# Patient Record
Sex: FEMALE | Race: WHITE | NOT HISPANIC OR LATINO | Employment: UNEMPLOYED | URBAN - METROPOLITAN AREA
[De-identification: names, ages, dates, MRNs, and addresses within clinical notes are randomized per-mention and may not be internally consistent; named-entity substitution may affect disease eponyms.]

---

## 2017-08-10 ENCOUNTER — HISTORICAL (OUTPATIENT)
Dept: ADMINISTRATIVE | Facility: HOSPITAL | Age: 52
End: 2017-08-10

## 2017-12-22 ENCOUNTER — HISTORICAL (OUTPATIENT)
Dept: RADIOLOGY | Facility: HOSPITAL | Age: 52
End: 2017-12-22

## 2019-02-20 ENCOUNTER — HISTORICAL (OUTPATIENT)
Dept: RADIOLOGY | Facility: HOSPITAL | Age: 54
End: 2019-02-20

## 2019-03-20 ENCOUNTER — HISTORICAL (OUTPATIENT)
Dept: RADIOLOGY | Facility: HOSPITAL | Age: 54
End: 2019-03-20

## 2020-09-09 ENCOUNTER — HISTORICAL (OUTPATIENT)
Dept: RADIOLOGY | Facility: HOSPITAL | Age: 55
End: 2020-09-09

## 2020-09-09 LAB
ABS NEUT (OLG): 6.9 X10(3)/MCL (ref 2.1–9.2)
ALBUMIN SERPL-MCNC: 3.7 GM/DL (ref 3.4–5)
ALBUMIN/GLOB SERPL: 1 RATIO (ref 1.1–2)
ALP SERPL-CCNC: 75 UNIT/L (ref 45–117)
ALT SERPL-CCNC: 22 UNIT/L (ref 12–78)
AST SERPL-CCNC: 25 UNIT/L (ref 15–37)
B-HCG SERPL QL: NEGATIVE
BASOPHILS # BLD AUTO: 0.1 X10(3)/MCL (ref 0–0.2)
BASOPHILS NFR BLD AUTO: 1 %
BILIRUB SERPL-MCNC: 0.4 MG/DL (ref 0.2–1)
BILIRUBIN DIRECT+TOT PNL SERPL-MCNC: 0.1 MG/DL (ref 0–0.2)
BILIRUBIN DIRECT+TOT PNL SERPL-MCNC: 0.3 MG/DL
BUN SERPL-MCNC: 22 MG/DL (ref 7–18)
CALCIUM SERPL-MCNC: 9 MG/DL (ref 8.5–10.1)
CHLORIDE SERPL-SCNC: 109 MMOL/L (ref 98–107)
CO2 SERPL-SCNC: 26 MMOL/L (ref 21–32)
CREAT SERPL-MCNC: 0.8 MG/DL (ref 0.6–1.3)
EOSINOPHIL # BLD AUTO: 0.2 X10(3)/MCL (ref 0–0.9)
EOSINOPHIL NFR BLD AUTO: 3 %
ERYTHROCYTE [DISTWIDTH] IN BLOOD BY AUTOMATED COUNT: 14 % (ref 11.5–14.5)
FERRITIN SERPL-MCNC: 18.8 NG/ML (ref 8–388)
GLOBULIN SER-MCNC: 3.7 GM/ML (ref 2.3–3.5)
GLUCOSE SERPL-MCNC: 107 MG/DL (ref 74–106)
HAV AB SER QL IA: REACTIVE
HAV IGM SERPL QL IA: NONREACTIVE
HBV CORE AB SERPL QL IA: REACTIVE
HBV SURFACE AB SER-ACNC: 6.51 M[IU]/ML
HBV SURFACE AB SERPL IA-ACNC: NONREACTIVE M[IU]/ML
HBV SURFACE AG SERPL QL IA: NONREACTIVE
HCT VFR BLD AUTO: 31.4 % (ref 35–46)
HGB BLD-MCNC: 9.8 GM/DL (ref 12–16)
HIV 1+2 AB+HIV1 P24 AG SERPL QL IA: NONREACTIVE
IMM GRANULOCYTES # BLD AUTO: 0.06 10*3/UL
IMM GRANULOCYTES NFR BLD AUTO: 1 %
INR PPP: 0.96 (ref 0.9–1.2)
LYMPHOCYTES # BLD AUTO: 1.5 X10(3)/MCL (ref 0.6–4.6)
LYMPHOCYTES NFR BLD AUTO: 16 %
MCH RBC QN AUTO: 29.3 PG (ref 26–34)
MCHC RBC AUTO-ENTMCNC: 31.2 GM/DL (ref 31–37)
MCV RBC AUTO: 93.7 FL (ref 80–100)
MONOCYTES # BLD AUTO: 0.6 X10(3)/MCL (ref 0.1–1.3)
MONOCYTES NFR BLD AUTO: 6 %
NEUTROPHILS # BLD AUTO: 6.9 X10(3)/MCL (ref 2.1–9.2)
NEUTROPHILS NFR BLD AUTO: 74 %
PLATELET # BLD AUTO: 295 X10(3)/MCL (ref 130–400)
PMV BLD AUTO: 10.5 FL (ref 7.4–10.4)
POTASSIUM SERPL-SCNC: 4.4 MMOL/L (ref 3.5–5.1)
PROT SERPL-MCNC: 7.4 GM/DL (ref 6.4–8.2)
PROTHROMBIN TIME: 12.4 SECOND(S) (ref 11.9–14.4)
RBC # BLD AUTO: 3.35 X10(6)/MCL (ref 4–5.2)
SODIUM SERPL-SCNC: 141 MMOL/L (ref 136–145)
T PALLIDUM AB SER QL: NONREACTIVE
WBC # SPEC AUTO: 9.3 X10(3)/MCL (ref 4.5–11)

## 2020-11-16 ENCOUNTER — HISTORICAL (OUTPATIENT)
Dept: RADIOLOGY | Facility: HOSPITAL | Age: 55
End: 2020-11-16

## 2020-11-30 ENCOUNTER — HISTORICAL (OUTPATIENT)
Dept: LAB | Facility: HOSPITAL | Age: 55
End: 2020-11-30

## 2020-11-30 LAB
ABS NEUT (OLG): 3.72 X10(3)/MCL (ref 2.1–9.2)
ALBUMIN SERPL-MCNC: 3.6 GM/DL (ref 3.5–5)
ALBUMIN/GLOB SERPL: 0.8 RATIO (ref 1.1–2)
ALP SERPL-CCNC: 62 UNIT/L (ref 40–150)
ALT SERPL-CCNC: 8 UNIT/L (ref 0–55)
AST SERPL-CCNC: 15 UNIT/L (ref 5–34)
BASOPHILS # BLD AUTO: 0.1 X10(3)/MCL (ref 0–0.2)
BASOPHILS NFR BLD AUTO: 1 %
BILIRUB SERPL-MCNC: 0.3 MG/DL
BILIRUBIN DIRECT+TOT PNL SERPL-MCNC: 0.1 MG/DL (ref 0–0.8)
BILIRUBIN DIRECT+TOT PNL SERPL-MCNC: 0.2 MG/DL (ref 0–0.5)
BUN SERPL-MCNC: 11 MG/DL (ref 9.8–20.1)
CALCIUM SERPL-MCNC: 9.8 MG/DL (ref 8.4–10.2)
CHLORIDE SERPL-SCNC: 103 MMOL/L (ref 98–107)
CO2 SERPL-SCNC: 24 MMOL/L (ref 22–29)
CREAT SERPL-MCNC: 0.75 MG/DL (ref 0.55–1.02)
EOSINOPHIL # BLD AUTO: 0.3 X10(3)/MCL (ref 0–0.9)
EOSINOPHIL NFR BLD AUTO: 4 %
ERYTHROCYTE [DISTWIDTH] IN BLOOD BY AUTOMATED COUNT: 15.5 % (ref 11.5–17)
GLOBULIN SER-MCNC: 4.4 GM/DL (ref 2.4–3.5)
GLUCOSE SERPL-MCNC: 110 MG/DL (ref 74–100)
HCT VFR BLD AUTO: 38.3 % (ref 37–47)
HGB BLD-MCNC: 11.5 GM/DL (ref 12–16)
IMM GRANULOCYTES # BLD AUTO: 0.01 % (ref 0–0.02)
IMM GRANULOCYTES NFR BLD AUTO: 0.2 % (ref 0–0.43)
INR PPP: 0.9 (ref 0–1.3)
LYMPHOCYTES # BLD AUTO: 1.7 X10(3)/MCL (ref 0.6–4.6)
LYMPHOCYTES NFR BLD AUTO: 27 %
MCH RBC QN AUTO: 26.2 PG (ref 27–31)
MCHC RBC AUTO-ENTMCNC: 30 GM/DL (ref 33–36)
MCV RBC AUTO: 87.2 FL (ref 80–94)
MONOCYTES # BLD AUTO: 0.5 X10(3)/MCL (ref 0.1–1.3)
MONOCYTES NFR BLD AUTO: 8 %
NEUTROPHILS # BLD AUTO: 3.72 X10(3)/MCL (ref 1.4–7.9)
NEUTROPHILS NFR BLD AUTO: 59 %
PLATELET # BLD AUTO: 375 X10(3)/MCL (ref 130–400)
PMV BLD AUTO: 11.1 FL (ref 9.4–12.4)
POTASSIUM SERPL-SCNC: 4.1 MMOL/L (ref 3.5–5.1)
PROT SERPL-MCNC: 8 GM/DL (ref 6.4–8.3)
PROTHROMBIN TIME: 12.1 SECOND(S) (ref 11.1–13.7)
RBC # BLD AUTO: 4.39 X10(6)/MCL (ref 4.2–5.4)
SODIUM SERPL-SCNC: 138 MMOL/L (ref 136–145)
WBC # SPEC AUTO: 6.3 X10(3)/MCL (ref 4.5–11.5)

## 2020-12-11 ENCOUNTER — HISTORICAL (OUTPATIENT)
Dept: GASTROENTEROLOGY | Facility: CLINIC | Age: 55
End: 2020-12-11

## 2020-12-11 LAB — SARS-COV-2 RNA RESP QL NAA+PROBE: NOT DETECTED

## 2020-12-16 ENCOUNTER — HISTORICAL (OUTPATIENT)
Dept: ENDOSCOPY | Facility: HOSPITAL | Age: 55
End: 2020-12-16

## 2020-12-23 ENCOUNTER — HISTORICAL (OUTPATIENT)
Dept: LAB | Facility: HOSPITAL | Age: 55
End: 2020-12-23

## 2020-12-23 LAB
ABS NEUT (OLG): 3.24 X10(3)/MCL (ref 2.1–9.2)
ALBUMIN SERPL-MCNC: 3.6 GM/DL (ref 3.5–5)
ALBUMIN/GLOB SERPL: 1 RATIO (ref 1.1–2)
ALP SERPL-CCNC: 68 UNIT/L (ref 40–150)
ALT SERPL-CCNC: 5 UNIT/L (ref 0–55)
AST SERPL-CCNC: 15 UNIT/L (ref 5–34)
BASOPHILS # BLD AUTO: 0 X10(3)/MCL (ref 0–0.2)
BASOPHILS NFR BLD AUTO: 1 %
BILIRUB SERPL-MCNC: 0.2 MG/DL
BILIRUBIN DIRECT+TOT PNL SERPL-MCNC: <0.1 MG/DL (ref 0–0.5)
BILIRUBIN DIRECT+TOT PNL SERPL-MCNC: >0.1 MG/DL (ref 0–0.8)
BUN SERPL-MCNC: 8.8 MG/DL (ref 9.8–20.1)
CALCIUM SERPL-MCNC: 9.1 MG/DL (ref 8.4–10.2)
CHLORIDE SERPL-SCNC: 106 MMOL/L (ref 98–107)
CO2 SERPL-SCNC: 28 MMOL/L (ref 22–29)
CREAT SERPL-MCNC: 0.75 MG/DL (ref 0.55–1.02)
EOSINOPHIL # BLD AUTO: 0.2 X10(3)/MCL (ref 0–0.9)
EOSINOPHIL NFR BLD AUTO: 4 %
ERYTHROCYTE [DISTWIDTH] IN BLOOD BY AUTOMATED COUNT: 15.6 % (ref 11.5–17)
GLOBULIN SER-MCNC: 3.5 GM/DL (ref 2.4–3.5)
GLUCOSE SERPL-MCNC: 104 MG/DL (ref 74–100)
HCT VFR BLD AUTO: 36.2 % (ref 37–47)
HGB BLD-MCNC: 11 GM/DL (ref 12–16)
IMM GRANULOCYTES # BLD AUTO: 0.01 % (ref 0–0.02)
IMM GRANULOCYTES NFR BLD AUTO: 0.2 % (ref 0–0.43)
INR PPP: 1 (ref 0–1.3)
LYMPHOCYTES # BLD AUTO: 1.5 X10(3)/MCL (ref 0.6–4.6)
LYMPHOCYTES NFR BLD AUTO: 28 %
MCH RBC QN AUTO: 26.8 PG (ref 27–31)
MCHC RBC AUTO-ENTMCNC: 30.4 GM/DL (ref 33–36)
MCV RBC AUTO: 88.1 FL (ref 80–94)
MONOCYTES # BLD AUTO: 0.4 X10(3)/MCL (ref 0.1–1.3)
MONOCYTES NFR BLD AUTO: 7 %
NEUTROPHILS # BLD AUTO: 3.24 X10(3)/MCL (ref 1.4–7.9)
NEUTROPHILS NFR BLD AUTO: 60 %
PLATELET # BLD AUTO: 300 X10(3)/MCL (ref 130–400)
PMV BLD AUTO: 10.4 FL (ref 9.4–12.4)
POTASSIUM SERPL-SCNC: 4.4 MMOL/L (ref 3.5–5.1)
PROT SERPL-MCNC: 7.1 GM/DL (ref 6.4–8.3)
PROTHROMBIN TIME: 12.8 SECOND(S) (ref 11.1–13.7)
RBC # BLD AUTO: 4.11 X10(6)/MCL (ref 4.2–5.4)
SODIUM SERPL-SCNC: 142 MMOL/L (ref 136–145)
WBC # SPEC AUTO: 5.4 X10(3)/MCL (ref 4.5–11.5)

## 2021-01-26 ENCOUNTER — HISTORICAL (OUTPATIENT)
Dept: LAB | Facility: HOSPITAL | Age: 56
End: 2021-01-26

## 2021-01-26 LAB
ABS NEUT (OLG): 3.96 X10(3)/MCL (ref 2.1–9.2)
ALBUMIN SERPL-MCNC: 3.5 GM/DL (ref 3.5–5)
ALBUMIN/GLOB SERPL: 0.9 RATIO (ref 1.1–2)
ALP SERPL-CCNC: 51 UNIT/L (ref 40–150)
ALT SERPL-CCNC: 9 UNIT/L (ref 0–55)
AST SERPL-CCNC: 16 UNIT/L (ref 5–34)
BASOPHILS # BLD AUTO: 0 X10(3)/MCL (ref 0–0.2)
BASOPHILS NFR BLD AUTO: 1 %
BILIRUB SERPL-MCNC: 0.3 MG/DL
BILIRUBIN DIRECT+TOT PNL SERPL-MCNC: 0.1 MG/DL (ref 0–0.5)
BILIRUBIN DIRECT+TOT PNL SERPL-MCNC: 0.2 MG/DL (ref 0–0.8)
BUN SERPL-MCNC: 8 MG/DL (ref 9.8–20.1)
CALCIUM SERPL-MCNC: 9.3 MG/DL (ref 8.4–10.2)
CHLORIDE SERPL-SCNC: 104 MMOL/L (ref 98–107)
CO2 SERPL-SCNC: 26 MMOL/L (ref 22–29)
CREAT SERPL-MCNC: 0.77 MG/DL (ref 0.55–1.02)
EOSINOPHIL # BLD AUTO: 0.4 X10(3)/MCL (ref 0–0.9)
EOSINOPHIL NFR BLD AUTO: 6 %
ERYTHROCYTE [DISTWIDTH] IN BLOOD BY AUTOMATED COUNT: 17.3 % (ref 11.5–17)
GLOBULIN SER-MCNC: 3.9 GM/DL (ref 2.4–3.5)
GLUCOSE SERPL-MCNC: 113 MG/DL (ref 74–100)
HCT VFR BLD AUTO: 35.7 % (ref 37–47)
HGB BLD-MCNC: 10.9 GM/DL (ref 12–16)
IMM GRANULOCYTES # BLD AUTO: 0.01 % (ref 0–0.02)
IMM GRANULOCYTES NFR BLD AUTO: 0.2 % (ref 0–0.43)
INR PPP: 1 (ref 0–1.3)
LYMPHOCYTES # BLD AUTO: 1.7 X10(3)/MCL (ref 0.6–4.6)
LYMPHOCYTES NFR BLD AUTO: 26 %
MCH RBC QN AUTO: 27.5 PG (ref 27–31)
MCHC RBC AUTO-ENTMCNC: 30.5 GM/DL (ref 33–36)
MCV RBC AUTO: 89.9 FL (ref 80–94)
MONOCYTES # BLD AUTO: 0.4 X10(3)/MCL (ref 0.1–1.3)
MONOCYTES NFR BLD AUTO: 6 %
NEUTROPHILS # BLD AUTO: 3.96 X10(3)/MCL (ref 1.4–7.9)
NEUTROPHILS NFR BLD AUTO: 61 %
PLATELET # BLD AUTO: 342 X10(3)/MCL (ref 130–400)
PMV BLD AUTO: 10.3 FL (ref 9.4–12.4)
POTASSIUM SERPL-SCNC: 4 MMOL/L (ref 3.5–5.1)
PROT SERPL-MCNC: 7.4 GM/DL (ref 6.4–8.3)
PROTHROMBIN TIME: 12.5 SECOND(S) (ref 11.1–13.7)
RBC # BLD AUTO: 3.97 X10(6)/MCL (ref 4.2–5.4)
SODIUM SERPL-SCNC: 139 MMOL/L (ref 136–145)
WBC # SPEC AUTO: 6.5 X10(3)/MCL (ref 4.5–11.5)

## 2021-03-24 ENCOUNTER — HISTORICAL (OUTPATIENT)
Dept: LAB | Facility: HOSPITAL | Age: 56
End: 2021-03-24

## 2021-03-24 LAB — DEPRECATED CALCIDIOL+CALCIFEROL SERPL-MC: 24 NG/ML (ref 30–80)

## 2021-04-20 ENCOUNTER — HISTORICAL (OUTPATIENT)
Dept: LAB | Facility: HOSPITAL | Age: 56
End: 2021-04-20

## 2021-12-15 ENCOUNTER — HISTORICAL (OUTPATIENT)
Dept: LAB | Facility: HOSPITAL | Age: 56
End: 2021-12-15

## 2021-12-15 LAB — TSH SERPL-ACNC: 1.22 UIU/ML (ref 0.35–4.94)

## 2022-02-21 ENCOUNTER — HISTORICAL (OUTPATIENT)
Dept: ADMINISTRATIVE | Facility: HOSPITAL | Age: 57
End: 2022-02-21

## 2022-02-21 ENCOUNTER — HISTORICAL (OUTPATIENT)
Dept: LAB | Facility: HOSPITAL | Age: 57
End: 2022-02-21

## 2022-02-21 LAB
ALBUMIN SERPL-MCNC: 3.3 G/DL (ref 3.5–5)
ALBUMIN/GLOB SERPL: 0.9 {RATIO} (ref 1.1–2)
ALP SERPL-CCNC: 62 U/L (ref 40–150)
ALT SERPL-CCNC: 5 U/L (ref 0–55)
APPEARANCE, UA: CLEAR
AST SERPL-CCNC: 12 U/L (ref 5–34)
BACTERIA SPEC CULT: NORMAL
BILIRUB SERPL-MCNC: 0.2 MG/DL
BILIRUB UR QL STRIP: NORMAL
BILIRUBIN DIRECT+TOT PNL SERPL-MCNC: 0.1 (ref 0–0.5)
BILIRUBIN DIRECT+TOT PNL SERPL-MCNC: 0.1 (ref 0–0.8)
BUN SERPL-MCNC: 13.1 MG/DL (ref 9.8–20.1)
CALCIUM SERPL-MCNC: 9.3 MG/DL (ref 8.7–10.5)
CHLORIDE SERPL-SCNC: 105 MMOL/L (ref 98–107)
CHOLEST SERPL-MCNC: 154 MG/DL
CHOLEST/HDLC SERPL: 3 {RATIO} (ref 0–5)
CO2 SERPL-SCNC: 28 MMOL/L (ref 22–29)
COLOR UR: YELLOW
CREAT SERPL-MCNC: 0.73 MG/DL (ref 0.55–1.02)
ERYTHROCYTE [DISTWIDTH] IN BLOOD BY AUTOMATED COUNT: 15.8 % (ref 11.5–17)
GLOBULIN SER-MCNC: 3.6 G/DL (ref 2.4–3.5)
GLUCOSE (UA): NEGATIVE
GLUCOSE SERPL-MCNC: 108 MG/DL (ref 74–100)
HCT VFR BLD AUTO: 23.5 % (ref 37–47)
HDLC SERPL-MCNC: 47 MG/DL (ref 35–60)
HEMOLYSIS INTERF INDEX SERPL-ACNC: <0
HGB BLD-MCNC: 6.7 G/DL (ref 12–16)
HGB UR QL STRIP: NEGATIVE
ICTERIC INTERF INDEX SERPL-ACNC: 0
KETONES UR QL STRIP: 15
LDLC SERPL CALC-MCNC: 96 MG/DL (ref 50–140)
LEUKOCYTE ESTERASE UR QL STRIP: NEGATIVE
LIPEMIC INTERF INDEX SERPL-ACNC: <0
MCH RBC QN AUTO: 22 PG (ref 27–31)
MCHC RBC AUTO-ENTMCNC: 28.5 G/DL (ref 33–36)
MCV RBC AUTO: 77.3 FL (ref 80–94)
NITRITE UR QL STRIP: NEGATIVE
PH UR STRIP: 6.5 [PH] (ref 5–9)
PLATELET # BLD AUTO: 218 10*3/UL (ref 130–400)
PMV BLD AUTO: 9.7 FL (ref 9.4–12.4)
POTASSIUM SERPL-SCNC: 4.7 MMOL/L (ref 3.5–5.1)
PROT SERPL-MCNC: 6.9 G/DL (ref 6.4–8.3)
PROT UR QL STRIP: 30
RBC # BLD AUTO: 3.04 10*6/UL (ref 4.2–5.4)
RBC #/AREA URNS HPF: NORMAL /[HPF] (ref 0–2)
SODIUM SERPL-SCNC: 140 MMOL/L (ref 136–145)
SP GR UR STRIP: 1.02 (ref 1–1.03)
SQUAMOUS EPITHELIAL, UA: NORMAL
TRIGL SERPL-MCNC: 54 MG/DL (ref 37–140)
TSH SERPL-ACNC: 1.12 M[IU]/L (ref 0.35–4.94)
UROBILINOGEN UR STRIP-ACNC: 1
VLDLC SERPL CALC-MCNC: 11 MG/DL
WBC # SPEC AUTO: 5.1 10*3/UL (ref 4.5–11.5)
WBC #/AREA URNS HPF: NORMAL /[HPF] (ref 0–2)

## 2022-02-24 ENCOUNTER — HISTORICAL (OUTPATIENT)
Dept: LAB | Facility: HOSPITAL | Age: 57
End: 2022-02-24

## 2022-04-10 ENCOUNTER — HISTORICAL (OUTPATIENT)
Dept: ADMINISTRATIVE | Facility: HOSPITAL | Age: 57
End: 2022-04-10
Payer: MEDICAID

## 2022-04-27 VITALS
HEIGHT: 61 IN | DIASTOLIC BLOOD PRESSURE: 81 MMHG | SYSTOLIC BLOOD PRESSURE: 126 MMHG | BODY MASS INDEX: 27.8 KG/M2 | WEIGHT: 147.25 LBS

## 2022-05-04 NOTE — HISTORICAL [facility] C[facility]ER
This is a historical note converted from [employer]. Formatting and pictures may have been removed.  Please reference [employer] for original formatting and attached multimedia. History of Present Illness  55F with PMH of CAD s/p stenting on Plavix (last took last week), liver cirrhosis currently on treatment for chronic hepatitis C?and hepatitis B,?who presents for EGD today. She has a history of gastric ulcers treated 5 years ago in [place]. She reports good appetite, tolerating regular diet without any nausea or vomiting. No odynophagia or dysphagia. No abdominal pain or reflux symptoms. Regular BMs every 2 days, soft and brown. No melena or hematochezia. No pain with BMs.  ?  Last EGD: Apparently had EGD done 4-5 years ago in [place],?for gastric ulcers, reports that she took multiple medications for 6 weeks. Does not remember if she?was tested for H.?pylori.?Does not recall being told she had?varices present.  ?   Last colonoscopy was about 5 years ago, found some polyps but unsure of pathology, was told needed repeat scope in 5 years.  Review of Systems  See HPI, otherwise negative.  Physical Exam  Gen: NAD  CV: RRR  Resp: equal chest rise bilaterally  Abdomen: soft, NT, ND  Extremities: moving all 4, no edema  Assessment/Plan  55F with PMH of CAD s/p stenting on Plavix (last took last week), liver cirrhosis currently on treatment for chronic hepatitis C?and hepatitis B,?who presents for EGD today.  ?   -Risks and benefits of procedure discussed, consents signed.  -Proceed with endoscopic procedure with planned discharge to home afterwards.  ?   Adela Ambrose MD  [employer] General Surgery - PGY2   Problem List/Past Medical History  Ongoing  Bipolar  Chronic pain  Chronic pain  GERD - Gastro-esophageal reflux disease  Hepatitis C  HTN (hypertension)  HTN - Hypertension  Manic depression  Historical  Blockage of coronary artery of heart  Procedure/Surgical History  Angiogram (11.2020)  Biopsy, breast, with  placement of breast localization device(s) (eg, clip, metallic pellet), when performed, and imaging of the biopsy specimen, when performed, percutaneous; each additional lesion, including stereotactic guidance (List separately in kimberly (01/14/2016)  Biopsy, breast, with placement of breast localization device(s) (eg, clip, metallic pellet), when performed, and imaging of the biopsy specimen, when performed, percutaneous; first lesion, including stereotactic guidance (01/14/2016)  Excision of Left Breast, Percutaneous Approach, Diagnostic (01/14/2016)  Drainage of Right Middle Ear with Drainage Device, Open Approach (12/14/2015)  Replacement of Right Temporal Bone with Nonautologous Tissue Substitute, Open Approach (12/14/2015)  Tympanomastoidectomy (None) (12/14/2015)  Tympanoplasty with mastoidectomy (including canalplasty, middle ear surgery, tympanic membrane repair); with ossicular chain reconstruction (12/14/2015)  Tympanostomy (requiring insertion of ventilating tube), general anesthesia (12/14/2015)  angiogram (2015)  cardiac stent (2013)  right foot sx (2011)  hemorrhoidectomy (1998)  R hip fx (1974)  T&A, L ear sx, PET tubes (1974)  Colonoscopy   Medications  Inpatient  buffered lidocaine 2% - 0.5 ml syringe, 10 mg= 0.5 mL, Subcutaneous, As Directed  IVF Lactated Ringers LR Infusion 1,000 mL, 1000 mL, IV  Home  carvedilol 6.25 mg oral tablet, 6.25 mg= 1 tab(s), Oral, BID  clopidogrel 75 mg oral tablet, 75 mg= 1 tab(s), Oral, Daily  DULoxetine 60 mg oral delayed release capsule, 60 mg= 1 cap(s), Oral, Daily  lamiVUDine 100 mg oral tablet, 100 mg= 1 tab(s), Oral, Daily, 2 refills  lisinopril 20 mg oral tablet, 20 mg= 1 tab(s), Oral, Daily  PRAVASTATIN NA 40 MG TAB, 40 mg= 1 tab(s), Oral, Daily  sofosbuvir-velpatasvir 400 mg-100 mg oral tablet, 1 tab(s), Oral, Daily  traMADol 50 mg oral tablet, 50 mg= 1 tab(s), Oral, q12hr, PRN  Allergies  No Known Medication Allergies  Social History  Abuse/Neglect  No, No,  Yes, 12/15/2020  Alcohol - Low Risk, 09/07/2015  Past, Beer, 3-5 times per week, Started age 17 Years. Stopped age 34 Years. Previous treatment: None. Alcohol use interferes with work or home: No. Drinks more than intended: No. Others hurt by drinking: No., 08/18/2015  Employment/School  DISABLED, Highest education level: None., 08/18/2015  Home/Environment  Lives with Siblings. Living situation: Home/Independent. Alcohol abuse in household: No. Substance abuse in household: No. Smoker in household: Yes. Injuries/Abuse/Neglect in household: No. Feels unsafe at home: No. Safe place to go: Yes. Family/Friends available for support: Yes., 08/18/2015  Nutrition/Health  Regular, 08/18/2015  Sexual  Sexually active: No. Sexual orientation: Straight or heterosexual. History of sexual abuse: No. Gender Identity Identifies as female., 02/28/2019  Substance Use - High Risk, 12/10/2015  Past, Marijuana, 12/15/2020  Tobacco - High Risk, 09/07/2015  5-9 cigarettes (between 1/4 to 1/2 pack)/day in last 30 days, Cigars, No, 10 per day., 12/15/2020  Family History  Alcoholism.: Father.  Anxiety.: Sister.  Bipolar 1 disorder.: Sister.  Depression.: Sister.  Diabetes mellitus type 2: Mother.  Glaucoma.: Brother.  Heart disease.: Mother.  Hypertension.: Mother, Sister and Brother.  Immunizations  Vaccine Date Status   pneumococcal 13-valent conjugate vaccine 10/27/2020 Given   tetanus/diphtheria/pertussis, acel(Tdap) 09/23/2020 Given   influenza virus vaccine, inactivated 09/23/2020 Given   hepatitis A-hepatitis B vaccine 07/01/2014 Recorded   hepatitis A-hepatitis B vaccine 01/02/2014 Recorded   hepatitis A-hepatitis B vaccine 02/13/2012 Recorded

## 2022-06-21 ENCOUNTER — HOSPITAL ENCOUNTER (EMERGENCY)
Facility: HOSPITAL | Age: 57
Discharge: HOME | End: 2022-06-21
Attending: FAMILY MEDICINE
Payer: MEDICAID

## 2022-06-21 VITALS
BODY MASS INDEX: 25.52 KG/M2 | TEMPERATURE: 97 F | OXYGEN SATURATION: 98 % | WEIGHT: 130 LBS | SYSTOLIC BLOOD PRESSURE: 112 MMHG | RESPIRATION RATE: 20 BRPM | DIASTOLIC BLOOD PRESSURE: 69 MMHG | HEART RATE: 67 BPM | HEIGHT: 60 IN

## 2022-06-21 DIAGNOSIS — R52 PAIN: ICD-10-CM

## 2022-06-21 DIAGNOSIS — S63.502A SPRAIN OF LEFT WRIST, INITIAL ENCOUNTER: Primary | ICD-10-CM

## 2022-06-21 PROCEDURE — 96372 THER/PROPH/DIAG INJ SC/IM: CPT | Performed by: FAMILY MEDICINE

## 2022-06-21 PROCEDURE — 63600175 PHARM REV CODE 636 W HCPCS: Performed by: FAMILY MEDICINE

## 2022-06-21 PROCEDURE — 99284 EMERGENCY DEPT VISIT MOD MDM: CPT | Mod: 25

## 2022-06-21 PROCEDURE — 29126 APPL SHORT ARM SPLINT DYN: CPT | Mod: LT

## 2022-06-21 RX ORDER — KETOROLAC TROMETHAMINE 30 MG/ML
30 INJECTION, SOLUTION INTRAMUSCULAR; INTRAVENOUS
Status: COMPLETED | OUTPATIENT
Start: 2022-06-21 | End: 2022-06-21

## 2022-06-21 RX ORDER — DICLOFENAC SODIUM 50 MG/1
50 TABLET, DELAYED RELEASE ORAL 3 TIMES DAILY
Qty: 9 TABLET | Refills: 0 | Status: SHIPPED | OUTPATIENT
Start: 2022-06-21 | End: 2022-06-24

## 2022-06-21 RX ADMIN — KETOROLAC TROMETHAMINE 30 MG: 30 INJECTION, SOLUTION INTRAMUSCULAR at 11:06

## 2022-06-22 NOTE — ED PROVIDER NOTES
Encounter Date: 6/21/2022       History     Chief Complaint   Patient presents with    Wrist Pain     Fell 1 week ago and broke her fall with her left wrist, states it burns like arthritis.     Patient is a 56-year-old female patient comes in with a fall 1 week ago and is now having pain otherwise patient has no nausea no vomiting no diarrhea no fever chills or night sweats no head pain no loss        Review of patient's allergies indicates:  No Known Allergies  No past medical history on file.  No past surgical history on file.  No family history on file.     Review of Systems   Constitutional: Negative for fever.   HENT: Negative for sore throat.    Respiratory: Negative for shortness of breath.    Cardiovascular: Negative for chest pain.   Gastrointestinal: Negative for nausea.   Genitourinary: Negative for dysuria.   Musculoskeletal: Positive for myalgias. Negative for back pain.   Skin: Negative for rash.   Neurological: Negative for weakness.   Hematological: Does not bruise/bleed easily.       Physical Exam     Initial Vitals [06/21/22 2227]   BP Pulse Resp Temp SpO2   112/69 67 20 96.8 °F (36 °C) 98 %      MAP       --         Physical Exam    Nursing note and vitals reviewed.  Constitutional: She appears well-developed.   HENT:   Head: Normocephalic and atraumatic.   Right Ear: External ear normal.   Left Ear: External ear normal.   Nose: Nose normal.   Mouth/Throat: Oropharynx is clear and moist. No oropharyngeal exudate.   Eyes: Conjunctivae and EOM are normal. Pupils are equal, round, and reactive to light. Right eye exhibits no discharge. Left eye exhibits no discharge.   Neck: Neck supple. No tracheal deviation present. No JVD present.   Normal range of motion.  Cardiovascular: Normal rate, regular rhythm, normal heart sounds and intact distal pulses. Exam reveals no gallop and no friction rub.    No murmur heard.  Pulmonary/Chest: Breath sounds normal. No stridor. No respiratory distress. She has no  wheezes. She has no rhonchi. She has no rales.   Abdominal: Abdomen is soft. Bowel sounds are normal. She exhibits no distension and no mass. There is no abdominal tenderness. There is no rebound and no guarding.   Musculoskeletal:         General: Tenderness present. Normal range of motion.      Cervical back: Normal range of motion and neck supple.     Neurological: She is alert and oriented to person, place, and time. She has normal strength. No cranial nerve deficit.   Skin: Skin is warm and dry. No rash and no abscess noted. No erythema.   Psychiatric: She has a normal mood and affect. Her behavior is normal. Judgment and thought content normal.         ED Course   Procedures  Labs Reviewed - No data to display       Imaging Results          X-Ray Wrist Complete Left (Preliminary result)  Result time 06/21/22 22:50:52    ED Interpretation by Judah Canada MD (06/21/22 22:50:52, [facility] - Emergency Dept, Emergency Medicine)    Please see official radiology report otherwise ED interpretation is no acute process.                                 Medications - No data to display                       Clinical Impression:   Final diagnoses:  [R52] Pain  [S63.502A] Sprain of left wrist, initial encounter (Primary)          ED Disposition Condition    Discharge Stable        ED Prescriptions     Medication Sig Dispense Start Date End Date Auth. Provider    diclofenac (VOLTAREN) 50 MG EC tablet Take 1 tablet (50 mg total) by mouth 3 (three) times daily. for 3 days 9 tablet 6/21/2022 6/24/2022 Judah Canada MD        Follow-up Information    None          Judah Canada MD  06/21/22 1698

## 2022-09-19 DIAGNOSIS — Z12.31 OTHER SCREENING MAMMOGRAM: Primary | ICD-10-CM

## 2022-10-03 ENCOUNTER — HOSPITAL ENCOUNTER (EMERGENCY)
Facility: HOSPITAL | Age: 57
Discharge: HOME | End: 2022-10-03
Attending: FAMILY MEDICINE
Payer: MEDICAID

## 2022-10-03 VITALS
WEIGHT: 126.38 LBS | OXYGEN SATURATION: 98 % | BODY MASS INDEX: 24.81 KG/M2 | HEART RATE: 91 BPM | RESPIRATION RATE: 24 BRPM | TEMPERATURE: 98 F | SYSTOLIC BLOOD PRESSURE: 112 MMHG | DIASTOLIC BLOOD PRESSURE: 81 MMHG | HEIGHT: 60 IN

## 2022-10-03 DIAGNOSIS — J40 BRONCHITIS: ICD-10-CM

## 2022-10-03 DIAGNOSIS — K92.0 HEMATEMESIS, UNSPECIFIED WHETHER NAUSEA PRESENT: Primary | ICD-10-CM

## 2022-10-03 DIAGNOSIS — R06.02 SOB (SHORTNESS OF BREATH): ICD-10-CM

## 2022-10-03 LAB
ALBUMIN SERPL-MCNC: 4 GM/DL (ref 3.5–5)
ALBUMIN/GLOB SERPL: 1.1 RATIO (ref 1.1–2)
ALP SERPL-CCNC: 63 UNIT/L (ref 40–150)
ALT SERPL-CCNC: 6 UNIT/L (ref 0–55)
APPEARANCE UR: ABNORMAL
APTT PPP: 27.2 SECONDS (ref 23.2–33.7)
AST SERPL-CCNC: 20 UNIT/L (ref 5–34)
BACTERIA #/AREA URNS AUTO: ABNORMAL /HPF
BASOPHILS # BLD AUTO: 0.03 X10(3)/MCL (ref 0–0.2)
BASOPHILS NFR BLD AUTO: 0.4 %
BILIRUB UR QL STRIP.AUTO: ABNORMAL MG/DL
BILIRUBIN DIRECT+TOT PNL SERPL-MCNC: 0.3 MG/DL
BUN SERPL-MCNC: 10.6 MG/DL (ref 9.8–20.1)
CALCIUM SERPL-MCNC: 9.6 MG/DL (ref 8.4–10.2)
CHLORIDE SERPL-SCNC: 103 MMOL/L (ref 98–107)
CO2 SERPL-SCNC: 22 MMOL/L (ref 22–29)
COLOR UR AUTO: ABNORMAL
CREAT SERPL-MCNC: 0.79 MG/DL (ref 0.55–1.02)
D DIMER PPP IA.FEU-MCNC: 0.96 UG/ML FEU (ref 0–0.5)
EOSINOPHIL # BLD AUTO: 0.18 X10(3)/MCL (ref 0–0.9)
EOSINOPHIL NFR BLD AUTO: 2.2 %
ERYTHROCYTE [DISTWIDTH] IN BLOOD BY AUTOMATED COUNT: 13.4 % (ref 11.5–17)
FLUAV AG UPPER RESP QL IA.RAPID: NOT DETECTED
FLUBV AG UPPER RESP QL IA.RAPID: NOT DETECTED
GFR SERPLBLD CREATININE-BSD FMLA CKD-EPI: >60 MLS/MIN/1.73/M2
GLOBULIN SER-MCNC: 3.7 GM/DL (ref 2.4–3.5)
GLUCOSE SERPL-MCNC: 125 MG/DL (ref 74–100)
GLUCOSE UR QL STRIP.AUTO: NEGATIVE MG/DL
HCT VFR BLD AUTO: 42.6 % (ref 37–47)
HGB BLD-MCNC: 13.2 GM/DL (ref 12–16)
IMM GRANULOCYTES # BLD AUTO: 0.02 X10(3)/MCL (ref 0–0.04)
IMM GRANULOCYTES NFR BLD AUTO: 0.2 %
INR BLD: 1.07 (ref 0–1.3)
KETONES UR QL STRIP.AUTO: ABNORMAL MG/DL
LEUKOCYTE ESTERASE UR QL STRIP.AUTO: ABNORMAL UNIT/L
LYMPHOCYTES # BLD AUTO: 2 X10(3)/MCL (ref 0.6–4.6)
LYMPHOCYTES NFR BLD AUTO: 24.5 %
MCH RBC QN AUTO: 28.4 PG (ref 27–31)
MCHC RBC AUTO-ENTMCNC: 31 MG/DL (ref 33–36)
MCV RBC AUTO: 91.8 FL (ref 80–94)
MONOCYTES # BLD AUTO: 0.48 X10(3)/MCL (ref 0.1–1.3)
MONOCYTES NFR BLD AUTO: 5.9 %
MUCOUS THREADS URNS QL MICRO: ABNORMAL /LPF
NEUTROPHILS # BLD AUTO: 5.5 X10(3)/MCL (ref 2.1–9.2)
NEUTROPHILS NFR BLD AUTO: 66.8 %
NITRITE UR QL STRIP.AUTO: NEGATIVE
PH UR STRIP.AUTO: 5.5 [PH]
PLATELET # BLD AUTO: 352 X10(3)/MCL (ref 130–400)
PMV BLD AUTO: 10.4 FL (ref 7.4–10.4)
POC CARDIAC TROPONIN I: 0 NG/ML
POTASSIUM SERPL-SCNC: 4.4 MMOL/L (ref 3.5–5.1)
PROT SERPL-MCNC: 7.7 GM/DL (ref 6.4–8.3)
PROT UR QL STRIP.AUTO: NEGATIVE MG/DL
PROTHROMBIN TIME: 10.8 SECONDS (ref 12.5–14.5)
RBC # BLD AUTO: 4.64 X10(6)/MCL (ref 4.2–5.4)
RBC #/AREA URNS AUTO: ABNORMAL /HPF
RBC UR QL AUTO: NEGATIVE UNIT/L
SAMPLE: NORMAL
SARS-COV-2 RNA RESP QL NAA+PROBE: NOT DETECTED
SODIUM SERPL-SCNC: 139 MMOL/L (ref 136–145)
SP GR UR STRIP.AUTO: 1.02
SQUAMOUS #/AREA URNS AUTO: ABNORMAL /HPF
UROBILINOGEN UR STRIP-ACNC: 0.2 MG/DL
WBC # SPEC AUTO: 8.2 X10(3)/MCL (ref 4.5–11.5)
WBC #/AREA URNS AUTO: ABNORMAL /HPF

## 2022-10-03 PROCEDURE — 85025 COMPLETE CBC W/AUTO DIFF WBC: CPT | Performed by: FAMILY MEDICINE

## 2022-10-03 PROCEDURE — 36415 COLL VENOUS BLD VENIPUNCTURE: CPT | Performed by: FAMILY MEDICINE

## 2022-10-03 PROCEDURE — 93005 ELECTROCARDIOGRAM TRACING: CPT

## 2022-10-03 PROCEDURE — 99285 EMERGENCY DEPT VISIT HI MDM: CPT | Mod: 25

## 2022-10-03 PROCEDURE — 85610 PROTHROMBIN TIME: CPT | Performed by: FAMILY MEDICINE

## 2022-10-03 PROCEDURE — 0241U COVID/FLU A&B PCR: CPT | Performed by: FAMILY MEDICINE

## 2022-10-03 PROCEDURE — 80053 COMPREHEN METABOLIC PANEL: CPT | Performed by: FAMILY MEDICINE

## 2022-10-03 PROCEDURE — 85379 FIBRIN DEGRADATION QUANT: CPT | Performed by: FAMILY MEDICINE

## 2022-10-03 PROCEDURE — 81001 URINALYSIS AUTO W/SCOPE: CPT | Performed by: FAMILY MEDICINE

## 2022-10-03 PROCEDURE — 25500020 PHARM REV CODE 255: Performed by: FAMILY MEDICINE

## 2022-10-03 PROCEDURE — 93010 EKG 12-LEAD: ICD-10-PCS | Mod: ,,, | Performed by: INTERNAL MEDICINE

## 2022-10-03 PROCEDURE — 93010 ELECTROCARDIOGRAM REPORT: CPT | Mod: ,,, | Performed by: INTERNAL MEDICINE

## 2022-10-03 PROCEDURE — 85730 THROMBOPLASTIN TIME PARTIAL: CPT | Performed by: FAMILY MEDICINE

## 2022-10-03 PROCEDURE — 84484 ASSAY OF TROPONIN QUANT: CPT

## 2022-10-03 RX ADMIN — IOPAMIDOL 100 ML: 755 INJECTION, SOLUTION INTRAVENOUS at 03:10

## 2022-10-03 NOTE — DISCHARGE INSTRUCTIONS
Small lung mass documented on chest CT follow-up with primary care physician within next 2-3 days for observation via CT

## 2022-10-03 NOTE — ED PROVIDER NOTES
Encounter Date: 10/3/2022       History     Chief Complaint   Patient presents with    Hematemesis     Pt reports coughing this morning and coughing up mucous, then brown/red blood after coughing episode. Pt reports being on Plavix, has had past blood transfusions. Pt reports fatigue, abd discomfort when vomiting, denies pain now. Pt denies blood in urine or stool. Pt reports weight loss of 10 lbs in two weeks without trying, current smoker.      57-year-old comes in with complaint of coughing that has blood mixed in otherwise no blood in the patient's stool urine and is currently a smoker and has been throughout her past.      Review of patient's allergies indicates:  No Known Allergies  No past medical history on file.  No past surgical history on file.  No family history on file.     Review of Systems   Constitutional:  Negative for fever.   HENT:  Negative for sore throat.    Respiratory:  Positive for cough. Negative for shortness of breath.    Cardiovascular:  Negative for chest pain.   Gastrointestinal:  Negative for nausea.   Genitourinary:  Negative for dysuria.   Musculoskeletal:  Negative for back pain.   Skin:  Negative for rash.   Neurological:  Negative for weakness.   Hematological:  Does not bruise/bleed easily.   All other systems reviewed and are negative.    Physical Exam     Initial Vitals [10/03/22 1356]   BP Pulse Resp Temp SpO2   112/77 106 20 98.3 °F (36.8 °C) 97 %      MAP       --         Physical Exam    Nursing note and vitals reviewed.  Constitutional: She appears well-developed.   HENT:   Head: Normocephalic and atraumatic.   Right Ear: External ear normal.   Left Ear: External ear normal.   Nose: Nose normal.   Mouth/Throat: Oropharynx is clear and moist. No oropharyngeal exudate.   Eyes: Conjunctivae and EOM are normal. Pupils are equal, round, and reactive to light. Right eye exhibits no discharge. Left eye exhibits no discharge.   Neck: Neck supple. No tracheal deviation present. No  JVD present.   Normal range of motion.  Cardiovascular:  Normal rate, regular rhythm, normal heart sounds and intact distal pulses.     Exam reveals no gallop and no friction rub.       No murmur heard.  Pulmonary/Chest: Breath sounds normal. No stridor. No respiratory distress. She has no wheezes. She has no rhonchi. She has no rales.   Abdominal: Abdomen is soft. Bowel sounds are normal. She exhibits no distension and no mass. There is no abdominal tenderness. There is no rebound and no guarding.   Musculoskeletal:         General: Normal range of motion.      Cervical back: Normal range of motion and neck supple.     Neurological: She is alert and oriented to person, place, and time. She has normal strength. No cranial nerve deficit.   Skin: Skin is warm and dry. No rash and no abscess noted. No erythema.   Psychiatric: She has a normal mood and affect. Her behavior is normal. Judgment and thought content normal.       ED Course   Procedures  Labs Reviewed   COMPREHENSIVE METABOLIC PANEL - Abnormal; Notable for the following components:       Result Value    Glucose Level 125 (*)     Globulin 3.7 (*)     All other components within normal limits   URINALYSIS, REFLEX TO URINE CULTURE - Abnormal; Notable for the following components:    Color, UA Nichole (*)     Appearance, UA Slightly Cloudy (*)     Ketones, UA Trace (*)     Bilirubin, UA Small (*)     Leukocyte Esterase, UA Trace (*)     All other components within normal limits   PROTIME-INR - Abnormal; Notable for the following components:    PT 10.8 (*)     All other components within normal limits   D DIMER, QUANTITATIVE - Abnormal; Notable for the following components:    D-Dimer 0.96 (*)     All other components within normal limits   CBC WITH DIFFERENTIAL - Abnormal; Notable for the following components:    MCHC 31.0 (*)     All other components within normal limits   URINALYSIS, MICROSCOPIC - Abnormal; Notable for the following components:    Mucous, UA  Trace (*)     All other components within normal limits   APTT - Normal   COVID/FLU A&B PCR - Normal   CBC W/ AUTO DIFFERENTIAL    Narrative:     The following orders were created for panel order CBC auto differential.  Procedure                               Abnormality         Status                     ---------                               -----------         ------                     CBC with Differential[[id number]]        Abnormal            Final result                 Please view results for these tests on the individual orders.   TROPONIN ISTAT   POCT TROPONIN          Imaging Results              CTA Chest Non-Coronary (PE Studies) (Final result)  Result time 10/03/22 15:40:43      Final result by Ada Stoddard MD (10/03/22 15:40:43)                   Impression:      No pulmonary embolism seen    1 cm x 1 cm nodule seen in the right lower lobe.  Short-term follow-up with noncontrast CT is recommended.      Electronically signed by: Ada Stoddard  Date:    10/03/2022  Time:    15:40               Narrative:    EXAMINATION:  CTA CHEST NON CORONARY (PE STUDIES)    CLINICAL HISTORY:  Pulmonary embolism (PE) suspected, positive D-dimer;    TECHNIQUE:  Low dose axial images, sagittal and coronal reformations were obtained from the thoracic inlet to the lung bases following the IV administration of contrast..  Contrast timing was optimized to evaluate the pulmonary arteries.  MIP images were performed.  Automated exposure control was utilized    COMPARISON:  None    FINDINGS:  The lungs are clear.  No infiltrate is seen.  There is a 1 cm x 1 cm nodule seen in the right lower lobe..  No pleural effusion is seen.  No pleural thickening is seen.  No pneumothorax is seen.    No pulmonary embolism is seen.    The thoracic aorta appears normal.  No mediastinal lymphadenopathy is seen.  The heart appears normal.    Upper abdomen shows no acute abnormality.                                       X-Ray  Chest AP Portable (Final result)  Result time 10/03/22 14:18:07      Final result by Didier Dasilva MD (10/03/22 14:18:07)                   Impression:      No acute chest disease is identified.      Electronically signed by: Didier Dasilva  Date:    10/03/2022  Time:    14:18               Narrative:    EXAMINATION:  XR CHEST AP PORTABLE    CLINICAL HISTORY:  Chest Pain;, .    COMPARISON:  December 19, 2017    FINDINGS:  No alveolar consolidation, effusion, or pneumothorax is seen.   The thoracic aorta is normal  cardiac silhouette, central pulmonary vessels and mediastinum are normal in size and are grossly unremarkable.   visualized osseous structures are grossly unremarkable.                                       Medications   iopamidoL (ISOVUE-370) injection 100 mL (100 mLs Intravenous Given 10/3/22 1528)                              Clinical Impression:   Final diagnoses:  [R06.02] SOB (shortness of breath)  [K92.0] Hematemesis, unspecified whether nausea present (Primary)  [J40] Bronchitis        ED Disposition Condition    Discharge Stable          ED Prescriptions    None       Follow-up Information       Follow up With Specialties Details Why Contact Info      Schedule an appointment as soon as possible for a visit in 2 days  Follow-up with your primary care physician for documented lung mass on CT             Judah Canada MD  10/03/22 6980

## 2022-10-11 ENCOUNTER — OFFICE VISIT (OUTPATIENT)
Dept: INTERNAL MEDICINE | Facility: CLINIC | Age: 57
End: 2022-10-11
Payer: MEDICAID

## 2022-10-11 ENCOUNTER — LAB VISIT (OUTPATIENT)
Dept: LAB | Facility: HOSPITAL | Age: 57
End: 2022-10-11
Attending: STUDENT IN AN ORGANIZED HEALTH CARE EDUCATION/TRAINING PROGRAM
Payer: MEDICAID

## 2022-10-11 VITALS
HEART RATE: 84 BPM | BODY MASS INDEX: 24.39 KG/M2 | DIASTOLIC BLOOD PRESSURE: 57 MMHG | RESPIRATION RATE: 18 BRPM | TEMPERATURE: 98 F | WEIGHT: 129.19 LBS | SYSTOLIC BLOOD PRESSURE: 94 MMHG | HEIGHT: 61 IN | OXYGEN SATURATION: 96 %

## 2022-10-11 DIAGNOSIS — M85.80 OSTEOPENIA, UNSPECIFIED LOCATION: ICD-10-CM

## 2022-10-11 DIAGNOSIS — Z12.31 ENCOUNTER FOR SCREENING MAMMOGRAM FOR MALIGNANT NEOPLASM OF BREAST: ICD-10-CM

## 2022-10-11 DIAGNOSIS — D64.9 ANEMIA, UNSPECIFIED TYPE: ICD-10-CM

## 2022-10-11 DIAGNOSIS — B18.2 CHRONIC HEPATITIS C WITHOUT HEPATIC COMA: ICD-10-CM

## 2022-10-11 DIAGNOSIS — F31.9 BIPOLAR AFFECTIVE DISORDER, REMISSION STATUS UNSPECIFIED: Primary | ICD-10-CM

## 2022-10-11 DIAGNOSIS — I25.10 CORONARY ARTERY DISEASE, UNSPECIFIED VESSEL OR LESION TYPE, UNSPECIFIED WHETHER ANGINA PRESENT, UNSPECIFIED WHETHER NATIVE OR TRANSPLANTED HEART: ICD-10-CM

## 2022-10-11 DIAGNOSIS — K21.9 GASTROESOPHAGEAL REFLUX DISEASE, UNSPECIFIED WHETHER ESOPHAGITIS PRESENT: ICD-10-CM

## 2022-10-11 DIAGNOSIS — R91.1 LUNG NODULE: ICD-10-CM

## 2022-10-11 DIAGNOSIS — I10 HYPERTENSION, UNSPECIFIED TYPE: ICD-10-CM

## 2022-10-11 PROBLEM — B19.20 HEPATITIS C VIRUS INFECTION: Status: ACTIVE | Noted: 2022-10-11

## 2022-10-11 PROBLEM — Z12.39 SCREENING FOR BREAST CANCER: Status: ACTIVE | Noted: 2022-10-11

## 2022-10-11 LAB
BASOPHILS # BLD AUTO: 0.05 X10(3)/MCL (ref 0–0.2)
BASOPHILS NFR BLD AUTO: 0.7 %
CHOLEST SERPL-MCNC: 158 MG/DL
CHOLEST/HDLC SERPL: 4 {RATIO} (ref 0–5)
DEPRECATED CALCIDIOL+CALCIFEROL SERPL-MC: 34.5 NG/ML (ref 30–80)
EOSINOPHIL # BLD AUTO: 0.37 X10(3)/MCL (ref 0–0.9)
EOSINOPHIL NFR BLD AUTO: 5.2 %
ERYTHROCYTE [DISTWIDTH] IN BLOOD BY AUTOMATED COUNT: 13.6 % (ref 11.5–17)
EST. AVERAGE GLUCOSE BLD GHB EST-MCNC: 99.7 MG/DL
FERRITIN SERPL-MCNC: 27.13 NG/ML (ref 4.63–204)
HBA1C MFR BLD: 5.1 %
HCT VFR BLD AUTO: 39.2 % (ref 37–47)
HDLC SERPL-MCNC: 44 MG/DL (ref 35–60)
HGB BLD-MCNC: 12.2 GM/DL (ref 12–16)
IMM GRANULOCYTES # BLD AUTO: 0.04 X10(3)/MCL (ref 0–0.04)
IMM GRANULOCYTES NFR BLD AUTO: 0.6 %
IRON SATN MFR SERPL: 24 % (ref 20–50)
IRON SERPL-MCNC: 69 UG/DL (ref 50–170)
LDLC SERPL CALC-MCNC: 96 MG/DL (ref 50–140)
LYMPHOCYTES # BLD AUTO: 2.23 X10(3)/MCL (ref 0.6–4.6)
LYMPHOCYTES NFR BLD AUTO: 31.4 %
MCH RBC QN AUTO: 28.8 PG (ref 27–31)
MCHC RBC AUTO-ENTMCNC: 31.1 MG/DL (ref 33–36)
MCV RBC AUTO: 92.7 FL (ref 80–94)
MONOCYTES # BLD AUTO: 0.59 X10(3)/MCL (ref 0.1–1.3)
MONOCYTES NFR BLD AUTO: 8.3 %
NEUTROPHILS # BLD AUTO: 3.8 X10(3)/MCL (ref 2.1–9.2)
NEUTROPHILS NFR BLD AUTO: 53.8 %
NRBC BLD AUTO-RTO: 0 %
PLATELET # BLD AUTO: 235 X10(3)/MCL (ref 130–400)
PMV BLD AUTO: 10.4 FL (ref 7.4–10.4)
RBC # BLD AUTO: 4.23 X10(6)/MCL (ref 4.2–5.4)
TIBC SERPL-MCNC: 223 UG/DL (ref 70–310)
TIBC SERPL-MCNC: 292 UG/DL (ref 250–450)
TRANSFERRIN SERPL-MCNC: 263 MG/DL (ref 180–382)
TRIGL SERPL-MCNC: 88 MG/DL (ref 37–140)
VLDLC SERPL CALC-MCNC: 18 MG/DL
WBC # SPEC AUTO: 7.1 X10(3)/MCL (ref 4.5–11.5)

## 2022-10-11 PROCEDURE — 83036 HEMOGLOBIN GLYCOSYLATED A1C: CPT

## 2022-10-11 PROCEDURE — 36415 COLL VENOUS BLD VENIPUNCTURE: CPT

## 2022-10-11 PROCEDURE — 82728 ASSAY OF FERRITIN: CPT

## 2022-10-11 PROCEDURE — 90694 VACC AIIV4 NO PRSRV 0.5ML IM: CPT | Mod: PBBFAC

## 2022-10-11 PROCEDURE — 83540 ASSAY OF IRON: CPT

## 2022-10-11 PROCEDURE — 90471 IMMUNIZATION ADMIN: CPT | Mod: PBBFAC

## 2022-10-11 PROCEDURE — 80061 LIPID PANEL: CPT

## 2022-10-11 PROCEDURE — 85025 COMPLETE CBC W/AUTO DIFF WBC: CPT

## 2022-10-11 PROCEDURE — 99215 OFFICE O/P EST HI 40 MIN: CPT | Mod: PBBFAC

## 2022-10-11 PROCEDURE — 82306 VITAMIN D 25 HYDROXY: CPT

## 2022-10-11 RX ORDER — TIZANIDINE 4 MG/1
4 TABLET ORAL NIGHTLY PRN
COMMUNITY
Start: 2022-09-15 | End: 2023-02-01

## 2022-10-11 RX ORDER — AMLODIPINE BESYLATE 10 MG/1
10 TABLET ORAL DAILY
COMMUNITY
Start: 2022-10-04 | End: 2023-02-01 | Stop reason: SDUPTHER

## 2022-10-11 RX ORDER — CLONAZEPAM 0.5 MG/1
.25-.5 TABLET ORAL DAILY PRN
Status: ON HOLD | COMMUNITY
Start: 2022-09-07 | End: 2023-04-08 | Stop reason: HOSPADM

## 2022-10-11 RX ORDER — GABAPENTIN 300 MG/1
300 CAPSULE ORAL NIGHTLY
COMMUNITY
Start: 2022-10-06 | End: 2023-02-01

## 2022-10-11 RX ORDER — CARVEDILOL 12.5 MG/1
12.5 TABLET ORAL 2 TIMES DAILY
COMMUNITY
Start: 2022-10-04 | End: 2023-02-01 | Stop reason: SDUPTHER

## 2022-10-11 RX ORDER — DULOXETIN HYDROCHLORIDE 30 MG/1
30 CAPSULE, DELAYED RELEASE ORAL NIGHTLY
COMMUNITY
Start: 2022-10-04 | End: 2023-02-01 | Stop reason: SDUPTHER

## 2022-10-11 RX ORDER — CETIRIZINE HYDROCHLORIDE 10 MG/1
10 TABLET ORAL NIGHTLY
COMMUNITY
Start: 2022-10-04 | End: 2023-03-13

## 2022-10-11 RX ORDER — TRAZODONE HYDROCHLORIDE 150 MG/1
300 TABLET ORAL NIGHTLY
COMMUNITY
Start: 2022-09-07 | End: 2023-11-13

## 2022-10-11 RX ORDER — CLOPIDOGREL BISULFATE 75 MG/1
75 TABLET ORAL DAILY
COMMUNITY
Start: 2022-09-07 | End: 2024-01-03 | Stop reason: SDUPTHER

## 2022-10-11 RX ORDER — PANTOPRAZOLE SODIUM 40 MG/1
40 TABLET, DELAYED RELEASE ORAL DAILY
COMMUNITY
Start: 2022-08-10 | End: 2023-02-01 | Stop reason: SDUPTHER

## 2022-10-11 RX ORDER — FERROUS GLUCONATE 324(38)MG
1 TABLET ORAL 2 TIMES DAILY
COMMUNITY
Start: 2022-09-07 | End: 2022-10-20 | Stop reason: SDUPTHER

## 2022-10-11 RX ORDER — DULOXETIN HYDROCHLORIDE 60 MG/1
60 CAPSULE, DELAYED RELEASE ORAL DAILY
COMMUNITY
Start: 2022-10-04 | End: 2023-02-01 | Stop reason: SDUPTHER

## 2022-10-11 RX ORDER — LISINOPRIL 20 MG/1
20 TABLET ORAL DAILY
COMMUNITY
Start: 2022-10-05 | End: 2023-02-01 | Stop reason: SDUPTHER

## 2022-10-11 RX ADMIN — INFLUENZA A VIRUS A/VICTORIA/2570/2019 IVR-215 (H1N1) ANTIGEN (FORMALDEHYDE INACTIVATED), INFLUENZA A VIRUS A/DARWIN/6/2021 IVR-227 (H3N2) ANTIGEN (FORMALDEHYDE INACTIVATED), INFLUENZA B VIRUS B/AUSTRIA/1359417/2021 BVR-26 ANTIGEN (FORMALDEHYDE INACTIVATED), INFLUENZA B VIRUS B/PHUKET/3073/2013 BVR-1B ANTIGEN (FORMALDEHYDE INACTIVATED) 0.5 ML: 15; 15; 15; 15 INJECTION, SUSPENSION INTRAMUSCULAR at 03:10
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
## 2022-10-11 NOTE — PROGRESS NOTES
I have reviewed and concur with the resident's history, physical, assessment, and plan.  I have discussed with him all issues related to the diagnosis, workup and treatment plan.Care provided as reasonable and necessary.check CBC    Narinder Porter MD  [facility]

## 2022-10-20 ENCOUNTER — TELEPHONE (OUTPATIENT)
Dept: INTERNAL MEDICINE | Facility: CLINIC | Age: 57
End: 2022-10-20
Payer: MEDICAID

## 2022-10-20 RX ORDER — ATORVASTATIN CALCIUM 40 MG/1
40 TABLET, FILM COATED ORAL DAILY
Qty: 90 TABLET | Refills: 1 | Status: SHIPPED | OUTPATIENT
Start: 2022-10-20 | End: 2023-02-01 | Stop reason: SDUPTHER

## 2022-10-20 RX ORDER — FERROUS GLUCONATE 324(38)MG
1 TABLET ORAL 2 TIMES DAILY
Qty: 180 TABLET | Refills: 1 | Status: SHIPPED | OUTPATIENT
Start: 2022-10-20 | End: 2023-02-01 | Stop reason: SDUPTHER

## 2022-10-20 NOTE — PROGRESS NOTES
I spoke with Ms. Burns regarding her LDL. Goal would be < 70. She's taking pravastatin 40mg daily. I will switch it to atorvastatin 40mg daily.     She also mentions some lightheadedness on standing. Her BP was soft at the last visit. She will take her BP at home and keep a log which she'll bring to her nurse visit which will be scheduled.    Akin Gan PGY 3

## 2022-10-20 NOTE — TELEPHONE ENCOUNTER
Hi, can we please have Ms. Burns scheduled for a nurse visit for vitals check in the next 1-2 weeks. She will bring a BP log with her as well. Thanks.

## 2022-10-27 ENCOUNTER — TELEPHONE (OUTPATIENT)
Dept: INTERNAL MEDICINE | Facility: CLINIC | Age: 57
End: 2022-10-27
Payer: MEDICAID

## 2022-10-27 DIAGNOSIS — K92.0 HEMATEMESIS, UNSPECIFIED WHETHER NAUSEA PRESENT: Primary | ICD-10-CM

## 2022-10-27 NOTE — PROGRESS NOTES
Ms. Burns said she thew up some blood at 9AM this morning. It soaked a sheet of paper towel; there was some eggs in the throw up so she does believe it was from her stomach. She was coughing which led to her throwing up. Denies dizziness, lightheadedness. No repeat hematemesis. Does not know if she has melena b/c she takes iron so her stool is already black. Has EGD scheduled on 11/9. Will get CBC tomorrow. ED precautions given if she has repeat hematemesis or symptoms of feeling faint/weak. She lives with her brother    Akin Gan PGY 3

## 2022-10-27 NOTE — TELEPHONE ENCOUNTER
Pt called to inform Dr Gan she threw blood this morning and she is schedule for a scope on Wednesday Nov. 2nd. And her colonoscopy will be the following wk on Nov 9th.  Pt. Requesting a call back. Thanks.

## 2022-10-28 ENCOUNTER — LAB VISIT (OUTPATIENT)
Dept: LAB | Facility: HOSPITAL | Age: 57
End: 2022-10-28
Attending: STUDENT IN AN ORGANIZED HEALTH CARE EDUCATION/TRAINING PROGRAM
Payer: MEDICAID

## 2022-10-28 DIAGNOSIS — K92.0 HEMATEMESIS, UNSPECIFIED WHETHER NAUSEA PRESENT: ICD-10-CM

## 2022-10-28 LAB
BASOPHILS # BLD AUTO: 0.04 X10(3)/MCL (ref 0–0.2)
BASOPHILS NFR BLD AUTO: 0.5 %
EOSINOPHIL # BLD AUTO: 0.32 X10(3)/MCL (ref 0–0.9)
EOSINOPHIL NFR BLD AUTO: 3.6 %
ERYTHROCYTE [DISTWIDTH] IN BLOOD BY AUTOMATED COUNT: 13.8 % (ref 11.5–17)
HCT VFR BLD AUTO: 36.5 % (ref 37–47)
HGB BLD-MCNC: 11.6 GM/DL (ref 12–16)
IMM GRANULOCYTES # BLD AUTO: 0.03 X10(3)/MCL (ref 0–0.04)
IMM GRANULOCYTES NFR BLD AUTO: 0.3 %
LYMPHOCYTES # BLD AUTO: 2.15 X10(3)/MCL (ref 0.6–4.6)
LYMPHOCYTES NFR BLD AUTO: 24.2 %
MCH RBC QN AUTO: 28.4 PG (ref 27–31)
MCHC RBC AUTO-ENTMCNC: 31.8 MG/DL (ref 33–36)
MCV RBC AUTO: 89.2 FL (ref 80–94)
MONOCYTES # BLD AUTO: 0.63 X10(3)/MCL (ref 0.1–1.3)
MONOCYTES NFR BLD AUTO: 7.1 %
NEUTROPHILS # BLD AUTO: 5.7 X10(3)/MCL (ref 2.1–9.2)
NEUTROPHILS NFR BLD AUTO: 64.3 %
PLATELET # BLD AUTO: 292 X10(3)/MCL (ref 130–400)
PMV BLD AUTO: 10 FL (ref 7.4–10.4)
RBC # BLD AUTO: 4.09 X10(6)/MCL (ref 4.2–5.4)
WBC # SPEC AUTO: 8.9 X10(3)/MCL (ref 4.5–11.5)

## 2022-10-28 PROCEDURE — 85025 COMPLETE CBC W/AUTO DIFF WBC: CPT

## 2022-10-28 PROCEDURE — 36415 COLL VENOUS BLD VENIPUNCTURE: CPT

## 2022-11-04 ENCOUNTER — CLINICAL SUPPORT (OUTPATIENT)
Dept: INTERNAL MEDICINE | Facility: CLINIC | Age: 57
End: 2022-11-04
Payer: MEDICAID

## 2022-11-04 VITALS
HEART RATE: 70 BPM | DIASTOLIC BLOOD PRESSURE: 58 MMHG | HEIGHT: 61 IN | BODY MASS INDEX: 24.92 KG/M2 | TEMPERATURE: 98 F | WEIGHT: 132 LBS | SYSTOLIC BLOOD PRESSURE: 102 MMHG | RESPIRATION RATE: 18 BRPM

## 2022-11-04 DIAGNOSIS — R03.1 LOW BLOOD PRESSURE READING: Primary | ICD-10-CM

## 2022-11-04 PROCEDURE — 99214 OFFICE O/P EST MOD 30 MIN: CPT | Mod: PBBFAC

## 2022-11-04 RX ORDER — SODIUM, POTASSIUM,MAG SULFATES 17.5-3.13G
SOLUTION, RECONSTITUTED, ORAL ORAL
COMMUNITY
Start: 2022-09-13 | End: 2023-02-13 | Stop reason: ALTCHOICE

## 2022-11-04 RX ORDER — MIRTAZAPINE 15 MG/1
15 TABLET, FILM COATED ORAL NIGHTLY
COMMUNITY
Start: 2022-10-17 | End: 2023-03-13

## 2022-11-04 RX ORDER — PRAVASTATIN SODIUM 40 MG/1
40 TABLET ORAL DAILY
COMMUNITY
Start: 2022-07-29 | End: 2023-02-01

## 2022-11-04 RX ORDER — METHYLPREDNISOLONE 4 MG/1
TABLET ORAL
COMMUNITY
Start: 2022-08-16 | End: 2023-02-01

## 2022-11-04 NOTE — PROGRESS NOTES
Here for BP Check per DR KALIA Gan    BP Readin/58 manual resding    OR:70    Last dose of Medications: Amlodipine 10 mg , Carvedilol 12.5 mg and lisinopril 20 mg all taken 22 @   0830 am.    Complaints:No complaints of dizziness, weakness, tiredness, lightheadedness, chest pain or shortness of breath noted.    Provider sent  Blood Pressure reading.     BP log with home BP readings scanned into .          Dr KALIA Gan notified /58 OR 70 Patient has no hypotensive signs or symptoms. Patient told nurse she does not drink water. She drinks coke. Patient instructed she must drink 6 to 8 glasses of water daily to hydrate herself. Patient voiced understanding of this. Patient given 2 bottles of water and drank one in office, No new orders noted. Patient instructed provider will call her later this evening. After reviewing EMR.patient voiced understanding of this.                   Discharged home with instructions and information to continue with all prescribed medications,follow up appointments, drink plenty of water daily, maintain low sodium intake and to walk/ exercise daily.Patient voiced understanding of discharge instructions.

## 2022-12-12 ENCOUNTER — TELEPHONE (OUTPATIENT)
Dept: INTERNAL MEDICINE | Facility: CLINIC | Age: 57
End: 2022-12-12
Payer: MEDICAID

## 2022-12-12 DIAGNOSIS — Z00.00 HEALTH MAINTENANCE EXAMINATION: Primary | ICD-10-CM

## 2022-12-12 NOTE — TELEPHONE ENCOUNTER
----- Message from Bethany Ray sent at 12/12/2022  2:11 PM CST -----  Regarding: Dr. Gan-GYN Referral  Patient called in stating she hasn't had a GYN check-up in quite awhile and would like to be referred for an annual visit. Thanks

## 2023-02-01 ENCOUNTER — OFFICE VISIT (OUTPATIENT)
Dept: INTERNAL MEDICINE | Facility: CLINIC | Age: 58
End: 2023-02-01
Payer: MEDICAID

## 2023-02-01 VITALS
BODY MASS INDEX: 28.55 KG/M2 | RESPIRATION RATE: 18 BRPM | OXYGEN SATURATION: 97 % | TEMPERATURE: 98 F | DIASTOLIC BLOOD PRESSURE: 62 MMHG | SYSTOLIC BLOOD PRESSURE: 101 MMHG | WEIGHT: 151.19 LBS | HEART RATE: 94 BPM | HEIGHT: 61 IN

## 2023-02-01 DIAGNOSIS — R06.00 DYSPNEA, UNSPECIFIED TYPE: ICD-10-CM

## 2023-02-01 DIAGNOSIS — M85.80 OSTEOPENIA, UNSPECIFIED LOCATION: ICD-10-CM

## 2023-02-01 DIAGNOSIS — N83.209 CYST OF OVARY, UNSPECIFIED LATERALITY: ICD-10-CM

## 2023-02-01 DIAGNOSIS — I10 HYPERTENSION, UNSPECIFIED TYPE: Primary | ICD-10-CM

## 2023-02-01 DIAGNOSIS — Z12.39 ENCOUNTER FOR SCREENING FOR MALIGNANT NEOPLASM OF BREAST, UNSPECIFIED SCREENING MODALITY: ICD-10-CM

## 2023-02-01 DIAGNOSIS — D64.9 ANEMIA, UNSPECIFIED TYPE: ICD-10-CM

## 2023-02-01 DIAGNOSIS — M79.671 RIGHT FOOT PAIN: ICD-10-CM

## 2023-02-01 DIAGNOSIS — K21.9 GASTROESOPHAGEAL REFLUX DISEASE, UNSPECIFIED WHETHER ESOPHAGITIS PRESENT: ICD-10-CM

## 2023-02-01 DIAGNOSIS — M25.50 ARTHRALGIA, UNSPECIFIED JOINT: ICD-10-CM

## 2023-02-01 PROCEDURE — 90471 IMMUNIZATION ADMIN: CPT | Mod: PBBFAC

## 2023-02-01 PROCEDURE — 90677 PCV20 VACCINE IM: CPT | Mod: PBBFAC

## 2023-02-01 PROCEDURE — 99215 OFFICE O/P EST HI 40 MIN: CPT | Mod: PBBFAC

## 2023-02-01 RX ORDER — LISINOPRIL 20 MG/1
20 TABLET ORAL DAILY
Qty: 90 TABLET | Refills: 1 | Status: ON HOLD | OUTPATIENT
Start: 2023-02-01 | End: 2023-02-22 | Stop reason: HOSPADM

## 2023-02-01 RX ORDER — ACYCLOVIR 800 MG/1
TABLET ORAL
Qty: 30 TABLET | Refills: 0 | Status: SHIPPED | OUTPATIENT
Start: 2023-02-01 | End: 2023-02-20 | Stop reason: SDUPTHER

## 2023-02-01 RX ORDER — FERROUS GLUCONATE 324(38)MG
1 TABLET ORAL EVERY OTHER DAY
Qty: 60 TABLET | Refills: 1 | Status: SHIPPED | OUTPATIENT
Start: 2023-02-01 | End: 2023-02-19 | Stop reason: ALTCHOICE

## 2023-02-01 RX ORDER — CARVEDILOL 12.5 MG/1
12.5 TABLET ORAL 2 TIMES DAILY
Qty: 180 TABLET | Refills: 1 | Status: ON HOLD | OUTPATIENT
Start: 2023-02-01 | End: 2023-02-22 | Stop reason: HOSPADM

## 2023-02-01 RX ORDER — AMLODIPINE BESYLATE 10 MG/1
10 TABLET ORAL DAILY
Qty: 90 TABLET | Refills: 1 | Status: SHIPPED | OUTPATIENT
Start: 2023-02-01 | End: 2023-05-22 | Stop reason: SDUPTHER

## 2023-02-01 RX ORDER — DULOXETIN HYDROCHLORIDE 60 MG/1
60 CAPSULE, DELAYED RELEASE ORAL DAILY
Qty: 90 CAPSULE | Refills: 1 | Status: ON HOLD | OUTPATIENT
Start: 2023-02-01 | End: 2023-04-08 | Stop reason: HOSPADM

## 2023-02-01 RX ORDER — DULOXETIN HYDROCHLORIDE 30 MG/1
30 CAPSULE, DELAYED RELEASE ORAL NIGHTLY
Qty: 90 CAPSULE | Refills: 1 | Status: SHIPPED | OUTPATIENT
Start: 2023-02-01

## 2023-02-01 RX ORDER — GABAPENTIN 300 MG/1
300 CAPSULE ORAL 3 TIMES DAILY
Qty: 270 CAPSULE | Refills: 1 | Status: SHIPPED | OUTPATIENT
Start: 2023-02-01 | End: 2023-05-22

## 2023-02-01 RX ORDER — ATORVASTATIN CALCIUM 40 MG/1
40 TABLET, FILM COATED ORAL DAILY
Qty: 90 TABLET | Refills: 1 | Status: SHIPPED | OUTPATIENT
Start: 2023-02-01 | End: 2023-05-22 | Stop reason: SDUPTHER

## 2023-02-01 RX ORDER — CILOSTAZOL 50 MG/1
50 TABLET ORAL 2 TIMES DAILY
COMMUNITY
Start: 2023-01-23 | End: 2023-03-13

## 2023-02-01 RX ORDER — PANTOPRAZOLE SODIUM 40 MG/1
40 TABLET, DELAYED RELEASE ORAL DAILY
Qty: 90 TABLET | Refills: 1 | Status: SHIPPED | OUTPATIENT
Start: 2023-02-01 | End: 2023-05-22 | Stop reason: SDUPTHER

## 2023-02-01 RX ADMIN — PNEUMOCOCCAL 20-VALENT CONJUGATE VACCINE 0.5 ML
2.2; 2.2; 2.2; 2.2; 2.2; 2.2; 2.2; 2.2; 2.2; 2.2; 2.2; 2.2; 2.2; 2.2; 2.2; 2.2; 4.4; 2.2; 2.2; 2.2 INJECTION, SUSPENSION INTRAMUSCULAR at 03:02
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
## 2023-02-02 NOTE — PROGRESS NOTES
I have reviewed the notes, assessments, and management plan performed by resident, I discussed plan with resident and I concur with her/his documentation of Sandra Burns.

## 2023-02-13 ENCOUNTER — OFFICE VISIT (OUTPATIENT)
Dept: PULMONOLOGY | Facility: CLINIC | Age: 58
End: 2023-02-13
Payer: MEDICAID

## 2023-02-13 ENCOUNTER — HOSPITAL ENCOUNTER (OUTPATIENT)
Dept: RADIOLOGY | Facility: HOSPITAL | Age: 58
Discharge: HOME | End: 2023-02-13
Attending: STUDENT IN AN ORGANIZED HEALTH CARE EDUCATION/TRAINING PROGRAM
Payer: MEDICAID

## 2023-02-13 VITALS
SYSTOLIC BLOOD PRESSURE: 139 MMHG | OXYGEN SATURATION: 98 % | HEIGHT: 61 IN | WEIGHT: 154 LBS | BODY MASS INDEX: 29.07 KG/M2 | HEART RATE: 86 BPM | DIASTOLIC BLOOD PRESSURE: 89 MMHG | TEMPERATURE: 98 F | RESPIRATION RATE: 20 BRPM

## 2023-02-13 DIAGNOSIS — Z72.0 TOBACCO USE: Primary | ICD-10-CM

## 2023-02-13 DIAGNOSIS — R91.1 LUNG NODULE: ICD-10-CM

## 2023-02-13 DIAGNOSIS — J44.9 CHRONIC OBSTRUCTIVE PULMONARY DISEASE, UNSPECIFIED COPD TYPE: ICD-10-CM

## 2023-02-13 DIAGNOSIS — R91.1 SOLITARY PULMONARY NODULE: ICD-10-CM

## 2023-02-13 DIAGNOSIS — M25.50 ARTHRALGIA, UNSPECIFIED JOINT: ICD-10-CM

## 2023-02-13 DIAGNOSIS — R91.1 NODULE OF LOWER LOBE OF RIGHT LUNG: Primary | ICD-10-CM

## 2023-02-13 DIAGNOSIS — Z72.0 TOBACCO USE: ICD-10-CM

## 2023-02-13 PROCEDURE — 3079F PR MOST RECENT DIASTOLIC BLOOD PRESSURE 80-89 MM HG: ICD-10-PCS | Mod: CPTII,,, | Performed by: INTERNAL MEDICINE

## 2023-02-13 PROCEDURE — 99205 OFFICE O/P NEW HI 60 MIN: CPT | Mod: S$PBB,,, | Performed by: INTERNAL MEDICINE

## 2023-02-13 PROCEDURE — 3079F DIAST BP 80-89 MM HG: CPT | Mod: CPTII,,, | Performed by: INTERNAL MEDICINE

## 2023-02-13 PROCEDURE — 73130 X-RAY EXAM OF HAND: CPT | Mod: TC,RT

## 2023-02-13 PROCEDURE — 4010F PR ACE/ARB THEARPY RXD/TAKEN: ICD-10-PCS | Mod: CPTII,,, | Performed by: INTERNAL MEDICINE

## 2023-02-13 PROCEDURE — 1159F PR MEDICATION LIST DOCUMENTED IN MEDICAL RECORD: ICD-10-PCS | Mod: CPTII,,, | Performed by: INTERNAL MEDICINE

## 2023-02-13 PROCEDURE — 99205 PR OFFICE/OUTPT VISIT, NEW, LEVL V, 60-74 MIN: ICD-10-PCS | Mod: S$PBB,,, | Performed by: INTERNAL MEDICINE

## 2023-02-13 PROCEDURE — 3075F SYST BP GE 130 - 139MM HG: CPT | Mod: CPTII,,, | Performed by: INTERNAL MEDICINE

## 2023-02-13 PROCEDURE — 3008F PR BODY MASS INDEX (BMI) DOCUMENTED: ICD-10-PCS | Mod: CPTII,,, | Performed by: INTERNAL MEDICINE

## 2023-02-13 PROCEDURE — 3008F BODY MASS INDEX DOCD: CPT | Mod: CPTII,,, | Performed by: INTERNAL MEDICINE

## 2023-02-13 PROCEDURE — 4010F ACE/ARB THERAPY RXD/TAKEN: CPT | Mod: CPTII,,, | Performed by: INTERNAL MEDICINE

## 2023-02-13 PROCEDURE — 99215 OFFICE O/P EST HI 40 MIN: CPT | Mod: PBBFAC

## 2023-02-13 PROCEDURE — 1159F MED LIST DOCD IN RCRD: CPT | Mod: CPTII,,, | Performed by: INTERNAL MEDICINE

## 2023-02-13 PROCEDURE — 3075F PR MOST RECENT SYSTOLIC BLOOD PRESS GE 130-139MM HG: ICD-10-PCS | Mod: CPTII,,, | Performed by: INTERNAL MEDICINE

## 2023-02-13 RX ORDER — BUPROPION HYDROCHLORIDE 150 MG/1
150 TABLET, EXTENDED RELEASE ORAL EVERY MORNING
COMMUNITY
Start: 2022-10-17

## 2023-02-13 RX ORDER — KETOROLAC TROMETHAMINE 10 MG/1
10 TABLET, FILM COATED ORAL EVERY 4 HOURS PRN
COMMUNITY
Start: 2023-01-27 | End: 2023-03-13

## 2023-02-13 RX ORDER — NITROGLYCERIN 0.4 MG/1
1 TABLET SUBLINGUAL EVERY 5 MIN PRN
COMMUNITY
Start: 2023-01-23

## 2023-02-13 RX ORDER — FAMOTIDINE 40 MG/1
40 TABLET, FILM COATED ORAL DAILY
COMMUNITY
Start: 2022-09-14 | End: 2023-03-13

## 2023-02-13 RX ORDER — ROSUVASTATIN CALCIUM 40 MG/1
40 TABLET, COATED ORAL DAILY
COMMUNITY
Start: 2023-02-06 | End: 2023-05-22

## 2023-02-13 RX ORDER — AMPICILLIN TRIHYDRATE 250 MG
3 CAPSULE ORAL 2 TIMES DAILY
COMMUNITY
End: 2023-12-07

## 2023-02-13 RX ORDER — ALPRAZOLAM 0.5 MG/1
0.5 TABLET ORAL 2 TIMES DAILY PRN
COMMUNITY

## 2023-02-13 NOTE — PROGRESS NOTES
Subjective:       Patient ID: Sandra Burns is a 57 y.o. female.    Chief Complaint: No chief complaint on file.    Patient is a very pleasant 57-year-old white female referred to lung mass Clinic for an incidentally noted right lower lobe nodule.  Patient has a long smoking history 40+ pack years.  She has no personal history of cancer.  The nodularity in question is closely associated with vasculature and is in a peripheral location.  It appears somewhat sub solid.    Review of Systems   Constitutional: Negative.    HENT: Negative.     Eyes: Negative.    Respiratory:  Positive for cough.    Cardiovascular: Negative.    Gastrointestinal: Negative.    Genitourinary: Negative.    Musculoskeletal: Negative.    Skin: Negative.    Neurological: Negative.    Endo/Heme/Allergies: Negative.    Psychiatric/Behavioral: Negative.     All other systems reviewed and are negative.      Objective:   LMP  (LMP Unknown)     Physical Exam  Vitals reviewed.   Constitutional:       Appearance: Normal appearance.   HENT:      Head: Normocephalic.   Eyes:      Extraocular Movements: Extraocular movements intact.      Conjunctiva/sclera: Conjunctivae normal.      Pupils: Pupils are equal, round, and reactive to light.   Neck:      Comments: Postsurgical changes from partial thyroidectomy  Cardiovascular:      Rate and Rhythm: Normal rate and regular rhythm.      Pulses: Normal pulses.      Heart sounds: Normal heart sounds.   Pulmonary:      Effort: Pulmonary effort is normal.      Breath sounds: Rhonchi present.   Abdominal:      General: Bowel sounds are normal.      Palpations: Abdomen is soft.   Musculoskeletal:         General: No tenderness or deformity.   Neurological:      General: No focal deficit present.      Mental Status: She is alert and oriented to person, place, and time.         CT scan of the chest done in early October was personally reviewed.  There is a somewhat triangular shaped flat sub solid nodular opacity in  the periphery closely associated with some vasculature in the right lower lobe.  No other suspicious findings  Assessment:       Lung nodule  -     Ambulatory referral/consult to Pulmonology         Plan:     Based on the 1 cm size nodule smoking history, this certainly deserves continued follow-up.  This may represent intrapulmonary lymph node.  She is almost to the six-month jaguar from her initial scan.  We will arrange for her to have the follow-up scan at the 6 month jaguar and then be seen in clinic shortly afterwards    Gerard Pereira MD

## 2023-02-16 ENCOUNTER — TELEPHONE (OUTPATIENT)
Dept: FAMILY MEDICINE | Facility: CLINIC | Age: 58
End: 2023-02-16

## 2023-02-16 NOTE — TELEPHONE ENCOUNTER
Pt called stating she is in severe pain, pt states her whole body hurts, and she's suffering from Rheumatoid Arthritis and requesting pain medication, Please Advise.

## 2023-02-19 ENCOUNTER — HOSPITAL ENCOUNTER (EMERGENCY)
Facility: HOSPITAL | Age: 58
Discharge: HOME | End: 2023-02-20
Attending: FAMILY MEDICINE
Payer: MEDICAID

## 2023-02-19 DIAGNOSIS — B96.89 BACTERIAL VAGINOSIS: ICD-10-CM

## 2023-02-19 DIAGNOSIS — N76.0 BACTERIAL VAGINOSIS: ICD-10-CM

## 2023-02-19 DIAGNOSIS — A60.04 HERPES SIMPLEX VULVOVAGINITIS: Primary | ICD-10-CM

## 2023-02-19 PROCEDURE — 99284 EMERGENCY DEPT VISIT MOD MDM: CPT | Mod: 25

## 2023-02-20 VITALS
DIASTOLIC BLOOD PRESSURE: 89 MMHG | RESPIRATION RATE: 18 BRPM | SYSTOLIC BLOOD PRESSURE: 139 MMHG | OXYGEN SATURATION: 99 % | HEART RATE: 74 BPM

## 2023-02-20 LAB
APPEARANCE UR: CLEAR
BACTERIA #/AREA URNS AUTO: ABNORMAL /HPF
BILIRUB UR QL STRIP.AUTO: NEGATIVE MG/DL
C TRACH DNA SPEC QL NAA+PROBE: NOT DETECTED
CLUE CELLS VAG QL WET PREP: ABNORMAL
COLOR UR AUTO: ABNORMAL
GLUCOSE UR QL STRIP.AUTO: NORMAL MG/DL
HYALINE CASTS #/AREA URNS LPF: ABNORMAL /LPF
KETONES UR QL STRIP.AUTO: NEGATIVE MG/DL
LEUKOCYTE ESTERASE UR QL STRIP.AUTO: 75 UNIT/L
MUCOUS THREADS URNS QL MICRO: ABNORMAL /LPF
N GONORRHOEA DNA SPEC QL NAA+PROBE: NOT DETECTED
NITRITE UR QL STRIP.AUTO: NEGATIVE
PH UR STRIP.AUTO: 6 [PH]
PROT UR QL STRIP.AUTO: NEGATIVE MG/DL
RBC #/AREA URNS AUTO: ABNORMAL /HPF
RBC UR QL AUTO: NEGATIVE UNIT/L
SP GR UR STRIP.AUTO: 1.02
SQUAMOUS #/AREA URNS LPF: ABNORMAL /HPF
T VAGINALIS VAG QL WET PREP: ABNORMAL
UROBILINOGEN UR STRIP-ACNC: NORMAL MG/DL
WBC #/AREA URNS AUTO: ABNORMAL /HPF
WBC #/AREA VAG WET PREP: ABNORMAL
YEAST SPEC QL WET PREP: ABNORMAL

## 2023-02-20 PROCEDURE — 87591 N.GONORRHOEAE DNA AMP PROB: CPT | Performed by: PHYSICIAN ASSISTANT

## 2023-02-20 PROCEDURE — 81001 URINALYSIS AUTO W/SCOPE: CPT | Performed by: PHYSICIAN ASSISTANT

## 2023-02-20 PROCEDURE — 25000003 PHARM REV CODE 250: Performed by: PHYSICIAN ASSISTANT

## 2023-02-20 PROCEDURE — 96372 THER/PROPH/DIAG INJ SC/IM: CPT | Performed by: PHYSICIAN ASSISTANT

## 2023-02-20 PROCEDURE — 63600175 PHARM REV CODE 636 W HCPCS: Performed by: PHYSICIAN ASSISTANT

## 2023-02-20 PROCEDURE — 63700000 PHARM REV CODE 250 ALT 637 W/O HCPCS: Performed by: PHYSICIAN ASSISTANT

## 2023-02-20 PROCEDURE — 87210 SMEAR WET MOUNT SALINE/INK: CPT | Performed by: PHYSICIAN ASSISTANT

## 2023-02-20 RX ORDER — METHYLPREDNISOLONE SOD SUCC 125 MG
125 VIAL (EA) INJECTION
Status: COMPLETED | OUTPATIENT
Start: 2023-02-20 | End: 2023-02-20

## 2023-02-20 RX ORDER — HYDROCODONE BITARTRATE AND ACETAMINOPHEN 5; 325 MG/1; MG/1
1 TABLET ORAL
Status: COMPLETED | OUTPATIENT
Start: 2023-02-20 | End: 2023-02-20

## 2023-02-20 RX ORDER — METRONIDAZOLE 500 MG/1
500 TABLET ORAL EVERY 12 HOURS
Qty: 14 TABLET | Refills: 0 | Status: SHIPPED | OUTPATIENT
Start: 2023-02-20 | End: 2023-02-27

## 2023-02-20 RX ORDER — FLUCONAZOLE 100 MG/1
200 TABLET ORAL
Status: COMPLETED | OUTPATIENT
Start: 2023-02-20 | End: 2023-02-20

## 2023-02-20 RX ORDER — METRONIDAZOLE 500 MG/1
500 TABLET ORAL
Status: COMPLETED | OUTPATIENT
Start: 2023-02-20 | End: 2023-02-20

## 2023-02-20 RX ORDER — ACYCLOVIR 800 MG/1
TABLET ORAL
Qty: 30 TABLET | Refills: 1 | Status: SHIPPED | OUTPATIENT
Start: 2023-02-20 | End: 2023-03-13

## 2023-02-20 RX ADMIN — HYDROCODONE BITARTRATE AND ACETAMINOPHEN 1 TABLET: 5; 325 TABLET ORAL at 12:02

## 2023-02-20 RX ADMIN — METRONIDAZOLE 500 MG: 500 TABLET ORAL at 12:02

## 2023-02-20 RX ADMIN — FLUCONAZOLE 200 MG: 100 TABLET ORAL at 12:02

## 2023-02-20 RX ADMIN — METHYLPREDNISOLONE SODIUM SUCCINATE 125 MG: 125 INJECTION, POWDER, FOR SOLUTION INTRAMUSCULAR; INTRAVENOUS at 12:02

## 2023-02-20 NOTE — DISCHARGE INSTRUCTIONS
Continue taking medications as prescribed until complete.  Do not drink alcohol with Flagyl because this will cause vomiting.  Keep scheduled procedures as planned and follow up with your primary care provider within 2-3 days.
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
## 2023-02-22 ENCOUNTER — HOSPITAL ENCOUNTER (OUTPATIENT)
Facility: HOSPITAL | Age: 58
Discharge: HOME | End: 2023-02-22
Attending: STUDENT IN AN ORGANIZED HEALTH CARE EDUCATION/TRAINING PROGRAM | Admitting: STUDENT IN AN ORGANIZED HEALTH CARE EDUCATION/TRAINING PROGRAM
Payer: MEDICAID

## 2023-02-22 VITALS
SYSTOLIC BLOOD PRESSURE: 99 MMHG | DIASTOLIC BLOOD PRESSURE: 63 MMHG | HEIGHT: 60 IN | OXYGEN SATURATION: 96 % | WEIGHT: 155 LBS | BODY MASS INDEX: 30.43 KG/M2 | HEART RATE: 74 BPM | TEMPERATURE: 98 F

## 2023-02-22 DIAGNOSIS — I70.213 ATHEROSCLEROSIS OF NATIVE ARTERY OF BOTH LOWER EXTREMITIES WITH INTERMITTENT CLAUDICATION: ICD-10-CM

## 2023-02-22 PROCEDURE — 63600175 PHARM REV CODE 636 W HCPCS: Performed by: STUDENT IN AN ORGANIZED HEALTH CARE EDUCATION/TRAINING PROGRAM

## 2023-02-22 PROCEDURE — C1725 CATH, TRANSLUMIN NON-LASER: HCPCS | Performed by: STUDENT IN AN ORGANIZED HEALTH CARE EDUCATION/TRAINING PROGRAM

## 2023-02-22 PROCEDURE — C1894 INTRO/SHEATH, NON-LASER: HCPCS | Performed by: STUDENT IN AN ORGANIZED HEALTH CARE EDUCATION/TRAINING PROGRAM

## 2023-02-22 PROCEDURE — C1887 CATHETER, GUIDING: HCPCS | Performed by: STUDENT IN AN ORGANIZED HEALTH CARE EDUCATION/TRAINING PROGRAM

## 2023-02-22 PROCEDURE — C1874 STENT, COATED/COV W/DEL SYS: HCPCS | Performed by: STUDENT IN AN ORGANIZED HEALTH CARE EDUCATION/TRAINING PROGRAM

## 2023-02-22 PROCEDURE — 25000003 PHARM REV CODE 250: Performed by: STUDENT IN AN ORGANIZED HEALTH CARE EDUCATION/TRAINING PROGRAM

## 2023-02-22 PROCEDURE — 37253 INTRVASC US NONCORONARY ADDL: CPT | Performed by: STUDENT IN AN ORGANIZED HEALTH CARE EDUCATION/TRAINING PROGRAM

## 2023-02-22 PROCEDURE — 27201423 OPTIME MED/SURG SUP & DEVICES STERILE SUPPLY: Performed by: STUDENT IN AN ORGANIZED HEALTH CARE EDUCATION/TRAINING PROGRAM

## 2023-02-22 PROCEDURE — C9765 REVASC INTRA LITHOTRIP-STENT: HCPCS | Mod: 50 | Performed by: STUDENT IN AN ORGANIZED HEALTH CARE EDUCATION/TRAINING PROGRAM

## 2023-02-22 PROCEDURE — 99153 MOD SED SAME PHYS/QHP EA: CPT | Performed by: STUDENT IN AN ORGANIZED HEALTH CARE EDUCATION/TRAINING PROGRAM

## 2023-02-22 PROCEDURE — C1769 GUIDE WIRE: HCPCS | Performed by: STUDENT IN AN ORGANIZED HEALTH CARE EDUCATION/TRAINING PROGRAM

## 2023-02-22 PROCEDURE — 25500020 PHARM REV CODE 255: Performed by: STUDENT IN AN ORGANIZED HEALTH CARE EDUCATION/TRAINING PROGRAM

## 2023-02-22 PROCEDURE — 37252 INTRVASC US NONCORONARY 1ST: CPT | Performed by: STUDENT IN AN ORGANIZED HEALTH CARE EDUCATION/TRAINING PROGRAM

## 2023-02-22 PROCEDURE — C1760 CLOSURE DEV, VASC: HCPCS | Performed by: STUDENT IN AN ORGANIZED HEALTH CARE EDUCATION/TRAINING PROGRAM

## 2023-02-22 PROCEDURE — 99152 MOD SED SAME PHYS/QHP 5/>YRS: CPT | Performed by: STUDENT IN AN ORGANIZED HEALTH CARE EDUCATION/TRAINING PROGRAM

## 2023-02-22 DEVICE — ANGIO-SEAL VIP VASCULAR CLOSURE DEVICE
Type: IMPLANTABLE DEVICE | Site: GROIN | Status: FUNCTIONAL
Brand: ANGIO-SEAL

## 2023-02-22 DEVICE — ICAST COVERED STENT, 7MMX38MMX80CM
Type: IMPLANTABLE DEVICE | Site: ARTERIAL | Status: FUNCTIONAL
Brand: ICAST

## 2023-02-22 RX ORDER — LIDOCAINE HYDROCHLORIDE 10 MG/ML
INJECTION, SOLUTION EPIDURAL; INFILTRATION; INTRACAUDAL; PERINEURAL
Status: DISCONTINUED | OUTPATIENT
Start: 2023-02-22 | End: 2023-02-22 | Stop reason: HOSPADM

## 2023-02-22 RX ORDER — HYDROCODONE BITARTRATE AND ACETAMINOPHEN 5; 325 MG/1; MG/1
1 TABLET ORAL EVERY 4 HOURS PRN
Status: DISCONTINUED | OUTPATIENT
Start: 2023-02-22 | End: 2023-02-22 | Stop reason: HOSPADM

## 2023-02-22 RX ORDER — MIDAZOLAM HYDROCHLORIDE 1 MG/ML
INJECTION, SOLUTION INTRAMUSCULAR; INTRAVENOUS
Status: DISCONTINUED | OUTPATIENT
Start: 2023-02-22 | End: 2023-02-22 | Stop reason: HOSPADM

## 2023-02-22 RX ORDER — SODIUM CHLORIDE 9 MG/ML
INJECTION, SOLUTION INTRAVENOUS ONCE
Status: COMPLETED | OUTPATIENT
Start: 2023-02-22 | End: 2023-02-22

## 2023-02-22 RX ORDER — CARVEDILOL 6.25 MG/1
6.25 TABLET ORAL 2 TIMES DAILY WITH MEALS
Qty: 90 TABLET | Refills: 3
Start: 2023-02-22 | End: 2024-01-03 | Stop reason: SDUPTHER

## 2023-02-22 RX ORDER — ASPIRIN 81 MG/1
81 TABLET ORAL DAILY
Qty: 90 TABLET | Refills: 3
Start: 2023-02-22 | End: 2023-08-14 | Stop reason: ALTCHOICE

## 2023-02-22 RX ORDER — ASPIRIN 325 MG
TABLET ORAL
Status: DISCONTINUED | OUTPATIENT
Start: 2023-02-22 | End: 2023-02-22 | Stop reason: HOSPADM

## 2023-02-22 RX ORDER — MAG HYDROX/ALUMINUM HYD/SIMETH 200-200-20
SUSPENSION, ORAL (FINAL DOSE FORM) ORAL
Status: DISCONTINUED | OUTPATIENT
Start: 2023-02-22 | End: 2023-02-22 | Stop reason: HOSPADM

## 2023-02-22 RX ORDER — MORPHINE SULFATE 4 MG/ML
2 INJECTION, SOLUTION INTRAMUSCULAR; INTRAVENOUS EVERY 4 HOURS PRN
Status: DISCONTINUED | OUTPATIENT
Start: 2023-02-22 | End: 2023-02-22 | Stop reason: HOSPADM

## 2023-02-22 RX ORDER — ACETAMINOPHEN 325 MG/1
650 TABLET ORAL EVERY 4 HOURS PRN
Status: DISCONTINUED | OUTPATIENT
Start: 2023-02-22 | End: 2023-02-22 | Stop reason: HOSPADM

## 2023-02-22 RX ORDER — FENTANYL CITRATE 50 UG/ML
INJECTION, SOLUTION INTRAMUSCULAR; INTRAVENOUS
Status: DISCONTINUED | OUTPATIENT
Start: 2023-02-22 | End: 2023-02-22 | Stop reason: HOSPADM

## 2023-02-22 RX ORDER — LISINOPRIL 10 MG/1
10 TABLET ORAL DAILY
Qty: 90 TABLET | Refills: 3
Start: 2023-02-22 | End: 2023-05-22 | Stop reason: SDUPTHER

## 2023-02-22 RX ORDER — CLOPIDOGREL 300 MG/1
TABLET, FILM COATED ORAL
Status: DISCONTINUED | OUTPATIENT
Start: 2023-02-22 | End: 2023-02-22 | Stop reason: HOSPADM

## 2023-02-22 RX ORDER — ONDANSETRON 4 MG/1
8 TABLET, ORALLY DISINTEGRATING ORAL EVERY 8 HOURS PRN
Status: DISCONTINUED | OUTPATIENT
Start: 2023-02-22 | End: 2023-02-22 | Stop reason: HOSPADM

## 2023-02-22 RX ORDER — SODIUM CHLORIDE 9 MG/ML
100 INJECTION, SOLUTION INTRAVENOUS CONTINUOUS
Status: DISCONTINUED | OUTPATIENT
Start: 2023-02-22 | End: 2023-02-22 | Stop reason: HOSPADM

## 2023-02-22 RX ORDER — PHENYLEPHRINE HYDROCHLORIDE 10 MG/ML
INJECTION INTRAVENOUS
Status: DISCONTINUED | OUTPATIENT
Start: 2023-02-22 | End: 2023-02-22 | Stop reason: HOSPADM

## 2023-02-22 RX ORDER — HYDRALAZINE HYDROCHLORIDE 20 MG/ML
10 INJECTION INTRAMUSCULAR; INTRAVENOUS EVERY 4 HOURS PRN
Status: DISCONTINUED | OUTPATIENT
Start: 2023-02-22 | End: 2023-02-22 | Stop reason: HOSPADM

## 2023-02-22 RX ORDER — HEPARIN SODIUM 1000 [USP'U]/ML
INJECTION, SOLUTION INTRAVENOUS; SUBCUTANEOUS
Status: DISCONTINUED | OUTPATIENT
Start: 2023-02-22 | End: 2023-02-22 | Stop reason: HOSPADM

## 2023-02-22 RX ADMIN — SODIUM CHLORIDE: 9 INJECTION, SOLUTION INTRAVENOUS at 06:02

## 2023-02-22 NOTE — DISCHARGE INSTRUCTIONS
-Remove dressing in 24hrs  -No driving for two Days  -Do not lift anything heavier than a gallon of milk for 5 days  -No tub bathing for 5 days (if you have a groin site).   -No lotions, powders, creams around site for 5 days  - Return to the nearest emergency room if you start running a fever; have any kind of discharge coming from the site, the site looks red or swollen.  - If site starts to bleed, lay flat on the ground, apply pressure to the site and call 911.     prescriptions for Aspirin, Lisinopril, and Coreg at [facility] in [place]. Your dosages have changed for Lisinopril and Coreg.

## 2023-02-22 NOTE — OP NOTE
Date of the procedure: Feb 22, 2023    : Adriel Valero MD  Secondary : Carson Gibson MD     Indication   -R3 Lifestyle limiting claudication   - Active smoker  - Known PAD      Procedures  - b/l USG guided accesss  - Mod sedation   - b/l Iliact PTA and stent iCast 7x38 with shock wave IVL b/l 7.0x60   - Ivus b/l iliac   - Angioseal x 2    Access site  - b/l iliac 7 fr       Sedation  - Moderate sedation was administered by nursing staff for a duration of 60 minutes.  This physician supervised the qualified employee whose time was devoted to administration sedation and monitoring the patient.    Procedure in detail  Patient brought to Cath Lab after obtaining informed consent. Conscious sedation (Versed and Fentanyl) was administered prior to the procedure. B/l groin was prepped and draped using sterile manner. 2% lidocaine was used for local anesthesia. Two 5 [place] sheath was placed in the b/l CF artery using Seldinger technique.  Sheath was upsized to 7 fr. Lesion was crossed with 0.14 gladius wire. Left Iliac was ivus'd and reference and size was marked. B/l Shokwave was performed using 7.0 balloon. After which PTA angioplasty with 7.0x 60 [place] balloon and later two icast stent 7.0x38 were deployed in b/l iliac overhanging the aorta. Angiogram shows lesion improvement from 80-90% to 0%. Both sheath were removed and angioseal closure device was used to seal the groin.   At the end of the procedure all catheters and wires removed. Patient tolerated procedure very well. No complications noted. Estimated Blood loss <5 cc. No specimen removed.     Finding and impressions  - Severe b/l iliac disease s/p ivus guided lithotripsy w/ b/l covered stent 7.0 x 38    Plan  - Continue Plavix. Start aspirin   - Encouraged to stop smoking  - Follow-up with [facility]/cardiology upon discharge

## 2023-02-22 NOTE — Clinical Note
An angiography was performed of the distal infrarenal abdominal aorta post intervention  via power injection.  DSA

## 2023-02-27 ENCOUNTER — LAB VISIT (OUTPATIENT)
Dept: LAB | Facility: HOSPITAL | Age: 58
End: 2023-02-27
Attending: STUDENT IN AN ORGANIZED HEALTH CARE EDUCATION/TRAINING PROGRAM
Payer: MEDICAID

## 2023-02-27 ENCOUNTER — OFFICE VISIT (OUTPATIENT)
Dept: INTERNAL MEDICINE | Facility: CLINIC | Age: 58
End: 2023-02-27
Payer: MEDICAID

## 2023-02-27 VITALS
HEART RATE: 74 BPM | DIASTOLIC BLOOD PRESSURE: 71 MMHG | BODY MASS INDEX: 29.25 KG/M2 | TEMPERATURE: 98 F | OXYGEN SATURATION: 100 % | WEIGHT: 149 LBS | RESPIRATION RATE: 18 BRPM | SYSTOLIC BLOOD PRESSURE: 111 MMHG | HEIGHT: 60 IN

## 2023-02-27 DIAGNOSIS — M79.641 PAIN IN BOTH HANDS: ICD-10-CM

## 2023-02-27 DIAGNOSIS — D64.9 ANEMIA, UNSPECIFIED TYPE: ICD-10-CM

## 2023-02-27 DIAGNOSIS — I10 HYPERTENSION, UNSPECIFIED TYPE: Primary | ICD-10-CM

## 2023-02-27 DIAGNOSIS — L03.90 CELLULITIS, UNSPECIFIED CELLULITIS SITE: ICD-10-CM

## 2023-02-27 DIAGNOSIS — K21.9 GASTROESOPHAGEAL REFLUX DISEASE, UNSPECIFIED WHETHER ESOPHAGITIS PRESENT: ICD-10-CM

## 2023-02-27 DIAGNOSIS — M79.642 PAIN IN BOTH HANDS: ICD-10-CM

## 2023-02-27 PROBLEM — M79.643 HAND PAIN: Status: ACTIVE | Noted: 2023-02-27

## 2023-02-27 LAB
ALBUMIN SERPL-MCNC: 3.5 G/DL (ref 3.5–5)
ALBUMIN/GLOB SERPL: 1 RATIO (ref 1.1–2)
ALP SERPL-CCNC: 64 UNIT/L (ref 40–150)
ALT SERPL-CCNC: 10 UNIT/L (ref 0–55)
AST SERPL-CCNC: 14 UNIT/L (ref 5–34)
BILIRUBIN DIRECT+TOT PNL SERPL-MCNC: 0.3 MG/DL
BUN SERPL-MCNC: 8.4 MG/DL (ref 9.8–20.1)
CALCIUM SERPL-MCNC: 9.3 MG/DL (ref 8.4–10.2)
CHLORIDE SERPL-SCNC: 101 MMOL/L (ref 98–107)
CO2 SERPL-SCNC: 24 MMOL/L (ref 22–29)
CREAT SERPL-MCNC: 0.69 MG/DL (ref 0.55–1.02)
GFR SERPLBLD CREATININE-BSD FMLA CKD-EPI: >60 MLS/MIN/1.73/M2
GLOBULIN SER-MCNC: 3.4 GM/DL (ref 2.4–3.5)
GLUCOSE SERPL-MCNC: 116 MG/DL (ref 74–100)
HAPTOGLOB SERPL-MCNC: 295 MG/DL (ref 35–250)
LDH SERPL-CCNC: 194 U/L (ref 125–220)
POTASSIUM SERPL-SCNC: 4.5 MMOL/L (ref 3.5–5.1)
PROT SERPL-MCNC: 6.9 GM/DL (ref 6.4–8.3)
SODIUM SERPL-SCNC: 135 MMOL/L (ref 136–145)

## 2023-02-27 PROCEDURE — 83615 LACTATE (LD) (LDH) ENZYME: CPT

## 2023-02-27 PROCEDURE — 36415 COLL VENOUS BLD VENIPUNCTURE: CPT

## 2023-02-27 PROCEDURE — 86225 DNA ANTIBODY NATIVE: CPT

## 2023-02-27 PROCEDURE — 99215 OFFICE O/P EST HI 40 MIN: CPT | Mod: PBBFAC | Performed by: STUDENT IN AN ORGANIZED HEALTH CARE EDUCATION/TRAINING PROGRAM

## 2023-02-27 PROCEDURE — 80053 COMPREHEN METABOLIC PANEL: CPT

## 2023-02-27 PROCEDURE — 83010 ASSAY OF HAPTOGLOBIN QUANT: CPT

## 2023-02-27 RX ORDER — DOXYCYCLINE HYCLATE 100 MG
100 TABLET ORAL 2 TIMES DAILY
Qty: 14 TABLET | Refills: 0 | OUTPATIENT
Start: 2023-02-27 | End: 2023-03-13

## 2023-02-27 RX ORDER — PREDNISONE 20 MG/1
20 TABLET ORAL DAILY
Qty: 7 TABLET | Refills: 0 | OUTPATIENT
Start: 2023-02-27 | End: 2023-03-13

## 2023-02-27 RX ORDER — MUPIROCIN 20 MG/G
OINTMENT TOPICAL 3 TIMES DAILY
Qty: 22 G | Refills: 0 | Status: SHIPPED | OUTPATIENT
Start: 2023-02-27 | End: 2023-03-06

## 2023-02-27 NOTE — PROGRESS NOTES
"INTERNAL MEDICINE RESIDENT CLINIC  CLINIC NOTE    Patient Name: Sandra Burns  YOB: 1965    PRESENTING HISTORY       History of Present Illness:  Ms. Sandra Burns is a 57 y.o. female w/ an active medical problem list including Bipolar Disorder, Depression, Chronic pain, GERD, HCV, HBV, HTN. She is presnting today to [facility] IM Resident clinic for an acute care visit.    She has chronic, intermittent bilateral hand/finger pain which has been worsening recently. It has been interferring with her daily life and has been causing a lot of distress. She says her bilateral entire hands hurt. It "feels like bone pain" and sometimes it does have a shooting sensation. Denies any current Raynaud's but has had some in the past.     Also, on the week of 2/6, she had a peripheral angiogram done via right radial access. They were unable to successfully perform it so she had it repeated via groin access on 2/22 during which they placed a stent in each leg. She is now on DAPT and is compliant. Her BLE leg pain has resolved. The access site over her right radial artery had a small scab by the time of her second angiogram on the 22'nd but as since worsened to be a medium-sized scab ([id number]) with surrounding erythema and some pus drainage per the patient. It does hurt.    PSocH  -no alcohol use; smokes 1ppd for 41 years, no drug use    PFH  -ESRD and heart failure      ROS  Constitutional: no fever/chills  EENT: no sore throat, ear pain, sinus pain/congestion, nasal congestion/drainage  Respiratory: no cough, no wheezing, no shortness of breath  Cardiovascular: no chest pain, no palpitations, no edema  Gastrointestinal: no nausea, vomiting, or diarrhea. No abdominal pain  Genitourinary: no dysuria, no urinary frequency or urgency, no hematuria  Integumentary: no skin rash or abnormal lesion  Neurologic: no headache, no dizziness, no weakness or numbness  MSK: as above      MEDICATIONS & ALLERGIES:     Current " Outpatient Medications on File Prior to Visit   Medication Sig    acyclovir (ZOVIRAX) 800 MG Tab Take 1 tablet twice per day for 5 days at the onset of rash.    ALPRAZolam (XANAX) 0.5 MG tablet Take 0.5 mg by mouth 2 (two) times daily as needed for Anxiety (anxiety).    amLODIPine (NORVASC) 10 MG tablet Take 1 tablet (10 mg total) by mouth once daily.    aspirin (ECOTRIN) 81 MG EC tablet Take 1 tablet (81 mg total) by mouth once daily.    carvediloL (COREG) 6.25 MG tablet Take 1 tablet (6.25 mg total) by mouth 2 (two) times daily with meals.    cetirizine (ZYRTEC) 10 MG tablet Take 10 mg by mouth every evening.    clopidogreL (PLAVIX) 75 mg tablet Take 75 mg by mouth once daily.    DULoxetine (CYMBALTA) 30 MG capsule Take 1 capsule (30 mg total) by mouth every evening.    DULoxetine (CYMBALTA) 60 MG capsule Take 1 capsule (60 mg total) by mouth once daily.    gabapentin (NEURONTIN) 300 MG capsule Take 1 capsule (300 mg total) by mouth 3 (three) times daily.    garlic (GARLIQUE ORAL) Take 1 tablet by mouth Daily.    lisinopriL 10 MG tablet Take 1 tablet (10 mg total) by mouth once daily.    mirtazapine (REMERON) 15 MG tablet Take 15 mg by mouth every evening.    nitroGLYCERIN (NITROSTAT) 0.4 MG SL tablet Place 1 tablet under the tongue every 5 (five) minutes as needed.    pantoprazole (PROTONIX) 40 MG tablet Take 1 tablet (40 mg total) by mouth once daily.    red yeast rice 600 mg Cap Take 3 capsules by mouth 2 (two) times a day.    rosuvastatin (CRESTOR) 40 MG Tab Take 40 mg by mouth Daily.    traZODone (DESYREL) 150 MG tablet Take 300 mg by mouth every evening.    atorvastatin (LIPITOR) 40 MG tablet Take 1 tablet (40 mg total) by mouth once daily. (Patient not taking: Reported on 2/27/2023)    buPROPion (WELLBUTRIN SR) 150 MG TBSR 12 hr tablet Take 150 mg by mouth every morning.    cilostazoL (PLETAL) 50 MG Tab Take 50 mg by mouth 2 (two) times daily.    clonazePAM (KLONOPIN) 0.5 MG tablet Take 0.25-0.5 mg by mouth  daily as needed.    famotidine (PEPCID) 40 MG tablet Take 40 mg by mouth once daily.    ketorolac (TORADOL) 10 mg tablet Take 10 mg by mouth every 4 (four) hours as needed.    metroNIDAZOLE (FLAGYL) 500 MG tablet Take 1 tablet (500 mg total) by mouth every 12 (twelve) hours. for 7 days (Patient not taking: Reported on 2/27/2023)    omega-3/dha/epa/fish oil (OMEGA-3 FISH OIL ORAL) Take 2 capsules by mouth once daily. 2000 mg capsules     No current facility-administered medications on file prior to visit.       Review of patient's allergies indicates:  No Known Allergies    OBJECTIVE:   Vital Signs:  Vitals:    02/27/23 1241   BP: 111/71   Pulse: 74   Resp: 18   Temp: 98.2 °F (36.8 °C)   SpO2: 100%   Weight: 67.6 kg (149 lb)   Height: 5' (1.524 m)       No results found for this or any previous visit (from the past 24 hour(s)).      Physical Exam    General - Appears comfortable, appropriatley conversive   Mental Status - alert and oriented x 3, speaking in logical, relevant sentences   HEENT - no rhinorrhea   Cardiac - RRR, no murmurs, rubs, or gallops; no edema in LE   Respiratory - breathing comfortably; clear to ascultation bilaterally   Abdominal - nondistended, soft, nontender to palpation   Extremities - middle finger on left hand is swollen and does not have tenting; otherwise no edema seen in hands. Has mild tenderness over MCP of 2nd digit on right hand but otherwise no convincing tenderness/erythema/edema over other joints of hands    Tinel's sign positive on both wrists    Phalen's test negative although not performed for full 60 seconds    Skin - 2cm x2 cm scab proximal to right wrist overlying path of radial artery. Has surrounding erythema. Slightly tender to palpation. No drainage observed      ASSESSMENT & PLAN:     BLE Claudication  -Got bilateral lower extremity stents. Is compliant with ASA/Plavix. Denies blood in stools    Cellulitis overlying right radial access site with reported purulence per  patient  -prescribing doxycycline 100mg BID x 7 days PO; bactroban ointment topical  -she has appointment with cardiologist on Wednesday    Hx Anemia  -required blood transfusions in past  -EGD 11/2021 - hiatal hernia, GE ring, non-bleeding angioectasias  -C-Scope 9/2021 - normal  -had recent EGD and C-Scope in 2022 - has esophageal dilation, no signs of bleeding, colon polyp  -continue ferrous sulfate every-other-day  -Hg has dropped within last 4 months. She was told to contact her GI physician regarding this  -will see if I can add on a CMP, LDH, Haptoglobin to the Ds-DNA that was drawn today in lab.    Ongoing Hand Pain  -Differential includes: RA, SLE, Osteoarthritis, radiculopathy  -labwork is positive for RF and MALIKA. Will check Ds-DNA  -Despite positive labwork and her story, her physical exam does not look like acute RA. Will trial prednisone 20mg x7 days to see her response  -XR of right hand shows signs of osteoarthritis. Will order XR of left hand. She is trying voltaren gel without relief  -Recommending against NSAIDs due to DAPT therapy  -has history of vertebral/spine disease. She will bring in old scans from years ago to be loaded into system. Am ordering XR C-Spine. May order MRI after. Consider nerve conduction studies or referral to Sports Medicine if radiculopathy suspected    REMAINDER OF PROBLEMS AND MANAGEMENT CAN BE SEEN IN LAST PRIMARY NOTE. THIS IS ACUTE CARE VISIT    RTC in 1 month    Akin Gan MD  Internal Medicine PGY-3

## 2023-02-28 NOTE — PROGRESS NOTES
I have reviewed the notes, assessments, and discussed management plan with resident, I concur with his documentation of Sandra Burns.

## 2023-03-02 ENCOUNTER — HOSPITAL ENCOUNTER (OUTPATIENT)
Dept: RADIOLOGY | Facility: HOSPITAL | Age: 58
Discharge: HOME | End: 2023-03-02
Attending: INTERNAL MEDICINE
Payer: MEDICAID

## 2023-03-02 ENCOUNTER — PROCEDURE VISIT (OUTPATIENT)
Dept: RESPIRATORY THERAPY | Facility: HOSPITAL | Age: 58
End: 2023-03-02
Attending: INTERNAL MEDICINE
Payer: MEDICAID

## 2023-03-02 ENCOUNTER — HOSPITAL ENCOUNTER (OUTPATIENT)
Dept: RADIOLOGY | Facility: HOSPITAL | Age: 58
Discharge: HOME | End: 2023-03-02
Attending: STUDENT IN AN ORGANIZED HEALTH CARE EDUCATION/TRAINING PROGRAM
Payer: MEDICAID

## 2023-03-02 DIAGNOSIS — M79.641 PAIN IN BOTH HANDS: ICD-10-CM

## 2023-03-02 DIAGNOSIS — Z72.0 TOBACCO USE: ICD-10-CM

## 2023-03-02 DIAGNOSIS — J44.9 CHRONIC OBSTRUCTIVE PULMONARY DISEASE, UNSPECIFIED COPD TYPE: ICD-10-CM

## 2023-03-02 DIAGNOSIS — R91.1 NODULE OF LOWER LOBE OF RIGHT LUNG: ICD-10-CM

## 2023-03-02 DIAGNOSIS — M79.642 PAIN IN BOTH HANDS: ICD-10-CM

## 2023-03-02 LAB — DSDNA ANTIBODY (OHS): NEGATIVE

## 2023-03-02 PROCEDURE — 99900031 HC PATIENT EDUCATION (STAT)

## 2023-03-02 PROCEDURE — 94729 DIFFUSING CAPACITY: CPT

## 2023-03-02 PROCEDURE — 94727 GAS DIL/WSHOT DETER LNG VOL: CPT

## 2023-03-02 PROCEDURE — 71250 CT THORAX DX C-: CPT | Mod: TC

## 2023-03-02 PROCEDURE — 72040 X-RAY EXAM NECK SPINE 2-3 VW: CPT | Mod: TC

## 2023-03-13 ENCOUNTER — HOSPITAL ENCOUNTER (OUTPATIENT)
Dept: RADIOLOGY | Facility: HOSPITAL | Age: 58
Discharge: HOME | End: 2023-03-13
Attending: STUDENT IN AN ORGANIZED HEALTH CARE EDUCATION/TRAINING PROGRAM
Payer: MEDICAID

## 2023-03-13 ENCOUNTER — HOSPITAL ENCOUNTER (EMERGENCY)
Facility: HOSPITAL | Age: 58
Discharge: HOME | End: 2023-03-13
Attending: EMERGENCY MEDICINE
Payer: MEDICAID

## 2023-03-13 VITALS
WEIGHT: 145 LBS | BODY MASS INDEX: 28.47 KG/M2 | TEMPERATURE: 98 F | HEIGHT: 60 IN | OXYGEN SATURATION: 98 % | DIASTOLIC BLOOD PRESSURE: 53 MMHG | RESPIRATION RATE: 18 BRPM | HEART RATE: 67 BPM | SYSTOLIC BLOOD PRESSURE: 104 MMHG

## 2023-03-13 DIAGNOSIS — R55 SYNCOPE: ICD-10-CM

## 2023-03-13 DIAGNOSIS — N83.209 CYST OF OVARY, UNSPECIFIED LATERALITY: ICD-10-CM

## 2023-03-13 DIAGNOSIS — M85.80 OSTEOPENIA, UNSPECIFIED LOCATION: ICD-10-CM

## 2023-03-13 DIAGNOSIS — R55 SYNCOPE AND COLLAPSE: Primary | ICD-10-CM

## 2023-03-13 DIAGNOSIS — D64.9 ANEMIA, UNSPECIFIED TYPE: ICD-10-CM

## 2023-03-13 DIAGNOSIS — Z12.39 ENCOUNTER FOR SCREENING FOR MALIGNANT NEOPLASM OF BREAST, UNSPECIFIED SCREENING MODALITY: ICD-10-CM

## 2023-03-13 LAB
ANION GAP SERPL CALC-SCNC: 8 MEQ/L
BASOPHILS # BLD AUTO: 0.04 X10(3)/MCL (ref 0–0.2)
BASOPHILS NFR BLD AUTO: 0.6 %
BUN SERPL-MCNC: 16 MG/DL (ref 9.8–20.1)
CALCIUM SERPL-MCNC: 9.3 MG/DL (ref 8.4–10.2)
CHLORIDE SERPL-SCNC: 103 MMOL/L (ref 98–107)
CO2 SERPL-SCNC: 27 MMOL/L (ref 22–29)
CREAT SERPL-MCNC: 0.75 MG/DL (ref 0.55–1.02)
CREAT/UREA NIT SERPL: 21
EOSINOPHIL # BLD AUTO: 0.27 X10(3)/MCL (ref 0–0.9)
EOSINOPHIL NFR BLD AUTO: 3.9 %
ERYTHROCYTE [DISTWIDTH] IN BLOOD BY AUTOMATED COUNT: 14.2 % (ref 11.5–17)
GFR SERPLBLD CREATININE-BSD FMLA CKD-EPI: >60 MLS/MIN/1.73/M2
GLUCOSE SERPL-MCNC: 120 MG/DL (ref 74–100)
HCT VFR BLD AUTO: 28.7 % (ref 37–47)
HGB BLD-MCNC: 8 G/DL (ref 12–16)
IMM GRANULOCYTES # BLD AUTO: 0.03 X10(3)/MCL (ref 0–0.04)
IMM GRANULOCYTES NFR BLD AUTO: 0.4 %
LYMPHOCYTES # BLD AUTO: 1.54 X10(3)/MCL (ref 0.6–4.6)
LYMPHOCYTES NFR BLD AUTO: 22 %
MCH RBC QN AUTO: 25.8 PG
MCHC RBC AUTO-ENTMCNC: 27.9 G/DL (ref 33–36)
MCV RBC AUTO: 92.6 FL (ref 80–94)
MONOCYTES # BLD AUTO: 0.55 X10(3)/MCL (ref 0.1–1.3)
MONOCYTES NFR BLD AUTO: 7.9 %
NEUTROPHILS # BLD AUTO: 4.56 X10(3)/MCL (ref 2.1–9.2)
NEUTROPHILS NFR BLD AUTO: 65.2 %
PLATELET # BLD AUTO: 320 X10(3)/MCL (ref 130–400)
PMV BLD AUTO: 10.4 FL (ref 7.4–10.4)
POTASSIUM SERPL-SCNC: 4 MMOL/L (ref 3.5–5.1)
RBC # BLD AUTO: 3.1 X10(6)/MCL (ref 4.2–5.4)
SODIUM SERPL-SCNC: 138 MMOL/L (ref 136–145)
WBC # SPEC AUTO: 7 X10(3)/MCL (ref 4.5–11.5)

## 2023-03-13 PROCEDURE — 77067 MAMMO DIGITAL SCREENING BILAT WITH TOMO: ICD-10-PCS | Mod: 26,,, | Performed by: RADIOLOGY

## 2023-03-13 PROCEDURE — 93010 EKG 12-LEAD: ICD-10-PCS | Mod: ,,, | Performed by: STUDENT IN AN ORGANIZED HEALTH CARE EDUCATION/TRAINING PROGRAM

## 2023-03-13 PROCEDURE — 80048 BASIC METABOLIC PNL TOTAL CA: CPT | Performed by: EMERGENCY MEDICINE

## 2023-03-13 PROCEDURE — 85025 COMPLETE CBC W/AUTO DIFF WBC: CPT | Performed by: EMERGENCY MEDICINE

## 2023-03-13 PROCEDURE — 76856 US EXAM PELVIC COMPLETE: CPT | Mod: TC

## 2023-03-13 PROCEDURE — 77080 DXA BONE DENSITY AXIAL: CPT | Mod: TC

## 2023-03-13 PROCEDURE — 99284 EMERGENCY DEPT VISIT MOD MDM: CPT | Mod: 25

## 2023-03-13 PROCEDURE — 93005 ELECTROCARDIOGRAM TRACING: CPT

## 2023-03-13 PROCEDURE — 77067 SCR MAMMO BI INCL CAD: CPT | Mod: TC

## 2023-03-13 PROCEDURE — 93010 ELECTROCARDIOGRAM REPORT: CPT | Mod: ,,, | Performed by: STUDENT IN AN ORGANIZED HEALTH CARE EDUCATION/TRAINING PROGRAM

## 2023-03-13 PROCEDURE — 77063 MAMMO DIGITAL SCREENING BILAT WITH TOMO: ICD-10-PCS | Mod: 26,,, | Performed by: RADIOLOGY

## 2023-03-13 PROCEDURE — 77063 BREAST TOMOSYNTHESIS BI: CPT | Mod: 26,,, | Performed by: RADIOLOGY

## 2023-03-13 PROCEDURE — 77067 SCR MAMMO BI INCL CAD: CPT | Mod: 26,,, | Performed by: RADIOLOGY

## 2023-03-13 NOTE — ED PROVIDER NOTES
Encounter Date: 3/13/2023       History     Chief Complaint   Patient presents with    Loss of Consciousness     Reports she had a syncope episode on Saturday and fell. Complains of dizziness and lightheaded. Ecchymosis noted to right eye. Patient is on Plavix     This 57-year-old female presents to the emergency room with numerous complaints.  She has ecchymosis around her right eye.  She states she passed out on Saturday.  She was lying in bed when her brother came into the house and she got up to see who it was.  She states that she is been feeling weak and dizzy ever since having stents placed in both legs on the 22nd of last month.  Her cardiologist has lowered her blood pressure medications.     Review of patient's allergies indicates:  No Known Allergies  Past Medical History:   Diagnosis Date    Anemia, unspecified     Coronary artery disease     History of esophagogastroduodenoscopy (EGD) 11/01/2022    Hypertension     PAD (peripheral artery disease)     PVD (peripheral vascular disease) with claudication     Thyroid disease      Past Surgical History:   Procedure Laterality Date    EXTRACORPOREAL SHOCK WAVE LITHOTRIPSY  2/22/2023    Procedure: LITHOTRIPSY- Peripheral Shockwave;  Surgeon: Adriel Valero MD;  Location: [facility] CATH LAB;  Service: Cardiology;;    HIP SURGERY      INSERTION, STENT, ARTERY  2/22/2023    Procedure: INSERTION, STENT, ARTERY;  Surgeon: Adriel Valero MD;  Location: [facility] CATH LAB;  Service: Cardiology;;    INTRAVASCULAR ULTRASOUND, NON-CORONARY  2/22/2023    Procedure: Intravascular Ultrasound, Non-Coronary;  Surgeon: Adriel Valero MD;  Location: [facility] CATH LAB;  Service: Cardiology;;    LEFT HEART CATHETERIZATION      PERIPHERAL ANGIOGRAPHY N/A 2/22/2023    Procedure: Peripheral angiography;  Surgeon: Adriel Valero MD;  Location: [facility] CATH LAB;  Service: Cardiology;  Laterality: N/A;  BILAT ILIAC INTERVENTION     No family history on file.  Social History     Tobacco Use     Smoking status: Every Day     Packs/day: 1.00     Years: 40.00     Pack years: 40.00     Types: Cigarettes    Smokeless tobacco: Never   Substance Use Topics    Alcohol use: Not Currently    Drug use: Yes     Types: Marijuana     Review of Systems   Constitutional:  Negative for fever.   HENT:  Negative for sore throat.    Respiratory:  Negative for shortness of breath.    Cardiovascular:  Negative for chest pain.   Gastrointestinal:  Negative for nausea.   Genitourinary:  Negative for dysuria.   Musculoskeletal:  Negative for back pain.   Skin:  Negative for rash.   Neurological:  Positive for dizziness, syncope and light-headedness. Negative for weakness.   Hematological:  Does not bruise/bleed easily.     Physical Exam     Initial Vitals [03/13/23 1446]   BP Pulse Resp Temp SpO2   101/64 63 18 97.8 °F (36.6 °C) 98 %      MAP       --         Physical Exam    Nursing note and vitals reviewed.  Constitutional: She appears well-developed and well-nourished.   HENT:   Head: Normocephalic and atraumatic.   Eyes: Conjunctivae and EOM are normal. Pupils are equal, round, and reactive to light.   Neck: Neck supple.   Normal range of motion.  Cardiovascular:  Normal rate, regular rhythm, normal heart sounds and intact distal pulses.           Pulmonary/Chest: Breath sounds normal.   Abdominal: Abdomen is soft. Bowel sounds are normal.   Musculoskeletal:         General: Normal range of motion.      Cervical back: Normal range of motion and neck supple.     Neurological: She is alert and oriented to person, place, and time. She has normal strength.   Skin: Skin is warm and dry. Capillary refill takes less than 2 seconds.   Psychiatric: She has a normal mood and affect. Her behavior is normal. Judgment and thought content normal.       ED Course   Procedures  Labs Reviewed   BASIC METABOLIC PANEL - Abnormal; Notable for the following components:       Result Value    Glucose Level 120 (*)     All other components within  normal limits   CBC WITH DIFFERENTIAL - Abnormal; Notable for the following components:    RBC 3.10 (*)     Hgb 8.0 (*)     Hct 28.7 (*)     MCHC 27.9 (*)     All other components within normal limits   CBC W/ AUTO DIFFERENTIAL    Narrative:     The following orders were created for panel order CBC auto differential.  Procedure                               Abnormality         Status                     ---------                               -----------         ------                     CBC with Differential[[id number]]        Abnormal            Final result                 Please view results for these tests on the individual orders.     EKG Readings: (Independently Interpreted)   Rhythm: Normal Sinus Rhythm. Heart Rate: 63. Ectopy: No Ectopy. Conduction: Normal. ST Segments: Normal ST Segments. T Waves: Normal. Clinical Impression: Normal Sinus Rhythm   3/13/23 1448     Imaging Results    None          Medications - No data to display                           Clinical Impression:   Final diagnoses:  [R55] Syncope  [R55] Syncope and collapse (Primary)  [D64.9] Anemia, unspecified type        ED Disposition Condition    Discharge Stable          ED Prescriptions    None       Follow-up Information       Follow up With Specialties Details Why Contact Info    Akin Gan MD Internal Medicine Call in 3 days  [address] [facility] [address] [phone]               Jadiel Coffey MD  03/13/23 1348

## 2023-03-27 ENCOUNTER — OFFICE VISIT (OUTPATIENT)
Dept: INTERNAL MEDICINE | Facility: CLINIC | Age: 58
End: 2023-03-27
Payer: MEDICAID

## 2023-03-27 VITALS
BODY MASS INDEX: 29.61 KG/M2 | WEIGHT: 150.81 LBS | DIASTOLIC BLOOD PRESSURE: 70 MMHG | SYSTOLIC BLOOD PRESSURE: 109 MMHG | OXYGEN SATURATION: 98 % | RESPIRATION RATE: 18 BRPM | TEMPERATURE: 99 F | HEIGHT: 60 IN | HEART RATE: 72 BPM

## 2023-03-27 DIAGNOSIS — D64.9 ANEMIA, UNSPECIFIED TYPE: ICD-10-CM

## 2023-03-27 DIAGNOSIS — M85.80 OSTEOPENIA, UNSPECIFIED LOCATION: ICD-10-CM

## 2023-03-27 DIAGNOSIS — M79.641 PAIN IN BOTH HANDS: ICD-10-CM

## 2023-03-27 DIAGNOSIS — M79.642 PAIN IN BOTH HANDS: ICD-10-CM

## 2023-03-27 DIAGNOSIS — M06.9 RHEUMATOID ARTHRITIS, INVOLVING UNSPECIFIED SITE, UNSPECIFIED WHETHER RHEUMATOID FACTOR PRESENT: ICD-10-CM

## 2023-03-27 DIAGNOSIS — I10 HYPERTENSION, UNSPECIFIED TYPE: Primary | ICD-10-CM

## 2023-03-27 DIAGNOSIS — K21.9 GASTROESOPHAGEAL REFLUX DISEASE, UNSPECIFIED WHETHER ESOPHAGITIS PRESENT: ICD-10-CM

## 2023-03-27 PROCEDURE — 99215 OFFICE O/P EST HI 40 MIN: CPT | Mod: PBBFAC | Performed by: STUDENT IN AN ORGANIZED HEALTH CARE EDUCATION/TRAINING PROGRAM

## 2023-03-27 RX ORDER — IBUPROFEN 200 MG
1 TABLET ORAL DAILY
Qty: 60 PATCH | Refills: 0 | Status: SHIPPED | OUTPATIENT
Start: 2023-03-27 | End: 2024-01-03 | Stop reason: SDUPTHER

## 2023-03-27 NOTE — PROGRESS NOTES
"INTERNAL MEDICINE RESIDENT CLINIC  CLINIC NOTE    Patient Name: Sandra Burns  YOB: 1965    PRESENTING HISTORY       History of Present Illness:  Ms. Sandra Burns is a 57 y.o. female w/ an active medical problem list including Bipolar Disorder, Depression, Chronic pain, GERD, HCV, HBV, HTN. She is presnting today to [facility] IM Resident clinic for an acute care visit for syncope, anemia.     Acute Care note from last month:    She has chronic, intermittent bilateral hand/finger pain which has been worsening recently. It has been interferring with her daily life and has been causing a lot of distress. She says her bilateral entire hands hurt. It "feels like bone pain" and sometimes it does have a shooting sensation. Denies any current Raynaud's but has had some in the past.     Also, on the week of 2/6, she had a peripheral angiogram done via right radial access. They were unable to successfully perform it so she had it repeated via groin access on 2/22 during which they placed a stent in each leg. She is now on DAPT and is compliant. Her BLE leg pain has resolved. The access site over her right radial artery had a small scab by the time of her second angiogram on the 22'nd but as since worsened to be a medium-sized scab ([id number]) with surrounding erythema and some pus drainage per the patient. It does hurt.    Today:    -Earlier this month, Ms. Burns had a month where she was feeling dizzy and weak. She collapsed one time after getting out of bed and subsequently went to the ED. Found to be anemic with Hg of 8 (previous Hg 8.7). Was not having any active bleed and was discharged. Also seems her lisinopril was decreased from 20 to 10mg daily recently due to low BP's.    For the past couple weeks she has felt tired but has not been having any dizziness. She is taking the iron every-other-day, and on the days when she doesn't take the iron her stool is brown. No melena/hematochezia/hematemesis. She " did contact Dr. Hatch's office regarding her anemia who suggested she see a hematologist b/c her recent EGD and C-scope were normal. Her small wound on her right wrist is still present but improving and has been without any drainage. She continues to have bilateral hand pains which did improve with her trial of prednisone.      PSocH  -no alcohol use; smokes 1ppd for 41 years, no drug use    PFH  -ESRD and heart failure      ROS  Constitutional: no fever/chills  EENT: no sore throat, ear pain, sinus pain/congestion, nasal congestion/drainage  Respiratory: no cough, no wheezing, no shortness of breath  Cardiovascular: no chest pain, no palpitations, no edema  Gastrointestinal: as above  Genitourinary: no dysuria, no urinary frequency or urgency, no hematuria  Integumentary: no skin rash or abnormal lesion  Neurologic: no headache, no dizziness, no weakness or numbness        MEDICATIONS & ALLERGIES:     Current Outpatient Medications on File Prior to Visit   Medication Sig    ALPRAZolam (XANAX) 0.5 MG tablet Take 0.5 mg by mouth 2 (two) times daily as needed for Anxiety (anxiety).    amLODIPine (NORVASC) 10 MG tablet Take 1 tablet (10 mg total) by mouth once daily.    aspirin (ECOTRIN) 81 MG EC tablet Take 1 tablet (81 mg total) by mouth once daily.    atorvastatin (LIPITOR) 40 MG tablet Take 1 tablet (40 mg total) by mouth once daily.    carvediloL (COREG) 6.25 MG tablet Take 1 tablet (6.25 mg total) by mouth 2 (two) times daily with meals.    clopidogreL (PLAVIX) 75 mg tablet Take 75 mg by mouth once daily.    DULoxetine (CYMBALTA) 30 MG capsule Take 1 capsule (30 mg total) by mouth every evening.    DULoxetine (CYMBALTA) 60 MG capsule Take 1 capsule (60 mg total) by mouth once daily.    gabapentin (NEURONTIN) 300 MG capsule Take 1 capsule (300 mg total) by mouth 3 (three) times daily.    garlic (GARLIQUE ORAL) Take 1 tablet by mouth Daily.    lisinopriL 10 MG tablet Take 1 tablet (10 mg total) by mouth once  daily.    nitroGLYCERIN (NITROSTAT) 0.4 MG SL tablet Place 1 tablet under the tongue every 5 (five) minutes as needed.    pantoprazole (PROTONIX) 40 MG tablet Take 1 tablet (40 mg total) by mouth once daily.    red yeast rice 600 mg Cap Take 3 capsules by mouth 2 (two) times a day.    traZODone (DESYREL) 150 MG tablet Take 300 mg by mouth every evening.    buPROPion (WELLBUTRIN SR) 150 MG TBSR 12 hr tablet Take 150 mg by mouth every morning.    clonazePAM (KLONOPIN) 0.5 MG tablet Take 0.25-0.5 mg by mouth daily as needed.    rosuvastatin (CRESTOR) 40 MG Tab Take 40 mg by mouth Daily.     No current facility-administered medications on file prior to visit.       Review of patient's allergies indicates:  No Known Allergies    OBJECTIVE:   Vital Signs:  Vitals:    03/27/23 1255   BP: 109/70   Pulse: 72   Resp: 18   Temp: 98.6 °F (37 °C)   SpO2: 98%   Weight: 68.4 kg (150 lb 12.8 oz)   Height: 5' (1.524 m)       No results found for this or any previous visit (from the past 24 hour(s)).      Physical Exam    General - Appears comfortable, appropriatley conversive   Mental Status - alert and oriented x 3, speaking in logical, relevant sentences   HEENT - no rhinorrhea   Cardiac - RRR, no murmurs, rubs, or gallops; no edema in LE   Respiratory - breathing comfortably; clear to ascultation bilaterally   Abdominal - nondistended, soft, nontender to palpation   Extremities - scab over anterior right wrist 1cm x 1cm. Smaller than previous visit. Shallow. No exudate. Non-tender to palpation  Skin - no rashes or bruises seen on skin        ASSESSMENT & PLAN:     This is an acute care visit. The below problems were addressed this visit. Remainder of problems can be seen on clinic note from 2/1/2023    Syncope  -suspect this may have been due to anemia although she has been asymptomatic for past couple weeks. For now will hold off on transfusion since she's been asymptomatic. Will check updated Hg level  -She has appointment soon  with [facility]. Encouraged her to inform them of her syncopal event. Has TTE scheduled soon  -Has RA. Cervical XR showed slight forward displacement of C4 on C5 which doesn't seem too concerning but will get MRI of c-spine to get better image  -will get records from [facility]    Healing wound over right wrist  -see HPI for details  -continue bactroban ointment    Hx Anemia  -has hx of GI bleed  -following Dr. Hatch. Recent EGD and c-scope normal  -recommend f/u with Dr. Hatch regarding possibility of small bowel GI bleed  -continue ferrous sulfate  -ordering CBC, reticulocyte count, smear, folate, B12, Raji test, osmotic fragility test, electrophoresis, LDH, haptoglobin  -will get records from GI    Ongoing Hand Pain  Seems combined osteoarthritis and rheumatoid arthritis  -labwork is positive for RF and MALIKA. Negative CCP and ds-DNA  -symptoms improved significantly with trial of prednisone. Placing Rheumatology referral  -XR of right hand shows signs of osteoarthritis. Will order XR of left hand. She is trying voltaren gel and Tylenol without relief  -Recommending against NSAIDs due to DAPT therapy  -has history of vertebral/spine disease. Consider nerve conduction studies or referral to Sports Medicine if radiculopathy suspected    BLE Claudication  -Got bilateral lower extremity stents. Is compliant with ASA/Plavix. Denies blood in stools    RTC in 2 months, acute care slot OK    Akin Gan MD  Internal Medicine PGY-3

## 2023-03-28 NOTE — PROGRESS NOTES
I have reviewed and concur with the resident's history, physical, assessment, and plan.  I have discussed with him all issues related to the diagnosis, workup and treatment plan. Care provided as reasonable and necessary.Anemai /u ordered    Narinder Porter MD  [facility]

## 2023-04-03 ENCOUNTER — HOSPITAL ENCOUNTER (OUTPATIENT)
Dept: RADIOLOGY | Facility: HOSPITAL | Age: 58
Discharge: HOME | End: 2023-04-03
Attending: STUDENT IN AN ORGANIZED HEALTH CARE EDUCATION/TRAINING PROGRAM
Payer: MEDICAID

## 2023-04-03 DIAGNOSIS — M06.9 RHEUMATOID ARTHRITIS, INVOLVING UNSPECIFIED SITE, UNSPECIFIED WHETHER RHEUMATOID FACTOR PRESENT: ICD-10-CM

## 2023-04-03 PROCEDURE — 73130 X-RAY EXAM OF HAND: CPT | Mod: TC,LT

## 2023-04-05 ENCOUNTER — TELEPHONE (OUTPATIENT)
Dept: INTERNAL MEDICINE | Facility: CLINIC | Age: 58
End: 2023-04-05
Payer: MEDICAID

## 2023-04-05 DIAGNOSIS — M06.9 RHEUMATOID ARTHRITIS, INVOLVING UNSPECIFIED SITE, UNSPECIFIED WHETHER RHEUMATOID FACTOR PRESENT: Primary | ICD-10-CM

## 2023-04-06 ENCOUNTER — TELEPHONE (OUTPATIENT)
Dept: INTERNAL MEDICINE | Facility: CLINIC | Age: 58
End: 2023-04-06
Payer: MEDICAID

## 2023-04-06 DIAGNOSIS — D64.9 ANEMIA, UNSPECIFIED TYPE: ICD-10-CM

## 2023-04-06 RX ORDER — FERROUS GLUCONATE 324(38)MG
1 TABLET ORAL EVERY OTHER DAY
COMMUNITY
Start: 2023-03-14 | End: 2023-04-06 | Stop reason: SDUPTHER

## 2023-04-06 RX ORDER — FOLIC ACID 1 MG/1
2 TABLET ORAL DAILY
Qty: 180 TABLET | Refills: 1 | Status: SHIPPED | OUTPATIENT
Start: 2023-04-06 | End: 2023-05-22 | Stop reason: SDUPTHER

## 2023-04-06 RX ORDER — MIRTAZAPINE 15 MG/1
TABLET, FILM COATED ORAL
COMMUNITY
Start: 2023-03-31

## 2023-04-06 RX ORDER — FERROUS GLUCONATE 324(38)MG
1 TABLET ORAL EVERY OTHER DAY
Qty: 90 TABLET | Refills: 1 | Status: SHIPPED | OUTPATIENT
Start: 2023-04-06 | End: 2023-05-22 | Stop reason: SDUPTHER

## 2023-04-06 RX ORDER — FERROUS GLUCONATE 324(38)MG
1 TABLET ORAL EVERY OTHER DAY
Qty: 30 TABLET | Refills: 2 | Status: CANCELLED | OUTPATIENT
Start: 2023-04-06

## 2023-04-06 RX ORDER — LANOLIN ALCOHOL/MO/W.PET/CERES
2000 CREAM (GRAM) TOPICAL DAILY
Qty: 180 TABLET | Refills: 1 | Status: SHIPPED | OUTPATIENT
Start: 2023-04-06 | End: 2023-05-22 | Stop reason: SDUPTHER

## 2023-04-06 NOTE — TELEPHONE ENCOUNTER
Placed call to Dr. Lira's office and spoke with Tana stated Dr. Lira accepts patient's insurance. Referral faxed via Epic system.

## 2023-04-06 NOTE — TELEPHONE ENCOUNTER
----- Message from Akin Gan MD sent at 4/5/2023  9:31 AM CDT -----  Hi,    I placed an external referral to Dr. Lira for Rheumatology for the attached patient. Can we have it sent over to their office? Thanks

## 2023-04-06 NOTE — TELEPHONE ENCOUNTER
Ms. Burns's Hg dropped to 6.8. She feels tired and has been feeling dizzy. She will go to the ED for blood transfusion    She has a history of duodenal angiodysplasias a couple years ago, but per patient, most recent endoscopies with Dr. Hatch were normal. I left voicemail with his office to discuss. I wonder if she would benefit from capsule endoscopy.     Her iron level dropped. I will refill her iron tablets (she's been out for a couple weeks). Her B12 and folic acid are low or lower-end-of-normal and MCV slightly elevated despite iron deficiency, so I suspect her vitamin deficiencies are contributing to her anemia. I'll check for a cause of malabsorption by checking for Celiac. Her prior H.Pylori biopsies were negative. Will refer her to [facility] hematology.    Akin Gan PGY 3  [facility] Internal Medicine    --------------    I spoke with ROB Cortez for Dr. Hatch. We discussed the case. Her scopes (upper and lower) from November were largely unremarkable and also her biopsies were negative for H. Pylori. We could consider doing a video capsule endoscopy, but they don't do that at [facility] and ussually refer over to [facility] for that procedure. Since she has Medicaid the only available institution would be at [facility]. I'll get in touch with GI clinic to see the availability for capsule endoscopy.

## 2023-04-07 ENCOUNTER — HOSPITAL ENCOUNTER (OUTPATIENT)
Facility: HOSPITAL | Age: 58
Discharge: HOME | DRG: 812 | End: 2023-04-08
Attending: EMERGENCY MEDICINE | Admitting: EMERGENCY MEDICINE
Payer: MEDICAID

## 2023-04-07 DIAGNOSIS — K92.2 UPPER GI BLEED: Primary | ICD-10-CM

## 2023-04-07 DIAGNOSIS — R53.1 GENERALIZED WEAKNESS: ICD-10-CM

## 2023-04-07 DIAGNOSIS — D64.9 ACUTE ON CHRONIC ANEMIA: ICD-10-CM

## 2023-04-07 LAB
ABO + RH BLD: NORMAL
ABORH RETYPE: NORMAL
ALBUMIN SERPL-MCNC: 3.6 G/DL (ref 3.5–5)
ALBUMIN/GLOB SERPL: 1 RATIO (ref 1.1–2)
ALP SERPL-CCNC: 58 UNIT/L (ref 40–150)
ALT SERPL-CCNC: 10 UNIT/L (ref 0–55)
APTT PPP: 30.9 SECONDS (ref 23.2–33.7)
AST SERPL-CCNC: 15 UNIT/L (ref 5–34)
BASOPHILS # BLD AUTO: 0.07 X10(3)/MCL (ref 0–0.2)
BASOPHILS NFR BLD AUTO: 1.1 %
BILIRUBIN DIRECT+TOT PNL SERPL-MCNC: 0.1 MG/DL
BLD PROD TYP BPU: NORMAL
BLOOD UNIT EXPIRATION DATE: NORMAL
BLOOD UNIT TYPE CODE: 5100
BUN SERPL-MCNC: 9.8 MG/DL (ref 9.8–20.1)
CALCIUM SERPL-MCNC: 9.1 MG/DL (ref 8.4–10.2)
CHLORIDE SERPL-SCNC: 108 MMOL/L (ref 98–107)
CO2 SERPL-SCNC: 23 MMOL/L (ref 22–29)
CREAT SERPL-MCNC: 0.84 MG/DL (ref 0.55–1.02)
CROSSMATCH INTERPRETATION: NORMAL
DISPENSE STATUS: NORMAL
EOSINOPHIL # BLD AUTO: 0.24 X10(3)/MCL (ref 0–0.9)
EOSINOPHIL NFR BLD AUTO: 3.7 %
ERYTHROCYTE [DISTWIDTH] IN BLOOD BY AUTOMATED COUNT: 17.6 % (ref 11.5–17)
GFR SERPLBLD CREATININE-BSD FMLA CKD-EPI: >60 MLS/MIN/1.73/M2
GLOBULIN SER-MCNC: 3.5 GM/DL (ref 2.4–3.5)
GLUCOSE SERPL-MCNC: 111 MG/DL (ref 74–100)
GROUP & RH: NORMAL
HAPTOGLOB SERPL-MCNC: 167 MG/DL (ref 35–250)
HCT VFR BLD AUTO: 25.1 % (ref 37–47)
HGB BLD-MCNC: 7.5 G/DL (ref 12–16)
IMM GRANULOCYTES # BLD AUTO: 0.03 X10(3)/MCL (ref 0–0.04)
IMM GRANULOCYTES NFR BLD AUTO: 0.5 %
INDIRECT COOMBS GEL: NORMAL
INR BLD: 0.95 (ref 0–1.3)
LDH SERPL-CCNC: 157 U/L (ref 125–220)
LYMPHOCYTES # BLD AUTO: 1.04 X10(3)/MCL (ref 0.6–4.6)
LYMPHOCYTES NFR BLD AUTO: 15.9 %
MCH RBC QN AUTO: 25.4 PG (ref 27–31)
MCHC RBC AUTO-ENTMCNC: 29.9 G/DL (ref 33–36)
MCV RBC AUTO: 85.1 FL (ref 80–94)
MONOCYTES # BLD AUTO: 0.61 X10(3)/MCL (ref 0.1–1.3)
MONOCYTES NFR BLD AUTO: 9.3 %
NEUTROPHILS # BLD AUTO: 4.54 X10(3)/MCL (ref 2.1–9.2)
NEUTROPHILS NFR BLD AUTO: 69.5 %
NRBC BLD AUTO-RTO: 0 %
PLATELET # BLD AUTO: 345 X10(3)/MCL (ref 130–400)
PMV BLD AUTO: 10.3 FL (ref 7.4–10.4)
POTASSIUM SERPL-SCNC: 4 MMOL/L (ref 3.5–5.1)
PROT SERPL-MCNC: 7.1 GM/DL (ref 6.4–8.3)
PROTHROMBIN TIME: 12.6 SECONDS (ref 12.5–14.5)
RBC # BLD AUTO: 2.95 X10(6)/MCL (ref 4.2–5.4)
SODIUM SERPL-SCNC: 138 MMOL/L (ref 136–145)
SPECIMEN OUTDATE: NORMAL
UNIT NUMBER: NORMAL
WBC # SPEC AUTO: 6.5 X10(3)/MCL (ref 4.5–11.5)

## 2023-04-07 PROCEDURE — 25000003 PHARM REV CODE 250: Performed by: STUDENT IN AN ORGANIZED HEALTH CARE EDUCATION/TRAINING PROGRAM

## 2023-04-07 PROCEDURE — 80053 COMPREHEN METABOLIC PANEL: CPT | Performed by: NURSE PRACTITIONER

## 2023-04-07 PROCEDURE — 85025 COMPLETE CBC W/AUTO DIFF WBC: CPT | Performed by: NURSE PRACTITIONER

## 2023-04-07 PROCEDURE — 96376 TX/PRO/DX INJ SAME DRUG ADON: CPT

## 2023-04-07 PROCEDURE — 96374 THER/PROPH/DIAG INJ IV PUSH: CPT

## 2023-04-07 PROCEDURE — 99291 CRITICAL CARE FIRST HOUR: CPT

## 2023-04-07 PROCEDURE — G0378 HOSPITAL OBSERVATION PER HR: HCPCS

## 2023-04-07 PROCEDURE — 63600175 PHARM REV CODE 636 W HCPCS: Performed by: EMERGENCY MEDICINE

## 2023-04-07 PROCEDURE — 11000001 HC ACUTE MED/SURG PRIVATE ROOM

## 2023-04-07 PROCEDURE — 86923 COMPATIBILITY TEST ELECTRIC: CPT | Performed by: EMERGENCY MEDICINE

## 2023-04-07 PROCEDURE — 25000003 PHARM REV CODE 250: Performed by: EMERGENCY MEDICINE

## 2023-04-07 PROCEDURE — 85060 BLOOD SMEAR INTERPRETATION: CPT | Performed by: STUDENT IN AN ORGANIZED HEALTH CARE EDUCATION/TRAINING PROGRAM

## 2023-04-07 PROCEDURE — 83010 ASSAY OF HAPTOGLOBIN QUANT: CPT | Performed by: STUDENT IN AN ORGANIZED HEALTH CARE EDUCATION/TRAINING PROGRAM

## 2023-04-07 PROCEDURE — 36430 TRANSFUSION BLD/BLD COMPNT: CPT

## 2023-04-07 PROCEDURE — C9113 INJ PANTOPRAZOLE SODIUM, VIA: HCPCS | Performed by: EMERGENCY MEDICINE

## 2023-04-07 PROCEDURE — 86900 BLOOD TYPING SEROLOGIC ABO: CPT | Performed by: NURSE PRACTITIONER

## 2023-04-07 PROCEDURE — 82272 OCCULT BLD FECES 1-3 TESTS: CPT

## 2023-04-07 PROCEDURE — 86923 COMPATIBILITY TEST ELECTRIC: CPT | Mod: 91 | Performed by: STUDENT IN AN ORGANIZED HEALTH CARE EDUCATION/TRAINING PROGRAM

## 2023-04-07 PROCEDURE — 83615 LACTATE (LD) (LDH) ENZYME: CPT | Performed by: STUDENT IN AN ORGANIZED HEALTH CARE EDUCATION/TRAINING PROGRAM

## 2023-04-07 PROCEDURE — P9016 RBC LEUKOCYTES REDUCED: HCPCS | Performed by: EMERGENCY MEDICINE

## 2023-04-07 PROCEDURE — 85610 PROTHROMBIN TIME: CPT | Performed by: NURSE PRACTITIONER

## 2023-04-07 PROCEDURE — 99285 EMERGENCY DEPT VISIT HI MDM: CPT | Mod: 25

## 2023-04-07 PROCEDURE — 96375 TX/PRO/DX INJ NEW DRUG ADDON: CPT

## 2023-04-07 PROCEDURE — 85730 THROMBOPLASTIN TIME PARTIAL: CPT | Performed by: NURSE PRACTITIONER

## 2023-04-07 RX ORDER — MIRTAZAPINE 15 MG/1
15 TABLET, FILM COATED ORAL NIGHTLY
Status: DISCONTINUED | OUTPATIENT
Start: 2023-04-07 | End: 2023-04-08 | Stop reason: HOSPADM

## 2023-04-07 RX ORDER — ONDANSETRON 2 MG/ML
4 INJECTION INTRAMUSCULAR; INTRAVENOUS EVERY 8 HOURS PRN
Status: DISCONTINUED | OUTPATIENT
Start: 2023-04-07 | End: 2023-04-08 | Stop reason: HOSPADM

## 2023-04-07 RX ORDER — FOLIC ACID 1 MG/1
2 TABLET ORAL DAILY
Status: DISCONTINUED | OUTPATIENT
Start: 2023-04-08 | End: 2023-04-08 | Stop reason: HOSPADM

## 2023-04-07 RX ORDER — DULOXETIN HYDROCHLORIDE 30 MG/1
60 CAPSULE, DELAYED RELEASE ORAL DAILY
Status: DISCONTINUED | OUTPATIENT
Start: 2023-04-08 | End: 2023-04-08 | Stop reason: HOSPADM

## 2023-04-07 RX ORDER — HYDROCODONE BITARTRATE AND ACETAMINOPHEN 500; 5 MG/1; MG/1
TABLET ORAL
Status: DISCONTINUED | OUTPATIENT
Start: 2023-04-07 | End: 2023-04-08 | Stop reason: HOSPADM

## 2023-04-07 RX ORDER — ATORVASTATIN CALCIUM 40 MG/1
40 TABLET, FILM COATED ORAL DAILY
Status: DISCONTINUED | OUTPATIENT
Start: 2023-04-08 | End: 2023-04-08 | Stop reason: HOSPADM

## 2023-04-07 RX ORDER — CARVEDILOL 3.12 MG/1
6.25 TABLET ORAL 2 TIMES DAILY WITH MEALS
Status: DISCONTINUED | OUTPATIENT
Start: 2023-04-07 | End: 2023-04-08 | Stop reason: HOSPADM

## 2023-04-07 RX ORDER — TRAZODONE HYDROCHLORIDE 150 MG/1
300 TABLET ORAL NIGHTLY
Status: DISCONTINUED | OUTPATIENT
Start: 2023-04-07 | End: 2023-04-08 | Stop reason: HOSPADM

## 2023-04-07 RX ORDER — ALPRAZOLAM 0.5 MG/1
0.5 TABLET ORAL 2 TIMES DAILY PRN
Status: DISCONTINUED | OUTPATIENT
Start: 2023-04-07 | End: 2023-04-08 | Stop reason: HOSPADM

## 2023-04-07 RX ORDER — BUPROPION HYDROCHLORIDE 150 MG/1
150 TABLET, EXTENDED RELEASE ORAL EVERY MORNING
Status: DISCONTINUED | OUTPATIENT
Start: 2023-04-08 | End: 2023-04-08 | Stop reason: HOSPADM

## 2023-04-07 RX ORDER — PANTOPRAZOLE SODIUM 40 MG/10ML
80 INJECTION, POWDER, LYOPHILIZED, FOR SOLUTION INTRAVENOUS
Status: COMPLETED | OUTPATIENT
Start: 2023-04-07 | End: 2023-04-07

## 2023-04-07 RX ORDER — ACETAMINOPHEN 325 MG/1
650 TABLET ORAL EVERY 4 HOURS PRN
Status: DISCONTINUED | OUTPATIENT
Start: 2023-04-07 | End: 2023-04-08 | Stop reason: HOSPADM

## 2023-04-07 RX ORDER — ACETAMINOPHEN 325 MG/1
650 TABLET ORAL EVERY 8 HOURS PRN
Status: DISCONTINUED | OUTPATIENT
Start: 2023-04-07 | End: 2023-04-08 | Stop reason: HOSPADM

## 2023-04-07 RX ORDER — IBUPROFEN 200 MG
1 TABLET ORAL DAILY
Status: DISCONTINUED | OUTPATIENT
Start: 2023-04-08 | End: 2023-04-08 | Stop reason: HOSPADM

## 2023-04-07 RX ADMIN — SODIUM CHLORIDE 8 MG/HR: 900 INJECTION INTRAVENOUS at 11:04

## 2023-04-07 RX ADMIN — CARVEDILOL 6.25 MG: 3.12 TABLET, FILM COATED ORAL at 05:04

## 2023-04-07 RX ADMIN — TRAZODONE HYDROCHLORIDE 300 MG: 150 TABLET ORAL at 09:04

## 2023-04-07 RX ADMIN — PANTOPRAZOLE SODIUM 80 MG: 40 INJECTION, POWDER, LYOPHILIZED, FOR SOLUTION INTRAVENOUS at 02:04

## 2023-04-07 RX ADMIN — ALPRAZOLAM 0.5 MG: 0.5 TABLET ORAL at 09:04

## 2023-04-07 RX ADMIN — MIRTAZAPINE 15 MG: 15 TABLET, FILM COATED ORAL at 09:04

## 2023-04-07 RX ADMIN — SODIUM CHLORIDE 8 MG/HR: 900 INJECTION INTRAVENOUS at 02:04

## 2023-04-07 RX ADMIN — SODIUM CHLORIDE 8 MG/HR: 900 INJECTION INTRAVENOUS at 06:04

## 2023-04-07 NOTE — H&P
Called and spoke to pt, relayed message.  Patient verbalized understanding and had no further questions/concerns.   [facility] Hospital Medicine History & Physical Examination       Patient Name: Sandra Burns  MRN: [id number]  Patient Class: IP- Inpatient   Admission Date: 04/07/2023   Admitting Service: Hospital Medicine   Length of Stay: 0  Attending Physician: Alex Fernandez MD  Primary Care Provider: Akin Gan MD  Face-to-Face encounter date: 04/07/2023  Code Status:   Chief Complaint: Abnormal Labs (POV with low H&H at PCP. Told to come in. Denies rectal bleeding or vomiting blood. Takes plavix. Has had transfusions years ago.)    Source of Information: Patient. Medical Records      HISTORY OF PRESENT ILLNESS:   Sandra Burns is a 57 y.o. female with a PMHx of HTN, PAD s/p intervention, PVD, hypothyroid, CAD s/p stent, RLL nodule, bipolar disorder, depression, GERD, HBV, HCV, anemia who presented to [facility] on 4/7/2023 with c/o fatigue with associated weakness, dizziness.  Patient reportedly had outpatient labs done revealing anemia and was referred to ED by PCP for further evaluation.  She was on Plavix.  Last had a colonoscopy in November 2022 which was unrevealing.  She denied CP, SOB, melena.  Of note, patient was seen in the ED on 03/13/2023 following a syncopal event and [facility] and was discharged home.    Initial ED VS include /54, HR 76, RR 20, SpO2 98%, temperature 98.4° F.  Labs notable for hemoglobin 7.5, hematocrit 25.1, chloride 108, glucose 111.  Stool guaiac positive.  She was typed and cross-matched for 1 units PRBC to be transfused.  She was started on a Protonix infusion.  Admitted to hospital medicine services for further medical management.    REVIEW OF SYSTEMS:   Except as documented, all other systems reviewed and negative     PAST MEDICAL HISTORY:   HTN, PAD s/p intervention, PVD, hypothyroid, CAD s/p stent, RLL nodule, bipolar disorder, depression, GERD, HBV, HCV, iron-deficiency anemia     PAST SURGICAL HISTORY:     Past Surgical History:   Procedure  Laterality Date    EXTRACORPOREAL SHOCK WAVE LITHOTRIPSY  2/22/2023    Procedure: LITHOTRIPSY- Peripheral Shockwave;  Surgeon: Adriel Valero MD;  Location: [facility] CATH LAB;  Service: Cardiology;;    HIP SURGERY      INSERTION, STENT, ARTERY  2/22/2023    Procedure: INSERTION, STENT, ARTERY;  Surgeon: Adriel Valero MD;  Location: [facility] CATH LAB;  Service: Cardiology;;    INTRAVASCULAR ULTRASOUND, NON-CORONARY  2/22/2023    Procedure: Intravascular Ultrasound, Non-Coronary;  Surgeon: Adriel Valero MD;  Location: [facility] CATH LAB;  Service: Cardiology;;    LEFT HEART CATHETERIZATION      PERIPHERAL ANGIOGRAPHY N/A 2/22/2023    Procedure: Peripheral angiography;  Surgeon: Adriel Valero MD;  Location: [facility] CATH LAB;  Service: Cardiology;  Laterality: N/A;  BILAT ILIAC INTERVENTION       FAMILY HISTORY:   Reviewed and negative    SOCIAL HISTORY:   Denied alcohol, illicit drug use.  Smokes 1 pack of cigarettes per day x41 years.    ALLERGIES:   Patient has no known allergies.    HOME MEDICATIONS:     Prior to Admission medications    Medication Sig Start Date End Date Taking? Authorizing Provider   ALPRAZolam (XANAX) 0.5 MG tablet Take 0.5 mg by mouth 2 (two) times daily as needed for Anxiety (anxiety).    Historical Provider   amLODIPine (NORVASC) 10 MG tablet Take 1 tablet (10 mg total) by mouth once daily. 2/1/23   Akin Gan MD   aspirin (ECOTRIN) 81 MG EC tablet Take 1 tablet (81 mg total) by mouth once daily. 2/22/23 2/22/24  Adriel Valero MD   atorvastatin (LIPITOR) 40 MG tablet Take 1 tablet (40 mg total) by mouth once daily. 2/1/23 2/1/24  Akin Gan MD   buPROPion (WELLBUTRIN SR) 150 MG TBSR 12 hr tablet Take 150 mg by mouth every morning. 10/17/22   Historical Provider   carvediloL (COREG) 6.25 MG tablet Take 1 tablet (6.25 mg total) by mouth 2 (two) times daily with meals. 2/22/23 2/22/24  Adriel Valero MD   clonazePAM (KLONOPIN) 0.5 MG tablet Take 0.25-0.5 mg by mouth daily as  needed. 9/7/22   Historical Provider   clopidogreL (PLAVIX) 75 mg tablet Take 75 mg by mouth once daily. 9/7/22   Historical Provider   cyanocobalamin (VITAMIN B-12) 1000 MCG tablet Take 2 tablets (2,000 mcg total) by mouth once daily. 4/6/23   Akin Gan MD   DULoxetine (CYMBALTA) 30 MG capsule Take 1 capsule (30 mg total) by mouth every evening. 2/1/23   Akin Gan MD   DULoxetine (CYMBALTA) 60 MG capsule Take 1 capsule (60 mg total) by mouth once daily. 2/1/23   Akin Gan MD   ferrous gluconate (FERGON) 324 MG tablet Take 1 tablet (324 mg total) by mouth every other day. 4/6/23   Akin Gan MD   folic acid (FOLVITE) 1 MG tablet Take 2 tablets (2 mg total) by mouth once daily. 4/6/23 4/5/24  Akin Gan MD   gabapentin (NEURONTIN) 300 MG capsule Take 1 capsule (300 mg total) by mouth 3 (three) times daily. 2/1/23 2/1/24  Akin Gan MD   garlic (GARLIQUE ORAL) Take 1 tablet by mouth Daily.    Historical Provider   lisinopriL 10 MG tablet Take 1 tablet (10 mg total) by mouth once daily. 2/22/23 2/22/24  Adriel Valero MD   mirtazapine (REMERON) 15 MG tablet TAKE ONE TABLET BY MOUTH ONCE A DAY AT BEDTIME 3/31/23   Historical Provider   nicotine (NICODERM CQ) 21 mg/24 hr Place 1 patch onto the skin once daily. 3/27/23   Akin Gan MD   nitroGLYCERIN (NITROSTAT) 0.4 MG SL tablet Place 1 tablet under the tongue every 5 (five) minutes as needed. 1/23/23   Historical Provider   pantoprazole (PROTONIX) 40 MG tablet Take 1 tablet (40 mg total) by mouth once daily. 2/1/23   Akin Gan MD   red yeast rice 600 mg Cap Take 3 capsules by mouth 2 (two) times a day.    Historical Provider   rosuvastatin (CRESTOR) 40 MG Tab Take 40 mg by mouth Daily. 2/6/23   Historical Provider   traZODone (DESYREL) 150 MG tablet Take 300 mg by mouth every evening. 9/7/22   Historical Provider      ________________________________________________________________________  INPATIENT LIST OF MEDICATIONS     Current Facility-Administered Medications:     0.9%  NaCl infusion (for blood administration), , Intravenous, Q24H PRN, Cira Medina MD    0.9%  NaCl infusion (for blood administration), , Intravenous, Q24H PRN, Alex Fernandez MD    acetaminophen tablet 650 mg, 650 mg, Oral, Q8H PRN, TOSHA Lomas-BC    acetaminophen tablet 650 mg, 650 mg, Oral, Q4H PRN, TOSHA Lomas-BC    ALPRAZolam tablet 0.5 mg, 0.5 mg, Oral, BID PRN, Alex Fernandez MD    [START ON 4/8/2023] atorvastatin tablet 40 mg, 40 mg, Oral, Daily, Alex Fernandez MD    [START ON 4/8/2023] buPROPion TBSR 12 hr tablet 150 mg, 150 mg, Oral, QAM, Alex Fernandez MD    carvediloL tablet 6.25 mg, 6.25 mg, Oral, BID WM, Alex Fernandez MD    [START ON 4/8/2023] DULoxetine DR capsule 60 mg, 60 mg, Oral, Daily, Alex Fernandez MD    [START ON 4/8/2023] folic acid tablet 2 mg, 2 mg, Oral, Daily, Alex Fernandez MD    mirtazapine tablet 15 mg, 15 mg, Oral, QHS, Alex Fernandez MD    [START ON 4/8/2023] nicotine 21 mg/24 hr 1 patch, 1 patch, Transdermal, Daily, Alex Fernandez MD    ondansetron injection 4 mg, 4 mg, Intravenous, Q8H PRN, GREGORIO Lomas    pantoprazole (PROTONIX) 40 mg in sodium chloride 0.9 % 100 mL IVPB (MB+), 8 mg/hr, Intravenous, Continuous, Cira Medina MD, Last Rate: 20 mL/hr at 04/07/23 1428, 8 mg/hr at 04/07/23 1428    traZODone tablet 300 mg, 300 mg, Oral, QHS, Alex Fernandez MD    Current Outpatient Medications:     ALPRAZolam (XANAX) 0.5 MG tablet, Take 0.5 mg by mouth 2 (two) times daily as needed for Anxiety (anxiety)., Disp: , Rfl:     amLODIPine (NORVASC) 10 MG tablet, Take 1 tablet (10 mg total) by mouth once daily., Disp: 90 tablet, Rfl: 1    aspirin (ECOTRIN) 81 MG EC tablet, Take 1 tablet (81 mg total) by mouth once daily., Disp: 90 tablet, Rfl: 3    atorvastatin (LIPITOR) 40 MG tablet, Take 1 tablet (40 mg total)  by mouth once daily., Disp: 90 tablet, Rfl: 1    buPROPion (WELLBUTRIN SR) 150 MG TBSR 12 hr tablet, Take 150 mg by mouth every morning., Disp: , Rfl:     carvediloL (COREG) 6.25 MG tablet, Take 1 tablet (6.25 mg total) by mouth 2 (two) times daily with meals., Disp: 90 tablet, Rfl: 3    clonazePAM (KLONOPIN) 0.5 MG tablet, Take 0.25-0.5 mg by mouth daily as needed., Disp: , Rfl:     clopidogreL (PLAVIX) 75 mg tablet, Take 75 mg by mouth once daily., Disp: , Rfl:     cyanocobalamin (VITAMIN B-12) 1000 MCG tablet, Take 2 tablets (2,000 mcg total) by mouth once daily., Disp: 180 tablet, Rfl: 1    DULoxetine (CYMBALTA) 30 MG capsule, Take 1 capsule (30 mg total) by mouth every evening., Disp: 90 capsule, Rfl: 1    DULoxetine (CYMBALTA) 60 MG capsule, Take 1 capsule (60 mg total) by mouth once daily., Disp: 90 capsule, Rfl: 1    ferrous gluconate (FERGON) 324 MG tablet, Take 1 tablet (324 mg total) by mouth every other day., Disp: 90 tablet, Rfl: 1    folic acid (FOLVITE) 1 MG tablet, Take 2 tablets (2 mg total) by mouth once daily., Disp: 180 tablet, Rfl: 1    gabapentin (NEURONTIN) 300 MG capsule, Take 1 capsule (300 mg total) by mouth 3 (three) times daily., Disp: 270 capsule, Rfl: 1    garlic (GARLIQUE ORAL), Take 1 tablet by mouth Daily., Disp: , Rfl:     lisinopriL 10 MG tablet, Take 1 tablet (10 mg total) by mouth once daily., Disp: 90 tablet, Rfl: 3    mirtazapine (REMERON) 15 MG tablet, TAKE ONE TABLET BY MOUTH ONCE A DAY AT BEDTIME, Disp: , Rfl:     nicotine (NICODERM CQ) 21 mg/24 hr, Place 1 patch onto the skin once daily., Disp: 60 patch, Rfl: 0    nitroGLYCERIN (NITROSTAT) 0.4 MG SL tablet, Place 1 tablet under the tongue every 5 (five) minutes as needed., Disp: , Rfl:     pantoprazole (PROTONIX) 40 MG tablet, Take 1 tablet (40 mg total) by mouth once daily., Disp: 90 tablet, Rfl: 1    red yeast rice 600 mg Cap, Take 3 capsules by mouth 2 (two) times a day., Disp: , Rfl:     rosuvastatin (CRESTOR) 40 MG  Tab, Take 40 mg by mouth Daily., Disp: , Rfl:     traZODone (DESYREL) 150 MG tablet, Take 300 mg by mouth every evening., Disp: , Rfl:     Scheduled Meds:   [START ON 4/8/2023] atorvastatin  40 mg Oral Daily    [START ON 4/8/2023] buPROPion  150 mg Oral QAM    carvediloL  6.25 mg Oral BID WM    [START ON 4/8/2023] DULoxetine  60 mg Oral Daily    [START ON 4/8/2023] folic acid  2 mg Oral Daily    mirtazapine  15 mg Oral QHS    [START ON 4/8/2023] nicotine  1 patch Transdermal Daily    traZODone  300 mg Oral QHS     Continuous Infusions:   pantoprazole (PROTONIX) IV infusion 8 mg/hr (04/07/23 1428)     PRN Meds:.sodium chloride, sodium chloride, acetaminophen, acetaminophen, ALPRAZolam, ondansetron    PHYSICAL EXAM:     VITAL SIGNS: 24 HRS MIN & MAX LAST   Temp  Min: 98.4 °F (36.9 °C)  Max: 98.4 °F (36.9 °C) 98.4 °F (36.9 °C)   BP  Min: 100/54  Max: 148/68 117/63   Pulse  Min: 67  Max: 76  67   Resp  Min: 16  Max: 20 16   SpO2  Min: 98 %  Max: 100 % 98 %       General appearance: Well-developed female in no apparent distress.  HENT: Atraumatic head. Moist mucous membranes of oral cavity.  Eyes: Normal extraocular movements.   Neck: Supple.   Lungs: Clear to auscultation bilaterally.   Heart: Regular rate and rhythm. S1 and S2 present. No pedal edema.  Abdomen: Soft, non-distended, non-tender.  Extremities: No cyanosis, clubbing, or edema.  Skin: No Rash.   Neuro: Motor and sensory exams grossly intact.   Psych/mental status: Appropriate mood and affect. Responds appropriately to questions.     LABS AND IMAGING:     Recent Labs   Lab 04/03/23  1702 04/07/23  1346   WBC 7.0 6.5   RBC 2.59* 2.95*   HGB 6.8* 7.5*   HCT 25.9* 25.1*   .0* 85.1   MCH 26.3* 25.4*   MCHC 26.3* 29.9*   RDW 18.3* 17.6*    345   MPV 12.1* 10.3       Recent Labs   Lab 04/07/23  1346      K 4.0   CO2 23   BUN 9.8   CREATININE 0.84   CALCIUM 9.1   ALBUMIN 3.6   ALKPHOS 58   ALT 10   AST 15   BILITOT 0.1       Microbiology Results  (last 7 days)       ** No results found for the last 168 hours. **             X-Ray Hand 3 view Left  EXAMINATION  XR HAND COMPLETE 3 VIEW LEFT    CLINICAL HISTORY  Rheumatoid arthritis, unspecified    TECHNIQUE  A total of 3 images submitted of the left hand.    COMPARISON  None available at the time of initial interpretation.    FINDINGS  Mild regional arthritic changes are present.  No convincing acutely displaced fracture or dislocation is identified.  No aggressive osseous lesion or periosteal reaction is appreciated.    The included soft tissues are without acute abnormality.    IMPRESSION  Mild arthritic changes without convincing acute abnormality.    Electronically signed by: Harman Campo  Date:    04/03/2023  Time:    17:28        ASSESSMENT & PLAN:   Symptomatic anemia   Acute normocytic anemia  CAD s/p recent stent placement on ASA and Plavix  PAD s/p intervention  Essential hypertension  History of hypothyroidism, bipolar disorder, depression, GERD, HBV, HCV, iron-deficiency anemia, right lower lobe nodule    Plan:  Transfuse 1 unit PRBC as ordered in ED   GI consult, appreciate recommendations   Check hepatitis-C antibody   Clear liquid diet, NPO after midnight   Continue IV Protonix drip   Resume appropriate home medications once updated   Labs in a.m.    VTE Prophylaxis: SCDs    Discharge Planning and Disposition: TBD    I, Nithya Stafford, NP have reviewed and discussed the case with Alex Fernandez MD  Please see the attending MD's addendum for further assessment and plan.    Nithya Stafford, AGACNP-BC  04/07/2023    _______________________________________________________________________________  MD Addendum:  I, Dr. Fernandez , assumed care of this patient today.   For the patient encounter, I performed the substantive portion of the visit, I reviewed the NP/PA documentation, treatment plan, and medical decision making.  I had face to face time with this patient     Seen and examined the pt. Agree with  above history physical exam and management.   Patient presented to the hospital with dizziness fatigue. PCP office called for low Hg and transfusion.   Patient was was started to Aspirin and Plavix due to recent LE stent placement.   She is now receiving RBCs.     - Continue Protonix 40 mg BID IV   - GI consult.     CC diagnosis GI bleeding,   CC time was given 45 min       All diagnosis and differential diagnosis have been reviewed; assessment and plan has been documented; I have personally reviewed the labs and test results that are presently available; I have reviewed the patients medication list; I have reviewed the consulting providers response and recommendations. I have reviewed or attempted to review medical records based upon their availability.    All of the patient and family questions have been addressed and answered. Patient's is agreeable to the above stated plan. I will continue to monitor closely and make adjustments to medical management as needed.      04/07/2023

## 2023-04-07 NOTE — ED PROVIDER NOTES
Encounter Date: 4/7/2023    SCRIBE #1 NOTE: I, Dolores Tomlin, am scribing for, and in the presence of,  Cira Medina MD. I have scribed the following portions of the note - Other sections scribed: HPI, ROS, PE.     History     Chief Complaint   Patient presents with    Abnormal Labs     POV with low H&H at PCP. Told to come in. Denies rectal bleeding or vomiting blood. Takes plavix. Has had transfusions years ago.     57 year old female with a history of PAD, CAD, HTN, and anemia presents to ED complaining of fatigue.  Pt reports feeling weak, dizzy, and fatigued.  She says that her PCP called her and told her her H&H was low.  She is on Plavix, and says she had a normal colonoscopy in November.  Pt denies any CP, SOB, or dark stools.    The history is provided by the patient.   GI Problem  The other symptoms of the illness include fatigue. The other symptoms of the illness do not include fever, shortness of breath, nausea, vomiting, diarrhea or dysuria.   Symptoms associated with the illness do not include constipation or back pain.   Review of patient's allergies indicates:  No Known Allergies  Past Medical History:   Diagnosis Date    Anemia, unspecified     Coronary artery disease     History of esophagogastroduodenoscopy (EGD) 11/01/2022    Hypertension     PAD (peripheral artery disease)     PVD (peripheral vascular disease) with claudication     Thyroid disease      Past Surgical History:   Procedure Laterality Date    EXTRACORPOREAL SHOCK WAVE LITHOTRIPSY  2/22/2023    Procedure: LITHOTRIPSY- Peripheral Shockwave;  Surgeon: Adriel Valero MD;  Location: [facility] CATH LAB;  Service: Cardiology;;    HIP SURGERY      INSERTION, STENT, ARTERY  2/22/2023    Procedure: INSERTION, STENT, ARTERY;  Surgeon: Adriel Valero MD;  Location: [facility] CATH LAB;  Service: Cardiology;;    INTRAVASCULAR ULTRASOUND, NON-CORONARY  2/22/2023    Procedure: Intravascular Ultrasound, Non-Coronary;  Surgeon: Adriel Valero MD;   Location: [facility] CATH LAB;  Service: Cardiology;;    LEFT HEART CATHETERIZATION      PERIPHERAL ANGIOGRAPHY N/A 2/22/2023    Procedure: Peripheral angiography;  Surgeon: Adriel Valero MD;  Location: [facility] CATH LAB;  Service: Cardiology;  Laterality: N/A;  BILAT ILIAC INTERVENTION     History reviewed. No pertinent family history.  Social History     Tobacco Use    Smoking status: Every Day     Packs/day: 0.25     Years: 40.00     Pack years: 10.00     Types: Cigarettes    Smokeless tobacco: Never   Substance Use Topics    Alcohol use: Not Currently    Drug use: Yes     Types: Marijuana     Review of Systems   Constitutional:  Positive for fatigue. Negative for fever and unexpected weight change.   HENT:  Negative for congestion and rhinorrhea.    Eyes:  Negative for pain.   Respiratory:  Negative for chest tightness, shortness of breath and wheezing.    Cardiovascular:  Negative for chest pain.   Gastrointestinal:  Negative for abdominal pain, constipation, diarrhea, nausea and vomiting.   Genitourinary:  Negative for dysuria.   Musculoskeletal:  Negative for back pain and neck pain.   Skin:  Negative for rash.   Allergic/Immunologic: Negative for environmental allergies, food allergies and immunocompromised state.   Neurological:  Positive for dizziness and weakness. Negative for speech difficulty.   Hematological:  Does not bruise/bleed easily.   Psychiatric/Behavioral:  Negative for sleep disturbance and suicidal ideas.      Physical Exam     Initial Vitals [04/07/23 1319]   BP Pulse Resp Temp SpO2   (!) 100/54 76 20 98.4 °F (36.9 °C) 98 %      MAP       --         Physical Exam    Nursing note and vitals reviewed.  Constitutional: She appears well-developed and well-nourished. She is not diaphoretic. No distress.   HENT:   Head: Normocephalic and atraumatic.   Nose: Nose normal.   Mouth/Throat: Oropharynx is clear and moist.   Eyes: Conjunctivae and EOM are normal. Pupils are equal, round, and reactive to light.    Neck: Trachea normal. Neck supple.   Normal range of motion.  Cardiovascular:  Normal rate, regular rhythm, normal heart sounds and intact distal pulses.           No murmur heard.  Pulmonary/Chest: Breath sounds normal. No respiratory distress. She has no wheezes. She has no rhonchi. She has no rales. She exhibits no tenderness.   Abdominal: Abdomen is soft. Bowel sounds are normal. She exhibits no distension and no mass. There is no abdominal tenderness. There is no rebound and no guarding.   Genitourinary: Rectum:      Guaiac result positive.   Guaiac positive stool. : Acceptable.   Genitourinary Comments: Rectal exam chaperoned by KATLYN Davila, dark brown stool     Musculoskeletal:         General: No tenderness or edema. Normal range of motion.      Cervical back: Normal range of motion and neck supple.      Lumbar back: Normal. Normal range of motion.     Neurological: She is alert and oriented to person, place, and time. She has normal strength. No cranial nerve deficit or sensory deficit.   Skin: Skin is warm and dry. Capillary refill takes less than 2 seconds. No abscess noted. No erythema. No pallor.   Psychiatric: She has a normal mood and affect. Her behavior is normal. Judgment and thought content normal.       ED Course   Critical Care    Date/Time: 4/7/2023 3:45 PM  Performed by: Cira Medina MD  Authorized by: Cira Medina MD   Direct patient critical care time: 15 minutes  Ordering / reviewing critical care time: 15 minutes  Total critical care time (exclusive of procedural time) : 30 minutes  Critical care was necessary to treat or prevent imminent or life-threatening deterioration of the following conditions: anemia, gi bleed.  Critical care was time spent personally by me on the following activities: development of treatment plan with patient or surrogate, discussions with consultants, evaluation of patient's response to treatment, examination of patient, obtaining  history from patient or surrogate, ordering and performing treatments and interventions, ordering and review of laboratory studies, pulse oximetry, re-evaluation of patient's condition and review of old charts.      Labs Reviewed   CBC WITH DIFFERENTIAL - Abnormal; Notable for the following components:       Result Value    RBC 2.95 (*)     Hgb 7.5 (*)     Hct 25.1 (*)     MCH 25.4 (*)     MCHC 29.9 (*)     RDW 17.6 (*)     All other components within normal limits   COMPREHENSIVE METABOLIC PANEL - Abnormal; Notable for the following components:    Chloride 108 (*)     Glucose Level 111 (*)     Albumin/Globulin Ratio 1.0 (*)     All other components within normal limits   PROTIME-INR - Normal   APTT - Normal   LACTATE DEHYDROGENASE - Normal   HAPTOGLOBIN - Normal   PATH REVIEW OF BLOOD SMEAR   OCCULT BLOOD,STOOL,DIAGNOSTIC (1-3)    Narrative:     The following orders were created for panel order Occult Blood, Stool, Diagnostic (1-3).  Procedure                               Abnormality         Status                     ---------                               -----------         ------                     Occult blood x 3, stool[[id number]]                                                       Please view results for these tests on the individual orders.   OCCULT BLOOD X 3, STOOL   TYPE & SCREEN   ABORH RETYPE   PREPARE RBC SOFT   PREPARE RBC SOFT          Imaging Results    None          Medications   pantoprazole (PROTONIX) 40 mg in sodium chloride 0.9 % 100 mL IVPB (MB+) (8 mg/hr Intravenous New Bag 4/7/23 1810)   0.9%  NaCl infusion (for blood administration) (has no administration in time range)   0.9%  NaCl infusion (for blood administration) (has no administration in time range)   ondansetron injection 4 mg (has no administration in time range)   acetaminophen tablet 650 mg (has no administration in time range)   acetaminophen tablet 650 mg (has no administration in time range)   ALPRAZolam tablet 0.5 mg (has  no administration in time range)   atorvastatin tablet 40 mg (has no administration in time range)   buPROPion TBSR 12 hr tablet 150 mg (has no administration in time range)   carvediloL tablet 6.25 mg (6.25 mg Oral Given 4/7/23 1718)   folic acid tablet 2 mg (has no administration in time range)   DULoxetine DR capsule 60 mg (has no administration in time range)   mirtazapine tablet 15 mg (has no administration in time range)   nicotine 21 mg/24 hr 1 patch (has no administration in time range)   traZODone tablet 300 mg (has no administration in time range)   ferric gluconate (FERRLECIT) 125 mg in sodium chloride 0.9% 100 mL IVPB (has no administration in time range)   pantoprazole injection 80 mg (80 mg Intravenous Given 4/7/23 1408)     Medical Decision Making:   History:   Old Medical Records: I decided to obtain old medical records.  Old Records Summarized: records from clinic visits.  Initial Assessment:   See hpi  Clinical Tests:   Lab Tests: Reviewed and Ordered  The following lab test(s) were unremarkable: CBC, CMP, PT and PTT  Other:   I have discussed this case with another health care provider.        Scribe Attestation:   Scribe #1: I performed the above scribed service and the documentation accurately describes the services I performed. I attest to the accuracy of the note.  Comments: Attending:   Physician Attestation Statement for Scribe #1: Cira OJEDA MD, personally performed the services described in this documentation. All medical record entries made by the scribe were at my direction and in my presence.  I have reviewed the chart and agree that the record reflects my personal performance and is accurate and complete.        Attending Attestation:           Physician Attestation for Scribe:  Physician Attestation Statement for Scribe #1: ICira MD, reviewed documentation, as scribed by Dolores Tomlin in my presence, and it is both accurate and complete.           ED  Course as of 04/07/23 1831 Fri Apr 07, 2023   1352 Had anemia workup  in clinic [BS]   1439 Hospitalist consulted [BS]      ED Course User Index  [BS] Cira Medina MD          Medical Decision Making  Given patient's presentation, differential diagnosis includes but is not limited to lab error, gi bleed, iron defciency or b12 or folate deficiency anemia, renal failure  To evaluate these  possible etiologies cbc, cmp were ordered and reviewed  Also reviewed previous labs  She is symptomatic with gi bleeding given protonix bolus and infusion and 1 u prbc ordered  Cmp noncontributory, h/h slightly improved compared to a few days ago but still lower than 1 month prior    Problems Addressed:  Acute on chronic anemia: acute illness or injury that poses a threat to life or bodily functions  Generalized weakness: acute illness or injury that poses a threat to life or bodily functions  Upper GI bleed: acute illness or injury that poses a threat to life or bodily functions    Amount and/or Complexity of Data Reviewed  External Data Reviewed: labs.  Labs: ordered.    Risk  OTC drugs.  Prescription drug management.  Decision regarding hospitalization.    Critical Care  Total time providing critical care: 30 minutes          Clinical Impression:   Final diagnoses:  [K92.2] Upper GI bleed (Primary)  [D64.9] Acute on chronic anemia  [R53.1] Generalized weakness        ED Disposition Condition    Admit Stable                Cira Medina MD  04/07/23 1831

## 2023-04-07 NOTE — Clinical Note
Diagnosis: Upper GI bleed [487005]   Admitting Provider:: REESE JOHNSON [[id number]]   Future Attending Provider: REESE JOHNSON [[id number]]   Reason for IP Medical Treatment  (Clinical interventions that can only be accomplished in the IP setting? ) :: anemia, gi bleed   I certify that Inpatient services for greater than or equal to 2 midnights are medically necessary:: Yes   Plans for Post-Acute care--if anticipated (pick the single best option):: A. No post acute care anticipated at this time

## 2023-04-07 NOTE — FIRST PROVIDER EVALUATION
Medical screening examination initiated.  I have conducted a focused provider triage encounter, findings are as follows:    Brief history of present illness:  56 y/o female who presents with having labs done on 4/3 and notes low H&H. Denies blood in stool or black stools. No vomiting blood or blood in urine. She is on plavix s/t stent in heart.     There were no vitals filed for this visit.    Pertinent physical exam:  alert, pale, ambulatory     Brief workup plan:  labs, exam    Preliminary workup initiated; this workup will be continued and followed by the physician or advanced practice provider that is assigned to the patient when roomed.

## 2023-04-08 VITALS
WEIGHT: 148.13 LBS | OXYGEN SATURATION: 97 % | SYSTOLIC BLOOD PRESSURE: 146 MMHG | HEART RATE: 65 BPM | RESPIRATION RATE: 18 BRPM | TEMPERATURE: 98 F | DIASTOLIC BLOOD PRESSURE: 77 MMHG | HEIGHT: 60 IN | BODY MASS INDEX: 29.08 KG/M2

## 2023-04-08 PROBLEM — D62 ACUTE BLOOD LOSS ANEMIA: Status: ACTIVE | Noted: 2023-04-08

## 2023-04-08 LAB
ALBUMIN SERPL-MCNC: 3.5 G/DL (ref 3.5–5)
ALBUMIN/GLOB SERPL: 1.2 RATIO (ref 1.1–2)
ALP SERPL-CCNC: 53 UNIT/L (ref 40–150)
ALT SERPL-CCNC: 8 UNIT/L (ref 0–55)
AST SERPL-CCNC: 14 UNIT/L (ref 5–34)
BASOPHILS # BLD AUTO: 0.06 X10(3)/MCL (ref 0–0.2)
BASOPHILS NFR BLD AUTO: 1.6 %
BILIRUBIN DIRECT+TOT PNL SERPL-MCNC: 0.5 MG/DL
BUN SERPL-MCNC: 6 MG/DL (ref 9.8–20.1)
CALCIUM SERPL-MCNC: 9.2 MG/DL (ref 8.4–10.2)
CHLORIDE SERPL-SCNC: 105 MMOL/L (ref 98–107)
CO2 SERPL-SCNC: 25 MMOL/L (ref 22–29)
CREAT SERPL-MCNC: 0.75 MG/DL (ref 0.55–1.02)
EOSINOPHIL # BLD AUTO: 0.2 X10(3)/MCL (ref 0–0.9)
EOSINOPHIL NFR BLD AUTO: 5.4 %
ERYTHROCYTE [DISTWIDTH] IN BLOOD BY AUTOMATED COUNT: 16.7 % (ref 11.5–17)
GFR SERPLBLD CREATININE-BSD FMLA CKD-EPI: >60 MLS/MIN/1.73/M2
GLOBULIN SER-MCNC: 2.9 GM/DL (ref 2.4–3.5)
GLUCOSE SERPL-MCNC: 112 MG/DL (ref 74–100)
HCT VFR BLD AUTO: 28.8 % (ref 37–47)
HEMATOLOGIST REVIEW: NORMAL
HGB BLD-MCNC: 9 G/DL (ref 12–16)
IMM GRANULOCYTES # BLD AUTO: 0.01 X10(3)/MCL (ref 0–0.04)
IMM GRANULOCYTES NFR BLD AUTO: 0.3 %
LYMPHOCYTES # BLD AUTO: 0.85 X10(3)/MCL (ref 0.6–4.6)
LYMPHOCYTES NFR BLD AUTO: 22.8 %
MCH RBC QN AUTO: 26.5 PG (ref 27–31)
MCHC RBC AUTO-ENTMCNC: 31.3 G/DL (ref 33–36)
MCV RBC AUTO: 85 FL (ref 80–94)
MONOCYTES # BLD AUTO: 0.47 X10(3)/MCL (ref 0.1–1.3)
MONOCYTES NFR BLD AUTO: 12.6 %
NEUTROPHILS # BLD AUTO: 2.13 X10(3)/MCL (ref 2.1–9.2)
NEUTROPHILS NFR BLD AUTO: 57.3 %
NRBC BLD AUTO-RTO: 0 %
PLATELET # BLD AUTO: 302 X10(3)/MCL (ref 130–400)
PMV BLD AUTO: 10.2 FL (ref 7.4–10.4)
POTASSIUM SERPL-SCNC: 4.2 MMOL/L (ref 3.5–5.1)
PROT SERPL-MCNC: 6.4 GM/DL (ref 6.4–8.3)
RBC # BLD AUTO: 3.39 X10(6)/MCL (ref 4.2–5.4)
SODIUM SERPL-SCNC: 140 MMOL/L (ref 136–145)
TSH SERPL-ACNC: 1.04 UIU/ML (ref 0.35–4.94)
WBC # SPEC AUTO: 3.7 X10(3)/MCL (ref 4.5–11.5)

## 2023-04-08 PROCEDURE — 63600175 PHARM REV CODE 636 W HCPCS: Performed by: STUDENT IN AN ORGANIZED HEALTH CARE EDUCATION/TRAINING PROGRAM

## 2023-04-08 PROCEDURE — 25000003 PHARM REV CODE 250: Performed by: STUDENT IN AN ORGANIZED HEALTH CARE EDUCATION/TRAINING PROGRAM

## 2023-04-08 PROCEDURE — G0378 HOSPITAL OBSERVATION PER HR: HCPCS

## 2023-04-08 PROCEDURE — 86803 HEPATITIS C AB TEST: CPT | Performed by: STUDENT IN AN ORGANIZED HEALTH CARE EDUCATION/TRAINING PROGRAM

## 2023-04-08 PROCEDURE — 99223 PR INITIAL HOSPITAL CARE,LEVL III: ICD-10-PCS | Mod: ,,, | Performed by: INTERNAL MEDICINE

## 2023-04-08 PROCEDURE — 96376 TX/PRO/DX INJ SAME DRUG ADON: CPT

## 2023-04-08 PROCEDURE — C9113 INJ PANTOPRAZOLE SODIUM, VIA: HCPCS | Performed by: EMERGENCY MEDICINE

## 2023-04-08 PROCEDURE — 63600175 PHARM REV CODE 636 W HCPCS: Performed by: EMERGENCY MEDICINE

## 2023-04-08 PROCEDURE — 99223 1ST HOSP IP/OBS HIGH 75: CPT | Mod: ,,, | Performed by: INTERNAL MEDICINE

## 2023-04-08 PROCEDURE — 80053 COMPREHEN METABOLIC PANEL: CPT | Performed by: NURSE PRACTITIONER

## 2023-04-08 PROCEDURE — S4991 NICOTINE PATCH NONLEGEND: HCPCS | Performed by: STUDENT IN AN ORGANIZED HEALTH CARE EDUCATION/TRAINING PROGRAM

## 2023-04-08 PROCEDURE — 85025 COMPLETE CBC W/AUTO DIFF WBC: CPT | Performed by: NURSE PRACTITIONER

## 2023-04-08 PROCEDURE — 84443 ASSAY THYROID STIM HORMONE: CPT

## 2023-04-08 PROCEDURE — 96365 THER/PROPH/DIAG IV INF INIT: CPT

## 2023-04-08 PROCEDURE — 25000003 PHARM REV CODE 250: Performed by: EMERGENCY MEDICINE

## 2023-04-08 PROCEDURE — 86225 DNA ANTIBODY NATIVE: CPT | Mod: 90 | Performed by: STUDENT IN AN ORGANIZED HEALTH CARE EDUCATION/TRAINING PROGRAM

## 2023-04-08 RX ADMIN — CARVEDILOL 6.25 MG: 3.12 TABLET, FILM COATED ORAL at 06:04

## 2023-04-08 RX ADMIN — BUPROPION HYDROCHLORIDE 150 MG: 150 TABLET, EXTENDED RELEASE ORAL at 06:04

## 2023-04-08 RX ADMIN — SODIUM CHLORIDE 125 MG: 9 INJECTION, SOLUTION INTRAVENOUS at 09:04

## 2023-04-08 RX ADMIN — SODIUM CHLORIDE 8 MG/HR: 900 INJECTION INTRAVENOUS at 10:04

## 2023-04-08 RX ADMIN — SODIUM CHLORIDE 8 MG/HR: 900 INJECTION INTRAVENOUS at 04:04

## 2023-04-08 RX ADMIN — NICOTINE 1 PATCH: 21 PATCH, EXTENDED RELEASE TRANSDERMAL at 08:04

## 2023-04-08 NOTE — PROGRESS NOTES
Gastroenterology Progress Note    Subjective/Interval History:  WBC 3.7  H&H 9.0/28.8  Vit B12 209  Folate 8.7  Iron 8  TIBC 344  Iron sat 2  Ferritin 8.80    Spoke with nurse regarding patient. Patient without bloody or black BM, n/v.    Patient resting in bed. She wants some coffee. Denies hematochezia, melena, n/v, abd pain/tenderness. Abd soft, BS+    ROS:  Review of Systems   Constitutional:  Positive for malaise/fatigue.   Respiratory:  Negative for cough and shortness of breath.    Cardiovascular:  Negative for chest pain.   Gastrointestinal:  Negative for abdominal pain, blood in stool, melena, nausea and vomiting.   Neurological:  Negative for weakness.     Vital Signs:  BP (!) 145/77   Pulse 68   Temp 98.1 °F (36.7 °C) (Oral)   Resp 18   Ht 5' (1.524 m)   Wt 67.2 kg (148 lb 2.4 oz)   LMP  (LMP Unknown)   SpO2 97%   BMI 28.93 kg/m²   Body mass index is 28.93 kg/m².    Physical Exam:  Physical Exam  Constitutional:       General: She is not in acute distress.     Appearance: She is obese. She is ill-appearing.   HENT:      Head: Normocephalic and atraumatic.      Right Ear: External ear normal.      Left Ear: External ear normal.      Nose: Nose normal.      Mouth/Throat:      Pharynx: Oropharynx is clear.   Eyes:      Conjunctiva/sclera: Conjunctivae normal.   Pulmonary:      Effort: Pulmonary effort is normal. No respiratory distress.   Abdominal:      General: Bowel sounds are normal. There is no distension.      Palpations: Abdomen is soft.      Tenderness: There is no abdominal tenderness. There is no guarding.   Musculoskeletal:         General: Normal range of motion.      Cervical back: Normal range of motion.   Skin:     General: Skin is warm and dry.      Coloration: Skin is not pale.   Neurological:      Mental Status: She is alert and oriented to person, place, and time. Mental status is at baseline.      Motor: No weakness.   Psychiatric:         Mood and Affect: Mood normal.          Behavior: Behavior normal.         Thought Content: Thought content normal.       Labs:  Recent Results (from the past 24 hour(s))   CBC with Differential    Collection Time: 04/07/23  1:46 PM   Result Value Ref Range    WBC 6.5 4.5 - 11.5 x10(3)/mcL    RBC 2.95 (L) 4.20 - 5.40 x10(6)/mcL    Hgb 7.5 (L) 12.0 - 16.0 g/dL    Hct 25.1 (L) 37.0 - 47.0 %    MCV 85.1 80.0 - 94.0 fL    MCH 25.4 (L) 27.0 - 31.0 pg    MCHC 29.9 (L) 33.0 - 36.0 g/dL    RDW 17.6 (H) 11.5 - 17.0 %    Platelet 345 130 - 400 x10(3)/mcL    MPV 10.3 7.4 - 10.4 fL    Neut % 69.5 %    Lymph % 15.9 %    Mono % 9.3 %    Eos % 3.7 %    Basophil % 1.1 %    Lymph # 1.04 0.6 - 4.6 x10(3)/mcL    Neut # 4.54 2.1 - 9.2 x10(3)/mcL    Mono # 0.61 0.1 - 1.3 x10(3)/mcL    Eos # 0.24 0 - 0.9 x10(3)/mcL    Baso # 0.07 0 - 0.2 x10(3)/mcL    IG# 0.03 0 - 0.04 x10(3)/mcL    IG% 0.5 %    NRBC% 0.0 %   Comprehensive Metabolic Panel    Collection Time: 04/07/23  1:46 PM   Result Value Ref Range    Sodium Level 138 136 - 145 mmol/L    Potassium Level 4.0 3.5 - 5.1 mmol/L    Chloride 108 (H) 98 - 107 mmol/L    Carbon Dioxide 23 22 - 29 mmol/L    Glucose Level 111 (H) 74 - 100 mg/dL    Blood Urea Nitrogen 9.8 9.8 - 20.1 mg/dL    Creatinine 0.84 0.55 - 1.02 mg/dL    Calcium Level Total 9.1 8.4 - 10.2 mg/dL    Protein Total 7.1 6.4 - 8.3 gm/dL    Albumin Level 3.6 3.5 - 5.0 g/dL    Globulin 3.5 2.4 - 3.5 gm/dL    Albumin/Globulin Ratio 1.0 (L) 1.1 - 2.0 ratio    Bilirubin Total 0.1 <=1.5 mg/dL    Alkaline Phosphatase 58 40 - 150 unit/L    Alanine Aminotransferase 10 0 - 55 unit/L    Aspartate Aminotransferase 15 5 - 34 unit/L    eGFR >60 mls/min/1.73/m2   Type & Screen    Collection Time: 04/07/23  1:46 PM   Result Value Ref Range    Group & Rh O POS     Indirect Raji GEL NEG     Specimen Outdate 04/10/2023 23:59    Protime-INR    Collection Time: 04/07/23  1:46 PM   Result Value Ref Range    PT 12.6 12.5 - 14.5 seconds    INR 0.95 0.00 - 1.30   APTT    Collection Time:  04/07/23  1:46 PM   Result Value Ref Range    PTT 30.9 23.2 - 33.7 seconds   Prepare RBC 1 Unit    Collection Time: 04/07/23  1:46 PM   Result Value Ref Range    UNIT NUMBER [id number]     UNIT ABO/RH O POS     DISPENSE STATUS Transfused     Unit Expiration [id number]     Product Code Q2859N72     Unit Blood Type Code 5100     CROSSMATCH INTERPRETATION Compatible    Prepare RBC 1 Unit    Collection Time: 04/07/23  1:46 PM   Result Value Ref Range    UNIT NUMBER [id number]     UNIT ABO/RH O POS     DISPENSE STATUS Selected     Unit Expiration [id number]     Product Code T1675I62     Unit Blood Type Code 5100     CROSSMATCH INTERPRETATION Compatible    Path Review, Peripheral Smear    Collection Time: 04/07/23  1:46 PM   Result Value Ref Range    Peripheral Smear Evaluation       - Severe, normocytic, hypochromic anemia with increased polychromasia.    Impression: Normocytic anemia can be seen in early iron deficiency anemia, anemia of chronic disease and acute blood loss.    Beltran Cazares M.D.    Lactate Dehydrogenase    Collection Time: 04/07/23  1:46 PM   Result Value Ref Range    Lactate Dehydrogenase 157 125 - 220 U/L   Haptoglobin    Collection Time: 04/07/23  1:46 PM   Result Value Ref Range    Haptoglobin 167 35 - 250 mg/dL   ABORH RETYPE    Collection Time: 04/07/23  2:00 PM   Result Value Ref Range    ABORH Retype O POS    Comprehensive Metabolic Panel    Collection Time: 04/08/23  7:01 AM   Result Value Ref Range    Sodium Level 140 136 - 145 mmol/L    Potassium Level 4.2 3.5 - 5.1 mmol/L    Chloride 105 98 - 107 mmol/L    Carbon Dioxide 25 22 - 29 mmol/L    Glucose Level 112 (H) 74 - 100 mg/dL    Blood Urea Nitrogen 6.0 (L) 9.8 - 20.1 mg/dL    Creatinine 0.75 0.55 - 1.02 mg/dL    Calcium Level Total 9.2 8.4 - 10.2 mg/dL    Protein Total 6.4 6.4 - 8.3 gm/dL    Albumin Level 3.5 3.5 - 5.0 g/dL    Globulin 2.9 2.4 - 3.5 gm/dL    Albumin/Globulin Ratio 1.2 1.1 - 2.0 ratio    Bilirubin Total 0.5  <=1.5 mg/dL    Alkaline Phosphatase 53 40 - 150 unit/L    Alanine Aminotransferase 8 0 - 55 unit/L    Aspartate Aminotransferase 14 5 - 34 unit/L    eGFR >60 mls/min/1.73/m2   CBC with Differential    Collection Time: 04/08/23  7:01 AM   Result Value Ref Range    WBC 3.7 (L) 4.5 - 11.5 x10(3)/mcL    RBC 3.39 (L) 4.20 - 5.40 x10(6)/mcL    Hgb 9.0 (L) 12.0 - 16.0 g/dL    Hct 28.8 (L) 37.0 - 47.0 %    MCV 85.0 80.0 - 94.0 fL    MCH 26.5 (L) 27.0 - 31.0 pg    MCHC 31.3 (L) 33.0 - 36.0 g/dL    RDW 16.7 11.5 - 17.0 %    Platelet 302 130 - 400 x10(3)/mcL    MPV 10.2 7.4 - 10.4 fL    Neut % 57.3 %    Lymph % 22.8 %    Mono % 12.6 %    Eos % 5.4 %    Basophil % 1.6 %    Lymph # 0.85 0.6 - 4.6 x10(3)/mcL    Neut # 2.13 2.1 - 9.2 x10(3)/mcL    Mono # 0.47 0.1 - 1.3 x10(3)/mcL    Eos # 0.20 0 - 0.9 x10(3)/mcL    Baso # 0.06 0 - 0.2 x10(3)/mcL    IG# 0.01 0 - 0.04 x10(3)/mcL    IG% 0.3 %    NRBC% 0.0 %         Assessment/Plan:  Sandra Burns is a 57 y.o. female with a PMHx of HTN, PAD s/p intervention, PVD, hypothyroid, CAD s/p stent, RLL nodule, bipolar disorder, depression, GERD, HBV, HCV, anemia who presented to [facility] on 4/7/2023 with c/o fatigue with associated weakness, dizziness.  Patient reportedly had outpatient labs done revealing anemia and was referred to ED by PCP for further evaluation.  She was on Plavix.  Last had a colonoscopy in November 2022 which was unrevealing.  She denied CP, SOB, melena.  Of note, patient was seen in the ED on 03/13/2023 following a syncopal event and [facility] and was discharged home.    Initial ED VS include /54, HR 76, RR 20, SpO2 98%, temperature 98.4° F.  Labs notable for hemoglobin 7.5, hematocrit 25.1, chloride 108, glucose 111.  Stool guaiac positive.  She was typed and cross-matched for 1 units PRBC to be transfused.  She was started on a Protonix infusion.  Admitted to hospital medicine services for further medical management.     Patient reports multiple endoscopy procedures  including EGD as well as colonoscopy.  She is followed up by Dr. Hatch in [place] as her primary gastroenterologist.  Last colonoscopy was done in November of 2022.  No source of GI blood loss noted.     Recommendations:  1. Monitor hemoglobin/hematocrit. Transfuse as needed to keep Hgb >8.  2. Hematology consulted, appreciate reccs. Patient has HOWARD. Patient has had multiple endoscopy procedures as above per primary gastroenterologist Dr. Hatch with no source of GI blood loss. Iron supplementation as needed. Vit B12 slightly low. Folate wnl. TSH pending.  3. Endoscopy procedures on hold for now.   A video capsule endoscopy can be considered pending Hematology consultation if necessary.  She was Hemoccult positive.  No overt blood loss reported.    4. Patient has had recent PVD workup and has stents placed.  Aspirin and Plavix can be continued from GI standpoint.     [facility] ChildressC  Gastroenterology  [facility]

## 2023-04-08 NOTE — CONSULTS
Subjective:       Patient ID: Sandra Burns is a 57 y.o. female.    Chief Complaint: Abnormal Labs (POV with low H&H at PCP. Told to come in. Denies rectal bleeding or vomiting blood. Takes plavix. Has had transfusions years ago.)      Diagnosis:  Anemia  Iron-deficiency   B12 deficiency    HPI:  57-year-old female with multiple medical comorbidities who presented to [facility] on 04/07/2023 with fatigue, weakness, and dizziness.  Just prior to her admission, she had had labs on an outpatient basis that showed anemia.  Of note, the patient has had multiple endoscopies with Dr. Hatch with GI, no source of GI blood loss.  Blood work done on 04/03/2023 revealed iron-deficiency with a ferritin of 8.8 and B12 deficiency with a level of 209.  Peripheral blood smear on 04/07/2023 revealed normocytic normochromic anemia.  Hematology was consulted.  I saw the patient on 04/08/2023.  She stated that she was feeling better.  She would no major issues or complaints.    Interval History:   Initial consult note  Past Medical History:   Diagnosis Date    Anemia, unspecified     Coronary artery disease     History of esophagogastroduodenoscopy (EGD) 11/01/2022    Hypertension     PAD (peripheral artery disease)     PVD (peripheral vascular disease) with claudication     Thyroid disease       Past Surgical History:   Procedure Laterality Date    EXTRACORPOREAL SHOCK WAVE LITHOTRIPSY  2/22/2023    Procedure: LITHOTRIPSY- Peripheral Shockwave;  Surgeon: Adriel Valero MD;  Location: [facility] CATH LAB;  Service: Cardiology;;    HIP SURGERY      INSERTION, STENT, ARTERY  2/22/2023    Procedure: INSERTION, STENT, ARTERY;  Surgeon: Adriel Valero MD;  Location: [facility] CATH LAB;  Service: Cardiology;;    INTRAVASCULAR ULTRASOUND, NON-CORONARY  2/22/2023    Procedure: Intravascular Ultrasound, Non-Coronary;  Surgeon: Adriel Valero MD;  Location: [facility] CATH LAB;  Service: Cardiology;;    LEFT HEART  CATHETERIZATION      PERIPHERAL ANGIOGRAPHY N/A 2/22/2023    Procedure: Peripheral angiography;  Surgeon: Adriel Valero MD;  Location: [facility] CATH LAB;  Service: Cardiology;  Laterality: N/A;  BILAT ILIAC INTERVENTION     Social History     Socioeconomic History    Marital status: Single   Tobacco Use    Smoking status: Every Day     Packs/day: 0.25     Years: 40.00     Pack years: 10.00     Types: Cigarettes    Smokeless tobacco: Never   Substance and Sexual Activity    Alcohol use: Not Currently    Drug use: Yes     Types: Marijuana    Sexual activity: Not Currently   Social History Narrative    ** Merged History Encounter **           History reviewed. No pertinent family history.   Review of patient's allergies indicates:  No Known Allergies   Review of Systems   Constitutional:  Negative for chills, diaphoresis, fatigue, fever and unexpected weight change.   HENT:  Negative for nasal congestion, mouth sores, sinus pressure/congestion and sore throat.    Eyes:  Negative for pain and visual disturbance.   Respiratory:  Negative for cough, chest tightness and shortness of breath.    Cardiovascular:  Negative for chest pain, palpitations and leg swelling.   Gastrointestinal:  Negative for abdominal distention, abdominal pain, blood in stool, constipation and diarrhea.   Genitourinary:  Negative for dysuria, frequency and hematuria.   Musculoskeletal:  Negative for arthralgias and back pain.   Integumentary:  Negative for rash.   Neurological:  Negative for dizziness, weakness, numbness and headaches.   Hematological:  Negative for adenopathy.   Psychiatric/Behavioral:  Negative for confusion.        Objective:      Physical Exam  Vitals reviewed. Exam conducted with a chaperone present.   Constitutional:       General: She is not in acute distress.     Appearance: Normal appearance.   HENT:      Head: Normocephalic and atraumatic.      Nose: Nose normal.      Mouth/Throat:      Mouth: Mucous membranes are moist.    Eyes:      Extraocular Movements: Extraocular movements intact.      Conjunctiva/sclera: Conjunctivae normal.   Cardiovascular:      Rate and Rhythm: Normal rate and regular rhythm.      Pulses: Normal pulses.      Heart sounds: Normal heart sounds.   Pulmonary:      Effort: Pulmonary effort is normal.      Breath sounds: Normal breath sounds.   Abdominal:      General: Bowel sounds are normal.      Palpations: Abdomen is soft.   Musculoskeletal:         General: No swelling. Normal range of motion.      Cervical back: Normal range of motion and neck supple.      Right lower leg: No edema.      Left lower leg: No edema.   Lymphadenopathy:      Cervical: No cervical adenopathy.   Skin:     General: Skin is warm and dry.   Neurological:      General: No focal deficit present.      Mental Status: She is alert and oriented to person, place, and time. Mental status is at baseline.   Psychiatric:         Mood and Affect: Mood normal.         Behavior: Behavior normal.       LABS AND IMAGING REVIEWED IN EPIC          Assessment:   Anemia   Iron-deficiency  B12 deficiency        Plan:       At this time, Hematology was consulted for anemia, she had 1 episode of macrocytosis, but the rest of her CBC showed normal cytosis.      She has a mixture of iron-deficiency and B12 deficiency.  This may be the reason that she has a normal MCV as iron-deficiency causes microcytosis and B12 deficiency causes macrocytosis.    Regardless, the patient has not explanation for her anemia as of 04/03/2023, I think replacing iron and B12 and following up in 6 weeks to see her CBC response is appropriate.  I do not think she needs any other anemia workup at this time.    If the patient has a appropriate iron and B12 supplementation and still has anemia, we could consider a bone marrow biopsy on an outpatient basis.    Patient okay for discharge from Hematology standpoint with appropriate B12 and iron supplementation.      Rashaad Vaz II,  MD

## 2023-04-08 NOTE — CONSULTS
Sandra Burns   MRN: [id number]   ADMISSION DATE: 2023  : 1965  AGE: 57 y.o.    DATE :  2023     PROVIDER: CODY CHINO    REASON FOR REFERRAL Anemia    SUBJECTIVE:  Sandra Burns is a 57 y.o. female with a PMHx of HTN, PAD s/p intervention, PVD, hypothyroid, CAD s/p stent, RLL nodule, bipolar disorder, depression, GERD, HBV, HCV, anemia who presented to [facility] on 2023 with c/o fatigue with associated weakness, dizziness.  Patient reportedly had outpatient labs done revealing anemia and was referred to ED by PCP for further evaluation.  She was on Plavix.  Last had a colonoscopy in 2022 which was unrevealing.  She denied CP, SOB, melena.  Of note, patient was seen in the ED on 2023 following a syncopal event and [facility] and was discharged home.    Initial ED VS include /54, HR 76, RR 20, SpO2 98%, temperature 98.4° F.  Labs notable for hemoglobin 7.5, hematocrit 25.1, chloride 108, glucose 111.  Stool guaiac positive.  She was typed and cross-matched for 1 units PRBC to be transfused.  She was started on a Protonix infusion.  Admitted to hospital medicine services for further medical management.    Patient reports multiple endoscopy procedures including EGD as well as colonoscopy.  She is followed up by Dr. Hatch in [place] as her primary gastroenterologist.  Last colonoscopy was done in 2022.  No source of GI blood loss noted.    Review of Systems   Awake and alert.  Occasional shortness of breath.  No chest pain.  No abdominal pain.  No overt GI blood loss.  Hemoccult was positive.  No significant lower extremity swelling.  No fever    Review of patient's allergies indicates:  No Known Allergies      [START ON 2023] atorvastatin  40 mg Oral Daily    [START ON 2023] buPROPion  150 mg Oral QAM    carvediloL  6.25 mg Oral BID WM    [START ON 2023] DULoxetine  60 mg Oral Daily    [START ON 2023] ferric gluconate (FERRLECIT) IVPB  125 mg  Intravenous Daily    [START ON 4/8/2023] folic acid  2 mg Oral Daily    mirtazapine  15 mg Oral QHS    [START ON 4/8/2023] nicotine  1 patch Transdermal Daily    traZODone  300 mg Oral QHS       There are no discontinued medications.      pantoprazole (PROTONIX) IV infusion 8 mg/hr (04/07/23 1810)        Past Medical History:   Diagnosis Date    Anemia, unspecified     Coronary artery disease     History of esophagogastroduodenoscopy (EGD) 11/01/2022    Hypertension     PAD (peripheral artery disease)     PVD (peripheral vascular disease) with claudication     Thyroid disease       Social History     Socioeconomic History    Marital status: Single   Tobacco Use    Smoking status: Every Day     Packs/day: 0.25     Years: 40.00     Pack years: 10.00     Types: Cigarettes    Smokeless tobacco: Never   Substance and Sexual Activity    Alcohol use: Not Currently    Drug use: Yes     Types: Marijuana    Sexual activity: Not Currently   Social History Narrative    ** Merged History Encounter **           Past Surgical History:   Procedure Laterality Date    EXTRACORPOREAL SHOCK WAVE LITHOTRIPSY  2/22/2023    Procedure: LITHOTRIPSY- Peripheral Shockwave;  Surgeon: Adriel Valero MD;  Location: [facility] CATH LAB;  Service: Cardiology;;    HIP SURGERY      INSERTION, STENT, ARTERY  2/22/2023    Procedure: INSERTION, STENT, ARTERY;  Surgeon: Adriel Valero MD;  Location: [facility] CATH LAB;  Service: Cardiology;;    INTRAVASCULAR ULTRASOUND, NON-CORONARY  2/22/2023    Procedure: Intravascular Ultrasound, Non-Coronary;  Surgeon: Adriel Valero MD;  Location: [facility] CATH LAB;  Service: Cardiology;;    LEFT HEART CATHETERIZATION      PERIPHERAL ANGIOGRAPHY N/A 2/22/2023    Procedure: Peripheral angiography;  Surgeon: Adriel Valero MD;  Location: [facility] CATH LAB;  Service: Cardiology;  Laterality: N/A;  BILAT ILIAC INTERVENTION        History reviewed. No pertinent family history.       OBJECTIVE:     Vitals:    04/07/23 1740 04/07/23  1745 04/07/23 1800 04/07/23 1900   BP: 134/71 139/74 126/68 115/73   Pulse: 70 69 68 66   Resp: 19 17 20 18   Temp: 97.9 °F (36.6 °C) 97.4 °F (36.3 °C) 97.8 °F (36.6 °C)    TempSrc: Oral Oral Oral    SpO2: 100% 100% 100% 99%       Physical Exam   HEENT examination: Pale conjunctiva, anicteric sclera   Neck:  Supple   Chest:  Decreased breath sounds bilaterally   Heart examination:  S1, S2 audible   Abdomen:  Bowel sounds positive, soft, nontender   Extremities:  No significant edema.    LABS    Recent Labs   Lab 04/03/23  1702 04/07/23  1346   WBC 7.0 6.5   HGB 6.8* 7.5*   HCT 25.9* 25.1*    345      Recent Labs   Lab 04/07/23  1346      K 4.0   CO2 23   BUN 9.8   CREATININE 0.84   CALCIUM 9.1   BILITOT 0.1   ALKPHOS 58   ALT 10   AST 15   GLUCOSE 111*      Recent Labs   Lab 04/07/23  1346   INR 0.95    No results for input(s): AMYLASE in the last 168 hours.   Recent Labs   Lab 04/07/23  1346   INR 0.95   PTT 30.9           RESULTS: X-Ray Hand 3 view Left    Result Date: 4/3/2023  EXAMINATION XR HAND COMPLETE 3 VIEW LEFT CLINICAL HISTORY Rheumatoid arthritis, unspecified TECHNIQUE A total of 3 images submitted of the left hand. COMPARISON None available at the time of initial interpretation. FINDINGS Mild regional arthritic changes are present.  No convincing acutely displaced fracture or dislocation is identified.  No aggressive osseous lesion or periosteal reaction is appreciated. The included soft tissues are without acute abnormality. IMPRESSION Mild arthritic changes without convincing acute abnormality. Electronically signed by: Harman Campo Date:    04/03/2023 Time:    17:28    DXA Bone Density Spine And Hip    Result Date: 3/13/2023  EXAMINATION: DXA BONE DENSITY AXIAL SKELETON 1 OR MORE SITES CLINICAL HISTORY: Other specified disorders of bone density and structure, unspecified site COMPARISON: 16 November 2020 FINDINGS: BMD     T-score 0.952 -2.0.  Lumbar Spine (L1-L4).  Previous BMD 0.943  0.869 -1.1.  Total Left Femur.  Previous BMD 0.871 0.791 -1.7.  Total Right Femur.  Previous BMD 0.885 0.830 -1.4.  Mean Femur     Osteopenia.  Moderately increased fracture risk. Electronically signed by: Kian Lr Date:    03/13/2023 Time:    15:04    US Pelvis Comp with Transvag NON-OB (xpd    Result Date: 3/13/2023  EXAMINATION: US PELVIS COMP WITH TRANSVAG NON-OB (XPD) CLINICAL HISTORY: Ovary cyst; Unspecified ovarian cyst, unspecified side TECHNIQUE: Transabdominal sonography of the pelvis was performed, followed by transvaginal sonography to better evaluate the uterus and ovaries. COMPARISON: None. FINDINGS: Uterus: Size: 3.5 x 1.5 x 2.5 cm Masses: None Endometrium: Endometrial stripe measures approximately 2 mm Right ovary: Size: 4 mm x 5 mm x 9 mm cm Appearance: Normal Vascular flow: Normal. Left ovary: Size: 6 mm x 7 mm x 1.1 cm cm Appearance: Normal Vascular Flow: Normal. Free Fluid: None.     No definite abnormalities identified Electronically signed by: Didier Dasilva Date:    03/13/2023 Time:    16:08         ICD-10-CM ICD-9-CM   1. Upper GI bleed  K92.2 578.9   2. Acute on chronic anemia  D64.9 285.9   3. Generalized weakness  R53.1 780.79      4.       Macrocytic anemia.    ASSESSMENT & PLAN:   Sandra Burns is a 57 y.o. female with a PMHx of HTN, PAD s/p intervention, PVD, hypothyroid, CAD s/p stent, RLL nodule, bipolar disorder, depression, GERD, HBV, HCV, anemia who presented to [facility] on 4/7/2023 with c/o fatigue with associated weakness, dizziness.  Patient reportedly had outpatient labs done revealing anemia and was referred to ED by PCP for further evaluation.  She was on Plavix.  Last had a colonoscopy in November 2022 which was unrevealing.  She denied CP, SOB, melena.  Of note, patient was seen in the ED on 03/13/2023 following a syncopal event and [facility] and was discharged home.    Initial ED VS include /54, HR 76, RR 20, SpO2 98%, temperature 98.4° F.  Labs notable for  hemoglobin 7.5, hematocrit 25.1, chloride 108, glucose 111.  Stool guaiac positive.  She was typed and cross-matched for 1 units PRBC to be transfused.  She was started on a Protonix infusion.  Admitted to hospital medicine services for further medical management.    Patient reports multiple endoscopy procedures including EGD as well as colonoscopy.  She is followed up by Dr. Hatch in [place] as her primary gastroenterologist.  Last colonoscopy was done in November of 2022.  No source of GI blood loss noted.    Recommendations:  1. Packed RBC transfusion in progress.  Monitor post transfusion hemoglobin/hematocrit.  2. Recommend Hematology consultation.  Patient has macrocytic anemia.  She denies any ETOH intake.  Obtain B12, folate, TSH level etc. if not done recently.  3. Endoscopy procedures on hold for now.  Patient has had multiple endoscopy procedures as above per primary gastroenterologist Dr. Hatch.  A video capsule endoscopy can be considered pending Hematology consultation if necessary.  She was Hemoccult positive.  No overt blood loss reported.    4. Patient has had recent PVD workup and has stents placed.  Aspirin and Plavix can be continued from GI standpoint.    Thank you for the consultation      Addendum:  Iron/TIBC saturation less than 5%.  Iron studies are consistent with iron-deficiency anemia.  B12, folate levels reviewed as well.  Obtain TSH level.

## 2023-04-08 NOTE — PLAN OF CARE
Problem: Adult Inpatient Plan of Care  Goal: Plan of Care Review  Outcome: Ongoing, Progressing  Goal: Patient-Specific Goal (Individualized)  Outcome: Ongoing, Progressing  Goal: Absence of Hospital-Acquired Illness or Injury  Outcome: Ongoing, Progressing  Goal: Optimal Comfort and Wellbeing  Outcome: Ongoing, Progressing  Goal: Readiness for Transition of Care  Outcome: Ongoing, Progressing     Problem: Fatigue  Goal: Improved Activity Tolerance  Outcome: Ongoing, Progressing      cup/fork/spoon

## 2023-04-08 NOTE — DISCHARGE SUMMARY
[facility] Hospital Medicine Discharge Summary    Admit Date: 4/7/2023  Discharge Date and Time: 4/8/20233:56 PM  Admitting Physician: BENJAMIN Team  Discharging Physician: Dora Nye MD.  Primary Care Physician: Akin Gan MD      Discharge Diagnoses:  Acute on chronic normocytic anemia  B12 deficiency   Iron deficiency   History of PVD on dual antiplatelets   Hypothyroidism  CAD status post PCI  Lung nodule   Chronic hep B/chronic hep c   Depression   GERD    Hospital Course:   57-year-old female with significant history of HTN, PVD status post intervention, hypothyroidism, CAD status post PCI, right lower lobe lung nodule, bipolar disorder, depression, GERD, chronic hepatitis-B/chronic hepatitis-C an iron deficiency anemia presented to the ED with complaints of fatigue, generalized weakness and outpatient labs revealing anemia.  Patient was referred to the ED for admission to further evaluate anemia.  On Plavix.  Patient had a colonoscopy 2.  One thousand twenty-two which was unrevealing.  Patient was admitted hospitalist medicine service.  1 unit PRBC transfusion ordered.  Gastroenterology was consulted.  Patient denied any overt bleeding.  Anemia macrocytic.  No alcohol use.  GI recommended to hold off on endoscopic evaluation and recommended Hematology evaluation.  Patient has had multiple endoscopic procedures by her primary gastroenterologist was was unrevealing.  She reportedly was Hemoccult positive.  From GI standpoint aspirin/Plavix could be resumed.  Patient's hemoglobin appropriately improved after transfusion.  She has a mixture of iron deficiency and B12 deficiency.  Hematology/oncology recommended to continue B12, iron supplementation along with folic acid as outpatient and follow-up with them in 6 weeks to see CBC response.  In case of persistent anemia they will plan for bone marrow biopsy as outpatient.  Patient symptomatically stable.  Hemoglobin improved.   Patient was cleared by all specialist for DC.  Therefore it was decided to discharge her home.  Follow-up with GI and Hematology/Oncology as recommended.  B12, iron supplementation ordered.  Treatment plans discussed with the patient and she voiced understanding everything explained.  Discharge medications per med rec  Vitals:  VITAL SIGNS: 24 HRS MIN & MAX LAST   Temp  Min: 97.4 °F (36.3 °C)  Max: 98.3 °F (36.8 °C) 98.2 °F (36.8 °C)   BP  Min: 107/63  Max: 146/77 (!) 146/77     Pulse  Min: 65  Max: 78  65   Resp  Min: 17  Max: 20 18   SpO2  Min: 97 %  Max: 100 % 97 %       Physical Exam:  General appearance:  No acute distress  HENT: Atraumatic   Lungs: Clear to auscultation bilaterally.   Heart: RRR,No edema  Abdomen: Soft, non tender   Extremities: warm  Neuro:  Awake, alert, oriented x4  Psych/mental status: Appropriate mood and affect.      Procedures Performed: No admission procedures for hospital encounter.     Significant Diagnostic Studies: See Full reports for all details    Recent Labs   Lab 04/03/23  1702 04/07/23  1346 04/08/23  0701   WBC 7.0 6.5 3.7*   RBC 2.59* 2.95* 3.39*   HGB 6.8* 7.5* 9.0*   HCT 25.9* 25.1* 28.8*   .0* 85.1 85.0   MCH 26.3* 25.4* 26.5*   MCHC 26.3* 29.9* 31.3*   RDW 18.3* 17.6* 16.7    345 302   MPV 12.1* 10.3 10.2       Recent Labs   Lab 04/07/23  1346 04/08/23  0701    140   K 4.0 4.2   CO2 23 25   BUN 9.8 6.0*   CREATININE 0.84 0.75   CALCIUM 9.1 9.2   ALBUMIN 3.6 3.5   ALKPHOS 58 53   ALT 10 8   AST 15 14   BILITOT 0.1 0.5        Microbiology Results (last 7 days)       ** No results found for the last 168 hours. **             X-Ray Hand 3 view Left  EXAMINATION  XR HAND COMPLETE 3 VIEW LEFT    CLINICAL HISTORY  Rheumatoid arthritis, unspecified    TECHNIQUE  A total of 3 images submitted of the left hand.    COMPARISON  None available at the time of initial interpretation.    FINDINGS  Mild regional arthritic changes are present.  No convincing acutely  displaced fracture or dislocation is identified.  No aggressive osseous lesion or periosteal reaction is appreciated.    The included soft tissues are without acute abnormality.    IMPRESSION  Mild arthritic changes without convincing acute abnormality.    Electronically signed by: Harman Campo  Date:    04/03/2023  Time:    17:28         Medication List        CHANGE how you take these medications      DULoxetine 30 MG capsule  Commonly known as: CYMBALTA  Take 1 capsule (30 mg total) by mouth every evening.  What changed: Another medication with the same name was removed. Continue taking this medication, and follow the directions you see here.            CONTINUE taking these medications      ALPRAZolam 0.5 MG tablet  Commonly known as: XANAX     amLODIPine 10 MG tablet  Commonly known as: NORVASC  Take 1 tablet (10 mg total) by mouth once daily.     aspirin 81 MG EC tablet  Commonly known as: ECOTRIN  Take 1 tablet (81 mg total) by mouth once daily.     atorvastatin 40 MG tablet  Commonly known as: LIPITOR  Take 1 tablet (40 mg total) by mouth once daily.     buPROPion 150 MG TBSR 12 hr tablet  Commonly known as: WELLBUTRIN SR     carvediloL 6.25 MG tablet  Commonly known as: COREG  Take 1 tablet (6.25 mg total) by mouth 2 (two) times daily with meals.     clopidogreL 75 mg tablet  Commonly known as: PLAVIX     cyanocobalamin 1000 MCG tablet  Commonly known as: VITAMIN B-12  Take 2 tablets (2,000 mcg total) by mouth once daily.     ferrous gluconate 324 MG tablet  Commonly known as: FERGON  Take 1 tablet (324 mg total) by mouth every other day.     folic acid 1 MG tablet  Commonly known as: FOLVITE  Take 2 tablets (2 mg total) by mouth once daily.     gabapentin 300 MG capsule  Commonly known as: NEURONTIN  Take 1 capsule (300 mg total) by mouth 3 (three) times daily.     GARLIQUE ORAL     lisinopriL 10 MG tablet  Take 1 tablet (10 mg total) by mouth once daily.     mirtazapine 15 MG tablet  Commonly known as:  REMERON     nicotine 21 mg/24 hr  Commonly known as: NICODERM CQ  Place 1 patch onto the skin once daily.     nitroGLYCERIN 0.4 MG SL tablet  Commonly known as: NITROSTAT     pantoprazole 40 MG tablet  Commonly known as: PROTONIX  Take 1 tablet (40 mg total) by mouth once daily.     red yeast rice 600 mg Cap     rosuvastatin 40 MG Tab  Commonly known as: CRESTOR     traZODone 150 MG tablet  Commonly known as: DESYREL            STOP taking these medications      clonazePAM 0.5 MG tablet  Commonly known as: KlonoPIN               Explained in detail to the patient about the discharge plan, medications, and follow-up visits. Pt understands and agrees with the treatment plan  Discharge Disposition: Home or Self Care   Discharged Condition: stable  Diet-   Dietary Orders (From admission, onward)       Start     Ordered    04/08/23 1035  Diet full liquid  Diet effective now         04/08/23 1034                   Medications Per DC med rec  Activities as tolerated   Follow-up Information       Akin Gan MD Follow up in 1 week(s).    Specialty: Internal Medicine  Contact information:  [address] [address]  [phone]               Rashaad Vaz II, MD Follow up in 4 week(s).    Specialties: Oncology, Hematology and Oncology  Contact information:  [address]  [facility] [address]  [phone]               Terry Charles MD Follow up in 1 week(s).    Specialty: Gastroenterology  Contact information:  [address]  [address] [address]  [phone]                           For further questions contact hospitalist office    Discharge time 33 minutes    For worsening symptoms, chest pain, shortness of breath, increased abdominal pain, high grade fever, stroke or stroke like symptoms, immediately go to the nearest Emergency Room or call 911 as soon as possible.      Dora Stanford M.D on 4/8/2023. at 3:56 PM.

## 2023-04-08 NOTE — NURSING
Nurses Note -- 4 Eyes      4/7/2023   11:06 PM      Skin assessed during: Admit      [x] No Pressure Injuries Present    []Prevention Measures Documented      [] Yes- Altered Skin Integrity Present or Discovered   [] LDA Added if Not in Epic (Describe Wound)   [] New Altered Skin Integrity was Present on Admit and Documented in LDA   [] Wound Image Taken    Wound Care Consulted? No    Attending Nurse:  Saba Cormier RN     Second RN/Staff Member:  Fidelina Carranza RN

## 2023-04-10 ENCOUNTER — PATIENT OUTREACH (OUTPATIENT)
Dept: ADMINISTRATIVE | Facility: CLINIC | Age: 58
End: 2023-04-10
Payer: MEDICAID

## 2023-04-10 ENCOUNTER — TELEPHONE (OUTPATIENT)
Dept: HEMATOLOGY/ONCOLOGY | Facility: CLINIC | Age: 58
End: 2023-04-10
Payer: MEDICAID

## 2023-04-10 LAB — HCV AB SERPL QL IA: REACTIVE

## 2023-04-10 NOTE — PROGRESS NOTES
C3 nurse attempted to contact Sandra Burns  for a TCC post hospital discharge follow up call. No answer. Left voicemail with callback information. The patient has a scheduled HOSFU appointment with Akin Gan MD  on 04/24/2023 @ 115 pm.

## 2023-04-11 ENCOUNTER — PATIENT MESSAGE (OUTPATIENT)
Dept: ADMINISTRATIVE | Facility: CLINIC | Age: 58
End: 2023-04-11
Payer: MEDICAID

## 2023-04-11 LAB
ABO + RH BLD: NORMAL
BLD PROD TYP BPU: NORMAL
BLOOD UNIT EXPIRATION DATE: NORMAL
BLOOD UNIT TYPE CODE: 5100
CROSSMATCH INTERPRETATION: NORMAL
DISPENSE STATUS: NORMAL
DSDNA IGG SERPL IA-ACNC: <12.3 IU/ML
UNIT NUMBER: NORMAL

## 2023-04-12 ENCOUNTER — TELEPHONE (OUTPATIENT)
Dept: GASTROENTEROLOGY | Facility: CLINIC | Age: 58
End: 2023-04-12
Payer: MEDICAID

## 2023-04-12 NOTE — TELEPHONE ENCOUNTER
----- Message from Akin Gan MD sent at 4/10/2023 10:59 AM CDT -----  Regarding: RE: capsule endoscopy  Sounds good, no worries. Thanks.      ----- Message -----  From: Ilana Vance  Sent: 4/6/2023   3:25 PM CDT  To: Akin Gan MD  Subject: RE: capsule endoscopy                            Sorry Dr. Gan,  No Capsule endoscopy while Dr. Del Rio is out. She returns at the end of June.  ----- Message -----  From: Akin Gan MD  Sent: 4/6/2023   2:59 PM CDT  To: [facility] Clinical Support Staff  Subject: capsule endoscopy                                Hi,     I have a patient, Sandra Burns, who is a patient of Dr. Hatch. I discussed her case with the NP who works at his office, and we're considering that a capsule endoscopy would be beneficial for the patient. She has iron-deficiency anemia and normal EGD and colonoscopy. They don't do that procedure over there and usually refer their patients who have Medicaid to [facility] for that procedure. I know Dr. Jiménez is out right now, so I want to check with y'all regarding the availability of small capsule endoscopy currently.     Thanks    Dr Gan

## 2023-05-01 DIAGNOSIS — D64.9 ANEMIA, UNSPECIFIED TYPE: Primary | ICD-10-CM

## 2023-05-16 ENCOUNTER — LAB VISIT (OUTPATIENT)
Dept: LAB | Facility: HOSPITAL | Age: 58
End: 2023-05-16
Attending: STUDENT IN AN ORGANIZED HEALTH CARE EDUCATION/TRAINING PROGRAM
Payer: MEDICAID

## 2023-05-16 DIAGNOSIS — D64.9 ANEMIA, UNSPECIFIED TYPE: ICD-10-CM

## 2023-05-16 LAB
ALBUMIN SERPL-MCNC: 3.8 G/DL (ref 3.5–5)
ALBUMIN/GLOB SERPL: 1.1 RATIO (ref 1.1–2)
ALP SERPL-CCNC: 57 UNIT/L (ref 40–150)
ALT SERPL-CCNC: 10 UNIT/L (ref 0–55)
AST SERPL-CCNC: 15 UNIT/L (ref 5–34)
BASOPHILS # BLD AUTO: 0.05 X10(3)/MCL
BASOPHILS NFR BLD AUTO: 0.9 %
BILIRUBIN DIRECT+TOT PNL SERPL-MCNC: 0.3 MG/DL
BUN SERPL-MCNC: 9.1 MG/DL (ref 9.8–20.1)
CALCIUM SERPL-MCNC: 9.2 MG/DL (ref 8.4–10.2)
CHLORIDE SERPL-SCNC: 103 MMOL/L (ref 98–107)
CHOLEST SERPL-MCNC: 168 MG/DL
CHOLEST/HDLC SERPL: 3 {RATIO} (ref 0–5)
CO2 SERPL-SCNC: 26 MMOL/L (ref 22–29)
CREAT SERPL-MCNC: 0.82 MG/DL (ref 0.55–1.02)
EOSINOPHIL # BLD AUTO: 0.42 X10(3)/MCL (ref 0–0.9)
EOSINOPHIL NFR BLD AUTO: 7.6 %
ERYTHROCYTE [DISTWIDTH] IN BLOOD BY AUTOMATED COUNT: 16.5 % (ref 11.5–17)
FERRITIN SERPL-MCNC: 16.8 NG/ML (ref 4.63–204)
FOLATE SERPL-MCNC: 38.8 NG/ML (ref 7–31.4)
GFR SERPLBLD CREATININE-BSD FMLA CKD-EPI: >60 MLS/MIN/1.73/M2
GLOBULIN SER-MCNC: 3.6 GM/DL (ref 2.4–3.5)
GLUCOSE SERPL-MCNC: 93 MG/DL (ref 74–100)
HCT VFR BLD AUTO: 28.2 % (ref 37–47)
HDLC SERPL-MCNC: 51 MG/DL (ref 35–60)
HGB BLD-MCNC: 8.1 G/DL (ref 12–16)
IMM GRANULOCYTES # BLD AUTO: 0.01 X10(3)/MCL (ref 0–0.04)
IMM GRANULOCYTES NFR BLD AUTO: 0.2 %
IRON SATN MFR SERPL: 15 % (ref 20–50)
IRON SERPL-MCNC: 50 UG/DL (ref 50–170)
LDLC SERPL CALC-MCNC: 104 MG/DL (ref 50–140)
LYMPHOCYTES # BLD AUTO: 1.52 X10(3)/MCL (ref 0.6–4.6)
LYMPHOCYTES NFR BLD AUTO: 27.4 %
MCH RBC QN AUTO: 24.8 PG (ref 27–31)
MCHC RBC AUTO-ENTMCNC: 28.7 G/DL (ref 33–36)
MCV RBC AUTO: 86.5 FL (ref 80–94)
MONOCYTES # BLD AUTO: 0.4 X10(3)/MCL (ref 0.1–1.3)
MONOCYTES NFR BLD AUTO: 7.2 %
NEUTROPHILS # BLD AUTO: 3.14 X10(3)/MCL (ref 2.1–9.2)
NEUTROPHILS NFR BLD AUTO: 56.7 %
PLATELET # BLD AUTO: 250 X10(3)/MCL (ref 130–400)
PMV BLD AUTO: 9.9 FL (ref 7.4–10.4)
POTASSIUM SERPL-SCNC: 4.4 MMOL/L (ref 3.5–5.1)
PROT SERPL-MCNC: 7.4 GM/DL (ref 6.4–8.3)
RBC # BLD AUTO: 3.26 X10(6)/MCL (ref 4.2–5.4)
SODIUM SERPL-SCNC: 138 MMOL/L (ref 136–145)
TIBC SERPL-MCNC: 273 UG/DL (ref 70–310)
TIBC SERPL-MCNC: 323 UG/DL (ref 250–450)
TRANSFERRIN SERPL-MCNC: 311 MG/DL (ref 180–382)
TRIGL SERPL-MCNC: 63 MG/DL (ref 37–140)
VIT B12 SERPL-MCNC: 705 PG/ML (ref 213–816)
VLDLC SERPL CALC-MCNC: 13 MG/DL
WBC # SPEC AUTO: 5.54 X10(3)/MCL (ref 4.5–11.5)

## 2023-05-16 PROCEDURE — 80053 COMPREHEN METABOLIC PANEL: CPT

## 2023-05-16 PROCEDURE — 82746 ASSAY OF FOLIC ACID SERUM: CPT

## 2023-05-16 PROCEDURE — 82728 ASSAY OF FERRITIN: CPT

## 2023-05-16 PROCEDURE — 85557 RBC OSMOTIC FRAGILITY: CPT

## 2023-05-16 PROCEDURE — 85025 COMPLETE CBC W/AUTO DIFF WBC: CPT

## 2023-05-16 PROCEDURE — 36415 COLL VENOUS BLD VENIPUNCTURE: CPT

## 2023-05-16 PROCEDURE — 83550 IRON BINDING TEST: CPT

## 2023-05-16 PROCEDURE — 30000890 MAYO GENERIC ORDERABLE

## 2023-05-16 PROCEDURE — 82784 ASSAY IGA/IGD/IGG/IGM EACH: CPT

## 2023-05-16 PROCEDURE — 82607 VITAMIN B-12: CPT

## 2023-05-16 PROCEDURE — 80061 LIPID PANEL: CPT

## 2023-05-16 PROCEDURE — 86880 COOMBS TEST DIRECT: CPT | Performed by: STUDENT IN AN ORGANIZED HEALTH CARE EDUCATION/TRAINING PROGRAM

## 2023-05-17 LAB — IGA SERPL-MCNC: 220 MG/DL (ref 65–421)

## 2023-05-17 PROCEDURE — 83516 IMMUNOASSAY NONANTIBODY: CPT | Performed by: STUDENT IN AN ORGANIZED HEALTH CARE EDUCATION/TRAINING PROGRAM

## 2023-05-19 LAB
ELIA CELIKEY IGA (TTG IGA) QUANTITATIVE: 0.3 U/ML
MAYO GENERIC ORDERABLE RESULT: NORMAL

## 2023-05-20 PROBLEM — K31.819 ANGIODYSPLASIA OF DUODENUM: Status: ACTIVE | Noted: 2023-05-20

## 2023-05-20 PROBLEM — D50.0 IRON DEFICIENCY ANEMIA DUE TO CHRONIC BLOOD LOSS: Status: ACTIVE | Noted: 2023-05-20

## 2023-05-20 PROBLEM — E53.8 VITAMIN B12 DEFICIENCY: Status: ACTIVE | Noted: 2023-05-20

## 2023-05-20 NOTE — PROGRESS NOTES
History:  Past Medical History:   Diagnosis Date    Anemia, unspecified     Coronary artery disease     History of esophagogastroduodenoscopy (EGD) 11/01/2022    Hypertension     PAD (peripheral artery disease)     PVD (peripheral vascular disease) with claudication     Thyroid disease    Past medical history:  B12 deficiency.  Anemia.  Iron-deficiency.  History of peripheral vascular disease, on dual antiplatelets.  Hypothyroidism.  CAD, S/P PCI.  Chronic hepatitis-B.  Chronic hepatitis-C. Depression.  GERD.  Right lower lung lobe nodule.  02/22/2023: Bilateral lower extremity stent placement, with resolution of bilateral lower extremity pain  Social history:  .  Lives with her brother in [place], [place].  No children.  Never became pregnant.  Disabled.  Does not work.  Has been smoking half a pack of cigarettes daily for 45 years.  Has been smoking marijuana every other day for 40 years.  No other illicit drugs.  No alcohol abuse.  Family history:  Negative for cancers or blood dyscrasias.  Health maintenance:  PCP at [facility].  -05/02/2023:  Bilateral screening mammogram (comparison:  12/03/2020 mammogram):  BI-RADS 2 (benign)  -02/24/2022: Stool for occult blood pos  -history of multiple colonoscopies and endoscopies: Angiodysplastic lesions in duodenum  Past Surgical History:   Procedure Laterality Date    EXTRACORPOREAL SHOCK WAVE LITHOTRIPSY  2/22/2023    Procedure: LITHOTRIPSY- Peripheral Shockwave;  Surgeon: Adriel Valero MD;  Location: [facility] CATH LAB;  Service: Cardiology;;    HIP SURGERY      INSERTION, STENT, ARTERY  2/22/2023    Procedure: INSERTION, STENT, ARTERY;  Surgeon: Adriel Valero MD;  Location: [facility] CATH LAB;  Service: Cardiology;;    INTRAVASCULAR ULTRASOUND, NON-CORONARY  2/22/2023    Procedure: Intravascular Ultrasound, Non-Coronary;  Surgeon: Adriel Valero MD;  Location: [facility] CATH LAB;  Service: Cardiology;;    LEFT HEART CATHETERIZATION      PERIPHERAL ANGIOGRAPHY  N/A 2/22/2023    Procedure: Peripheral angiography;  Surgeon: Adriel Valero MD;  Location: [facility] CATH LAB;  Service: Cardiology;  Laterality: N/A;  BILAT ILIAC INTERVENTION      Social History     Socioeconomic History    Marital status: Single   Tobacco Use    Smoking status: Every Day     Packs/day: 0.25     Years: 40.00     Pack years: 10.00     Types: Cigarettes    Smokeless tobacco: Never   Substance and Sexual Activity    Alcohol use: Not Currently    Drug use: Yes     Types: Marijuana    Sexual activity: Not Currently   Social History Narrative    ** Merged History Encounter **           History reviewed. No pertinent family history.     Reason for Follow-up:  Iron-deficiency anemia   Vitamin B12 deficiency   Angiodysplasia of duodenum    History of Present Illness:   Results        Oncologic/Hematologic History:  Oncology History    No history exists.   57-year-old female, referred from Internal Medicine, for evaluation of anemia.      Admitted to [facility] 04/07/2023-04/08/2023:  57-year-old female with significant history of HTN, PVD status post intervention, hypothyroidism, CAD status post PCI, right lower lobe lung nodule, bipolar disorder, depression, GERD, chronic hepatitis-B/chronic hepatitis-C and iron deficiency anemia presented to the ED with complaints of fatigue, generalized weakness and outpatient labs revealing anemia.  Patient was referred to the ED for admission to further evaluate anemia.  On Plavix.  Patient had a colonoscopy 2.  Transfused with PRBC x1 unit.  No overt bleeding.  No history of alcohol use.  GI recommended to hold off on endoscopic evaluation.  Hemoccult-pos but numerous endoscopy procedures by Dr. Keyon Hatch, her GI, unrevealing in the past.  Patient on aspirin and Plavix which were continued.  Hemoglobin improved appropriately post PRBC x1 unit.  Iron deficient.  B12 deficient.  04/03/2023: Outside blood work showed ferritin 8.8.  B12 209.    History of  iron-deficiency anemia.  History of B12 deficiency.  Admitted to [facility] 04/07/2023 with fatigue, weakness, dizziness.  Outside labs had shown anemia.  History of multiple endoscopies with Dr. Keyon Hatch, GI, with no source of GI bleed found.  04/03/2023: Blood work showed iron-deficiency, ferritin 8.8, B12 level 209.    History of duodenal angiodysplasias a couple of years ago    Blood transfusion in the past   EGD 11/2021: Hiatal hernia, GE ring, nonbleeding angioectasias   Colonoscopy 09/2021: Normal   EGD and colonoscopy 2022: Esophageal dilation, no bleeding, colon polyp    History of hepatitis-C: S/P treatment; S/P occlusive 2020; posttreatment viral load undetectable; FibroScan F1 (no liver cirrhosis)    09/09/2020: Limited abdominal ultrasound (comparison:  05/07/2013): Liver cirrhosis; no focal hepatic lesion    -12/16/2020: EGD (liver cirrhosis, rule out esophageal varices):  3 diminutive nonbleeding angiodysplastic lesions in the 2nd portion of duodenum  -09/22/2021:  Colonoscopy (indication:  Occult blood in stool; personal history of colon polyps): Normal mucosa in whole:  -11/15/2021: EGD (Keyon Hatch MD) (indication:  Dysphagia, oropharyngeal, nausea, heartburn, abdominal bloating, GI metaplasia, generalized abdominal pain):  Erythema lower 3rd of esophagus compatible with esophagitis; erythema antrum, compatible with gastritis; esophageal hiatal hernia; a single bleeding angiectasia seen in duodenal bulb, treated with monopolar cautery successfully (thermotherapy) (pathology:  Gastric antrum biopsy, reactive gastropathy with mild nonspecific chronic inflammation, negative for intestinal metaplasia; negative for H pylori; gastric body biopsy, reactive gastropathy, mild nonspecific chronic inflammation, negative for intestinal metaplasia, negative for H pylori)    Labs reviewed:  Hemoglobin: 8.1 (05/16/2023); 9.0 (04/08/2023); 7.5 (04/07/2023); 6.8 (04/03/2023); 8.0 (03/13/2023); 11.6  (10/28/2022); 13.2 (10/03/2022), etc.   MCV: Consistently normal with mild macrocytosis on a couple of occasions  Rest of CBC unremarkable  05/16/2023: Serum iron 50.  TIBC 323.  Ferritin 16.8.  Transferrin saturation 15%.  B12 level 705.  Folate 38.8.    04/03/2023: Serum iron 8.  TIBC 344.  Ferritin 8.8.  Transferrin saturation 2%.  B12 level 209, low.  Folate 8.7   10/11/2022: Serum iron 69.  TIBC 292.  Ferritin 27.13.  Transferrin saturation 24%.  09/09/2020:  Ferritin 18.8, hemoglobin 9.8, MCV 93.7  Haptoglobin normal  04/03/2023:  Hemoglobin 6.8.  Retic count 1.89%.  LDH normal.  05/17/2023: Celiac serology negative    04/08/2023:  Hepatitis-C antibody pos (treated in the past with Epclusa, with a negative hepatitis-A viral load subsequently   09/09/2020:  Hepatitis-B core antibody total reactive.  Hepatitis-B surface antibody negative.  Hepatitis-B surface antigen negative.  HIV negative    05/16/2023:  RBC osmotic fragility normal  04/03/2023:  Hemoglobin electrophoresis:  No abnormal hemoglobin or beta thalassemia    05/22/2023:  Pleasant lady.  Presents for initial hematology consultation.  In no acute discomfort.    Always feels tired.  No abdominal pain, nausea or vomiting.  Denies hematemesis, melena, or hematochezia.  Fair appetite.  Main complaint is chronic tiredness.  Has been taking iron pill every other day for last 2 years.  No GI side effects.  Started vitamin B12 pill x2 daily, a month ago.  We will check her iron stores and B12 level at this time.  Says that she was transfused around Easter time and a couple of years ago as well.  No history of cancers.  Chronic generalized fatigue, swelling in the legs, cough, and numbness and tingling in hands.  Generalized body pains, 9/10, which she attributes to rheumatoid arthritis.      Interval History:  [No matching plan found]   [No matching plan found]       Medications:  Current Outpatient Medications on File Prior to Visit   Medication Sig  Dispense Refill    ALPRAZolam (XANAX) 0.5 MG tablet Take 0.5 mg by mouth 2 (two) times daily as needed for Anxiety (anxiety).      amLODIPine (NORVASC) 10 MG tablet Take 1 tablet (10 mg total) by mouth once daily. 90 tablet 1    aspirin (ECOTRIN) 81 MG EC tablet Take 1 tablet (81 mg total) by mouth once daily. 90 tablet 3    atorvastatin (LIPITOR) 40 MG tablet Take 1 tablet (40 mg total) by mouth once daily. 90 tablet 1    buPROPion (WELLBUTRIN SR) 150 MG TBSR 12 hr tablet Take 150 mg by mouth every morning.      carvediloL (COREG) 6.25 MG tablet Take 1 tablet (6.25 mg total) by mouth 2 (two) times daily with meals. 90 tablet 3    clopidogreL (PLAVIX) 75 mg tablet Take 75 mg by mouth once daily.      cyanocobalamin (VITAMIN B-12) 1000 MCG tablet Take 1 tablet (1,000 mcg total) by mouth once daily. 180 tablet 1    DULoxetine (CYMBALTA) 30 MG capsule Take 1 capsule (30 mg total) by mouth every evening. 90 capsule 1    ferrous gluconate (FERGON) 324 MG tablet Take 1 tablet (324 mg total) by mouth every other day. 90 tablet 1    folic acid (FOLVITE) 1 MG tablet Take 1 tablet (1 mg total) by mouth once daily. 90 tablet 1    garlic (GARLIQUE ORAL) Take 1 tablet by mouth Daily.      lisinopriL 10 MG tablet Take 1 tablet (10 mg total) by mouth once daily. 90 tablet 3    mirtazapine (REMERON) 15 MG tablet TAKE ONE TABLET BY MOUTH ONCE A DAY AT BEDTIME      nicotine (NICODERM CQ) 21 mg/24 hr Place 1 patch onto the skin once daily. 60 patch 0    nitroGLYCERIN (NITROSTAT) 0.4 MG SL tablet Place 1 tablet under the tongue every 5 (five) minutes as needed.      pantoprazole (PROTONIX) 40 MG tablet Take 1 tablet (40 mg total) by mouth once daily. 90 tablet 1    pregabalin (LYRICA) 25 MG capsule Take 1 capsule (25 mg total) by mouth 2 (two) times daily. 120 capsule 1    red yeast rice 600 mg Cap Take 3 capsules by mouth 2 (two) times a day.      traZODone (DESYREL) 150 MG tablet Take 300 mg by mouth every evening.       [DISCONTINUED] amLODIPine (NORVASC) 10 MG tablet Take 1 tablet (10 mg total) by mouth once daily. 90 tablet 1    [DISCONTINUED] atorvastatin (LIPITOR) 40 MG tablet Take 1 tablet (40 mg total) by mouth once daily. 90 tablet 1    [DISCONTINUED] cyanocobalamin (VITAMIN B-12) 1000 MCG tablet Take 2 tablets (2,000 mcg total) by mouth once daily. 180 tablet 1    [DISCONTINUED] ferrous gluconate (FERGON) 324 MG tablet Take 1 tablet (324 mg total) by mouth every other day. 90 tablet 1    [DISCONTINUED] folic acid (FOLVITE) 1 MG tablet Take 2 tablets (2 mg total) by mouth once daily. 180 tablet 1    [DISCONTINUED] gabapentin (NEURONTIN) 300 MG capsule Take 1 capsule (300 mg total) by mouth 3 (three) times daily. 270 capsule 1    [DISCONTINUED] lisinopriL 10 MG tablet Take 1 tablet (10 mg total) by mouth once daily. 90 tablet 3    [DISCONTINUED] pantoprazole (PROTONIX) 40 MG tablet Take 1 tablet (40 mg total) by mouth once daily. 90 tablet 1    [DISCONTINUED] rosuvastatin (CRESTOR) 40 MG Tab Take 40 mg by mouth Daily.       Current Facility-Administered Medications on File Prior to Visit   Medication Dose Route Frequency Provider Last Rate Last Admin    ferric gluconate 62.5 mg/5 mL injection 125 mg  125 mg Intravenous 1 time in Clinic/HOD Akin Gan MD           Review of Systems:   All systems reviewed and found to be negative except for the symptoms detailed above    Physical Examination:   VITAL SIGNS:   Vitals:    05/22/23 1003   BP: (!) 143/86   Pulse: 68   Resp: 20   Temp: 97.6 °F (36.4 °C)     GENERAL:  In no apparent distress.    HEAD:  No signs of head trauma.  EYES:  Pupils are equal.  Extraocular motions intact.    EARS:  Hearing grossly intact.  MOUTH:  Oropharynx is normal.   NECK:  No adenopathy, no JVD.     CHEST:  Chest with clear breath sounds bilaterally.  No wheezes, rales, rhonchi.    CARDIAC:  Regular rate and rhythm.  S1 and S2, without murmurs, gallops, rubs.  VASCULAR:  No Edema.   Peripheral pulses normal and equal in all extremities.  ABDOMEN:  Soft, without detectable tenderness.  No sign of distention.  No   rebound or guarding, and no masses palpated.   Bowel Sounds normal.  MUSCULOSKELETAL:  Good range of motion of all major joints. Extremities without clubbing, cyanosis or edema.    NEUROLOGIC EXAM:  Alert and oriented x 3.  No focal sensory or strength deficits.   Speech normal.  Follows commands.  PSYCHIATRIC:  Mood normal.    No results for input(s): CBC in the last 72 hours.   No results for input(s): CMP in the last 72 hours.     Assessment:  Problem List Items Addressed This Visit          Oncology    Iron deficiency anemia due to chronic blood loss    Relevant Orders    Magnesium    Comprehensive Metabolic Panel    CBC Auto Differential    Ferritin    Iron and TIBC    Vitamin B12    Intrinsic Factor Blocking Ab       Endocrine    Vitamin B12 deficiency    Relevant Orders    Magnesium    Comprehensive Metabolic Panel    CBC Auto Differential    Ferritin    Iron and TIBC    Vitamin B12    Intrinsic Factor Blocking Ab       GI    Angiodysplasia of duodenum    Relevant Orders    Magnesium    Comprehensive Metabolic Panel    CBC Auto Differential    Ferritin    Iron and TIBC    Vitamin B12    Intrinsic Factor Blocking Ab     Iron-deficiency anemia, B12 deficiency anemia:  (history of duodenal angiodysplasia):   -Hemoglobin: 8.1 (05/16/2023); 9.0 (04/08/2023); 7.5 (04/07/2023); 6.8 (04/03/2023); 8.0 (03/13/2023); 11.6 (10/28/2022); 13.2 (10/03/2022), etc.   -MCV: Consistently normal with mild macrocytosis on a couple of occasions  -Rest of CBC unremarkable  -ferritin:  16.8 (05/16/2023); 8.8 (04/03/2023); 27.13 (10/11/2022); 18.8 (09/09/2020)  -Haptoglobin normal  -04/03/2023:  Hemoglobin 6.8.  Retic count 1.89%.  LDH normal.  Hospitalized 04/07/2023, transfused with PRBC x1 unit  -05/17/2023: Celiac serology negative  -05/16/2023:  RBC osmotic fragility normal  -04/03/2023:   Hemoglobin electrophoresis:  No abnormal hemoglobin or beta thalassemia  -hospitalized [facility] 04/07/2023-04/08/2020: Symptomatic anemia.  Transfused PRBC x1 unit.  Iron deficient.  Also found to be B12 deficient.  -Ferrlecit 125 mg IV x2 (02/20/2023, 04/08/2023  -history of multiple with Dr. Keyon Hatch, GI, EGD and colonoscopy in the past, with no source of GI bleeding found  -04/03/2023: Ferritin 8.8.  B12 209.  -12/16/2020: EGD:  No esophageal varices) history of liver cirrhosis on ultrasound); 3 diminutive nonbleeding angiodysplastic lesions in the 2nd portion of duodenum  -EGD 11/15/2021:  Lower 3rd esophagitis; gastritis gastric antrum; esophageal hiatal hernia; a single bleeding angiectasia duodenal bulb, treated with monopolar cautery successfully (thermotherapy) (pathology:  No metaplasia, no H pylori, no malignancy)  -colonoscopy 09/22/2021 (indication: Occult blood in stool; personal history of colon polyps): Normal   -EGD and colonoscopy 2022: Esophageal dilation, no bleeding, colon polyp  -05/22/2023: Tells me that she has been taking an iron pill every other day for at least 2-3 years; no GI side effects; transfused around Easter time as well as a couple of years ago.  No active bleeding.  Generalized fatigue and tiredness, chronic.      Vitamin B12 deficiency:  -04/03/2023: B12 level 209  -05/22/2023: Tells me that she has been taking 2 vitamin B12 pills for last 1 month.      History hepatitis-C and liver cirrhosis:  -treated with Epclusa in 2020   -Posttreatment viral load undetectable  -FibroScan F 1 (no liver cirrhosis)  -ultrasound 09/09/2020: Liver cirrhosis     -EGD 12/16/2020:  No esophageal varices      Plan:   Chronic iron-deficiency anemia  Ferritin level has been low as far back as 2020  Hemoglobin was 11.6 on 10/28/2022, subsequently, has been declining (range 7-9)  No hemolysis  Celiac serology negative  History of liver cirrhosis (noted on ultrasound) with underlying history  hepatitis-C  No esophageal varices on EGD 12/16/2020    History of angiodysplastic lesions in duodenum:  -EGD 12/16/2020:  3 diminutive nonbleeding angiodysplastic lesions 2nd portion of duodenum  -EGD 11/15/2021: A single bleeding angiectasia duodenal bulb, treated with monopolar cautery successfully  This might as well be the source of chronic iron-deficiency anemia; managed re-evaluation  Colonoscopy 09/22/2021: Normal  -colonoscopy 2022: Colon polyp  -05/22/2023: Tells me that she has been taking an iron pill every other day for at least 2-3 years; no GI side effects; transfused around Easter time as well as a couple of years ago.  No active bleeding.  Generalized fatigue and tiredness, chronic.  >>>  At this time, check CBC, serum iron, TIBC, and ferritin to assess response to oral iron therapy, if any   Remains iron deficient despite chronic oral iron therapy  Will proceed with Feraheme 510 mg IV x2, administered a week apart  Assess response with repeat CBC and iron studies 6 weeks later    Found to be B12 deficient on labs on 04/03/2023   -05/22/2023: Tells me that she has been taking 2 vitamin B12 pills for last 1 month  >>>  Repeat B12 level at this time to check for adequacy of internal iron absorption   Check intrinsic factor antibody as well    PCP at [facility] is arranging for capsule endoscopy by GI for further evaluation of iron-deficiency anemia.    From Hematology perspective, we will monitor iron and B12 deficiency anemia, and intervene to replete any deficiency as and when necessary    Follow-up in 1 week, with labs and discussion of plan in terms of parenteral iron therapy.    Above discussed at length with the patient.  All questions answered.    Discussed labs and gave her copies of relevant reports.    Plan of management discussed.    She understands and agrees with this plan.    Follow-up:  No follow-ups on file.

## 2023-05-22 ENCOUNTER — OFFICE VISIT (OUTPATIENT)
Dept: HEMATOLOGY/ONCOLOGY | Facility: CLINIC | Age: 58
End: 2023-05-22
Attending: INTERNAL MEDICINE
Payer: MEDICAID

## 2023-05-22 ENCOUNTER — TELEPHONE (OUTPATIENT)
Dept: HEMATOLOGY/ONCOLOGY | Facility: CLINIC | Age: 58
End: 2023-05-22

## 2023-05-22 ENCOUNTER — OFFICE VISIT (OUTPATIENT)
Dept: INTERNAL MEDICINE | Facility: CLINIC | Age: 58
End: 2023-05-22
Payer: MEDICAID

## 2023-05-22 VITALS
BODY MASS INDEX: 28.54 KG/M2 | RESPIRATION RATE: 18 BRPM | TEMPERATURE: 98 F | WEIGHT: 145.38 LBS | HEIGHT: 60 IN | SYSTOLIC BLOOD PRESSURE: 106 MMHG | DIASTOLIC BLOOD PRESSURE: 67 MMHG | HEART RATE: 63 BPM | OXYGEN SATURATION: 100 %

## 2023-05-22 VITALS
HEIGHT: 60 IN | BODY MASS INDEX: 28.47 KG/M2 | WEIGHT: 145 LBS | OXYGEN SATURATION: 99 % | DIASTOLIC BLOOD PRESSURE: 86 MMHG | RESPIRATION RATE: 20 BRPM | TEMPERATURE: 98 F | HEART RATE: 68 BPM | SYSTOLIC BLOOD PRESSURE: 143 MMHG

## 2023-05-22 DIAGNOSIS — D50.0 IRON DEFICIENCY ANEMIA DUE TO CHRONIC BLOOD LOSS: ICD-10-CM

## 2023-05-22 DIAGNOSIS — K31.819 ANGIODYSPLASIA OF DUODENUM: ICD-10-CM

## 2023-05-22 DIAGNOSIS — D64.9 ANEMIA, UNSPECIFIED TYPE: ICD-10-CM

## 2023-05-22 DIAGNOSIS — D62 ACUTE BLOOD LOSS ANEMIA: ICD-10-CM

## 2023-05-22 DIAGNOSIS — B18.2 CHRONIC HEPATITIS C WITHOUT HEPATIC COMA: ICD-10-CM

## 2023-05-22 DIAGNOSIS — E53.8 VITAMIN B12 DEFICIENCY: ICD-10-CM

## 2023-05-22 DIAGNOSIS — M25.50 ARTHRALGIA, UNSPECIFIED JOINT: ICD-10-CM

## 2023-05-22 DIAGNOSIS — I10 HYPERTENSION, UNSPECIFIED TYPE: Primary | ICD-10-CM

## 2023-05-22 DIAGNOSIS — D50.0 IRON DEFICIENCY ANEMIA DUE TO CHRONIC BLOOD LOSS: Primary | ICD-10-CM

## 2023-05-22 DIAGNOSIS — K21.9 GASTROESOPHAGEAL REFLUX DISEASE, UNSPECIFIED WHETHER ESOPHAGITIS PRESENT: ICD-10-CM

## 2023-05-22 DIAGNOSIS — Z12.31 ENCOUNTER FOR SCREENING MAMMOGRAM FOR MALIGNANT NEOPLASM OF BREAST: ICD-10-CM

## 2023-05-22 DIAGNOSIS — E53.8 VITAMIN B12 DEFICIENCY: Primary | ICD-10-CM

## 2023-05-22 LAB
(HCYS)2 SERPL-MCNC: 4.6 UMOL/L (ref 5.1–15.4)
ALBUMIN SERPL-MCNC: 3.8 G/DL (ref 3.5–5)
ALBUMIN/GLOB SERPL: 1 RATIO (ref 1.1–2)
ALP SERPL-CCNC: 66 UNIT/L (ref 40–150)
ALT SERPL-CCNC: 7 UNIT/L (ref 0–55)
AST SERPL-CCNC: 14 UNIT/L (ref 5–34)
BASOPHILS # BLD AUTO: 0.04 X10(3)/MCL
BASOPHILS NFR BLD AUTO: 0.6 %
BILIRUBIN DIRECT+TOT PNL SERPL-MCNC: 0.3 MG/DL
BUN SERPL-MCNC: 10.6 MG/DL (ref 9.8–20.1)
CALCIUM SERPL-MCNC: 9.5 MG/DL (ref 8.4–10.2)
CHLORIDE SERPL-SCNC: 104 MMOL/L (ref 98–107)
CO2 SERPL-SCNC: 26 MMOL/L (ref 22–29)
CREAT SERPL-MCNC: 0.71 MG/DL (ref 0.55–1.02)
EOSINOPHIL # BLD AUTO: 0.29 X10(3)/MCL (ref 0–0.9)
EOSINOPHIL NFR BLD AUTO: 4.4 %
ERYTHROCYTE [DISTWIDTH] IN BLOOD BY AUTOMATED COUNT: 16.4 % (ref 11.5–17)
FERRITIN SERPL-MCNC: 17.85 NG/ML (ref 4.63–204)
GFR SERPLBLD CREATININE-BSD FMLA CKD-EPI: >60 MLS/MIN/1.73/M2
GLOBULIN SER-MCNC: 3.7 GM/DL (ref 2.4–3.5)
GLUCOSE SERPL-MCNC: 117 MG/DL (ref 74–100)
HCT VFR BLD AUTO: 31.1 % (ref 37–47)
HGB BLD-MCNC: 8.9 G/DL (ref 12–16)
IMM GRANULOCYTES # BLD AUTO: 0.02 X10(3)/MCL (ref 0–0.04)
IMM GRANULOCYTES NFR BLD AUTO: 0.3 %
IRON SATN MFR SERPL: 4 % (ref 20–50)
IRON SERPL-MCNC: 14 UG/DL (ref 50–170)
LYMPHOCYTES # BLD AUTO: 1.34 X10(3)/MCL (ref 0.6–4.6)
LYMPHOCYTES NFR BLD AUTO: 20.5 %
MAGNESIUM SERPL-MCNC: 1.9 MG/DL (ref 1.6–2.6)
MCH RBC QN AUTO: 24.7 PG (ref 27–31)
MCHC RBC AUTO-ENTMCNC: 28.6 G/DL (ref 33–36)
MCV RBC AUTO: 86.4 FL (ref 80–94)
MONOCYTES # BLD AUTO: 0.52 X10(3)/MCL (ref 0.1–1.3)
MONOCYTES NFR BLD AUTO: 8 %
NEUTROPHILS # BLD AUTO: 4.32 X10(3)/MCL (ref 2.1–9.2)
NEUTROPHILS NFR BLD AUTO: 66.2 %
NRBC BLD AUTO-RTO: 0 %
PLATELET # BLD AUTO: 259 X10(3)/MCL (ref 130–400)
PMV BLD AUTO: 11 FL (ref 7.4–10.4)
POTASSIUM SERPL-SCNC: 4 MMOL/L (ref 3.5–5.1)
PROT SERPL-MCNC: 7.5 GM/DL (ref 6.4–8.3)
RBC # BLD AUTO: 3.6 X10(6)/MCL (ref 4.2–5.4)
SODIUM SERPL-SCNC: 139 MMOL/L (ref 136–145)
TIBC SERPL-MCNC: 324 UG/DL (ref 70–310)
TIBC SERPL-MCNC: 338 UG/DL (ref 250–450)
TRANSFERRIN SERPL-MCNC: 294 MG/DL (ref 180–382)
VIT B12 SERPL-MCNC: 521 PG/ML (ref 213–816)
WBC # SPEC AUTO: 6.53 X10(3)/MCL (ref 4.5–11.5)

## 2023-05-22 PROCEDURE — 1160F PR REVIEW ALL MEDS BY PRESCRIBER/CLIN PHARMACIST DOCUMENTED: ICD-10-PCS | Mod: CPTII,,, | Performed by: INTERNAL MEDICINE

## 2023-05-22 PROCEDURE — 85025 COMPLETE CBC W/AUTO DIFF WBC: CPT | Performed by: INTERNAL MEDICINE

## 2023-05-22 PROCEDURE — 83090 ASSAY OF HOMOCYSTEINE: CPT | Performed by: INTERNAL MEDICINE

## 2023-05-22 PROCEDURE — 3077F PR MOST RECENT SYSTOLIC BLOOD PRESSURE >= 140 MM HG: ICD-10-PCS | Mod: CPTII,,, | Performed by: INTERNAL MEDICINE

## 2023-05-22 PROCEDURE — 83921 ORGANIC ACID SINGLE QUANT: CPT | Mod: 90 | Performed by: INTERNAL MEDICINE

## 2023-05-22 PROCEDURE — 4010F ACE/ARB THERAPY RXD/TAKEN: CPT | Mod: CPTII,,, | Performed by: INTERNAL MEDICINE

## 2023-05-22 PROCEDURE — 99215 OFFICE O/P EST HI 40 MIN: CPT | Mod: PBBFAC,27

## 2023-05-22 PROCEDURE — 3008F BODY MASS INDEX DOCD: CPT | Mod: CPTII,,, | Performed by: INTERNAL MEDICINE

## 2023-05-22 PROCEDURE — 99204 OFFICE O/P NEW MOD 45 MIN: CPT | Mod: S$PBB,,, | Performed by: INTERNAL MEDICINE

## 2023-05-22 PROCEDURE — 83735 ASSAY OF MAGNESIUM: CPT | Performed by: INTERNAL MEDICINE

## 2023-05-22 PROCEDURE — 83550 IRON BINDING TEST: CPT | Performed by: INTERNAL MEDICINE

## 2023-05-22 PROCEDURE — 99204 PR OFFICE/OUTPT VISIT, NEW, LEVL IV, 45-59 MIN: ICD-10-PCS | Mod: S$PBB,,, | Performed by: INTERNAL MEDICINE

## 2023-05-22 PROCEDURE — 1159F MED LIST DOCD IN RCRD: CPT | Mod: CPTII,,, | Performed by: INTERNAL MEDICINE

## 2023-05-22 PROCEDURE — 82728 ASSAY OF FERRITIN: CPT | Performed by: INTERNAL MEDICINE

## 2023-05-22 PROCEDURE — 86340 INTRINSIC FACTOR ANTIBODY: CPT | Mod: 90 | Performed by: INTERNAL MEDICINE

## 2023-05-22 PROCEDURE — 3079F PR MOST RECENT DIASTOLIC BLOOD PRESSURE 80-89 MM HG: ICD-10-PCS | Mod: CPTII,,, | Performed by: INTERNAL MEDICINE

## 2023-05-22 PROCEDURE — 82607 VITAMIN B-12: CPT | Performed by: INTERNAL MEDICINE

## 2023-05-22 PROCEDURE — 4010F PR ACE/ARB THEARPY RXD/TAKEN: ICD-10-PCS | Mod: CPTII,,, | Performed by: INTERNAL MEDICINE

## 2023-05-22 PROCEDURE — 80053 COMPREHEN METABOLIC PANEL: CPT | Performed by: INTERNAL MEDICINE

## 2023-05-22 PROCEDURE — 36415 COLL VENOUS BLD VENIPUNCTURE: CPT | Performed by: INTERNAL MEDICINE

## 2023-05-22 PROCEDURE — 1160F RVW MEDS BY RX/DR IN RCRD: CPT | Mod: CPTII,,, | Performed by: INTERNAL MEDICINE

## 2023-05-22 PROCEDURE — 3008F PR BODY MASS INDEX (BMI) DOCUMENTED: ICD-10-PCS | Mod: CPTII,,, | Performed by: INTERNAL MEDICINE

## 2023-05-22 PROCEDURE — 3077F SYST BP >= 140 MM HG: CPT | Mod: CPTII,,, | Performed by: INTERNAL MEDICINE

## 2023-05-22 PROCEDURE — 99215 OFFICE O/P EST HI 40 MIN: CPT | Mod: PBBFAC | Performed by: INTERNAL MEDICINE

## 2023-05-22 PROCEDURE — 1159F PR MEDICATION LIST DOCUMENTED IN MEDICAL RECORD: ICD-10-PCS | Mod: CPTII,,, | Performed by: INTERNAL MEDICINE

## 2023-05-22 PROCEDURE — 3079F DIAST BP 80-89 MM HG: CPT | Mod: CPTII,,, | Performed by: INTERNAL MEDICINE

## 2023-05-22 RX ORDER — LISINOPRIL 10 MG/1
10 TABLET ORAL DAILY
Qty: 90 TABLET | Refills: 3
Start: 2023-05-22 | End: 2024-01-03 | Stop reason: SDUPTHER

## 2023-05-22 RX ORDER — FERROUS GLUCONATE 324(38)MG
1 TABLET ORAL EVERY OTHER DAY
Qty: 90 TABLET | Refills: 1 | Status: SHIPPED | OUTPATIENT
Start: 2023-05-22

## 2023-05-22 RX ORDER — HEPARIN 100 UNIT/ML
500 SYRINGE INTRAVENOUS
Status: CANCELLED | OUTPATIENT
Start: 2023-05-30

## 2023-05-22 RX ORDER — FOLIC ACID 1 MG/1
1 TABLET ORAL DAILY
Qty: 90 TABLET | Refills: 1 | Status: SHIPPED | OUTPATIENT
Start: 2023-05-22 | End: 2024-05-21

## 2023-05-22 RX ORDER — PREGABALIN 25 MG/1
25 CAPSULE ORAL 2 TIMES DAILY
Qty: 120 CAPSULE | Refills: 1 | Status: SHIPPED | OUTPATIENT
Start: 2023-05-22 | End: 2023-11-20

## 2023-05-22 RX ORDER — PANTOPRAZOLE SODIUM 40 MG/1
40 TABLET, DELAYED RELEASE ORAL DAILY
Qty: 90 TABLET | Refills: 1 | Status: SHIPPED | OUTPATIENT
Start: 2023-05-22 | End: 2023-12-07

## 2023-05-22 RX ORDER — SODIUM CHLORIDE 0.9 % (FLUSH) 0.9 %
10 SYRINGE (ML) INJECTION
Status: CANCELLED | OUTPATIENT
Start: 2023-05-30

## 2023-05-22 RX ORDER — AMLODIPINE BESYLATE 10 MG/1
10 TABLET ORAL DAILY
Qty: 90 TABLET | Refills: 1 | Status: SHIPPED | OUTPATIENT
Start: 2023-05-22 | End: 2024-01-03 | Stop reason: SDUPTHER

## 2023-05-22 RX ORDER — METHYLPREDNISOLONE SOD SUCC 125 MG
125 VIAL (EA) INJECTION ONCE AS NEEDED
Status: CANCELLED | OUTPATIENT
Start: 2023-05-30

## 2023-05-22 RX ORDER — SODIUM FERRIC GLUCONATE COMPLEX IN SUCROSE 12.5 MG/ML
125 INJECTION INTRAVENOUS
Status: SHIPPED | OUTPATIENT
Start: 2023-05-22

## 2023-05-22 RX ORDER — EPINEPHRINE 0.3 MG/.3ML
0.3 INJECTION SUBCUTANEOUS ONCE AS NEEDED
Status: CANCELLED | OUTPATIENT
Start: 2023-05-30

## 2023-05-22 RX ORDER — LANOLIN ALCOHOL/MO/W.PET/CERES
1000 CREAM (GRAM) TOPICAL DAILY
Qty: 180 TABLET | Refills: 1 | Status: SHIPPED | OUTPATIENT
Start: 2023-05-22

## 2023-05-22 RX ORDER — KETOROLAC TROMETHAMINE 10 MG/1
10 TABLET, FILM COATED ORAL DAILY PRN
Qty: 7 TABLET | Refills: 0 | Status: SHIPPED | OUTPATIENT
Start: 2023-05-22 | End: 2023-05-27

## 2023-05-22 RX ORDER — DIPHENHYDRAMINE HYDROCHLORIDE 50 MG/ML
50 INJECTION INTRAMUSCULAR; INTRAVENOUS ONCE AS NEEDED
Status: CANCELLED | OUTPATIENT
Start: 2023-05-30

## 2023-05-22 RX ORDER — HYDROCODONE BITARTRATE AND ACETAMINOPHEN 5; 325 MG/1; MG/1
1 TABLET ORAL EVERY 8 HOURS PRN
Qty: 15 TABLET | Refills: 0 | Status: SHIPPED | OUTPATIENT
Start: 2023-05-22 | End: 2023-06-29 | Stop reason: SDUPTHER

## 2023-05-22 RX ORDER — ATORVASTATIN CALCIUM 40 MG/1
40 TABLET, FILM COATED ORAL DAILY
Qty: 90 TABLET | Refills: 1 | Status: SHIPPED | OUTPATIENT
Start: 2023-05-22 | End: 2023-12-07

## 2023-05-22 NOTE — PROGRESS NOTES
I have reviewed the notes, assessments, and management plan performed by resident, I concur with the documentation of Sandra Burns.

## 2023-05-22 NOTE — PROGRESS NOTES
[facility]  CLINIC NOTE    Patient Name: Sandra Burns  YOB: 1965    PRESENTING HISTORY       History of Present Illness:  Ms. Sandra Burns is a 57 y.o. female w/ an active medical problem list including Bipolar Disorder, Depression, Chronic pain, GERD, HCV, HBV, HTN. She is presnting today to [facility] for follow up appointment. She had recent hospital stay for symptomatic anemia.     Ms. Burns was hospitalized for symptomatic anemia, combination of iron-deficiency and B12 deficiency. She has a history of GI bleeds in past but had recent EGD and c-scope with Dr. Hatch in [place] which were normal. I spoke with the NP over the phone just prior to the patient's hospitlization and there was consideration for small-capsule endoscopy. Inpatient GI wanted to wait on hematology evaluation prior to performing pill endoscopy. So, she was transfused and discharged on iron, B12, folate with outpatient f/u with hematology (has appointment today).     Today, she mostly complains of her joint pains, saying she has no life b/c of her joint pains. Has pain in her hands, feet, elbows, shoulders. Has history of back pain with associated radiculopathy down legs to her feet and also has history of neck pain with radiculopthy too. She tested positive for RF and MALIKA and her symptoms did improve before with trial of prednisone. Was referred to Dr. Lira last visit but she hasn't heard from them      PSocH  -no alcohol use; smokes 1ppd for 41 years, no drug use    PFH  -ESRD and heart failure      ROS  Constitutional: no fever/chills  EENT: no sore throat, ear pain, sinus pain/congestion, nasal congestion/drainage  Respiratory: no cough, no wheezing, no shortness of breath  Cardiovascular: no chest pain, no palpitations, no edema  Gastrointestinal: as above  Genitourinary: no dysuria, no urinary frequency or urgency, no hematuria  Integumentary: no skin rash or abnormal  lesion  Neurologic: no headache, no dizziness, no weakness or numbness  MSK: as above      MEDICATIONS & ALLERGIES:     Current Outpatient Medications on File Prior to Visit   Medication Sig    ALPRAZolam (XANAX) 0.5 MG tablet Take 0.5 mg by mouth 2 (two) times daily as needed for Anxiety (anxiety).    buPROPion (WELLBUTRIN SR) 150 MG TBSR 12 hr tablet Take 150 mg by mouth every morning.    carvediloL (COREG) 6.25 MG tablet Take 1 tablet (6.25 mg total) by mouth 2 (two) times daily with meals.    clopidogreL (PLAVIX) 75 mg tablet Take 75 mg by mouth once daily.    DULoxetine (CYMBALTA) 30 MG capsule Take 1 capsule (30 mg total) by mouth every evening.    mirtazapine (REMERON) 15 MG tablet TAKE ONE TABLET BY MOUTH ONCE A DAY AT BEDTIME    nicotine (NICODERM CQ) 21 mg/24 hr Place 1 patch onto the skin once daily.    nitroGLYCERIN (NITROSTAT) 0.4 MG SL tablet Place 1 tablet under the tongue every 5 (five) minutes as needed.    traZODone (DESYREL) 150 MG tablet Take 300 mg by mouth every evening.    [DISCONTINUED] amLODIPine (NORVASC) 10 MG tablet Take 1 tablet (10 mg total) by mouth once daily.    [DISCONTINUED] atorvastatin (LIPITOR) 40 MG tablet Take 1 tablet (40 mg total) by mouth once daily.    [DISCONTINUED] cyanocobalamin (VITAMIN B-12) 1000 MCG tablet Take 2 tablets (2,000 mcg total) by mouth once daily.    [DISCONTINUED] ferrous gluconate (FERGON) 324 MG tablet Take 1 tablet (324 mg total) by mouth every other day.    [DISCONTINUED] folic acid (FOLVITE) 1 MG tablet Take 2 tablets (2 mg total) by mouth once daily.    [DISCONTINUED] gabapentin (NEURONTIN) 300 MG capsule Take 1 capsule (300 mg total) by mouth 3 (three) times daily.    [DISCONTINUED] lisinopriL 10 MG tablet Take 1 tablet (10 mg total) by mouth once daily.    [DISCONTINUED] pantoprazole (PROTONIX) 40 MG tablet Take 1 tablet (40 mg total) by mouth once daily.    aspirin (ECOTRIN) 81 MG EC tablet Take 1 tablet (81 mg total) by mouth once daily.     garlic (GARLIQUE ORAL) Take 1 tablet by mouth Daily.    red yeast rice 600 mg Cap Take 3 capsules by mouth 2 (two) times a day.    [DISCONTINUED] rosuvastatin (CRESTOR) 40 MG Tab Take 40 mg by mouth Daily.     No current facility-administered medications on file prior to visit.       Review of patient's allergies indicates:  No Known Allergies    OBJECTIVE:   Vital Signs:  Vitals:    05/22/23 0730   BP: 106/67   Pulse: 63   Resp: 18   Temp: 98.3 °F (36.8 °C)   SpO2: 100%   Weight: 66 kg (145 lb 6.4 oz)   Height: 5' (1.524 m)       No results found for this or any previous visit (from the past 24 hour(s)).      Physical Exam    General - Appears comfortable, appropriatley conversive   Mental Status - alert and oriented x 3, speaking in logical, relevant sentences   HEENT - no rhinorrhea   Cardiac - RRR, no murmurs, rubs, or gallops; no edema in LE   Respiratory - breathing comfortably; clear to ascultation bilaterally   Abdominal - nondistended, soft, nontender to palpation   Extremities - tender in PIP of left 4'th digit. Some tenderness over a couple MCP's on left hand. Slightly tender over 5th MTP of left foot. Has some swelling near left wrist  Skin - no rashes or bruises seen on skin        ASSESSMENT & PLAN:     Health Maintenance  -Mammogram UTD and normal   -UTD with colonscopy - follows Dr. Hatch  -Immunizations: UTD with tdap, PCV 20. Declining Shingrix    Symptomatic Anemia  -reticulocyte count normal  -folate, B12 levels were low - has been on oral supplements with normalization of levels  -will decrease B12 and folate from 2 tablets daily to 1 tablet daily  -continue iron supplement every-other-day. Discussed with [facility] hematology who will arrange for IV iron infusions.   -I will discuss with Dr. Hatch office regarding small capsule endoscopy. Since she has Medicaid, it would have to be done here at [facility]. Will try to get it scheduled for when GI is available again.  -remainder of anemia workup is  normal    Joint Pains  Seems combined osteoarthritis and rheumatoid arthritis  -labwork is positive for RF and MALIKA. Negative CCP and ds-DNA. Am ordering additional autoimmune labs today  -symptoms improved significantly with trial of prednisone. Will again place rheumatology referral to Dr. Lira.  -I do not want to start steroids given hx GI bleeds and I do not want to start MTX given her current anemia.  -Ordering CT neck for radiculopathy symptoms. Updating XR's of knees and spine  -will give a few doses or prn toradol and norco    BLE Claudication  -Got bilateral lower extremity stents. Is compliant with ASA/Plavix. Denies blood in stools    Osteopenia  -DEXA 2/2023 - FRAX score 0.8% and 8.4% for hip and major osteoporotic fracture. No need for bisphosphonates    CAD  -Scheduled PCI x1 for 90% stenosis in 2013 for stable angina  -Has GRANGER - following [facility]     HCV s/p treatment  -s/p Epclusa treatment 2020; post-treatment viral load undetectable  -Fibroscan F1 so doesn't have cirrhosis  -no longer follows ID clinic since she was cured    Hx positive HBV core antibody  -Hx HBV Core IgG positive with negative surface antigen - suggests prior infection, not active    Tobacco abuse  -has dyspnea - she says she had PFT done at [facility] but I don't seen the records. I will call [facility]  -will discuss nicotine patches in future    RTC in 2 months, acute care slot OK    Akin Gan MD  Internal Medicine PGY-3

## 2023-05-22 NOTE — TELEPHONE ENCOUNTER
Called pt; left voice mail advising Infusion would be calling to schedule appt and to call back if she has questions.            EVITA Nguyen MD; Aracely Mendenhall LPN; ELKE SANTOYO Staff; Kev Coates RN; Lora Cheng MA; 1 other  @[email or web address] - pt just left from appt with Dr. Herndon     6 week lab orders entered     [email or web address] pt will need 6 week lab appt           Previous Messages       ----- Message -----   From: Dawit Herndon MD   Sent: 5/22/2023  10:50 AM CDT   To: Aracely Mendenhall LPN, Yanick SANTOYO Staff     Proceed with parenteral iron therapy with Feraheme x2, administered a week apart   Recheck serum iron, TIBC, ferritin, and CBC in 6 weeks.

## 2023-05-22 NOTE — Clinical Note
Orders for today:  Check CBC, serum iron, TIBC, ferritin  Repeat B12 level at this time  Check intrinsic factor antibody, methylmalonic acid level, homocystine level  Follow-up in 1 week.

## 2023-05-23 LAB
ANNOTATION COMMENT IMP: NORMAL
IF BLOCK AB SER QL: NEGATIVE

## 2023-05-24 LAB — METHYLMALONATE SERPL-SCNC: 0.12 NMOL/ML

## 2023-05-25 DIAGNOSIS — E53.8 VITAMIN B12 DEFICIENCY: Primary | ICD-10-CM

## 2023-05-26 ENCOUNTER — LAB VISIT (OUTPATIENT)
Dept: LAB | Facility: HOSPITAL | Age: 58
End: 2023-05-26
Attending: STUDENT IN AN ORGANIZED HEALTH CARE EDUCATION/TRAINING PROGRAM
Payer: MEDICAID

## 2023-05-26 DIAGNOSIS — M25.50 ARTHRALGIA, UNSPECIFIED JOINT: ICD-10-CM

## 2023-05-26 DIAGNOSIS — D64.9 ANEMIA, UNSPECIFIED TYPE: ICD-10-CM

## 2023-05-26 LAB
CRP SERPL-MCNC: 1 MG/L
ERYTHROCYTE [SEDIMENTATION RATE] IN BLOOD: 77 MM/HR (ref 0–20)
IGA SERPL-MCNC: 230 MG/DL (ref 65–421)

## 2023-05-26 PROCEDURE — 85652 RBC SED RATE AUTOMATED: CPT

## 2023-05-26 PROCEDURE — 82784 ASSAY IGA/IGD/IGG/IGM EACH: CPT

## 2023-05-26 PROCEDURE — 30000890 MAYO GENERIC ORDERABLE

## 2023-05-26 PROCEDURE — 86140 C-REACTIVE PROTEIN: CPT

## 2023-05-26 PROCEDURE — 86235 NUCLEAR ANTIGEN ANTIBODY: CPT | Mod: 90

## 2023-05-26 PROCEDURE — 83516 IMMUNOASSAY NONANTIBODY: CPT

## 2023-05-26 PROCEDURE — 83516 IMMUNOASSAY NONANTIBODY: CPT | Mod: 90

## 2023-05-26 PROCEDURE — 86235 NUCLEAR ANTIGEN ANTIBODY: CPT

## 2023-05-26 PROCEDURE — 85060 BLOOD SMEAR INTERPRETATION: CPT

## 2023-05-26 PROCEDURE — 36415 COLL VENOUS BLD VENIPUNCTURE: CPT

## 2023-05-26 RX ORDER — LANOLIN ALCOHOL/MO/W.PET/CERES
1000 CREAM (GRAM) TOPICAL DAILY
Qty: 180 TABLET | Refills: 1 | OUTPATIENT
Start: 2023-05-26

## 2023-05-27 LAB
CENTROMERE IGG SER-ACNC: <0.2 U
ENA SM IGG SER-ACNC: <0.2 U
HEMATOLOGIST REVIEW: NORMAL

## 2023-05-28 NOTE — PROGRESS NOTES
History:  Past Medical History:   Diagnosis Date    Anemia, unspecified     Coronary artery disease     History of esophagogastroduodenoscopy (EGD) 11/01/2022    Hypertension     PAD (peripheral artery disease)     PVD (peripheral vascular disease) with claudication     Thyroid disease    Past medical history:  B12 deficiency.  Anemia.  Iron-deficiency.  History of peripheral vascular disease, on dual antiplatelets.  Hypothyroidism.  CAD, S/P PCI.  Chronic hepatitis-B.  Chronic hepatitis-C. Depression.  GERD.  Right lower lung lobe nodule.  02/22/2023: Bilateral lower extremity stent placement, with resolution of bilateral lower extremity pain  Social history:  .  Lives with her brother in [place], [place].  No children.  Never became pregnant.  Disabled.  Does not work.  Has been smoking half a pack of cigarettes daily for 45 years.  Has been smoking marijuana every other day for 40 years.  No other illicit drugs.  No alcohol abuse.  Family history:  Negative for cancers or blood dyscrasias.  Health maintenance:  PCP at [facility].  -05/02/2023:  Bilateral screening mammogram (comparison:  12/03/2020 mammogram):  BI-RADS 2 (benign)  -02/24/2022: Stool for occult blood pos  -history of multiple colonoscopies and endoscopies: Angiodysplastic lesions in duodenum  Past Surgical History:   Procedure Laterality Date    EXTRACORPOREAL SHOCK WAVE LITHOTRIPSY  2/22/2023    Procedure: LITHOTRIPSY- Peripheral Shockwave;  Surgeon: Adriel Valero MD;  Location: [facility] CATH LAB;  Service: Cardiology;;    HIP SURGERY      INSERTION, STENT, ARTERY  2/22/2023    Procedure: INSERTION, STENT, ARTERY;  Surgeon: Adriel Valero MD;  Location: [facility] CATH LAB;  Service: Cardiology;;    INTRAVASCULAR ULTRASOUND, NON-CORONARY  2/22/2023    Procedure: Intravascular Ultrasound, Non-Coronary;  Surgeon: Adriel Valero MD;  Location: [facility] CATH LAB;  Service: Cardiology;;    LEFT HEART CATHETERIZATION      PERIPHERAL ANGIOGRAPHY  N/A 2/22/2023    Procedure: Peripheral angiography;  Surgeon: Adriel Valero MD;  Location: [facility] CATH LAB;  Service: Cardiology;  Laterality: N/A;  BILAT ILIAC INTERVENTION      Social History     Socioeconomic History    Marital status: Single   Tobacco Use    Smoking status: Every Day     Packs/day: 0.25     Years: 40.00     Pack years: 10.00     Types: Cigarettes    Smokeless tobacco: Never   Substance and Sexual Activity    Alcohol use: Not Currently    Drug use: Yes     Types: Marijuana    Sexual activity: Not Currently   Social History Narrative    ** Merged History Encounter **           History reviewed. No pertinent family history.     Reason for Follow-up:  Iron-deficiency anemia   Vitamin B12 deficiency   Angiodysplasia of duodenum    History of Present Illness:   Skin Cancer        Oncologic/Hematologic History:  Oncology History    No history exists.   57-year-old female, referred from Internal Medicine, for evaluation of anemia.      Admitted to [facility] 04/07/2023-04/08/2023:  57-year-old female with significant history of HTN, PVD status post intervention, hypothyroidism, CAD status post PCI, right lower lobe lung nodule, bipolar disorder, depression, GERD, chronic hepatitis-B/chronic hepatitis-C and iron deficiency anemia presented to the ED with complaints of fatigue, generalized weakness and outpatient labs revealing anemia.  Patient was referred to the ED for admission to further evaluate anemia.  On Plavix.  Patient had a colonoscopy 2.  Transfused with PRBC x1 unit.  No overt bleeding.  No history of alcohol use.  GI recommended to hold off on endoscopic evaluation.  Hemoccult-pos but numerous endoscopy procedures by Dr. Keyon Hatch, her GI, unrevealing in the past.  Patient on aspirin and Plavix which were continued.  Hemoglobin improved appropriately post PRBC x1 unit.  Iron deficient.  B12 deficient.  04/03/2023: Outside blood work showed ferritin 8.8.  B12 209.    History of  iron-deficiency anemia.  History of B12 deficiency.  Admitted to [facility] 04/07/2023 with fatigue, weakness, dizziness.  Outside labs had shown anemia.  History of multiple endoscopies with Dr. Keyon Hatch, GI, with no source of GI bleed found.  04/03/2023: Blood work showed iron-deficiency, ferritin 8.8, B12 level 209.    History of duodenal angiodysplasias a couple of years ago    Blood transfusion in the past   EGD 11/2021: Hiatal hernia, GE ring, nonbleeding angioectasias   Colonoscopy 09/2021: Normal   EGD and colonoscopy 2022: Esophageal dilation, no bleeding, colon polyp    History of hepatitis-C: S/P treatment; S/P occlusive 2020; posttreatment viral load undetectable; FibroScan F1 (no liver cirrhosis)    09/09/2020: Limited abdominal ultrasound (comparison:  05/07/2013): Liver cirrhosis; no focal hepatic lesion    -12/16/2020: EGD (liver cirrhosis, rule out esophageal varices):  3 diminutive nonbleeding angiodysplastic lesions in the 2nd portion of duodenum  -09/22/2021:  Colonoscopy (indication:  Occult blood in stool; personal history of colon polyps): Normal mucosa in whole:  -11/15/2021: EGD (Keyon Hatch MD) (indication:  Dysphagia, oropharyngeal, nausea, heartburn, abdominal bloating, GI metaplasia, generalized abdominal pain):  Erythema lower 3rd of esophagus compatible with esophagitis; erythema antrum, compatible with gastritis; esophageal hiatal hernia; a single bleeding angiectasia seen in duodenal bulb, treated with monopolar cautery successfully (thermotherapy) (pathology:  Gastric antrum biopsy, reactive gastropathy with mild nonspecific chronic inflammation, negative for intestinal metaplasia; negative for H pylori; gastric body biopsy, reactive gastropathy, mild nonspecific chronic inflammation, negative for intestinal metaplasia, negative for H pylori)    Labs reviewed:  Hemoglobin: 8.1 (05/16/2023); 9.0 (04/08/2023); 7.5 (04/07/2023); 6.8 (04/03/2023); 8.0 (03/13/2023); 11.6  (10/28/2022); 13.2 (10/03/2022), etc.   MCV: Consistently normal with mild macrocytosis on a couple of occasions  Rest of CBC unremarkable  05/16/2023: Serum iron 50.  TIBC 323.  Ferritin 16.8.  Transferrin saturation 15%.  B12 level 705.  Folate 38.8.    04/03/2023: Serum iron 8.  TIBC 344.  Ferritin 8.8.  Transferrin saturation 2%.  B12 level 209, low.  Folate 8.7   10/11/2022: Serum iron 69.  TIBC 292.  Ferritin 27.13.  Transferrin saturation 24%.  09/09/2020:  Ferritin 18.8, hemoglobin 9.8, MCV 93.7  Haptoglobin normal  04/03/2023:  Hemoglobin 6.8.  Retic count 1.89%.  LDH normal.  05/17/2023: Celiac serology negative    04/08/2023:  Hepatitis-C antibody pos (treated in the past with Epclusa, with a negative hepatitis-A viral load subsequently   09/09/2020:  Hepatitis-B core antibody total reactive.  Hepatitis-B surface antibody negative.  Hepatitis-B surface antigen negative.  HIV negative    05/16/2023:  RBC osmotic fragility normal  04/03/2023:  Hemoglobin electrophoresis:  No abnormal hemoglobin or beta thalassemia    05/22/2023:  Pleasant lady.  Presents for initial hematology consultation.  In no acute discomfort.    Always feels tired.  No abdominal pain, nausea or vomiting.  Denies hematemesis, melena, or hematochezia.  Fair appetite.  Main complaint is chronic tiredness.  Has been taking iron pill every other day for last 2 years.  No GI side effects.  Started vitamin B12 pill x2 daily, a month ago.  We will check her iron stores and B12 level at this time.  Says that she was transfused around Easter time and a couple of years ago as well.  No history of cancers.  Chronic generalized fatigue, swelling in the legs, cough, and numbness and tingling in hands.  Generalized body pains, 9/10, which she attributes to rheumatoid arthritis.    Interval History:  FERAHEME (FERUMOXYTOL) TWO DOSES   [No matching plan found]     05/29/2023:   -05/22/2023:  Hemoglobin 8.9.  MCV 86.4.  Ferritin 17.85.  Transferrin  saturation 4%.  Vitamin B12 level 521.  Intrinsic factor antibody negative.  Homocystine level 4.6 micromoles per L, suppressed (patient already on oral B12 supplementation).  Methylmalonic acid level 0.12 nonmobile/ml, normal (patient already on oral vitamin B12 supplementation, with adequate absorption)  Presents for follow-up visit.  Continues to experience weakness, fatigue, swelling in the legs, trouble swallowing, and numbness and tingling in hands and feet.  Pain in knees and all over body, 10/10 in severity.  No abdominal pain, nausea, vomiting, hematemesis, melena, or hematochezia.  Source of iron-deficiency/bleeding is still elusive although she is known to have angiodysplasia of duodenum.  Capsule endoscopy is pending.  Has failed oral iron therapy.  Compliant with vitamin B12 pills.      Medications:  Current Outpatient Medications on File Prior to Visit   Medication Sig Dispense Refill    ALPRAZolam (XANAX) 0.5 MG tablet Take 0.5 mg by mouth 2 (two) times daily as needed for Anxiety (anxiety).      amLODIPine (NORVASC) 10 MG tablet Take 1 tablet (10 mg total) by mouth once daily. 90 tablet 1    aspirin (ECOTRIN) 81 MG EC tablet Take 1 tablet (81 mg total) by mouth once daily. 90 tablet 3    atorvastatin (LIPITOR) 40 MG tablet Take 1 tablet (40 mg total) by mouth once daily. 90 tablet 1    buPROPion (WELLBUTRIN SR) 150 MG TBSR 12 hr tablet Take 150 mg by mouth every morning.      carvediloL (COREG) 6.25 MG tablet Take 1 tablet (6.25 mg total) by mouth 2 (two) times daily with meals. 90 tablet 3    clopidogreL (PLAVIX) 75 mg tablet Take 75 mg by mouth once daily.      cyanocobalamin (VITAMIN B-12) 1000 MCG tablet Take 1 tablet (1,000 mcg total) by mouth once daily. 180 tablet 1    DULoxetine (CYMBALTA) 30 MG capsule Take 1 capsule (30 mg total) by mouth every evening. 90 capsule 1    ferrous gluconate (FERGON) 324 MG tablet Take 1 tablet (324 mg total) by mouth every other day. 90 tablet 1    folic acid  (FOLVITE) 1 MG tablet Take 1 tablet (1 mg total) by mouth once daily. 90 tablet 1    gabapentin (NEURONTIN) 400 MG capsule Take 400 mg by mouth 3 (three) times daily.      garlic (GARLIQUE ORAL) Take 1 tablet by mouth Daily.      HYDROcodone-acetaminophen (NORCO) 5-325 mg per tablet Take 1 tablet by mouth every 8 (eight) hours as needed for Pain. 15 tablet 0    lisinopriL 10 MG tablet Take 1 tablet (10 mg total) by mouth once daily. 90 tablet 3    mirtazapine (REMERON) 15 MG tablet TAKE ONE TABLET BY MOUTH ONCE A DAY AT BEDTIME      nicotine (NICODERM CQ) 21 mg/24 hr Place 1 patch onto the skin once daily. 60 patch 0    nitroGLYCERIN (NITROSTAT) 0.4 MG SL tablet Place 1 tablet under the tongue every 5 (five) minutes as needed.      pantoprazole (PROTONIX) 40 MG tablet Take 1 tablet (40 mg total) by mouth once daily. 90 tablet 1    pregabalin (LYRICA) 25 MG capsule Take 1 capsule (25 mg total) by mouth 2 (two) times daily. 120 capsule 1    red yeast rice 600 mg Cap Take 3 capsules by mouth 2 (two) times a day.      traZODone (DESYREL) 150 MG tablet Take 300 mg by mouth every evening.       Current Facility-Administered Medications on File Prior to Visit   Medication Dose Route Frequency Provider Last Rate Last Admin    ferric gluconate 62.5 mg/5 mL injection 125 mg  125 mg Intravenous 1 time in Clinic/HOD Akin Gan MD           Review of Systems:   All systems reviewed and found to be negative except for the symptoms detailed above    Physical Examination:   VITAL SIGNS:   Vitals:    05/29/23 1329   BP: 125/83   Pulse: 92   Resp: 20   Temp: 98.1 °F (36.7 °C)       GENERAL:  In no apparent distress.    HEAD:  No signs of head trauma.  EYES:  Pupils are equal.  Extraocular motions intact.    EARS:  Hearing grossly intact.  MOUTH:  Oropharynx is normal.   NECK:  No adenopathy, no JVD.     CHEST:  Chest with clear breath sounds bilaterally.  No wheezes, rales, rhonchi.    CARDIAC:  Regular rate and rhythm.   S1 and S2, without murmurs, gallops, rubs.  VASCULAR:  No Edema.  Peripheral pulses normal and equal in all extremities.  ABDOMEN:  Soft, without detectable tenderness.  No sign of distention.  No   rebound or guarding, and no masses palpated.   Bowel Sounds normal.  MUSCULOSKELETAL:  Good range of motion of all major joints. Extremities without clubbing, cyanosis or edema.    NEUROLOGIC EXAM:  Alert and oriented x 3.  No focal sensory or strength deficits.   Speech normal.  Follows commands.  PSYCHIATRIC:  Mood normal.    No results for input(s): CBC in the last 72 hours.   No results for input(s): CMP in the last 72 hours.     Assessment:  Problem List Items Addressed This Visit          Oncology    Iron deficiency anemia due to chronic blood loss - Primary       Endocrine    Vitamin B12 deficiency       GI    Hepatitis C virus infection    Angiodysplasia of duodenum     Iron-deficiency anemia, B12 deficiency anemia:  (history of duodenal angiodysplasia):   -Hemoglobin: 8.1 (05/16/2023); 9.0 (04/08/2023); 7.5 (04/07/2023); 6.8 (04/03/2023); 8.0 (03/13/2023); 11.6 (10/28/2022); 13.2 (10/03/2022), etc.   -MCV: Consistently normal with mild macrocytosis on a couple of occasions  -Rest of CBC unremarkable  -ferritin:  16.8 (05/16/2023); 8.8 (04/03/2023); 27.13 (10/11/2022); 18.8 (09/09/2020)  -Haptoglobin normal  -04/03/2023:  Hemoglobin 6.8.  Retic count 1.89%.  LDH normal.  Hospitalized 04/07/2023, transfused with PRBC x1 unit  -05/17/2023: Celiac serology negative  -05/16/2023:  RBC osmotic fragility normal  -04/03/2023:  Hemoglobin electrophoresis:  No abnormal hemoglobin or beta thalassemia  -hospitalized [facility] 04/07/2023-04/08/2020: Symptomatic anemia.  Transfused PRBC x1 unit.  Iron deficient.  Also found to be B12 deficient.  -Ferrlecit 125 mg IV x2 (02/20/2023, 04/08/2023  -history of multiple with Dr. Keyon Hatch, GI, EGD and colonoscopy in the past, with no source of GI bleeding found  -04/03/2023:  Ferritin 8.8.  B12 209.  -12/16/2020: EGD:  No esophageal varices) history of liver cirrhosis on ultrasound); 3 diminutive nonbleeding angiodysplastic lesions in the 2nd portion of duodenum  -EGD 11/15/2021:  Lower 3rd esophagitis; gastritis gastric antrum; esophageal hiatal hernia; a single bleeding angiectasia duodenal bulb, treated with monopolar cautery successfully (thermotherapy) (pathology:  No metaplasia, no H pylori, no malignancy)  -colonoscopy 09/22/2021 (indication: Occult blood in stool; personal history of colon polyps): Normal   -EGD and colonoscopy 2022: Esophageal dilation, no bleeding, colon polyp  -05/22/2023: Tells me that she has been taking an iron pill every other day for at least 2-3 years; no GI side effects; transfused around Easter time as well as a couple of years ago.  No active bleeding.  Generalized fatigue and tiredness, chronic.  -05/22/2023:  Hemoglobin 8.9.  MCV 86.4.  Ferritin 17.85.  Transferrin saturation 4%.  Vitamin B12 level 521.  Intrinsic factor antibody negative.  Homocystine level 4.6 micromoles per L, suppressed (patient already on oral B12 supplementation).  Methylmalonic acid level 0.12 nonmobile/ml, normal (patient already on oral vitamin B12 supplementation, with adequate absorption)  (As of 05/22/2023, persistent iron-deficiency anemia, hemoglobin 8.9, ferritin 17.65, despite taking iron pill every other day for last 2-3 years)      Vitamin B12 deficiency:  -diagnosed 04/03/2023: B12 level 209  -05/22/2023: Tells me that she has been taking 2 vitamin B12 pills for last 1 month.  -05/22/2023:  Hemoglobin 8.9.  MCV 86.4.  Ferritin 17.85.  Transferrin saturation 4%.  Vitamin B12 level 521.  Intrinsic factor antibody negative.  Homocystine level 4.6 micromoles per L, suppressed (patient already on oral B12 supplementation).  Methylmalonic acid level 0.12 nonmobile/ml, normal (patient already on oral vitamin B12 supplementation, with adequate absorption)  (intrinsic  factor antibody negative; absorbing vitamin B12 satisfactorily via oral route)      History hepatitis-C and liver cirrhosis:  -treated with Epclusa in 2020   -Posttreatment viral load undetectable  -FibroScan F 1 (no liver cirrhosis)  -ultrasound 09/09/2020: Liver cirrhosis     -EGD 12/16/2020:  No esophageal varices      Plan:   Chronic iron-deficiency anemia  Ferritin level has been low as far back as 2020  Hemoglobin was 11.6 on 10/28/2022, subsequently, has been declining (range 7-9)  No hemolysis  Celiac serology negative  History of liver cirrhosis (noted on ultrasound) with underlying history hepatitis-C  No esophageal varices on EGD 12/16/2020    History of angiodysplastic lesions in duodenum:  -EGD 12/16/2020:  3 diminutive nonbleeding angiodysplastic lesions 2nd portion of duodenum  -EGD 11/15/2021: A single bleeding angiectasia duodenal bulb, treated with monopolar cautery successfully  This might as well be the source of chronic iron-deficiency anemia; managed re-evaluation  Colonoscopy 09/22/2021: Normal  -colonoscopy 2022: Colon polyp  -05/22/2023: Tells me that she has been taking an iron pill every other day for at least 2-3 years; no GI side effects; transfused around Easter time as well as a couple of years ago.  No active bleeding.  Generalized fatigue and tiredness, chronic.  (As of 05/22/2023, persistent iron-deficiency anemia, hemoglobin 8.9, ferritin 17.65, despite taking iron pill every other day for last 2-3 years)  >>>  Remains iron deficient despite chronic oral iron therapy  Will proceed with Feraheme 510 mg IV x2, administered a week apart  Assess response with repeat CBC and iron studies 6 weeks later    Found to be B12 deficient on labs on 04/03/2023   -05/22/2023: Tells me that she has been taking 2 vitamin B12 pills for last 1 month  -05/22/2023:  Hemoglobin 8.9.  MCV 86.4.  Ferritin 17.85.  Transferrin saturation 4%.  Vitamin B12 level 521.  Intrinsic factor antibody negative.   Homocystine level 4.6 micromoles per L, suppressed (patient already on oral B12 supplementation).  Methylmalonic acid level 0.12 nonmobile/ml, normal (patient already on oral vitamin B12 supplementation, with adequate absorption)  (intrinsic factor antibody negative; absorbing vitamin B12 satisfactorily via oral route)  >>>  Continue vitamin B12 1000 mcg p.o. q.day; absorbing satisfactorily via oral route    PCP at [facility] is arranging for capsule endoscopy by GI for further evaluation of iron-deficiency anemia.    From Hematology perspective, we will monitor iron and B12 deficiency anemia, and intervene to replete any deficiency as and when necessary    Follow-up in 6 weeks with repeat CBC, serum iron, TIBC, and ferritin, to assess response to parenteral iron therapy with Feraheme which is pending.    Above discussed at length with the patient.  All questions answered.    Discussed labs and gave her copies of relevant reports.    Plan of management discussed.    She understands and agrees with this plan.    Follow-up:  No follow-ups on file.

## 2023-05-29 ENCOUNTER — OFFICE VISIT (OUTPATIENT)
Dept: HEMATOLOGY/ONCOLOGY | Facility: CLINIC | Age: 58
End: 2023-05-29
Attending: INTERNAL MEDICINE
Payer: MEDICAID

## 2023-05-29 VITALS
TEMPERATURE: 98 F | SYSTOLIC BLOOD PRESSURE: 125 MMHG | HEART RATE: 92 BPM | OXYGEN SATURATION: 99 % | RESPIRATION RATE: 20 BRPM | HEIGHT: 61 IN | BODY MASS INDEX: 27 KG/M2 | DIASTOLIC BLOOD PRESSURE: 83 MMHG | WEIGHT: 143 LBS

## 2023-05-29 DIAGNOSIS — E53.8 VITAMIN B12 DEFICIENCY: ICD-10-CM

## 2023-05-29 DIAGNOSIS — D50.0 IRON DEFICIENCY ANEMIA DUE TO CHRONIC BLOOD LOSS: Primary | ICD-10-CM

## 2023-05-29 DIAGNOSIS — K31.819 ANGIODYSPLASIA OF DUODENUM: ICD-10-CM

## 2023-05-29 DIAGNOSIS — B18.2 CHRONIC HEPATITIS C WITHOUT HEPATIC COMA: ICD-10-CM

## 2023-05-29 PROCEDURE — 3008F BODY MASS INDEX DOCD: CPT | Mod: CPTII,,, | Performed by: INTERNAL MEDICINE

## 2023-05-29 PROCEDURE — 1160F PR REVIEW ALL MEDS BY PRESCRIBER/CLIN PHARMACIST DOCUMENTED: ICD-10-PCS | Mod: CPTII,,, | Performed by: INTERNAL MEDICINE

## 2023-05-29 PROCEDURE — 99214 OFFICE O/P EST MOD 30 MIN: CPT | Mod: S$PBB,,, | Performed by: INTERNAL MEDICINE

## 2023-05-29 PROCEDURE — 3074F SYST BP LT 130 MM HG: CPT | Mod: CPTII,,, | Performed by: INTERNAL MEDICINE

## 2023-05-29 PROCEDURE — 1160F RVW MEDS BY RX/DR IN RCRD: CPT | Mod: CPTII,,, | Performed by: INTERNAL MEDICINE

## 2023-05-29 PROCEDURE — 3079F DIAST BP 80-89 MM HG: CPT | Mod: CPTII,,, | Performed by: INTERNAL MEDICINE

## 2023-05-29 PROCEDURE — 1159F MED LIST DOCD IN RCRD: CPT | Mod: CPTII,,, | Performed by: INTERNAL MEDICINE

## 2023-05-29 PROCEDURE — 4010F PR ACE/ARB THEARPY RXD/TAKEN: ICD-10-PCS | Mod: CPTII,,, | Performed by: INTERNAL MEDICINE

## 2023-05-29 PROCEDURE — 3074F PR MOST RECENT SYSTOLIC BLOOD PRESSURE < 130 MM HG: ICD-10-PCS | Mod: CPTII,,, | Performed by: INTERNAL MEDICINE

## 2023-05-29 PROCEDURE — 3008F PR BODY MASS INDEX (BMI) DOCUMENTED: ICD-10-PCS | Mod: CPTII,,, | Performed by: INTERNAL MEDICINE

## 2023-05-29 PROCEDURE — 3079F PR MOST RECENT DIASTOLIC BLOOD PRESSURE 80-89 MM HG: ICD-10-PCS | Mod: CPTII,,, | Performed by: INTERNAL MEDICINE

## 2023-05-29 PROCEDURE — 99214 PR OFFICE/OUTPT VISIT, EST, LEVL IV, 30-39 MIN: ICD-10-PCS | Mod: S$PBB,,, | Performed by: INTERNAL MEDICINE

## 2023-05-29 PROCEDURE — 99215 OFFICE O/P EST HI 40 MIN: CPT | Mod: PBBFAC | Performed by: INTERNAL MEDICINE

## 2023-05-29 PROCEDURE — 4010F ACE/ARB THERAPY RXD/TAKEN: CPT | Mod: CPTII,,, | Performed by: INTERNAL MEDICINE

## 2023-05-29 PROCEDURE — 1159F PR MEDICATION LIST DOCUMENTED IN MEDICAL RECORD: ICD-10-PCS | Mod: CPTII,,, | Performed by: INTERNAL MEDICINE

## 2023-05-29 RX ORDER — GABAPENTIN 400 MG/1
400 CAPSULE ORAL 3 TIMES DAILY
COMMUNITY
Start: 2023-05-09 | End: 2023-12-07

## 2023-05-29 NOTE — Clinical Note
Orders for today:   Proceed with Feraheme x2  In 6 weeks, recheck CBC, serum iron, TIBC, ferritin  Continue vitamin B12 1000 mcg p.o. q.day  Needs capsule endoscopy; being arranged by her PCP at [facility]   Follow-up in 6 weeks, with repeat labs.

## 2023-05-31 LAB
ELIA CELIKEY IGA (TTG IGA) QUANTITATIVE: 0.5 U/ML
SCL-70S AB QUANT (OHS): <0.6 U/ML

## 2023-06-05 ENCOUNTER — INFUSION (OUTPATIENT)
Dept: INFUSION THERAPY | Facility: HOSPITAL | Age: 58
End: 2023-06-05
Attending: INTERNAL MEDICINE
Payer: MEDICAID

## 2023-06-05 VITALS
RESPIRATION RATE: 20 BRPM | BODY MASS INDEX: 28.04 KG/M2 | HEART RATE: 83 BPM | DIASTOLIC BLOOD PRESSURE: 60 MMHG | WEIGHT: 146.63 LBS | TEMPERATURE: 98 F | SYSTOLIC BLOOD PRESSURE: 111 MMHG | OXYGEN SATURATION: 96 %

## 2023-06-05 DIAGNOSIS — D50.0 IRON DEFICIENCY ANEMIA DUE TO CHRONIC BLOOD LOSS: Primary | ICD-10-CM

## 2023-06-05 LAB — MAYO GENERIC ORDERABLE RESULT: NORMAL

## 2023-06-05 PROCEDURE — 25000003 PHARM REV CODE 250: Performed by: INTERNAL MEDICINE

## 2023-06-05 PROCEDURE — 63600175 PHARM REV CODE 636 W HCPCS: Mod: JZ,JG | Performed by: INTERNAL MEDICINE

## 2023-06-05 PROCEDURE — 96365 THER/PROPH/DIAG IV INF INIT: CPT

## 2023-06-05 RX ORDER — HEPARIN 100 UNIT/ML
500 SYRINGE INTRAVENOUS
Status: CANCELLED | OUTPATIENT
Start: 2023-06-05

## 2023-06-05 RX ORDER — SODIUM CHLORIDE 0.9 % (FLUSH) 0.9 %
10 SYRINGE (ML) INJECTION
Status: CANCELLED | OUTPATIENT
Start: 2023-06-05

## 2023-06-05 RX ORDER — DIPHENHYDRAMINE HYDROCHLORIDE 50 MG/ML
50 INJECTION INTRAMUSCULAR; INTRAVENOUS ONCE AS NEEDED
Status: CANCELLED | OUTPATIENT
Start: 2023-06-05

## 2023-06-05 RX ORDER — SODIUM CHLORIDE 0.9 % (FLUSH) 0.9 %
10 SYRINGE (ML) INJECTION
Status: DISCONTINUED | OUTPATIENT
Start: 2023-06-05 | End: 2023-06-05 | Stop reason: HOSPADM

## 2023-06-05 RX ORDER — EPINEPHRINE 0.3 MG/.3ML
0.3 INJECTION SUBCUTANEOUS ONCE AS NEEDED
Status: CANCELLED | OUTPATIENT
Start: 2023-06-05

## 2023-06-05 RX ORDER — METHYLPREDNISOLONE SOD SUCC 125 MG
125 VIAL (EA) INJECTION ONCE AS NEEDED
Status: CANCELLED | OUTPATIENT
Start: 2023-06-05

## 2023-06-05 RX ADMIN — SODIUM CHLORIDE: 9 INJECTION, SOLUTION INTRAVENOUS at 01:06

## 2023-06-05 RX ADMIN — FERUMOXYTOL 510 MG: 510 INJECTION INTRAVENOUS at 01:06

## 2023-06-09 DIAGNOSIS — D50.0 IRON DEFICIENCY ANEMIA DUE TO CHRONIC BLOOD LOSS: Primary | ICD-10-CM

## 2023-06-12 ENCOUNTER — INFUSION (OUTPATIENT)
Dept: INFUSION THERAPY | Facility: HOSPITAL | Age: 58
End: 2023-06-12
Attending: INTERNAL MEDICINE
Payer: MEDICAID

## 2023-06-12 ENCOUNTER — TELEPHONE (OUTPATIENT)
Dept: INFUSION THERAPY | Facility: HOSPITAL | Age: 58
End: 2023-06-12
Payer: MEDICAID

## 2023-06-12 VITALS
HEIGHT: 61 IN | RESPIRATION RATE: 20 BRPM | BODY MASS INDEX: 27.34 KG/M2 | WEIGHT: 144.81 LBS | DIASTOLIC BLOOD PRESSURE: 64 MMHG | TEMPERATURE: 98 F | HEART RATE: 58 BPM | SYSTOLIC BLOOD PRESSURE: 120 MMHG | OXYGEN SATURATION: 96 %

## 2023-06-12 DIAGNOSIS — D50.0 IRON DEFICIENCY ANEMIA DUE TO CHRONIC BLOOD LOSS: Primary | ICD-10-CM

## 2023-06-12 PROCEDURE — 96365 THER/PROPH/DIAG IV INF INIT: CPT

## 2023-06-12 PROCEDURE — 25000003 PHARM REV CODE 250: Performed by: INTERNAL MEDICINE

## 2023-06-12 PROCEDURE — 63600175 PHARM REV CODE 636 W HCPCS: Mod: JZ,JG | Performed by: INTERNAL MEDICINE

## 2023-06-12 RX ORDER — SODIUM CHLORIDE 0.9 % (FLUSH) 0.9 %
10 SYRINGE (ML) INJECTION
Status: DISCONTINUED | OUTPATIENT
Start: 2023-06-12 | End: 2023-06-12 | Stop reason: HOSPADM

## 2023-06-12 RX ORDER — EPINEPHRINE 0.3 MG/.3ML
0.3 INJECTION SUBCUTANEOUS ONCE AS NEEDED
Status: CANCELLED | OUTPATIENT
Start: 2023-06-12

## 2023-06-12 RX ORDER — DIPHENHYDRAMINE HYDROCHLORIDE 50 MG/ML
50 INJECTION INTRAMUSCULAR; INTRAVENOUS ONCE AS NEEDED
Status: CANCELLED | OUTPATIENT
Start: 2023-06-12

## 2023-06-12 RX ORDER — HEPARIN 100 UNIT/ML
500 SYRINGE INTRAVENOUS
Status: CANCELLED | OUTPATIENT
Start: 2023-06-12

## 2023-06-12 RX ORDER — METHYLPREDNISOLONE SOD SUCC 125 MG
125 VIAL (EA) INJECTION ONCE AS NEEDED
Status: CANCELLED | OUTPATIENT
Start: 2023-06-12

## 2023-06-12 RX ORDER — SODIUM CHLORIDE 0.9 % (FLUSH) 0.9 %
10 SYRINGE (ML) INJECTION
Status: CANCELLED | OUTPATIENT
Start: 2023-06-12

## 2023-06-12 RX ORDER — METHYLPREDNISOLONE SOD SUCC 125 MG
125 VIAL (EA) INJECTION ONCE AS NEEDED
Status: DISCONTINUED | OUTPATIENT
Start: 2023-06-12 | End: 2023-06-12 | Stop reason: HOSPADM

## 2023-06-12 RX ORDER — EPINEPHRINE 0.3 MG/.3ML
0.3 INJECTION SUBCUTANEOUS ONCE AS NEEDED
Status: DISCONTINUED | OUTPATIENT
Start: 2023-06-12 | End: 2023-06-12 | Stop reason: HOSPADM

## 2023-06-12 RX ORDER — DIPHENHYDRAMINE HYDROCHLORIDE 50 MG/ML
50 INJECTION INTRAMUSCULAR; INTRAVENOUS ONCE AS NEEDED
Status: DISCONTINUED | OUTPATIENT
Start: 2023-06-12 | End: 2023-06-12 | Stop reason: HOSPADM

## 2023-06-12 RX ADMIN — SODIUM CHLORIDE: 9 INJECTION, SOLUTION INTRAVENOUS at 01:06

## 2023-06-12 RX ADMIN — FERUMOXYTOL 510 MG: 510 INJECTION INTRAVENOUS at 01:06

## 2023-06-21 ENCOUNTER — PATIENT MESSAGE (OUTPATIENT)
Dept: ADMINISTRATIVE | Facility: HOSPITAL | Age: 58
End: 2023-06-21
Payer: MEDICAID

## 2023-06-21 RX ORDER — HYDROCODONE BITARTRATE AND ACETAMINOPHEN 5; 325 MG/1; MG/1
1 TABLET ORAL EVERY 8 HOURS PRN
Qty: 15 TABLET | Refills: 0 | Status: CANCELLED | OUTPATIENT
Start: 2023-06-21

## 2023-06-21 NOTE — TELEPHONE ENCOUNTER
----- Message from Bethany Ray sent at 6/21/2023 11:06 AM CDT -----  Regarding: Dr. Gan-Refill Request  Good morning, patient has called in to request that a refill of Norco 5-325 mg be sent to [facility] in [place] @ Dr. Gan's earliest convenience. Patient was advised to seek treatment in the ER due to severe arthritis pain. LOV was on 05/22/23. RTC on 07/19/23. Thank you

## 2023-06-29 ENCOUNTER — TELEPHONE (OUTPATIENT)
Dept: INTERNAL MEDICINE | Facility: CLINIC | Age: 58
End: 2023-06-29
Payer: MEDICAID

## 2023-06-29 DIAGNOSIS — M06.9 RHEUMATOID ARTHRITIS, INVOLVING UNSPECIFIED SITE, UNSPECIFIED WHETHER RHEUMATOID FACTOR PRESENT: Primary | ICD-10-CM

## 2023-06-29 RX ORDER — PREDNISONE 10 MG/1
10 TABLET ORAL DAILY
Qty: 14 TABLET | Refills: 0 | Status: SHIPPED | OUTPATIENT
Start: 2023-06-29 | End: 2023-09-27 | Stop reason: ALTCHOICE

## 2023-06-29 RX ORDER — HYDROCODONE BITARTRATE AND ACETAMINOPHEN 5; 325 MG/1; MG/1
1 TABLET ORAL EVERY 8 HOURS PRN
Qty: 15 TABLET | Refills: 0 | OUTPATIENT
Start: 2023-06-29 | End: 2023-07-22

## 2023-06-29 NOTE — TELEPHONE ENCOUNTER
I tried calling her a couple days before, and I also called her twice this evening but she hasn't picked up.    I will refill a short course of her Norco. I will prescribe a short course of low-dose prednisone to help with her inflammation/pain. I will place an urgent referral (urgent since she's been in pain and has had trouble getting established with a rheumatologist) to [facility] and also to Dr. Wan Peres in [place] with [facility] (we'll need to fax this referral over).    Please inform the patient. Thanks.

## 2023-06-29 NOTE — TELEPHONE ENCOUNTER
----- Message from Bethany Ray sent at 6/29/2023 10:16 AM CDT -----  Regarding: Dr. Gan-Refill Request  Good morning, Ms. Flores has called in regards to her refill request for Norco and to inform Dr. Gan that Dr. Lira's office does not accept her insurance. Thank you

## 2023-07-03 ENCOUNTER — TELEPHONE (OUTPATIENT)
Dept: RHEUMATOLOGY | Facility: CLINIC | Age: 58
End: 2023-07-03
Payer: MEDICAID

## 2023-07-03 NOTE — TELEPHONE ENCOUNTER
Our team is currently working on incoming referrals and will have our providers review incoming referral.  Thank you   ----- Message from Akin Gan MD sent at 6/30/2023  4:34 PM CDT -----  Hi, this is Dr Gan with Internal Medicine. I'm trying to get my patient set up with Rheumatology. She has had significant hand pains which I believe are largely due to rheumatoid arthritis (although also has some osteoarthritis). It's been some time and I've had trouble getting her set up with an external Rheumatologist due to insurance reasons. She continues to be in pain. I was told that y'all are no longer on divert anymore, so I placed a referral. I'm wondering what y'alls availability is and if y'all would be able to see her sooner rather than later? I have not started methotrexate as her PCP because she has also had anemia, requiring pRBC transfusions. Thank you for all you do.

## 2023-07-10 ENCOUNTER — OFFICE VISIT (OUTPATIENT)
Dept: HEMATOLOGY/ONCOLOGY | Facility: CLINIC | Age: 58
End: 2023-07-10
Attending: INTERNAL MEDICINE
Payer: MEDICAID

## 2023-07-10 ENCOUNTER — PATIENT MESSAGE (OUTPATIENT)
Dept: ADMINISTRATIVE | Facility: HOSPITAL | Age: 58
End: 2023-07-10
Payer: MEDICAID

## 2023-07-10 VITALS
DIASTOLIC BLOOD PRESSURE: 75 MMHG | OXYGEN SATURATION: 98 % | WEIGHT: 139.38 LBS | TEMPERATURE: 99 F | SYSTOLIC BLOOD PRESSURE: 126 MMHG | RESPIRATION RATE: 20 BRPM | BODY MASS INDEX: 26.31 KG/M2 | HEIGHT: 61 IN | HEART RATE: 71 BPM

## 2023-07-10 DIAGNOSIS — K31.819 ANGIODYSPLASIA OF DUODENUM: ICD-10-CM

## 2023-07-10 DIAGNOSIS — E53.8 VITAMIN B12 DEFICIENCY: ICD-10-CM

## 2023-07-10 DIAGNOSIS — E53.8 VITAMIN B12 DEFICIENCY: Primary | ICD-10-CM

## 2023-07-10 DIAGNOSIS — B18.2 CHRONIC HEPATITIS C WITHOUT HEPATIC COMA: ICD-10-CM

## 2023-07-10 DIAGNOSIS — D50.0 IRON DEFICIENCY ANEMIA DUE TO CHRONIC BLOOD LOSS: ICD-10-CM

## 2023-07-10 DIAGNOSIS — D50.0 IRON DEFICIENCY ANEMIA DUE TO CHRONIC BLOOD LOSS: Primary | ICD-10-CM

## 2023-07-10 PROCEDURE — 99213 PR OFFICE/OUTPT VISIT, EST, LEVL III, 20-29 MIN: ICD-10-PCS | Mod: S$PBB,,, | Performed by: INTERNAL MEDICINE

## 2023-07-10 PROCEDURE — 3074F SYST BP LT 130 MM HG: CPT | Mod: CPTII,,, | Performed by: INTERNAL MEDICINE

## 2023-07-10 PROCEDURE — 1160F RVW MEDS BY RX/DR IN RCRD: CPT | Mod: CPTII,,, | Performed by: INTERNAL MEDICINE

## 2023-07-10 PROCEDURE — 4010F ACE/ARB THERAPY RXD/TAKEN: CPT | Mod: CPTII,,, | Performed by: INTERNAL MEDICINE

## 2023-07-10 PROCEDURE — 3078F PR MOST RECENT DIASTOLIC BLOOD PRESSURE < 80 MM HG: ICD-10-PCS | Mod: CPTII,,, | Performed by: INTERNAL MEDICINE

## 2023-07-10 PROCEDURE — 4010F PR ACE/ARB THEARPY RXD/TAKEN: ICD-10-PCS | Mod: CPTII,,, | Performed by: INTERNAL MEDICINE

## 2023-07-10 PROCEDURE — 3008F BODY MASS INDEX DOCD: CPT | Mod: CPTII,,, | Performed by: INTERNAL MEDICINE

## 2023-07-10 PROCEDURE — 1159F MED LIST DOCD IN RCRD: CPT | Mod: CPTII,,, | Performed by: INTERNAL MEDICINE

## 2023-07-10 PROCEDURE — 1160F PR REVIEW ALL MEDS BY PRESCRIBER/CLIN PHARMACIST DOCUMENTED: ICD-10-PCS | Mod: CPTII,,, | Performed by: INTERNAL MEDICINE

## 2023-07-10 PROCEDURE — 1159F PR MEDICATION LIST DOCUMENTED IN MEDICAL RECORD: ICD-10-PCS | Mod: CPTII,,, | Performed by: INTERNAL MEDICINE

## 2023-07-10 PROCEDURE — 99215 OFFICE O/P EST HI 40 MIN: CPT | Mod: PBBFAC | Performed by: INTERNAL MEDICINE

## 2023-07-10 PROCEDURE — 3008F PR BODY MASS INDEX (BMI) DOCUMENTED: ICD-10-PCS | Mod: CPTII,,, | Performed by: INTERNAL MEDICINE

## 2023-07-10 PROCEDURE — 99213 OFFICE O/P EST LOW 20 MIN: CPT | Mod: S$PBB,,, | Performed by: INTERNAL MEDICINE

## 2023-07-10 PROCEDURE — 3074F PR MOST RECENT SYSTOLIC BLOOD PRESSURE < 130 MM HG: ICD-10-PCS | Mod: CPTII,,, | Performed by: INTERNAL MEDICINE

## 2023-07-10 PROCEDURE — 3078F DIAST BP <80 MM HG: CPT | Mod: CPTII,,, | Performed by: INTERNAL MEDICINE

## 2023-07-10 RX ORDER — DULOXETIN HYDROCHLORIDE 60 MG/1
60 CAPSULE, DELAYED RELEASE ORAL EVERY MORNING
COMMUNITY
Start: 2023-06-26

## 2023-07-10 RX ORDER — GABAPENTIN 800 MG/1
800 TABLET ORAL 3 TIMES DAILY
COMMUNITY
Start: 2023-06-08 | End: 2023-12-07

## 2023-07-10 RX ORDER — CLOPIDOGREL BISULFATE 75 MG/1
TABLET ORAL
COMMUNITY
End: 2023-12-07

## 2023-07-10 RX ORDER — LISINOPRIL 20 MG/1
TABLET ORAL
COMMUNITY
End: 2023-12-07

## 2023-07-10 RX ORDER — ALENDRONATE SODIUM 5 MG/1
TABLET ORAL EVERY OTHER DAY
COMMUNITY
End: 2024-01-03 | Stop reason: SDUPTHER

## 2023-07-10 RX ORDER — DULOXETIN HYDROCHLORIDE 60 MG/1
1 CAPSULE, DELAYED RELEASE ORAL
COMMUNITY
End: 2023-12-07

## 2023-07-10 RX ORDER — PRAVASTATIN SODIUM 40 MG/1
TABLET ORAL
COMMUNITY
End: 2023-12-07

## 2023-07-10 RX ORDER — GABAPENTIN 600 MG/1
600 TABLET ORAL 3 TIMES DAILY
COMMUNITY
Start: 2023-06-27 | End: 2023-12-07

## 2023-07-10 NOTE — PROGRESS NOTES
History:  Past Medical History:   Diagnosis Date    Anemia, unspecified     Coronary artery disease     History of esophagogastroduodenoscopy (EGD) 11/01/2022    Hypertension     PAD (peripheral artery disease)     PVD (peripheral vascular disease) with claudication     Thyroid disease    Past medical history:  B12 deficiency.  Anemia.  Iron-deficiency.  History of peripheral vascular disease, on dual antiplatelets.  Hypothyroidism.  CAD, S/P PCI.  Chronic hepatitis-B.  Chronic hepatitis-C. Depression.  GERD.  Right lower lung lobe nodule.  02/22/2023: Bilateral lower extremity stent placement, with resolution of bilateral lower extremity pain  Social history:  .  Lives with her brother in [place], [place].  No children.  Never became pregnant.  Disabled.  Does not work.  Has been smoking half a pack of cigarettes daily for 45 years.  Has been smoking marijuana every other day for 40 years.  No other illicit drugs.  No alcohol abuse.  Family history:  Negative for cancers or blood dyscrasias.  Health maintenance:  PCP at [facility].  -05/02/2023:  Bilateral screening mammogram (comparison:  12/03/2020 mammogram):  BI-RADS 2 (benign)  -02/24/2022: Stool for occult blood pos  -history of multiple colonoscopies and endoscopies: Angiodysplastic lesions in duodenum  Past Surgical History:   Procedure Laterality Date    EXTRACORPOREAL SHOCK WAVE LITHOTRIPSY  2/22/2023    Procedure: LITHOTRIPSY- Peripheral Shockwave;  Surgeon: Adriel Valero MD;  Location: [facility] CATH LAB;  Service: Cardiology;;    HIP SURGERY      INSERTION, STENT, ARTERY  2/22/2023    Procedure: INSERTION, STENT, ARTERY;  Surgeon: Adriel Valero MD;  Location: [facility] CATH LAB;  Service: Cardiology;;    INTRAVASCULAR ULTRASOUND, NON-CORONARY  2/22/2023    Procedure: Intravascular Ultrasound, Non-Coronary;  Surgeon: Adriel Valero MD;  Location: [facility] CATH LAB;  Service: Cardiology;;    LEFT HEART CATHETERIZATION      PERIPHERAL ANGIOGRAPHY  N/A 2/22/2023    Procedure: Peripheral angiography;  Surgeon: Adriel Valero MD;  Location: [facility] CATH LAB;  Service: Cardiology;  Laterality: N/A;  BILAT ILIAC INTERVENTION      Social History     Socioeconomic History    Marital status: Single   Tobacco Use    Smoking status: Every Day     Packs/day: 0.25     Years: 40.00     Pack years: 10.00     Types: Cigarettes    Smokeless tobacco: Never   Substance and Sexual Activity    Alcohol use: Not Currently    Drug use: Yes     Types: Marijuana    Sexual activity: Not Currently   Social History Narrative    ** Merged History Encounter **           History reviewed. No pertinent family history.     Reason for Follow-up:  Iron-deficiency anemia   Vitamin B12 deficiency   Angiodysplasia of duodenum    History of Present Illness:   Iron deficiency anemia due to chronic blood loss        Oncologic/Hematologic History:  Oncology History    No history exists.   57-year-old female, referred from Internal Medicine, for evaluation of anemia.      Admitted to [facility] 04/07/2023-04/08/2023:  57-year-old female with significant history of HTN, PVD status post intervention, hypothyroidism, CAD status post PCI, right lower lobe lung nodule, bipolar disorder, depression, GERD, chronic hepatitis-B/chronic hepatitis-C and iron deficiency anemia presented to the ED with complaints of fatigue, generalized weakness and outpatient labs revealing anemia.  Patient was referred to the ED for admission to further evaluate anemia.  On Plavix.  Patient had a colonoscopy 2.  Transfused with PRBC x1 unit.  No overt bleeding.  No history of alcohol use.  GI recommended to hold off on endoscopic evaluation.  Hemoccult-pos but numerous endoscopy procedures by Dr. Keyon Hatch, her GI, unrevealing in the past.  Patient on aspirin and Plavix which were continued.  Hemoglobin improved appropriately post PRBC x1 unit.  Iron deficient.  B12 deficient.  04/03/2023: Outside blood work showed ferritin  8.8.  B12 209.    History of iron-deficiency anemia.  History of B12 deficiency.  Admitted to [facility] 04/07/2023 with fatigue, weakness, dizziness.  Outside labs had shown anemia.  History of multiple endoscopies with Dr. Keyon Hatch, GI, with no source of GI bleed found.  04/03/2023: Blood work showed iron-deficiency, ferritin 8.8, B12 level 209.    History of duodenal angiodysplasias a couple of years ago    Blood transfusion in the past   EGD 11/2021: Hiatal hernia, GE ring, nonbleeding angioectasias   Colonoscopy 09/2021: Normal   EGD and colonoscopy 2022: Esophageal dilation, no bleeding, colon polyp    History of hepatitis-C: S/P treatment; S/P occlusive 2020; posttreatment viral load undetectable; FibroScan F1 (no liver cirrhosis)    09/09/2020: Limited abdominal ultrasound (comparison:  05/07/2013): Liver cirrhosis; no focal hepatic lesion    -12/16/2020: EGD (liver cirrhosis, rule out esophageal varices):  3 diminutive nonbleeding angiodysplastic lesions in the 2nd portion of duodenum  -09/22/2021:  Colonoscopy (indication:  Occult blood in stool; personal history of colon polyps): Normal mucosa in whole:  -11/15/2021: EGD (Keyon Hatch MD) (indication:  Dysphagia, oropharyngeal, nausea, heartburn, abdominal bloating, GI metaplasia, generalized abdominal pain):  Erythema lower 3rd of esophagus compatible with esophagitis; erythema antrum, compatible with gastritis; esophageal hiatal hernia; a single bleeding angiectasia seen in duodenal bulb, treated with monopolar cautery successfully (thermotherapy) (pathology:  Gastric antrum biopsy, reactive gastropathy with mild nonspecific chronic inflammation, negative for intestinal metaplasia; negative for H pylori; gastric body biopsy, reactive gastropathy, mild nonspecific chronic inflammation, negative for intestinal metaplasia, negative for H pylori)    Labs reviewed:  Hemoglobin: 8.1 (05/16/2023); 9.0 (04/08/2023); 7.5 (04/07/2023); 6.8 (04/03/2023);  8.0 (03/13/2023); 11.6 (10/28/2022); 13.2 (10/03/2022), etc.   MCV: Consistently normal with mild macrocytosis on a couple of occasions  Rest of CBC unremarkable  05/16/2023: Serum iron 50.  TIBC 323.  Ferritin 16.8.  Transferrin saturation 15%.  B12 level 705.  Folate 38.8.    04/03/2023: Serum iron 8.  TIBC 344.  Ferritin 8.8.  Transferrin saturation 2%.  B12 level 209, low.  Folate 8.7   10/11/2022: Serum iron 69.  TIBC 292.  Ferritin 27.13.  Transferrin saturation 24%.  09/09/2020:  Ferritin 18.8, hemoglobin 9.8, MCV 93.7  Haptoglobin normal  04/03/2023:  Hemoglobin 6.8.  Retic count 1.89%.  LDH normal.  05/17/2023: Celiac serology negative    04/08/2023:  Hepatitis-C antibody pos (treated in the past with Epclusa, with a negative hepatitis-A viral load subsequently   09/09/2020:  Hepatitis-B core antibody total reactive.  Hepatitis-B surface antibody negative.  Hepatitis-B surface antigen negative.  HIV negative    05/16/2023:  RBC osmotic fragility normal  04/03/2023:  Hemoglobin electrophoresis:  No abnormal hemoglobin or beta thalassemia    05/22/2023:  Pleasant lady.  Presents for initial hematology consultation.  In no acute discomfort.    Always feels tired.  No abdominal pain, nausea or vomiting.  Denies hematemesis, melena, or hematochezia.  Fair appetite.  Main complaint is chronic tiredness.  Has been taking iron pill every other day for last 2 years.  No GI side effects.  Started vitamin B12 pill x2 daily, a month ago.  We will check her iron stores and B12 level at this time.  Says that she was transfused around Easter time and a couple of years ago as well.  No history of cancers.  Chronic generalized fatigue, swelling in the legs, cough, and numbness and tingling in hands.  Generalized body pains, 9/10, which she attributes to rheumatoid arthritis.    Interval History:  FERAHEME (FERUMOXYTOL) TWO DOSES   [No matching plan found]     07/10/2023:   -S/P Feraheme 510 mg IV x2 (06/05/2023,  06/12/2023)  -07/10/2023:  Hemoglobin 12.3 (was 8.9 on 05/22/2023 before Feraheme); serum iron 100 (was 14 on 05/22/2023, before Feraheme); transferrin saturation 43% (was 4% on 05/22/2023 before Feraheme) (thus, good response to Feraheme, with normalization of hemoglobin and improvement in iron stores; ferritin level is pending)  Presents for a follow-up visit.  Even with normalization of hemoglobin and iron stores, he continues to feel fatigued.  Some hot flashes.  Chronic stable mild-to-moderate exertional dyspnea.  No abdominal pain, nausea, vomiting, hematemesis, melena, or hematochezia.  She says that capsule endoscopy is scheduled for December 2023.  Has not been taking vitamin B12 for last couple of weeks; unable to afford over-the-counter; she will prescribe and send the script to her pharmacy.    Medications:  Current Outpatient Medications on File Prior to Visit   Medication Sig Dispense Refill    alendronate (FOSAMAX) 5 MG Tab 1 tablet Orally Once a day      ALPRAZolam (XANAX) 0.5 MG tablet Take 0.5 mg by mouth 2 (two) times daily as needed for Anxiety (anxiety).      amLODIPine (NORVASC) 10 MG tablet Take 1 tablet (10 mg total) by mouth once daily. 90 tablet 1    aspirin (ECOTRIN) 81 MG EC tablet Take 1 tablet (81 mg total) by mouth once daily. 90 tablet 3    atorvastatin (LIPITOR) 40 MG tablet Take 1 tablet (40 mg total) by mouth once daily. 90 tablet 1    buPROPion (WELLBUTRIN SR) 150 MG TBSR 12 hr tablet Take 150 mg by mouth every morning.      carvediloL (COREG) 6.25 MG tablet Take 1 tablet (6.25 mg total) by mouth 2 (two) times daily with meals. 90 tablet 3    clopidogreL (PLAVIX) 75 mg tablet Take 75 mg by mouth once daily.      clopidogreL (PLAVIX) 75 mg tablet 1 tablet Orally Once a day      cyanocobalamin (VITAMIN B-12) 1000 MCG tablet Take 1 tablet (1,000 mcg total) by mouth once daily. 180 tablet 1    DULoxetine (CYMBALTA) 30 MG capsule Take 1 capsule (30 mg total) by mouth every evening. 90  capsule 1    DULoxetine (CYMBALTA) 60 MG capsule Take 60 mg by mouth every morning.      DULoxetine (CYMBALTA) 60 MG capsule 1 capsule.      ferrous gluconate (FERGON) 324 MG tablet Take 1 tablet (324 mg total) by mouth every other day. 90 tablet 1    folic acid (FOLVITE) 1 MG tablet Take 1 tablet (1 mg total) by mouth once daily. 90 tablet 1    gabapentin (NEURONTIN) 400 MG capsule Take 400 mg by mouth 3 (three) times daily.      gabapentin (NEURONTIN) 600 MG tablet Take 600 mg by mouth 3 (three) times daily.      gabapentin (NEURONTIN) 800 MG tablet Take 800 mg by mouth 3 (three) times daily.      garlic (GARLIQUE ORAL) Take 1 tablet by mouth Daily.      HYDROcodone-acetaminophen (NORCO) 5-325 mg per tablet Take 1 tablet by mouth every 8 (eight) hours as needed for Pain. 15 tablet 0    lisinopriL (PRINIVIL,ZESTRIL) 20 MG tablet 1 tablet Orally Once a day      lisinopriL 10 MG tablet Take 1 tablet (10 mg total) by mouth once daily. 90 tablet 3    mirtazapine (REMERON) 15 MG tablet TAKE ONE TABLET BY MOUTH ONCE A DAY AT BEDTIME      nicotine (NICODERM CQ) 21 mg/24 hr Place 1 patch onto the skin once daily. 60 patch 0    nitroGLYCERIN (NITROSTAT) 0.4 MG SL tablet Place 1 tablet under the tongue every 5 (five) minutes as needed.      pantoprazole (PROTONIX) 40 MG tablet Take 1 tablet (40 mg total) by mouth once daily. 90 tablet 1    pravastatin (PRAVACHOL) 40 MG tablet 1 tablet Orally Once a day      predniSONE (DELTASONE) 10 MG tablet Take 1 tablet (10 mg total) by mouth once daily. 14 tablet 0    pregabalin (LYRICA) 25 MG capsule Take 1 capsule (25 mg total) by mouth 2 (two) times daily. 120 capsule 1    red yeast rice 600 mg Cap Take 3 capsules by mouth 2 (two) times a day.      traZODone (DESYREL) 150 MG tablet Take 300 mg by mouth every evening.       Current Facility-Administered Medications on File Prior to Visit   Medication Dose Route Frequency Provider Last Rate Last Admin    ferric gluconate 62.5 mg/5 mL  injection 125 mg  125 mg Intravenous 1 time in Clinic/HOD Akin Gan MD           Review of Systems:   All systems reviewed and found to be negative except for the symptoms detailed above    Physical Examination:   VITAL SIGNS:   Vitals:    07/10/23 1139   BP: 126/75   Pulse: 71   Resp: 20   Temp: 98.8 °F (37.1 °C)       GENERAL:  In no apparent distress.    HEAD:  No signs of head trauma.  EYES:  Pupils are equal.  Extraocular motions intact.    EARS:  Hearing grossly intact.  MOUTH:  Oropharynx is normal.   NECK:  No adenopathy, no JVD.     CHEST:  Chest with clear breath sounds bilaterally.  No wheezes, rales, rhonchi.    CARDIAC:  Regular rate and rhythm.  S1 and S2, without murmurs, gallops, rubs.  VASCULAR:  No Edema.  Peripheral pulses normal and equal in all extremities.  ABDOMEN:  Soft, without detectable tenderness.  No sign of distention.  No   rebound or guarding, and no masses palpated.   Bowel Sounds normal.  MUSCULOSKELETAL:  Good range of motion of all major joints. Extremities without clubbing, cyanosis or edema.    NEUROLOGIC EXAM:  Alert and oriented x 3.  No focal sensory or strength deficits.   Speech normal.  Follows commands.  PSYCHIATRIC:  Mood normal.    No results for input(s): CBC in the last 72 hours.   No results for input(s): CMP in the last 72 hours.     Assessment:  Problem List Items Addressed This Visit          Oncology    Iron deficiency anemia due to chronic blood loss - Primary       Endocrine    Vitamin B12 deficiency       GI    Hepatitis C virus infection    Angiodysplasia of duodenum     Iron-deficiency anemia, B12 deficiency anemia:  (history of duodenal angiodysplasia):   -Hemoglobin: 8.1 (05/16/2023); 9.0 (04/08/2023); 7.5 (04/07/2023); 6.8 (04/03/2023); 8.0 (03/13/2023); 11.6 (10/28/2022); 13.2 (10/03/2022), etc.   -MCV: Consistently normal with mild macrocytosis on a couple of occasions  -Rest of CBC unremarkable  -ferritin:  16.8 (05/16/2023); 8.8  (04/03/2023); 27.13 (10/11/2022); 18.8 (09/09/2020)  -Haptoglobin normal  -04/03/2023:  Hemoglobin 6.8.  Retic count 1.89%.  LDH normal.  Hospitalized 04/07/2023, transfused with PRBC x1 unit  -05/17/2023: Celiac serology negative  -05/16/2023:  RBC osmotic fragility normal  -04/03/2023:  Hemoglobin electrophoresis:  No abnormal hemoglobin or beta thalassemia  -hospitalized [facility] 04/07/2023-04/08/2020: Symptomatic anemia.  Transfused PRBC x1 unit.  Iron deficient.  Also found to be B12 deficient.  -Ferrlecit 125 mg IV x2 (02/20/2023, 04/08/2023  -history of multiple with Dr. Keyon Hatch, GI, EGD and colonoscopy in the past, with no source of GI bleeding found  -04/03/2023: Ferritin 8.8.  B12 209.  -12/16/2020: EGD:  No esophageal varices) history of liver cirrhosis on ultrasound); 3 diminutive nonbleeding angiodysplastic lesions in the 2nd portion of duodenum  -EGD 11/15/2021:  Lower 3rd esophagitis; gastritis gastric antrum; esophageal hiatal hernia; a single bleeding angiectasia duodenal bulb, treated with monopolar cautery successfully (thermotherapy) (pathology:  No metaplasia, no H pylori, no malignancy)  -colonoscopy 09/22/2021 (indication: Occult blood in stool; personal history of colon polyps): Normal   -EGD and colonoscopy 2022: Esophageal dilation, no bleeding, colon polyp  -05/22/2023: Tells me that she has been taking an iron pill every other day for at least 2-3 years; no GI side effects; transfused around Easter time as well as a couple of years ago.  No active bleeding.  Generalized fatigue and tiredness, chronic.  -05/22/2023:  Hemoglobin 8.9.  MCV 86.4.  Ferritin 17.85.  Transferrin saturation 4%.  Vitamin B12 level 521.  Intrinsic factor antibody negative.  Homocystine level 4.6 micromoles per L, suppressed (patient already on oral B12 supplementation).  Methylmalonic acid level 0.12 nonmobile/ml, normal (patient already on oral vitamin B12 supplementation, with adequate absorption)  (As  of 05/22/2023, persistent iron-deficiency anemia, hemoglobin 8.9, ferritin 17.65, despite taking iron pill every other day for last 2-3 years)  -S/P Feraheme 510 mg IV x2 (06/05/2023, 06/12/2023)  -07/10/2023:  Hemoglobin 12.3 (was 8.9 on 05/22/2023 before Feraheme); serum iron 100 (was 14 on 05/22/2023, before Feraheme); transferrin saturation 43% (was 4% on 05/22/2023 before Feraheme)   (thus, good response to Feraheme, with normalization of hemoglobin and improvement in iron stores; ferritin level is pending)      Vitamin B12 deficiency:  -diagnosed 04/03/2023: B12 level 209  -05/22/2023: Tells me that she has been taking 2 vitamin B12 pills for last 1 month.  -05/22/2023:  Hemoglobin 8.9.  MCV 86.4.  Ferritin 17.85.  Transferrin saturation 4%.  Vitamin B12 level 521.  Intrinsic factor antibody negative.  Homocystine level 4.6 micromoles per L, suppressed (patient already on oral B12 supplementation).  Methylmalonic acid level 0.12 nonmobile/ml, normal (patient already on oral vitamin B12 supplementation, with adequate absorption)  (intrinsic factor antibody negative; absorbing vitamin B12 satisfactorily via oral route)      History hepatitis-C and liver cirrhosis:  -treated with Epclusa in 2020   -Posttreatment viral load undetectable  -FibroScan F 1 (no liver cirrhosis)  -ultrasound 09/09/2020: Liver cirrhosis     -EGD 12/16/2020:  No esophageal varices      Plan:   Chronic iron-deficiency anemia  Ferritin level has been low as far back as 2020  Hemoglobin was 11.6 on 10/28/2022, subsequently, has been declining (range 7-9)  No hemolysis  Celiac serology negative  History of liver cirrhosis (noted on ultrasound) with underlying history hepatitis-C  No esophageal varices on EGD 12/16/2020    History of angiodysplastic lesions in duodenum:  -EGD 12/16/2020:  3 diminutive nonbleeding angiodysplastic lesions 2nd portion of duodenum  -EGD 11/15/2021: A single bleeding angiectasia duodenal bulb, treated with  monopolar cautery successfully  This might as well be the source of chronic iron-deficiency anemia; managed re-evaluation  Colonoscopy 09/22/2021: Normal  -colonoscopy 2022: Colon polyp  -05/22/2023: Tells me that she has been taking an iron pill every other day for at least 2-3 years; no GI side effects; transfused around Easter time as well as a couple of years ago.  No active bleeding.  Generalized fatigue and tiredness, chronic.  (As of 05/22/2023, persistent iron-deficiency anemia, hemoglobin 8.9, ferritin 17.65, despite taking iron pill every other day for last 2-3 years)  -S/P Feraheme 510 mg IV x2 (06/05/2023, 06/12/2023)  -07/10/2023:  Hemoglobin 12.3 (was 8.9 on 05/22/2023 before Feraheme); serum iron 100 (was 14 on 05/22/2023, before Feraheme); transferrin saturation 43% (was 4% on 05/22/2023 before Feraheme)   (thus, good response to Feraheme, with normalization of hemoglobin and improvement in iron stores; ferritin level is pending)  >>>  Assess response to Feraheme with repeat CBC, serum iron, TIBC, ferritin at this time    Found to be B12 deficient on labs on 04/03/2023   -05/22/2023: Tells me that she has been taking 2 vitamin B12 pills for last 1 month  -05/22/2023:  Hemoglobin 8.9.  MCV 86.4.  Ferritin 17.85.  Transferrin saturation 4%.  Vitamin B12 level 521.  Intrinsic factor antibody negative.  Homocystine level 4.6 micromoles per L, suppressed (patient already on oral B12 supplementation).  Methylmalonic acid level 0.12 nonmobile/ml, normal (patient already on oral vitamin B12 supplementation, with adequate absorption)  (intrinsic factor antibody negative; absorbing vitamin B12 satisfactorily via oral route)  >>>  Continue vitamin B12 1000 mcg p.o. q.day; absorbing satisfactorily via oral route    PCP at [facility] is arranging for capsule endoscopy by GI for further evaluation of iron-deficiency anemia.    From Hematology perspective, we will monitor iron and B12 deficiency anemia, and intervene to  replete any deficiency as and when necessary    Follow-up in 2 months, with CBC, serum iron, TIBC, and ferritin.    Above discussed at length with the patient.  All questions answered.    Discussed labs and gave her copies of relevant reports.    Plan of management discussed.    She understands and agrees with this plan.    Follow-up:  No follow-ups on file.

## 2023-07-10 NOTE — Clinical Note
Orders for today:   Continue vitamin B12 1000 mcg p.o. q.day  Follow-up with GI for capsule endoscopy.  Follow-up in 2 months, with CBC, serum iron, TIBC, and ferritin.

## 2023-07-12 ENCOUNTER — DOCUMENTATION ONLY (OUTPATIENT)
Dept: HEMATOLOGY/ONCOLOGY | Facility: CLINIC | Age: 58
End: 2023-07-12
Payer: MEDICAID

## 2023-07-12 DIAGNOSIS — E53.8 VITAMIN B12 DEFICIENCY: Primary | ICD-10-CM

## 2023-07-12 NOTE — PROGRESS NOTES
Patient called saying that she would rather have B12 shots rather than pills because she can not afford the pills.  This is reasonable.    We will administer vitamin B12 1000 mcg every month in our office.

## 2023-07-13 RX ORDER — CYANOCOBALAMIN 1000 UG/ML
1000 INJECTION, SOLUTION INTRAMUSCULAR; SUBCUTANEOUS
Qty: 2 ML | Refills: 0 | Status: SHIPPED | OUTPATIENT
Start: 2023-07-13 | End: 2023-09-25 | Stop reason: SDUPTHER

## 2023-07-18 ENCOUNTER — TELEPHONE (OUTPATIENT)
Dept: INTERNAL MEDICINE | Facility: CLINIC | Age: 58
End: 2023-07-18

## 2023-07-20 DIAGNOSIS — M85.80 OSTEOPENIA, UNSPECIFIED LOCATION: Primary | ICD-10-CM

## 2023-07-20 RX ORDER — HYDROCODONE BITARTRATE AND ACETAMINOPHEN 5; 325 MG/1; MG/1
1 TABLET ORAL EVERY 8 HOURS PRN
Qty: 15 TABLET | Refills: 0 | Status: CANCELLED | OUTPATIENT
Start: 2023-07-20

## 2023-07-21 ENCOUNTER — TELEPHONE (OUTPATIENT)
Dept: INTERNAL MEDICINE | Facility: CLINIC | Age: 58
End: 2023-07-21

## 2023-07-22 ENCOUNTER — HOSPITAL ENCOUNTER (EMERGENCY)
Facility: HOSPITAL | Age: 58
Discharge: HOME | End: 2023-07-22
Attending: FAMILY MEDICINE
Payer: MEDICAID

## 2023-07-22 VITALS
DIASTOLIC BLOOD PRESSURE: 78 MMHG | OXYGEN SATURATION: 98 % | HEART RATE: 81 BPM | HEIGHT: 60 IN | SYSTOLIC BLOOD PRESSURE: 129 MMHG | WEIGHT: 140 LBS | TEMPERATURE: 98 F | RESPIRATION RATE: 18 BRPM | BODY MASS INDEX: 27.48 KG/M2

## 2023-07-22 DIAGNOSIS — M54.9 BACK PAIN, UNSPECIFIED BACK LOCATION, UNSPECIFIED BACK PAIN LATERALITY, UNSPECIFIED CHRONICITY: Primary | ICD-10-CM

## 2023-07-22 PROCEDURE — 63600175 PHARM REV CODE 636 W HCPCS: Performed by: FAMILY MEDICINE

## 2023-07-22 PROCEDURE — 96372 THER/PROPH/DIAG INJ SC/IM: CPT | Performed by: FAMILY MEDICINE

## 2023-07-22 PROCEDURE — 99284 EMERGENCY DEPT VISIT MOD MDM: CPT

## 2023-07-22 RX ORDER — DICLOFENAC SODIUM 50 MG/1
50 TABLET, DELAYED RELEASE ORAL 3 TIMES DAILY
Qty: 9 TABLET | Refills: 0 | Status: SHIPPED | OUTPATIENT
Start: 2023-07-22 | End: 2023-07-25

## 2023-07-22 RX ORDER — KETOROLAC TROMETHAMINE 30 MG/ML
60 INJECTION, SOLUTION INTRAMUSCULAR; INTRAVENOUS
Status: COMPLETED | OUTPATIENT
Start: 2023-07-22 | End: 2023-07-22

## 2023-07-22 RX ORDER — CYCLOBENZAPRINE HCL 10 MG
10 TABLET ORAL 3 TIMES DAILY PRN
Qty: 15 TABLET | Refills: 0 | Status: SHIPPED | OUTPATIENT
Start: 2023-07-22 | End: 2023-07-27

## 2023-07-22 RX ORDER — HYDROCODONE BITARTRATE AND ACETAMINOPHEN 5; 325 MG/1; MG/1
1 TABLET ORAL EVERY 6 HOURS PRN
Qty: 12 TABLET | Refills: 0 | Status: SHIPPED | OUTPATIENT
Start: 2023-07-22 | End: 2023-08-14 | Stop reason: ALTCHOICE

## 2023-07-22 RX ADMIN — KETOROLAC TROMETHAMINE 60 MG: 30 INJECTION, SOLUTION INTRAMUSCULAR; INTRAVENOUS at 02:07

## 2023-07-22 NOTE — ED PROVIDER NOTES
Encounter Date: 7/22/2023       History     Chief Complaint   Patient presents with    Back Pain     Lower back pain radiating down to left foot times one month     Back pain   57-year-old female patient with a history of chronic back pain now having episode of sciatica into the left leg down to the left foot patient initially thought it was foot pain but after discussing with the patient and her back history and movement increasing pain this sounds like sciatica chronic history of back pain contributing to today's episode patient is own history source      Review of patient's allergies indicates:  No Known Allergies  Past Medical History:   Diagnosis Date    Anemia, unspecified     Coronary artery disease     History of esophagogastroduodenoscopy (EGD) 11/01/2022    Hypertension     PAD (peripheral artery disease)     PVD (peripheral vascular disease) with claudication     Thyroid disease      Past Surgical History:   Procedure Laterality Date    EXTRACORPOREAL SHOCK WAVE LITHOTRIPSY  2/22/2023    Procedure: LITHOTRIPSY- Peripheral Shockwave;  Surgeon: Adriel Valero MD;  Location: [facility] CATH LAB;  Service: Cardiology;;    HIP SURGERY      INSERTION, STENT, ARTERY  2/22/2023    Procedure: INSERTION, STENT, ARTERY;  Surgeon: Adriel Vlaero MD;  Location: [facility] CATH LAB;  Service: Cardiology;;    INTRAVASCULAR ULTRASOUND, NON-CORONARY  2/22/2023    Procedure: Intravascular Ultrasound, Non-Coronary;  Surgeon: Adriel Valero MD;  Location: [facility] CATH LAB;  Service: Cardiology;;    LEFT HEART CATHETERIZATION      PERIPHERAL ANGIOGRAPHY N/A 2/22/2023    Procedure: Peripheral angiography;  Surgeon: Adriel Valero MD;  Location: [facility] CATH LAB;  Service: Cardiology;  Laterality: N/A;  BILAT ILIAC INTERVENTION     No family history on file.  Social History     Tobacco Use    Smoking status: Every Day     Packs/day: 0.25     Years: 40.00     Pack years: 10.00     Types: Cigarettes    Smokeless tobacco: Never   Substance Use  Topics    Alcohol use: Not Currently    Drug use: Yes     Types: Marijuana     Review of Systems   Constitutional:  Negative for fever.   HENT:  Negative for sore throat.    Respiratory:  Negative for shortness of breath.    Cardiovascular:  Negative for chest pain.   Gastrointestinal:  Negative for nausea.   Genitourinary:  Negative for dysuria.   Musculoskeletal:  Positive for back pain.   Skin:  Negative for rash.   Neurological:  Negative for weakness.   Hematological:  Does not bruise/bleed easily.   All other systems reviewed and are negative.    Physical Exam     Initial Vitals [07/22/23 1403]   BP Pulse Resp Temp SpO2   129/78 81 18 98.4 °F (36.9 °C) 98 %      MAP       --         Physical Exam    Nursing note and vitals reviewed.  Constitutional: She appears well-developed.   HENT:   Head: Normocephalic and atraumatic.   Right Ear: External ear normal.   Left Ear: External ear normal.   Nose: Nose normal.   Mouth/Throat: Oropharynx is clear and moist. No oropharyngeal exudate.   Eyes: Conjunctivae and EOM are normal. Pupils are equal, round, and reactive to light. Right eye exhibits no discharge. Left eye exhibits no discharge.   Neck: Neck supple. No tracheal deviation present. No JVD present.   Normal range of motion.  Cardiovascular:  Normal rate, regular rhythm, normal heart sounds and intact distal pulses.     Exam reveals no gallop and no friction rub.       No murmur heard.  Pulmonary/Chest: Breath sounds normal. No stridor. No respiratory distress. She has no wheezes. She has no rhonchi. She has no rales.   Abdominal: Abdomen is soft. Bowel sounds are normal. She exhibits no distension and no mass. There is no abdominal tenderness. There is no rebound and no guarding.   Musculoskeletal:         General: Tenderness present. Normal range of motion.      Cervical back: Normal range of motion and neck supple.     Neurological: She is alert and oriented to person, place, and time. She has normal  strength. No cranial nerve deficit.   Skin: Skin is warm and dry. No rash and no abscess noted. No erythema.   Psychiatric: She has a normal mood and affect. Her behavior is normal. Judgment and thought content normal.       ED Course   Procedures  Labs Reviewed - No data to display       Imaging Results    None          Medications   ketorolac injection 60 mg (60 mg Intramuscular Given 7/22/23 6498)     Medical Decision Making:   Differential Diagnosis:   Back pain injury sprain strain sciatica                        Clinical Impression:   Final diagnoses:  [M54.9] Back pain, unspecified back location, unspecified back pain laterality, unspecified chronicity (Primary)        ED Disposition Condition    Discharge Stable          ED Prescriptions       Medication Sig Dispense Start Date End Date Auth. Provider    diclofenac (VOLTAREN) 50 MG EC tablet Take 1 tablet (50 mg total) by mouth 3 (three) times daily. for 3 days 9 tablet 7/22/2023 7/25/2023 Judah Canada MD    cyclobenzaprine (FLEXERIL) 10 MG tablet Take 1 tablet (10 mg total) by mouth 3 (three) times daily as needed for Muscle spasms. 15 tablet 7/22/2023 7/27/2023 Judah Canada MD    HYDROcodone-acetaminophen (NORCO) 5-325 mg per tablet Take 1 tablet by mouth every 6 (six) hours as needed for Pain. 12 tablet 7/22/2023 -- Judah Canada MD          Follow-up Information    None          Judah Canada MD  07/22/23 7431

## 2023-07-24 ENCOUNTER — TELEPHONE (OUTPATIENT)
Dept: INTERNAL MEDICINE | Facility: CLINIC | Age: 58
End: 2023-07-24

## 2023-07-24 NOTE — TELEPHONE ENCOUNTER
GOOD MORNING DR. MORALES,     MS. AGUILAR CALLED TO LET YOU KNOW THAT SHE HAD TO VISIT THE ED DUE TO HER HAVING PAIN.       SHE STATED THAT SHE WS GIVEN THE FOLLOWING AND TOLD TO NOTIFY HER PCP:    NORCO 12#      VISTARIL 9#      DICLOFENAC 7#

## 2023-07-28 LAB — DIRECT COOMBS (DAT): NORMAL

## 2023-08-10 RX ORDER — HYDROCODONE BITARTRATE AND ACETAMINOPHEN 5; 325 MG/1; MG/1
1 TABLET ORAL EVERY 6 HOURS PRN
Qty: 12 TABLET | Refills: 0 | OUTPATIENT
Start: 2023-08-10

## 2023-08-14 ENCOUNTER — OFFICE VISIT (OUTPATIENT)
Dept: INTERNAL MEDICINE | Facility: CLINIC | Age: 58
End: 2023-08-14
Payer: MEDICAID

## 2023-08-14 VITALS
OXYGEN SATURATION: 94 % | BODY MASS INDEX: 28.07 KG/M2 | WEIGHT: 143 LBS | RESPIRATION RATE: 18 BRPM | TEMPERATURE: 98 F | HEART RATE: 75 BPM | SYSTOLIC BLOOD PRESSURE: 97 MMHG | DIASTOLIC BLOOD PRESSURE: 63 MMHG | HEIGHT: 60 IN

## 2023-08-14 DIAGNOSIS — D64.9 CHRONIC ANEMIA: Primary | ICD-10-CM

## 2023-08-14 PROCEDURE — 99215 OFFICE O/P EST HI 40 MIN: CPT | Mod: PBBFAC

## 2023-08-14 RX ORDER — ERGOCALCIFEROL 1.25 MG/1
50000 CAPSULE ORAL
Qty: 60 CAPSULE | Refills: 1 | Status: SHIPPED | OUTPATIENT
Start: 2023-08-14

## 2023-08-14 RX ORDER — AMLODIPINE BESYLATE 5 MG/1
5 TABLET ORAL
COMMUNITY
Start: 2023-08-08 | End: 2023-12-07

## 2023-08-14 RX ORDER — ROSUVASTATIN CALCIUM 40 MG/1
40 TABLET, COATED ORAL DAILY
COMMUNITY
Start: 2023-07-14

## 2023-08-14 RX ORDER — HYDROCODONE BITARTRATE AND ACETAMINOPHEN 10; 325 MG/1; MG/1
1 TABLET ORAL EVERY 8 HOURS PRN
Qty: 21 TABLET | Refills: 0 | Status: SHIPPED | OUTPATIENT
Start: 2023-08-14 | End: 2023-08-21

## 2023-08-14 RX ORDER — METHOTREXATE 2.5 MG/1
7.5 TABLET ORAL
Qty: 12 TABLET | Refills: 0 | Status: SHIPPED | OUTPATIENT
Start: 2023-08-14 | End: 2023-09-27 | Stop reason: ALTCHOICE

## 2023-08-14 NOTE — PROGRESS NOTES
INTERNAL MEDICINE RESIDENT CLINIC  CLINIC NOTE    Patient Name: Sandra Burns  YOB: 1965    PRESENTING HISTORY       History of Present Illness:  Ms. Sandra Burns is a 57 y.o. female w/ an active medical problem list including Bipolar Disorder, Depression, Chronic pain, GERD, HCV, HBV, HTN. She is presnting today to [facility] IM Resident clinic for follow up appointment. She had recent hospital stay for symptomatic anemia.     Ms. Burns was hospitalized for symptomatic anemia, combination of iron-deficiency and B12 deficiency. She has a history of GI bleeds in past but had recent EGD and c-scope with Dr. Hatch in [place] which were normal. I spoke with the NP over the phone just prior to the patient's hospitlization and there was consideration for small-capsule endoscopy. Inpatient GI wanted to wait on hematology evaluation prior to performing pill endoscopy. So, she was transfused and discharged on iron, B12, folate with outpatient f/u with hematology (has appointment today).     Today, she mostly complains of her joint pains, saying she has no life b/c of her joint pains. Has pain in her hands, feet, elbows, shoulders. Has history of back pain with associated radiculopathy down legs to her feet and also has history of neck pain with radiculopthy too. She tested positive for RF and MALIKA and her symptoms did improve before with trial of prednisone. Was referred to Dr. Lira last visit but she hasn't heard from them      PSocH  -no alcohol use; smokes 1ppd for 41 years, no drug use    PFH  -ESRD and heart failure      ROS  Constitutional: no fever/chills  EENT: no sore throat, ear pain, sinus pain/congestion, nasal congestion/drainage  Respiratory: no cough, no wheezing, no shortness of breath  Cardiovascular: no chest pain, no palpitations, no edema  Gastrointestinal: as above  Genitourinary: no dysuria, no urinary frequency or urgency, no hematuria  Integumentary: no skin rash or abnormal  lesion  Neurologic: no headache, no dizziness, no weakness or numbness  MSK: as above      MEDICATIONS & ALLERGIES:     Current Outpatient Medications on File Prior to Visit   Medication Sig    ALPRAZolam (XANAX) 0.5 MG tablet Take 0.5 mg by mouth 2 (two) times daily as needed for Anxiety (anxiety).    amLODIPine (NORVASC) 10 MG tablet Take 1 tablet (10 mg total) by mouth once daily.    carvediloL (COREG) 6.25 MG tablet Take 1 tablet (6.25 mg total) by mouth 2 (two) times daily with meals.    clopidogreL (PLAVIX) 75 mg tablet Take 75 mg by mouth once daily.    cyanocobalamin 1,000 mcg/mL injection Inject 1 mL (1,000 mcg total) into the muscle every 28 days.    DULoxetine (CYMBALTA) 30 MG capsule Take 1 capsule (30 mg total) by mouth every evening.    DULoxetine (CYMBALTA) 60 MG capsule Take 60 mg by mouth every morning.    ferrous gluconate (FERGON) 324 MG tablet Take 1 tablet (324 mg total) by mouth every other day.    folic acid (FOLVITE) 1 MG tablet Take 1 tablet (1 mg total) by mouth once daily.    gabapentin (NEURONTIN) 800 MG tablet Take 800 mg by mouth 3 (three) times daily.    garlic (GARLIQUE ORAL) Take 1 tablet by mouth Daily.    lisinopriL 10 MG tablet Take 1 tablet (10 mg total) by mouth once daily.    nicotine (NICODERM CQ) 21 mg/24 hr Place 1 patch onto the skin once daily.    nitroGLYCERIN (NITROSTAT) 0.4 MG SL tablet Place 1 tablet under the tongue every 5 (five) minutes as needed.    pantoprazole (PROTONIX) 40 MG tablet Take 1 tablet (40 mg total) by mouth once daily.    red yeast rice 600 mg Cap Take 3 capsules by mouth 2 (two) times a day.    rosuvastatin (CRESTOR) 40 MG Tab Take 40 mg by mouth.    traZODone (DESYREL) 150 MG tablet Take 300 mg by mouth every evening.    alendronate (FOSAMAX) 5 MG Tab 1 tablet Orally Once a day    amLODIPine (NORVASC) 5 MG tablet Take 5 mg by mouth.    atorvastatin (LIPITOR) 40 MG tablet Take 1 tablet (40 mg total) by mouth once daily. (Patient not taking: Reported  on 8/14/2023)    buPROPion (WELLBUTRIN SR) 150 MG TBSR 12 hr tablet Take 150 mg by mouth every morning.    clopidogreL (PLAVIX) 75 mg tablet 1 tablet Orally Once a day    cyanocobalamin (VITAMIN B-12) 1000 MCG tablet Take 1 tablet (1,000 mcg total) by mouth once daily. (Patient not taking: Reported on 8/14/2023)    DULoxetine (CYMBALTA) 60 MG capsule 1 capsule.    gabapentin (NEURONTIN) 400 MG capsule Take 400 mg by mouth 3 (three) times daily.    gabapentin (NEURONTIN) 600 MG tablet Take 600 mg by mouth 3 (three) times daily.    lisinopriL (PRINIVIL,ZESTRIL) 20 MG tablet 1 tablet Orally Once a day    mirtazapine (REMERON) 15 MG tablet TAKE ONE TABLET BY MOUTH ONCE A DAY AT BEDTIME    pravastatin (PRAVACHOL) 40 MG tablet 1 tablet Orally Once a day    predniSONE (DELTASONE) 10 MG tablet Take 1 tablet (10 mg total) by mouth once daily. (Patient not taking: Reported on 8/14/2023)    pregabalin (LYRICA) 25 MG capsule Take 1 capsule (25 mg total) by mouth 2 (two) times daily. (Patient not taking: Reported on 8/14/2023)    [DISCONTINUED] aspirin (ECOTRIN) 81 MG EC tablet Take 1 tablet (81 mg total) by mouth once daily. (Patient not taking: Reported on 8/14/2023)    [DISCONTINUED] HYDROcodone-acetaminophen (NORCO) 5-325 mg per tablet Take 1 tablet by mouth every 6 (six) hours as needed for Pain. (Patient not taking: Reported on 8/14/2023)     Current Facility-Administered Medications on File Prior to Visit   Medication    ferric gluconate 62.5 mg/5 mL injection 125 mg       Review of patient's allergies indicates:  No Known Allergies    OBJECTIVE:   Vital Signs:  Vitals:    08/14/23 1411   BP: 97/63   Pulse: 75   Resp: 18   Temp: 98 °F (36.7 °C)   SpO2: (!) 94%   Weight: 64.9 kg (143 lb)   Height: 5' (1.524 m)       No results found for this or any previous visit (from the past 24 hour(s)).      Physical Exam    General - Appears comfortable, appropriatley conversive   Mental Status - alert and oriented x 3, speaking in  logical, relevant sentences   HEENT - no rhinorrhea   Cardiac - RRR, no murmurs, rubs, or gallops; no edema in LE   Respiratory - breathing comfortably; clear to ascultation bilaterally   Abdominal - nondistended, soft, nontender to palpation   Extremities - tender in PIP of left 4'th digit. Some tenderness over a couple MCP's on left hand. Slightly tender over 5th MTP of left foot. Has some swelling near left wrist  Skin - no rashes or bruises seen on skin        ASSESSMENT & PLAN:     Health Maintenance  -Mammogram UTD and normal   -UTD with colonscopy - follows Dr. Hatch  -Immunizations: UTD with tdap, PCV 20. Declining Shingrix    Symptomatic Anemia  -reticulocyte count normal  -folate, B12 levels were low - has been on oral supplements with normalization of levels  -Contiue B12 and folate 1 tablet daily  -continue iron supplement every-other-day. Discussed with [facility] hematology who will arrange for IV iron infusions.   -has an appointment tomorrow with Dr. Jiménez small capsule endoscopy. Since she has Medicaid, it would have to be done here at [facility]. Will try to get it scheduled for when GI is available again.  -remainder of anemia workup is normal    Joint Pains  Seems combined osteoarthritis and rheumatoid arthritis  -labwork is positive for RF and MALIKA. Negative CCP and ds-DNA. Am ordering additional autoimmune labs today  -symptoms improved significantly with trial of prednisone. Has an appointment with Dr. Johnson in December.  -I do not want to start steroids given hx GI bleeds  -Will start her on MTX since her blood work has improved 7.5 for 4 weeks then will go up 2.5 per week until reaching 15 mg weekly, patient is already taking folic acid 1 mg daily  -Will order baseline CBC and follow up in 2 weeks.  -will give a few doses or prn toradol and norco    BLE Claudication  -Got bilateral lower extremity stents. Is compliant with Plavix. Mentions blood in stool, has an appointment with GI tomorrow for pill  camera.    Osteopenia  -DEXA 2/2023 - FRAX score 0.8% and 8.4% for hip and major osteoporotic fracture. No need for bisphosphonates  -low vitamin D in April/2023, started her on vitamin D supplement.    CAD  -Scheduled PCI x1 for 90% stenosis in 2013 for stable angina  -Has GRANGER - following [facility]     HCV s/p treatment  -s/p Epclusa treatment 2020; post-treatment viral load undetectable  -Fibroscan F1 so doesn't have cirrhosis  -no longer follows ID clinic since she was cured    Hx positive HBV core antibody  -Hx HBV Core IgG positive with negative surface antigen - suggests prior infection, not active    Tobacco abuse  -has dyspnea - she says she had PFT done at [facility] but I don't seen the records. I will call [facility]  -will discuss nicotine patches in future    RTC in 2 months, acute care slot LORI Dobbs MD  Internal Medicine PGY-1

## 2023-08-15 ENCOUNTER — HOSPITAL ENCOUNTER (OUTPATIENT)
Facility: HOSPITAL | Age: 58
Discharge: HOME | End: 2023-08-15
Attending: INTERNAL MEDICINE | Admitting: INTERNAL MEDICINE
Payer: MEDICAID

## 2023-08-15 DIAGNOSIS — D50.0 IRON DEFICIENCY ANEMIA DUE TO CHRONIC BLOOD LOSS: Primary | ICD-10-CM

## 2023-08-15 PROCEDURE — 27201423 OPTIME MED/SURG SUP & DEVICES STERILE SUPPLY: Performed by: INTERNAL MEDICINE

## 2023-08-15 PROCEDURE — 91110 GI TRC IMG INTRAL ESOPH-ILE: CPT | Mod: TC | Performed by: INTERNAL MEDICINE

## 2023-08-15 RX ORDER — HYDROCODONE BITARTRATE AND ACETAMINOPHEN 5; 325 MG/1; MG/1
1 TABLET ORAL EVERY 6 HOURS PRN
Qty: 12 TABLET | Refills: 0 | OUTPATIENT
Start: 2023-08-15

## 2023-08-23 NOTE — [employer] PATIENT INSTRUCTIONS
Discharge Summary/Instructions for after Video Capsule Endoscopy  Patient Name: Sandra Burns  Patient MRN: [id number]  Patient YOB: 1965  Tuesday, August 15, 2023  Liza Del Rio MD  1.  Do Not eat or drink anything for 1 hour.  Try sips of water first.  If   tolerated, resume your regular diet or one recommended by your physician.  2.  Do not drive, operate machinery, make critical decisions, or do   activities that require coordination or balance for 24 hours.  3.   You may experience a sore throat for 24 to 48 hours.  You may use   throat lozenges or gargle with warm salt water to relieve the discomfort.  4.  Because air was put into your stomach during the procedure, you may   experience some belching.  5.  Do not use any medication containing aspirin for 10 days.  6.  Go directly to the emergency room if you notice any of the following:   Chills and/or fever over 101 F   Persistent vomiting or vomiting with blood/nasal regurgitation   Severe abdominal pain, other than gas cramps   Severe chest pain   Black, tarry stools     Your doctor recommends these additional instructions:  You are being discharged to home.   You have a contact number available for emergencies.  The signs and symptoms   of potential delayed complications were discussed with you.  You may return   to normal activities tomorrow.  Written discharge instructions were   provided to you.   Continue your present medications.   Take an iron supplement.   Your physician has recommended an upper device-assisted enteroscopy.  If you have any questions or problems, please call your physician.  EMERGENCY PHONE NUMBER: [phone]  LAB RESULTS: [phone]  Liza Del Rio MD  8/23/2023 4:59:08 PM  This report has been verified and signed electronically.  Dear patient,  As a result of recent federal legislation (The Federal Cures Act), you may   receive lab or pathology results from your procedure in your [facility]   account  before your physician is able to contact you. Your physician or   their representative will relay the results to you with their   recommendations at their soonest availability.  Thank you,  [employer]

## 2023-09-01 ENCOUNTER — TELEPHONE (OUTPATIENT)
Dept: INTERNAL MEDICINE | Facility: CLINIC | Age: 58
End: 2023-09-01
Payer: MEDICAID

## 2023-09-01 NOTE — TELEPHONE ENCOUNTER
----- Message from Lia Anderson sent at 9/1/2023  3:06 PM CDT -----  Regarding: FW: Dr. Dobbs-Test Results    ----- Message -----  From: Bethany Ray  Sent: 8/30/2023   4:06 PM CDT  To: #  Subject: Dr. Dobbs-Test Results                           Good afternoon,patient states she has been calling GI for the results of her endoscopy from 08/15/23 but no one is answering the phone and would like to know if Dr. Dobbs can give them to her. Patient also stated she sometimes drinks apple cider vinegar as a cleanse and would like to know if it's okay to continue since the scope has been done. Please cll patient to discuss. Patient will RTC on 10/09/23. Thank you

## 2023-09-01 NOTE — TELEPHONE ENCOUNTER
Pt called, no answer and unable to leave an message. Will attempt to call pt on Tuesday when we RTC after labor day closure for Monday 9/4/2023.

## 2023-09-11 ENCOUNTER — DOCUMENTATION ONLY (OUTPATIENT)
Dept: GASTROENTEROLOGY | Facility: CLINIC | Age: 58
End: 2023-09-11
Payer: MEDICAID

## 2023-09-11 ENCOUNTER — OFFICE VISIT (OUTPATIENT)
Dept: HEMATOLOGY/ONCOLOGY | Facility: CLINIC | Age: 58
End: 2023-09-11
Attending: INTERNAL MEDICINE
Payer: MEDICAID

## 2023-09-11 VITALS
SYSTOLIC BLOOD PRESSURE: 115 MMHG | DIASTOLIC BLOOD PRESSURE: 74 MMHG | OXYGEN SATURATION: 97 % | HEART RATE: 72 BPM | TEMPERATURE: 99 F | BODY MASS INDEX: 27.72 KG/M2 | RESPIRATION RATE: 20 BRPM | WEIGHT: 141.19 LBS | HEIGHT: 60 IN

## 2023-09-11 DIAGNOSIS — D50.0 IRON DEFICIENCY ANEMIA DUE TO CHRONIC BLOOD LOSS: ICD-10-CM

## 2023-09-11 DIAGNOSIS — D50.0 IRON DEFICIENCY ANEMIA DUE TO CHRONIC BLOOD LOSS: Primary | ICD-10-CM

## 2023-09-11 DIAGNOSIS — E53.8 VITAMIN B12 DEFICIENCY: Primary | ICD-10-CM

## 2023-09-11 DIAGNOSIS — E53.8 VITAMIN B12 DEFICIENCY: ICD-10-CM

## 2023-09-11 DIAGNOSIS — D62 ACUTE BLOOD LOSS ANEMIA: ICD-10-CM

## 2023-09-11 DIAGNOSIS — I10 HYPERTENSION, UNSPECIFIED TYPE: ICD-10-CM

## 2023-09-11 DIAGNOSIS — K31.819 ANGIODYSPLASIA OF DUODENUM: ICD-10-CM

## 2023-09-11 PROCEDURE — 3008F BODY MASS INDEX DOCD: CPT | Mod: CPTII,,, | Performed by: INTERNAL MEDICINE

## 2023-09-11 PROCEDURE — 4010F PR ACE/ARB THEARPY RXD/TAKEN: ICD-10-PCS | Mod: CPTII,,, | Performed by: INTERNAL MEDICINE

## 2023-09-11 PROCEDURE — 3008F PR BODY MASS INDEX (BMI) DOCUMENTED: ICD-10-PCS | Mod: CPTII,,, | Performed by: INTERNAL MEDICINE

## 2023-09-11 PROCEDURE — 1160F RVW MEDS BY RX/DR IN RCRD: CPT | Mod: CPTII,,, | Performed by: INTERNAL MEDICINE

## 2023-09-11 PROCEDURE — 3074F SYST BP LT 130 MM HG: CPT | Mod: CPTII,,, | Performed by: INTERNAL MEDICINE

## 2023-09-11 PROCEDURE — 3074F PR MOST RECENT SYSTOLIC BLOOD PRESSURE < 130 MM HG: ICD-10-PCS | Mod: CPTII,,, | Performed by: INTERNAL MEDICINE

## 2023-09-11 PROCEDURE — 99213 OFFICE O/P EST LOW 20 MIN: CPT | Mod: S$PBB,,, | Performed by: INTERNAL MEDICINE

## 2023-09-11 PROCEDURE — 1160F PR REVIEW ALL MEDS BY PRESCRIBER/CLIN PHARMACIST DOCUMENTED: ICD-10-PCS | Mod: CPTII,,, | Performed by: INTERNAL MEDICINE

## 2023-09-11 PROCEDURE — 1159F MED LIST DOCD IN RCRD: CPT | Mod: CPTII,,, | Performed by: INTERNAL MEDICINE

## 2023-09-11 PROCEDURE — 4010F ACE/ARB THERAPY RXD/TAKEN: CPT | Mod: CPTII,,, | Performed by: INTERNAL MEDICINE

## 2023-09-11 PROCEDURE — 99213 OFFICE O/P EST LOW 20 MIN: CPT | Mod: PBBFAC | Performed by: INTERNAL MEDICINE

## 2023-09-11 PROCEDURE — 1159F PR MEDICATION LIST DOCUMENTED IN MEDICAL RECORD: ICD-10-PCS | Mod: CPTII,,, | Performed by: INTERNAL MEDICINE

## 2023-09-11 PROCEDURE — 3078F PR MOST RECENT DIASTOLIC BLOOD PRESSURE < 80 MM HG: ICD-10-PCS | Mod: CPTII,,, | Performed by: INTERNAL MEDICINE

## 2023-09-11 PROCEDURE — 3078F DIAST BP <80 MM HG: CPT | Mod: CPTII,,, | Performed by: INTERNAL MEDICINE

## 2023-09-11 PROCEDURE — 99213 PR OFFICE/OUTPT VISIT, EST, LEVL III, 20-29 MIN: ICD-10-PCS | Mod: S$PBB,,, | Performed by: INTERNAL MEDICINE

## 2023-09-11 NOTE — PROGRESS NOTES
Patient presented to GI Lab requesting results of video capsule endoscopy. Reviewed findings of the capsule endoscopy including multiple angioectasias without bleeding in the duodenum, jejunum and ileum. Recommendations include iron supplement and if hemoglobin falls, to perform an upper device-assisted enteroscopy vs push enteroscopy. No clinic follow-up needed at this time per Dr. Del Rio due to patient receiving close follow-up with frequent lab monitoring. Patient verbalized understanding. Patient has been followed by Dr. Keyon Hatch and was referred for video capsule endoscopy by Dr. Hatch. Will fax results to Dr. Hatch's office for his review.

## 2023-09-11 NOTE — PROGRESS NOTES
History:  Past Medical History:   Diagnosis Date    Anemia, unspecified     Coronary artery disease     History of esophagogastroduodenoscopy (EGD) 11/01/2022    Hypertension     PAD (peripheral artery disease)     PVD (peripheral vascular disease) with claudication     Thyroid disease    Past medical history:  B12 deficiency.  Anemia.  Iron-deficiency.  History of peripheral vascular disease, on dual antiplatelets.  Hypothyroidism.  CAD, S/P PCI.  Chronic hepatitis-B.  Chronic hepatitis-C. Depression.  GERD.  Right lower lung lobe nodule.  02/22/2023: Bilateral lower extremity stent placement, with resolution of bilateral lower extremity pain  Social history:  .  Lives with her brother in [place], [place].  No children.  Never became pregnant.  Disabled.  Does not work.  Has been smoking half a pack of cigarettes daily for 45 years.  Has been smoking marijuana every other day for 40 years.  No other illicit drugs.  No alcohol abuse.  Family history:  Negative for cancers or blood dyscrasias.  Health maintenance:  PCP at [facility].  -05/02/2023:  Bilateral screening mammogram (comparison:  12/03/2020 mammogram):  BI-RADS 2 (benign)  -02/24/2022: Stool for occult blood pos  -history of multiple colonoscopies and endoscopies: Angiodysplastic lesions in duodenum  Past Surgical History:   Procedure Laterality Date    EXTRACORPOREAL SHOCK WAVE LITHOTRIPSY  2/22/2023    Procedure: LITHOTRIPSY- Peripheral Shockwave;  Surgeon: Adriel Valero MD;  Location: [facility] CATH LAB;  Service: Cardiology;;    HIP SURGERY      INSERTION, STENT, ARTERY  2/22/2023    Procedure: INSERTION, STENT, ARTERY;  Surgeon: Adriel Valero MD;  Location: [facility] CATH LAB;  Service: Cardiology;;    INTRALUMINAL GASTROINTESTINAL TRACT IMAGING VIA CAPSULE N/A 8/15/2023    Procedure: IMAGING PROCEDURE, GI TRACT, INTRALUMINAL, VIA CAPSULE;  Surgeon: Liza Del Rio MD;  Location: [facility] ENDOSCOPY;  Service: Gastroenterology;  Laterality:  N/A;    INTRAVASCULAR ULTRASOUND, NON-CORONARY  2/22/2023    Procedure: Intravascular Ultrasound, Non-Coronary;  Surgeon: Adriel Valero MD;  Location: [facility] CATH LAB;  Service: Cardiology;;    LEFT HEART CATHETERIZATION      PERIPHERAL ANGIOGRAPHY N/A 2/22/2023    Procedure: Peripheral angiography;  Surgeon: Adriel Valero MD;  Location: [facility] CATH LAB;  Service: Cardiology;  Laterality: N/A;  BILAT ILIAC INTERVENTION      Social History     Socioeconomic History    Marital status: Single   Tobacco Use    Smoking status: Every Day     Current packs/day: 0.25     Average packs/day: 0.3 packs/day for 40.0 years (10.0 ttl pk-yrs)     Types: Cigarettes    Smokeless tobacco: Never    Tobacco comments:     10 cigs per day   Substance and Sexual Activity    Alcohol use: Not Currently    Drug use: Yes     Types: Marijuana    Sexual activity: Not Currently   Social History Narrative    ** Merged History Encounter **           History reviewed. No pertinent family history.     Reason for Follow-up:  Iron-deficiency anemia   Vitamin B12 deficiency   Angiodysplasia of duodenum    History of Present Illness:   iron deficiency anemia due to chronic blood less (iron deficiency anemia due to chronic blood less)        Oncologic/Hematologic History:  Oncology History    No history exists.   57-year-old female, referred from Internal Medicine, for evaluation of anemia.      Admitted to [facility] 04/07/2023-04/08/2023:  57-year-old female with significant history of HTN, PVD status post intervention, hypothyroidism, CAD status post PCI, right lower lobe lung nodule, bipolar disorder, depression, GERD, chronic hepatitis-B/chronic hepatitis-C and iron deficiency anemia presented to the ED with complaints of fatigue, generalized weakness and outpatient labs revealing anemia.  Patient was referred to the ED for admission to further evaluate anemia.  On Plavix.  Patient had a colonoscopy 2.  Transfused with PRBC x1 unit.  No overt  bleeding.  No history of alcohol use.  GI recommended to hold off on endoscopic evaluation.  Hemoccult-pos but numerous endoscopy procedures by Dr. Keyon Hatch, her GI, unrevealing in the past.  Patient on aspirin and Plavix which were continued.  Hemoglobin improved appropriately post PRBC x1 unit.  Iron deficient.  B12 deficient.  04/03/2023: Outside blood work showed ferritin 8.8.  B12 209.    History of iron-deficiency anemia.  History of B12 deficiency.  Admitted to [facility] 04/07/2023 with fatigue, weakness, dizziness.  Outside labs had shown anemia.  History of multiple endoscopies with Dr. Keyon Hatch, GI, with no source of GI bleed found.  04/03/2023: Blood work showed iron-deficiency, ferritin 8.8, B12 level 209.    History of duodenal angiodysplasias a couple of years ago    Blood transfusion in the past   EGD 11/2021: Hiatal hernia, GE ring, nonbleeding angioectasias   Colonoscopy 09/2021: Normal   EGD and colonoscopy 2022: Esophageal dilation, no bleeding, colon polyp    History of hepatitis-C: S/P treatment; S/P occlusive 2020; posttreatment viral load undetectable; FibroScan F1 (no liver cirrhosis)    09/09/2020: Limited abdominal ultrasound (comparison:  05/07/2013): Liver cirrhosis; no focal hepatic lesion    -12/16/2020: EGD (liver cirrhosis, rule out esophageal varices):  3 diminutive nonbleeding angiodysplastic lesions in the 2nd portion of duodenum  -09/22/2021:  Colonoscopy (indication:  Occult blood in stool; personal history of colon polyps): Normal mucosa in whole:  -11/15/2021: EGD (Keyon Hatch MD) (indication:  Dysphagia, oropharyngeal, nausea, heartburn, abdominal bloating, GI metaplasia, generalized abdominal pain):  Erythema lower 3rd of esophagus compatible with esophagitis; erythema antrum, compatible with gastritis; esophageal hiatal hernia; a single bleeding angiectasia seen in duodenal bulb, treated with monopolar cautery successfully (thermotherapy) (pathology:  Gastric  antrum biopsy, reactive gastropathy with mild nonspecific chronic inflammation, negative for intestinal metaplasia; negative for H pylori; gastric body biopsy, reactive gastropathy, mild nonspecific chronic inflammation, negative for intestinal metaplasia, negative for H pylori)    Labs reviewed:  Hemoglobin: 8.1 (05/16/2023); 9.0 (04/08/2023); 7.5 (04/07/2023); 6.8 (04/03/2023); 8.0 (03/13/2023); 11.6 (10/28/2022); 13.2 (10/03/2022), etc.   MCV: Consistently normal with mild macrocytosis on a couple of occasions  Rest of CBC unremarkable  05/16/2023: Serum iron 50.  TIBC 323.  Ferritin 16.8.  Transferrin saturation 15%.  B12 level 705.  Folate 38.8.    04/03/2023: Serum iron 8.  TIBC 344.  Ferritin 8.8.  Transferrin saturation 2%.  B12 level 209, low.  Folate 8.7   10/11/2022: Serum iron 69.  TIBC 292.  Ferritin 27.13.  Transferrin saturation 24%.  09/09/2020:  Ferritin 18.8, hemoglobin 9.8, MCV 93.7  Haptoglobin normal  04/03/2023:  Hemoglobin 6.8.  Retic count 1.89%.  LDH normal.  05/17/2023: Celiac serology negative    04/08/2023:  Hepatitis-C antibody pos (treated in the past with Epclusa, with a negative hepatitis-A viral load subsequently   09/09/2020:  Hepatitis-B core antibody total reactive.  Hepatitis-B surface antibody negative.  Hepatitis-B surface antigen negative.  HIV negative    05/16/2023:  RBC osmotic fragility normal  04/03/2023:  Hemoglobin electrophoresis:  No abnormal hemoglobin or beta thalassemia    05/22/2023:  Pleasant lady.  Presents for initial hematology consultation.  In no acute discomfort.    Always feels tired.  No abdominal pain, nausea or vomiting.  Denies hematemesis, melena, or hematochezia.  Fair appetite.  Main complaint is chronic tiredness.  Has been taking iron pill every other day for last 2 years.  No GI side effects.  Started vitamin B12 pill x2 daily, a month ago.  We will check her iron stores and B12 level at this time.  Says that she was transfused around Easter time  and a couple of years ago as well.  No history of cancers.  Chronic generalized fatigue, swelling in the legs, cough, and numbness and tingling in hands.  Generalized body pains, 9/10, which she attributes to rheumatoid arthritis.    Interval History:  FERAHEME (FERUMOXYTOL) TWO DOSES   [No matching plan found]     09/11/2023:   -05/16/2023:  Hemoglobin 8.1.  MCV normal.  Ferritin 16.80.  Transferrin saturation 15%.  -05/22/2023:  Hemoglobin 8.9.  MCV normal.  Ferritin 17.85.  Transferrin saturation 4%.   -07/10/2023:  Hemoglobin 12.3.  MCV normal.  Ferritin 122.03.  Transferrin saturation 43%.    -07/12/2023:  Patient desired to have B12 shots rather than pills because she can not afford the pills-08/14/2023:  Hemoglobin 11.3.  MCV normal.  Ferritin 23.54.  Transferrin saturation 12%.    -08/15/2023: Video capsule endoscopy: Multiple angioectasias without bleeding in the duodenum; multiple angioectasias without bleeding in the jejunum; multiple angioectasias without bleeding in the ileum  -09/11/2023:  Hemoglobin 11.1, stable since 08/14/2023.  MCV normal.  Presents for a follow-up visit.  Labs reviewed.  Feels well.  More energetic.  No abdominal pain, nausea, vomiting, hematemesis, melena, or hematochezia.  She was not aware that capsule endoscopy found multiple angioectasias in her GI tract; I advised her to follow-up with GI for further explanation regarding this.      Medications:  Current Outpatient Medications on File Prior to Visit   Medication Sig Dispense Refill    alendronate (FOSAMAX) 5 MG Tab 1 tablet Orally Once a day      ALPRAZolam (XANAX) 0.5 MG tablet Take 0.5 mg by mouth 2 (two) times daily as needed for Anxiety (anxiety).      amLODIPine (NORVASC) 10 MG tablet Take 1 tablet (10 mg total) by mouth once daily. 90 tablet 1    amLODIPine (NORVASC) 5 MG tablet Take 5 mg by mouth.      carvediloL (COREG) 6.25 MG tablet Take 1 tablet (6.25 mg total) by mouth 2 (two) times daily with meals. 90 tablet 3     clopidogreL (PLAVIX) 75 mg tablet Take 75 mg by mouth once daily.      cyanocobalamin 1,000 mcg/mL injection Inject 1 mL (1,000 mcg total) into the muscle every 28 days. 2 mL 0    DULoxetine (CYMBALTA) 30 MG capsule Take 1 capsule (30 mg total) by mouth every evening. 90 capsule 1    DULoxetine (CYMBALTA) 60 MG capsule Take 60 mg by mouth every morning.      ferrous gluconate (FERGON) 324 MG tablet Take 1 tablet (324 mg total) by mouth every other day. 90 tablet 1    folic acid (FOLVITE) 1 MG tablet Take 1 tablet (1 mg total) by mouth once daily. 90 tablet 1    gabapentin (NEURONTIN) 400 MG capsule Take 400 mg by mouth 3 (three) times daily.      gabapentin (NEURONTIN) 600 MG tablet Take 600 mg by mouth 3 (three) times daily.      garlic (GARLIQUE ORAL) Take 1 tablet by mouth Daily.      lisinopriL (PRINIVIL,ZESTRIL) 20 MG tablet 1 tablet Orally Once a day      nicotine (NICODERM CQ) 21 mg/24 hr Place 1 patch onto the skin once daily. 60 patch 0    nitroGLYCERIN (NITROSTAT) 0.4 MG SL tablet Place 1 tablet under the tongue every 5 (five) minutes as needed.      pantoprazole (PROTONIX) 40 MG tablet Take 1 tablet (40 mg total) by mouth once daily. 90 tablet 1    red yeast rice 600 mg Cap Take 3 capsules by mouth 2 (two) times a day.      rosuvastatin (CRESTOR) 40 MG Tab Take 40 mg by mouth.      traZODone (DESYREL) 150 MG tablet Take 300 mg by mouth every evening.      atorvastatin (LIPITOR) 40 MG tablet Take 1 tablet (40 mg total) by mouth once daily. (Patient not taking: Reported on 8/14/2023) 90 tablet 1    buPROPion (WELLBUTRIN SR) 150 MG TBSR 12 hr tablet Take 150 mg by mouth every morning.      clopidogreL (PLAVIX) 75 mg tablet 1 tablet Orally Once a day      cyanocobalamin (VITAMIN B-12) 1000 MCG tablet Take 1 tablet (1,000 mcg total) by mouth once daily. (Patient not taking: Reported on 9/11/2023) 180 tablet 1    DULoxetine (CYMBALTA) 60 MG capsule 1 capsule.      ergocalciferol (ERGOCALCIFEROL) 50,000 unit  Cap Take 1 capsule (50,000 Units total) by mouth every 7 days. (Patient not taking: Reported on 9/11/2023) 60 capsule 1    gabapentin (NEURONTIN) 800 MG tablet Take 800 mg by mouth 3 (three) times daily.      lisinopriL 10 MG tablet Take 1 tablet (10 mg total) by mouth once daily. 90 tablet 3    methotrexate 2.5 MG Tab Take 3 tablets (7.5 mg total) by mouth every 7 days. (Patient not taking: Reported on 9/11/2023) 12 tablet 0    mirtazapine (REMERON) 15 MG tablet TAKE ONE TABLET BY MOUTH ONCE A DAY AT BEDTIME      pravastatin (PRAVACHOL) 40 MG tablet 1 tablet Orally Once a day      predniSONE (DELTASONE) 10 MG tablet Take 1 tablet (10 mg total) by mouth once daily. (Patient not taking: Reported on 8/14/2023) 14 tablet 0    pregabalin (LYRICA) 25 MG capsule Take 1 capsule (25 mg total) by mouth 2 (two) times daily. (Patient not taking: Reported on 8/14/2023) 120 capsule 1     Current Facility-Administered Medications on File Prior to Visit   Medication Dose Route Frequency Provider Last Rate Last Admin    ferric gluconate 62.5 mg/5 mL injection 125 mg  125 mg Intravenous 1 time in Clinic/HOD Akin Gan MD           Review of Systems:   All systems reviewed and found to be negative except for the symptoms detailed above    Physical Examination:   VITAL SIGNS:   Vitals:    09/11/23 1328   BP: 115/74   Pulse: 72   Resp: 20   Temp: 98.6 °F (37 °C)         GENERAL:  In no apparent distress.    HEAD:  No signs of head trauma.  EYES:  Pupils are equal.  Extraocular motions intact.    EARS:  Hearing grossly intact.  MOUTH:  Oropharynx is normal.   NECK:  No adenopathy, no JVD.     CHEST:  Chest with clear breath sounds bilaterally.  No wheezes, rales, rhonchi.    CARDIAC:  Regular rate and rhythm.  S1 and S2, without murmurs, gallops, rubs.  VASCULAR:  No Edema.  Peripheral pulses normal and equal in all extremities.  ABDOMEN:  Soft, without detectable tenderness.  No sign of distention.  No   rebound or  "guarding, and no masses palpated.   Bowel Sounds normal.  MUSCULOSKELETAL:  Good range of motion of all major joints. Extremities without clubbing, cyanosis or edema.    NEUROLOGIC EXAM:  Alert and oriented x 3.  No focal sensory or strength deficits.   Speech normal.  Follows commands.  PSYCHIATRIC:  Mood normal.    No results for input(s): "CBC" in the last 72 hours.   No results for input(s): "CMP" in the last 72 hours.     Assessment:  Problem List Items Addressed This Visit          Oncology    Iron deficiency anemia due to chronic blood loss - Primary       Endocrine    Vitamin B12 deficiency       GI    Angiodysplasia of duodenum       Iron-deficiency anemia, B12 deficiency anemia:  (Multiple angioectasias without bleeding in the duodenum; multiple angioectasias without bleeding in the jejunum; multiple angioectasias without bleeding in the ileum):   -Hemoglobin: 8.1 (05/16/2023); 9.0 (04/08/2023); 7.5 (04/07/2023); 6.8 (04/03/2023); 8.0 (03/13/2023); 11.6 (10/28/2022); 13.2 (10/03/2022), etc.   -MCV: Consistently normal with mild macrocytosis on a couple of occasions  -Rest of CBC unremarkable  -ferritin:  16.8 (05/16/2023); 8.8 (04/03/2023); 27.13 (10/11/2022); 18.8 (09/09/2020)  -Haptoglobin normal  -04/03/2023:  Hemoglobin 6.8.  Retic count 1.89%.  LDH normal.  Hospitalized 04/07/2023, transfused with PRBC x1 unit  -05/17/2023: Celiac serology negative  -05/16/2023:  RBC osmotic fragility normal  -04/03/2023:  Hemoglobin electrophoresis:  No abnormal hemoglobin or beta thalassemia  -hospitalized [facility] 04/07/2023-04/08/2023: Symptomatic anemia.  Transfused PRBC x1 unit.  Iron deficient.  Also found to be B12 deficient.  -Ferrlecit 125 mg IV x2 (02/20/2023, 04/08/2023  -history of multiple EGD and colonoscopy with Dr. Keyon Hatch, GI, in the past, with no source of GI bleeding found  -04/03/2023: Ferritin 8.8.  B12 209.  -12/16/2020: EGD:  No esophageal varices; 3 diminutive nonbleeding " angiodysplastic lesions 2nd portion of duodenum   -11/15/2021: EGD:  Esophagitis, gastritis, a single bleeding angiectasia duodenal bulb treated with cautery (thermotherapy)  -09/22/2021: Colonoscopy:  Occult blood in stool: Normal  EGD and colonoscopy 2022:  Colon polyp  -no response to oral iron therapy x2-3 years  -05/16/2023:  Hemoglobin 8.1.  MCV normal.  Ferritin 16.80.  Transferrin saturation 15%.  -05/22/2023:  Hemoglobin 8.9.  MCV normal.  Ferritin 17.85.  Transferrin saturation 4%.    (no response to iron pill every other day which she has been taking for last 2-3 years)  -S/P Feraheme 510 mg IV x2 (06/05/2023, 06/12/2023)  -07/10/2023:  Hemoglobin 12.3.  MCV normal.  Ferritin 122.03.  Transferrin saturation 43%.    (thus, good response to Feraheme, with normalization of hemoglobin and improvement in iron stores; ferritin level is pending)  -07/12/2023:  Patient desired to have B12 shots rather than pills because she can not afford the pills-08/14/2023:  Hemoglobin 11.3.  MCV normal.  Ferritin 23.54.  Transferrin saturation 12%.    -08/14/2023:  Hemoglobin 11.3.  MCV normal.  Ferritin 23.54.  Transferrin saturation 12%.    -08/15/2023: Video capsule endoscopy: Multiple angioectasias without bleeding in the duodenum; multiple angioectasias without bleeding in the jejunum; multiple angioectasias without bleeding in the ileum  -09/11/2023:  Hemoglobin 11.1, stable      Vitamin B12 deficiency:  -diagnosed 04/03/2023: B12 level 209  -05/22/2023: Tells me that she has been taking 2 vitamin B12 pills for last 1 month.  -05/22/2023:  Hemoglobin 8.9.  MCV 86.4.  Ferritin 17.85.  Transferrin saturation 4%.  Vitamin B12 level 521.  Intrinsic factor antibody negative.  Homocystine level 4.6 micromoles per L, suppressed (patient already on oral B12 supplementation).  Methylmalonic acid level 0.12 nonmobile/ml, normal (patient already on oral vitamin B12 supplementation, with adequate absorption)  (intrinsic factor  antibody negative; absorbing vitamin B12 satisfactorily via oral route)  -07/12/2023:  Patient desired to have B12 shots rather than pills because she can not afford the pills      History of hepatitis-C and liver cirrhosis:  -treated with Epclusa in 2020   -Posttreatment viral load undetectable  -FibroScan F 1 (no liver cirrhosis)  -ultrasound 09/09/2020: Liver cirrhosis     -EGD 12/16/2020:  No esophageal varices      Plan:   Check CBC, serum iron, TIBC, ferritin, B12 level in November  For last couple she has discontinued B12 pills because she could not afford   For last 2 months, her sister-in-law has been administering her monthly B12 shots; please renew   Vitamin B12 1000 mcg IM every month.  Follow-up in November, with labs.  -------------------      -chronic iron-deficiency anemia  -transfused in the past   -no response to oral iron therapy which she was taking for 2-3 years  -history of nonbleeding angiodysplastic lesions on EGD 12/16/2020, 11/15/2021; S/P endoscopic thermotherapy 11/15/2021  -treated with Feraheme x2 in 06/2023  -found to have multiple angioectasias without bleeding in duodenum, jejunum, and in the ileum on video capsule endoscopy 08/15/2023  >>>  From Hematology perspective, surveil for recurrence of iron-deficiency anemia  Recheck CBC, serum iron, TIBC, and ferritin in 3 months (November)    Found to be vitamin B12 deficient on labs 04/03/2023   -05/22/2023: Told me that she has been taking 2 vitamin B12 pills for 1 month   -05/22/2023:  B12 level normalized, 521, with oral B12 therapy; intrinsic factor antibody negative; homocystine level suppressed; methylmalonic acid level normal (the labs were checked while she was already on oral B12 therapy)  -09/11/2023:  For last couple she has discontinued B12 pills because she could not afford   -09/11/2023:  For last 2 months, her sister-in-law has been administering her monthly B12 shots;   >>>  Continue vitamin B12 1000 mcg IM (per her  preference) every month; her sister in law administers the shots    From Hematology perspective, we will monitor iron and B12 deficiency anemia, and intervene to replete any deficiency as and when necessary    Follow-up in November, with CBC, serum iron, TIBC, ferritin, and B12 level.    Above discussed at length with the patient.  All questions answered.    Discussed labs and gave her copies of relevant reports.    Plan of management discussed.    She understands and agrees with this plan.    Follow-up:  No follow-ups on file.

## 2023-09-11 NOTE — Clinical Note
For last couple she has discontinued B12 pills because she could not afford  For last 2 months, her sister-in-law has been administering her monthly B12 shots; please renew  Vitamin B12 1000 mcg IM every month.

## 2023-09-11 NOTE — Clinical Note
Check CBC, serum iron, TIBC, ferritin, B12 level in November Continue vitamin B12 1000 mcg p.o. q.day Follow-up in November, with labs.

## 2023-09-13 ENCOUNTER — TELEPHONE (OUTPATIENT)
Dept: GASTROENTEROLOGY | Facility: CLINIC | Age: 58
End: 2023-09-13
Payer: MEDICAID

## 2023-09-13 NOTE — TELEPHONE ENCOUNTER
----- Message from CRIS Oliver sent at 9/12/2023  8:56 AM CDT -----  Regarding: Video capsule endoscopy  This patient was referred to us for a video capsule endoscopy by Dr. Keyon Hatch which was completed in August 2023. Could we send the results to Dr. Keyon Hatch's office? I spoke with the patient yesterday regarding results.  Thank you,  Priyanka

## 2023-09-13 NOTE — TELEPHONE ENCOUNTER
Report faxed to Dr. Hatch                                                [facility]                                              CARDIOLOGY AT [address], [place] [address]                                             Telephone: [phone]. Fax:[phone]                                                       CARDIOLOGY CONSULTATION NOTE                                                                                             History obtained by: Patient and medical record   Community Cardiologist: None    obtained: Yes [  ] No [ x ]  Reason for Consultation: Chest pain   Available out pt records reviewed: Yes [ x ] No [  ]    Chief complaint:    Patient is a 61y old  Male who presents with a chief complaint of chest pain     HPI:  This is a 61 yr old M current ETOH abuse (beer/vodka 8 drinks multiple times per week), with HTN, and HLD who presents with chest pain and hypertension consistent w/ hypertensive emergency. Patient has baseline LBBB, has chest pain/back pain which follows unstable anginal pattern. Pt states he hasnt seen a doctor in >4 years and has been heavily drinking.     PAST MEDICAL HISTORY  HTN (hypertension)    PAST SURGICAL HISTORY  No significant past surgical history    H/O knee surgery    SUBSTANCE USE HISTORY  Denies current and previous substance use [  ]   CIGARETTES -   ALCOHOL - see hpi   DRUGS -     FAMILY HISTORY:  FH: HTN (hypertension) (Father, Mother)    CARDIAC SPECIFIC FAMILY HX   No KNOWN family history of Cardiovascular disease, CAD, or sudden death in first degree relatives unless specified below  Family History of Cardiovascular Disease:  [  ]   Coronary Artery Disease in first degree relative:  [  ]   Sudden Cardiac Death in First degree relative: [  ]    CURRENT OTHER MEDICATIONS:  pantoprazole  Injectable 40 milliGRAM(s) IV Push once, Stop order after: 1 Doses      ALLERGIES:   No Known Allergies      VITAL SIGNS:  T(C): 36.6 (23 @ 06:54), Max: 36.6 (23 @ 06:54)  T(F): 97.9 (23 @ 06:54), Max: 97.9 (23 @ 06:54)  HR: 74 (23 @ 06:54) (68 - 77)  BP: 179/99 (23 @ 06:54) (158/97 - 179/99)  RR: 22 (23 @ 06:54) (18 - 22)  SpO2: 100% (23 @ 06:54) (96% - 100%)    INTAKE AND OUTPUT:       LABS:  ( 2023 04:18 )  Troponin T  <0.01,  CPK  X    , CKMB  X    , BNP X                                  14.0   5.03  )-----------( 192      ( 2023 04:18 )             40.8         142  |  103  |  8.6  ----------------------------<  89  3.8   |  27.0  |  0.61    Ca    8.7      2023 04:18    TPro  7.1  /  Alb  4.3  /  TBili  0.5  /  DBili  x   /  AST  56<H>  /  ALT  42<H>  /  AlkPhos  101      PT/INR - ( 2023 07:37 )   PT: 12.1 sec;   INR: 1.04 ratio         PTT - ( 2023 07:37 )  PTT:29.3 sec  Urinalysis Basic - ( 2023 04:18 )    Color: x / Appearance: x / SG: x / pH: x  Gluc: 89 mg/dL / Ketone: x  / Bili: x / Urobili: x   Blood: x / Protein: x / Nitrite: x   Leuk Esterase: x / RBC: x / WBC x   Sq Epi: x / Non Sq Epi: x / Bacteria: x    RADIOLOGY IMAGIN Lead ECG:   Provider's Contact #: [phone] (23 @ 06:06) [Ordered]  12 Lead ECG:   Provider's Contact #: [phone] (23 @ 06:06) [Ordered.]  Xray Chest 1 View- PORTABLE-Urgent: Urgent   Indication: Chest Pain  Transport: Portable  Exam Completed (23 @ 04:05) [Performed]  12 Lead ECG (23 @ 03:26) [Completed]

## 2023-09-15 RX ORDER — HYDROCODONE BITARTRATE AND ACETAMINOPHEN 10; 325 MG/1; MG/1
1 TABLET ORAL
COMMUNITY
End: 2023-10-04 | Stop reason: ALTCHOICE

## 2023-09-18 ENCOUNTER — TELEPHONE (OUTPATIENT)
Dept: INTERNAL MEDICINE | Facility: CLINIC | Age: 58
End: 2023-09-18
Payer: MEDICAID

## 2023-09-18 NOTE — TELEPHONE ENCOUNTER
----- Message from Bethany Ray sent at 9/18/2023  8:13 AM CDT -----  Regarding: Dr. Dobbs-Arthritis Pain  Good morning, patient has called again in regards to arthritis pain (cannot use her hands). Patient also stated  she is tired leaving messages or talking to people who she feels are not helping or giving the care she needs. Patient was informed her message was sent to the provider. Please call. Thank you

## 2023-09-25 DIAGNOSIS — E53.8 VITAMIN B12 DEFICIENCY: ICD-10-CM

## 2023-09-25 RX ORDER — CYANOCOBALAMIN 1000 UG/ML
1000 INJECTION, SOLUTION INTRAMUSCULAR; SUBCUTANEOUS
Qty: 2 ML | Refills: 2 | Status: SHIPPED | OUTPATIENT
Start: 2023-09-25 | End: 2023-12-07

## 2023-09-26 ENCOUNTER — TELEPHONE (OUTPATIENT)
Dept: INTERNAL MEDICINE | Facility: CLINIC | Age: 58
End: 2023-09-26

## 2023-09-26 NOTE — TELEPHONE ENCOUNTER
Pt called stating she has been having diarrhea for the last wks and requesting meds to help with it. Pt can be reached at [phone]

## 2023-09-27 DIAGNOSIS — R19.7 DIARRHEA, UNSPECIFIED TYPE: ICD-10-CM

## 2023-09-27 DIAGNOSIS — M79.642 PAIN IN BOTH HANDS: Primary | ICD-10-CM

## 2023-09-27 DIAGNOSIS — M79.641 PAIN IN BOTH HANDS: Primary | ICD-10-CM

## 2023-09-27 RX ORDER — METHOTREXATE 2.5 MG/1
TABLET ORAL
Qty: 81 TABLET | Refills: 0 | Status: SHIPPED | OUTPATIENT
Start: 2023-09-27 | End: 2023-12-07

## 2023-09-27 RX ORDER — HYDROCODONE BITARTRATE AND ACETAMINOPHEN 10; 325 MG/1; MG/1
1 TABLET ORAL
OUTPATIENT
Start: 2023-09-27

## 2023-09-27 RX ORDER — PREDNISONE 20 MG/1
20 TABLET ORAL DAILY
Qty: 5 TABLET | Refills: 0 | Status: SHIPPED | OUTPATIENT
Start: 2023-09-27 | End: 2023-10-02

## 2023-09-27 NOTE — PROGRESS NOTES
Called the patient back, she had complaints of diarrhea and joint pain.    Her diarrhea has started 3 weeks ago, loose to watery 4 times a day, it has a bad odor, no accompanied symptoms, she mentions a sick contact, she has a history of C. Diff., she has tried imodium with no improvement. Will order stool C.diff and GI panel.    She still has joint pain mainly in her wrist and thumb, she completed her first month of MTX, her recent CBC shows mild anemia, will titrate up her dose weekly until reaching 15 mg per week. Will give a course of prednisone 20 mg daily for 5 days, recommended her to avoid taking NSAIDS as she has a risk for GI bleed. I am not willing to give her an extended course of steroids due to her risk of bleeding. Trying to control her pain until she sees rheumatology.

## 2023-09-28 ENCOUNTER — APPOINTMENT (OUTPATIENT)
Dept: LAB | Facility: HOSPITAL | Age: 58
End: 2023-09-28
Payer: MEDICAID

## 2023-09-28 LAB
CLOSTRIDIUM DIFFICILE GDH ANTIGEN (OHS): NEGATIVE
CLOSTRIDIUM DIFFICILE TOXIN A/B (OHS): NEGATIVE

## 2023-09-29 ENCOUNTER — TELEPHONE (OUTPATIENT)
Dept: INTERNAL MEDICINE | Facility: CLINIC | Age: 58
End: 2023-09-29

## 2023-09-29 DIAGNOSIS — R19.7 DIARRHEA, UNSPECIFIED TYPE: Primary | ICD-10-CM

## 2023-09-29 LAB

## 2023-09-29 RX ORDER — LOPERAMIDE HYDROCHLORIDE 2 MG/1
CAPSULE ORAL
Qty: 20 CAPSULE | Refills: 0 | Status: SHIPPED | OUTPATIENT
Start: 2023-09-29

## 2023-10-02 ENCOUNTER — TELEPHONE (OUTPATIENT)
Dept: ADMINISTRATIVE | Facility: HOSPITAL | Age: 58
End: 2023-10-02

## 2023-10-02 NOTE — TELEPHONE ENCOUNTER
TELEPHONE VOICE MESSAGE:         MS. AGUILAR CARLY ASKING THAT WE RETURN HER CALL.  I ATTEMPTED TO CALL HER BACK, HOWEVER THERE WAS NO ANSWER.  I DID LEAVE HER A VM ASKING HER TO CALL ME BACK AT THE CLINIC.

## 2023-10-04 ENCOUNTER — TELEPHONE (OUTPATIENT)
Dept: INTERNAL MEDICINE | Facility: CLINIC | Age: 58
End: 2023-10-04
Payer: MEDICAID

## 2023-10-04 NOTE — TELEPHONE ENCOUNTER
Patient has called stating that she is in excruciating pain in her hands. She has an appt with Rheumatology but it is not until December. She is asking for something for the pain. Please advise if you can assist the patient until then, thanks.

## 2023-10-09 ENCOUNTER — OFFICE VISIT (OUTPATIENT)
Dept: INTERNAL MEDICINE | Facility: CLINIC | Age: 58
End: 2023-10-09
Payer: MEDICAID

## 2023-10-09 VITALS
WEIGHT: 144 LBS | RESPIRATION RATE: 20 BRPM | HEIGHT: 60 IN | BODY MASS INDEX: 28.27 KG/M2 | DIASTOLIC BLOOD PRESSURE: 71 MMHG | SYSTOLIC BLOOD PRESSURE: 120 MMHG | TEMPERATURE: 98 F | HEART RATE: 64 BPM | OXYGEN SATURATION: 97 %

## 2023-10-09 DIAGNOSIS — Z23 FLU VACCINE NEED: ICD-10-CM

## 2023-10-09 DIAGNOSIS — M79.641 PAIN OF RIGHT HAND: Primary | ICD-10-CM

## 2023-10-09 PROCEDURE — 90686 IIV4 VACC NO PRSV 0.5 ML IM: CPT | Mod: PBBFAC

## 2023-10-09 PROCEDURE — 99215 OFFICE O/P EST HI 40 MIN: CPT | Mod: PBBFAC

## 2023-10-09 RX ORDER — TRAMADOL HYDROCHLORIDE 50 MG/1
50 TABLET ORAL EVERY 6 HOURS
Qty: 100 TABLET | Refills: 0 | Status: SHIPPED | OUTPATIENT
Start: 2023-10-09 | End: 2023-11-03

## 2023-10-09 NOTE — PROGRESS NOTES
INTERNAL MEDICINE RESIDENT CLINIC  CLINIC NOTE    Patient Name: Sandra Burns  YOB: 1965    PRESENTING HISTORY       History of Present Illness:  Ms. Sandra Burns is a 58 y.o. female w/ an active medical problem list including Bipolar Disorder, Depression, Chronic pain, GERD, HCV, HBV, HTN. She is presnting today to [facility] IM [facility]ident clinic for follow up appointment. She had recent hospital stay for symptomatic anemia.     Ms. Burns was hospitalized for symptomatic anemia, combination of iron-deficiency and B12 deficiency. She has a history of GI bleeds in past but had recent EGD and c-scope with Dr. Hatch in [place] which were normal. I spoke with the NP over the phone just prior to the patient's hospitlization and there was consideration for small-capsule endoscopy. Inpatient GI wanted to wait on hematology evaluation prior to performing pill endoscopy. So, she was transfused and discharged on iron, B12, folate with outpatient f/u with hematology (has appointment today).     Today, she mostly complains of her joint pains, saying she has no life b/c of her joint pains. She is crying of the pain, the prednisone did not help at all, she is back on MTX      PSocH  -no alcohol use; smokes 1ppd for 41 years, no drug use    PFH  -ESRD and heart failure      Review of Systems   Constitutional:  Negative for chills and fever.   Respiratory:  Negative for cough and shortness of breath.    Cardiovascular:  Negative for chest pain, palpitations and leg swelling.   Gastrointestinal:  Positive for blood in stool. Negative for abdominal pain, constipation, diarrhea, heartburn, melena, nausea and vomiting.   Musculoskeletal:  Positive for joint pain.     Constitutional: no fever/chills  EENT: no sore throat, ear pain, sinus pain/congestion, nasal congestion/drainage  Respiratory: no cough, no wheezing, no shortness of breath  Cardiovascular: no chest pain, no palpitations, no edema  Gastrointestinal:  as above  Genitourinary: no dysuria, no urinary frequency or urgency, no hematuria  Integumentary: no skin rash or abnormal lesion  Neurologic: no headache, no dizziness, no weakness or numbness  MSK: as above      MEDICATIONS & ALLERGIES:     Current Outpatient Medications on File Prior to Visit   Medication Sig    alendronate (FOSAMAX) 5 MG Tab 1 tablet Orally Once a day    ALPRAZolam (XANAX) 0.5 MG tablet Take 0.5 mg by mouth 2 (two) times daily as needed for Anxiety (anxiety).    amLODIPine (NORVASC) 10 MG tablet Take 1 tablet (10 mg total) by mouth once daily.    amLODIPine (NORVASC) 5 MG tablet Take 5 mg by mouth.    atorvastatin (LIPITOR) 40 MG tablet Take 1 tablet (40 mg total) by mouth once daily. (Patient not taking: Reported on 8/14/2023)    buPROPion (WELLBUTRIN SR) 150 MG TBSR 12 hr tablet Take 150 mg by mouth every morning.    carvediloL (COREG) 6.25 MG tablet Take 1 tablet (6.25 mg total) by mouth 2 (two) times daily with meals.    clopidogreL (PLAVIX) 75 mg tablet Take 75 mg by mouth once daily.    clopidogreL (PLAVIX) 75 mg tablet 1 tablet Orally Once a day    cyanocobalamin (VITAMIN B-12) 1000 MCG tablet Take 1 tablet (1,000 mcg total) by mouth once daily. (Patient not taking: Reported on 9/11/2023)    cyanocobalamin 1,000 mcg/mL injection Inject 1 mL (1,000 mcg total) into the muscle every 28 days.    DULoxetine (CYMBALTA) 30 MG capsule Take 1 capsule (30 mg total) by mouth every evening.    DULoxetine (CYMBALTA) 60 MG capsule Take 60 mg by mouth every morning.    DULoxetine (CYMBALTA) 60 MG capsule 1 capsule.    ergocalciferol (ERGOCALCIFEROL) 50,000 unit Cap Take 1 capsule (50,000 Units total) by mouth every 7 days. (Patient not taking: Reported on 9/11/2023)    ferrous gluconate (FERGON) 324 MG tablet Take 1 tablet (324 mg total) by mouth every other day.    folic acid (FOLVITE) 1 MG tablet Take 1 tablet (1 mg total) by mouth once daily.    gabapentin (NEURONTIN) 400 MG capsule Take 400 mg by  mouth 3 (three) times daily.    gabapentin (NEURONTIN) 600 MG tablet Take 600 mg by mouth 3 (three) times daily.    gabapentin (NEURONTIN) 800 MG tablet Take 800 mg by mouth 3 (three) times daily.    garlic (GARLIQUE ORAL) Take 1 tablet by mouth Daily.    lisinopriL (PRINIVIL,ZESTRIL) 20 MG tablet 1 tablet Orally Once a day    lisinopriL 10 MG tablet Take 1 tablet (10 mg total) by mouth once daily.    loperamide (IMODIUM) 2 mg capsule Take 2 tablets in the morning and 1 tablet after every loose stool, no more than 6 tablets a day    methotrexate 2.5 MG Tab Take 4 tablets (10 mg total) by mouth every 7 days for 7 days, THEN 5 tablets (12.5 mg total) every 7 days for 7 days, THEN 6 tablets (15 mg total) every 7 days.    mirtazapine (REMERON) 15 MG tablet TAKE ONE TABLET BY MOUTH ONCE A DAY AT BEDTIME    nicotine (NICODERM CQ) 21 mg/24 hr Place 1 patch onto the skin once daily.    nitroGLYCERIN (NITROSTAT) 0.4 MG SL tablet Place 1 tablet under the tongue every 5 (five) minutes as needed.    pantoprazole (PROTONIX) 40 MG tablet Take 1 tablet (40 mg total) by mouth once daily.    pravastatin (PRAVACHOL) 40 MG tablet 1 tablet Orally Once a day    pregabalin (LYRICA) 25 MG capsule Take 1 capsule (25 mg total) by mouth 2 (two) times daily. (Patient not taking: Reported on 8/14/2023)    red yeast rice 600 mg Cap Take 3 capsules by mouth 2 (two) times a day.    rosuvastatin (CRESTOR) 40 MG Tab Take 40 mg by mouth.    traZODone (DESYREL) 150 MG tablet Take 300 mg by mouth every evening.     Current Facility-Administered Medications on File Prior to Visit   Medication    ferric gluconate 62.5 mg/5 mL injection 125 mg       Review of patient's allergies indicates:  No Known Allergies    OBJECTIVE:   Vital Signs:  There were no vitals filed for this visit.      No results found for this or any previous visit (from the past 24 hour(s)).      Physical Exam    General - Appears comfortable, appropriatley conversive   Mental Status -  alert and oriented x 3, speaking in logical, relevant sentences   HEENT - no rhinorrhea   Cardiac - RRR, no murmurs, rubs, or gallops; no edema in LE   Respiratory - breathing comfortably; clear to ascultation bilaterally   Abdominal - nondistended, soft, nontender to palpation   Extremities - tender in PIP of left 4'th digit. Some tenderness over a couple MCP's on left hand. Slightly tender over 5th MTP of left foot. Has some swelling near left wrist  Skin - no rashes or bruises seen on skin        ASSESSMENT & PLAN:     Symptomatic Anemia  -reticulocyte count normal  -folate, B12 levels were low - has been on oral supplements with normalization of levels  -Contiue B12 and folate 1 tablet daily  -continue iron supplement every-other-day. Discussed with [facility] hematology who will arrange for IV iron infusions.   -has an appointment tomorrow with Dr. Jiménez small capsule endoscopy. Since she has Medicaid, it would have to be done here at [facility]. Will try to get it scheduled for when GI is available again.  -remainder of anemia workup is normal    Joint Pains  Seems combined osteoarthritis and rheumatoid arthritis  -labwork is positive for RF and MALIKA. Negative CCP and ds-DNA. Am ordering additional autoimmune labs today  -symptoms improved significantly with trial of prednisone. Has an appointment with Dr. Johnson in December.  -Gave her a course of prednisone 20 for 5 days with no improvement, avoiding long periods due to risk of GI bleed  -Continue MTX, go up 2.5 per week until reaching 15 mg weekly, patient is already taking folic acid 1 mg daily  -will give a few doses of tramadol    BLE Claudication  -Got bilateral lower extremity stents. Is compliant with Plavix.    GI bleed  -Had pill camera showing telangiactacias  -avoid NSAIDS and steroids    Osteopenia  -DEXA 2/2023 - FRAX score 0.8% and 8.4% for hip and major osteoporotic fracture. No need for bisphosphonates  -low vitamin D in April/2023, started her on  vitamin D supplement.    CAD  -Scheduled PCI x1 for 90% stenosis in 2013 for stable angina  -Has GRANGER - following [facility]     HCV s/p treatment  -s/p Epclusa treatment 2020; post-treatment viral load undetectable  -Fibroscan F1 so doesn't have cirrhosis  -no longer follows ID clinic since she was cured    Hx positive HBV core antibody  -Hx HBV Core IgG positive with negative surface antigen - suggests prior infection, not active    Tobacco abuse  -has dyspnea - she says she had PFT done at [facility] but I don't seen the records. I will call [facility]  -will discuss nicotine patches in future      RTC in 3 months    Justyna Dobbs MD  Internal Medicine PGY-1

## 2023-10-16 ENCOUNTER — PATIENT MESSAGE (OUTPATIENT)
Dept: ADMINISTRATIVE | Facility: HOSPITAL | Age: 58
End: 2023-10-16
Payer: MEDICAID

## 2023-11-13 ENCOUNTER — OFFICE VISIT (OUTPATIENT)
Dept: PULMONOLOGY | Facility: CLINIC | Age: 58
End: 2023-11-13
Attending: INTERNAL MEDICINE
Payer: MEDICAID

## 2023-11-13 VITALS
TEMPERATURE: 98 F | BODY MASS INDEX: 26.74 KG/M2 | WEIGHT: 136.19 LBS | HEIGHT: 60 IN | OXYGEN SATURATION: 96 % | DIASTOLIC BLOOD PRESSURE: 68 MMHG | SYSTOLIC BLOOD PRESSURE: 101 MMHG | HEART RATE: 83 BPM | RESPIRATION RATE: 18 BRPM

## 2023-11-13 DIAGNOSIS — R91.1 NODULE OF LOWER LOBE OF RIGHT LUNG: Primary | ICD-10-CM

## 2023-11-13 DIAGNOSIS — Z72.0 TOBACCO USE: ICD-10-CM

## 2023-11-13 DIAGNOSIS — R91.1 SOLITARY PULMONARY NODULE: Primary | ICD-10-CM

## 2023-11-13 DIAGNOSIS — R91.1 SOLITARY PULMONARY NODULE: ICD-10-CM

## 2023-11-13 PROCEDURE — 4010F PR ACE/ARB THEARPY RXD/TAKEN: ICD-10-PCS | Mod: CPTII,,, | Performed by: INTERNAL MEDICINE

## 2023-11-13 PROCEDURE — 99214 PR OFFICE/OUTPT VISIT, EST, LEVL IV, 30-39 MIN: ICD-10-PCS | Mod: S$PBB,,, | Performed by: INTERNAL MEDICINE

## 2023-11-13 PROCEDURE — 99215 OFFICE O/P EST HI 40 MIN: CPT | Mod: PBBFAC

## 2023-11-13 PROCEDURE — 3074F PR MOST RECENT SYSTOLIC BLOOD PRESSURE < 130 MM HG: ICD-10-PCS | Mod: CPTII,,, | Performed by: INTERNAL MEDICINE

## 2023-11-13 PROCEDURE — 1159F MED LIST DOCD IN RCRD: CPT | Mod: CPTII,,, | Performed by: INTERNAL MEDICINE

## 2023-11-13 PROCEDURE — 4010F ACE/ARB THERAPY RXD/TAKEN: CPT | Mod: CPTII,,, | Performed by: INTERNAL MEDICINE

## 2023-11-13 PROCEDURE — 99214 OFFICE O/P EST MOD 30 MIN: CPT | Mod: S$PBB,,, | Performed by: INTERNAL MEDICINE

## 2023-11-13 PROCEDURE — 3008F BODY MASS INDEX DOCD: CPT | Mod: CPTII,,, | Performed by: INTERNAL MEDICINE

## 2023-11-13 PROCEDURE — 3078F PR MOST RECENT DIASTOLIC BLOOD PRESSURE < 80 MM HG: ICD-10-PCS | Mod: CPTII,,, | Performed by: INTERNAL MEDICINE

## 2023-11-13 PROCEDURE — 3078F DIAST BP <80 MM HG: CPT | Mod: CPTII,,, | Performed by: INTERNAL MEDICINE

## 2023-11-13 PROCEDURE — 3074F SYST BP LT 130 MM HG: CPT | Mod: CPTII,,, | Performed by: INTERNAL MEDICINE

## 2023-11-13 PROCEDURE — 1159F PR MEDICATION LIST DOCUMENTED IN MEDICAL RECORD: ICD-10-PCS | Mod: CPTII,,, | Performed by: INTERNAL MEDICINE

## 2023-11-13 PROCEDURE — 3008F PR BODY MASS INDEX (BMI) DOCUMENTED: ICD-10-PCS | Mod: CPTII,,, | Performed by: INTERNAL MEDICINE

## 2023-11-13 RX ORDER — TRAZODONE HYDROCHLORIDE 300 MG/1
300 TABLET ORAL NIGHTLY
COMMUNITY
Start: 2023-11-02

## 2023-11-13 NOTE — PROGRESS NOTES
[facility]    Subjective:        HPI: Sandra Burns is a 58 y.o. female.  She was previously seen in [facility] lung mass [facility] by Dr. Pereira 02/2023 for evaluation of a right lower lobe approximate 1 cm pulmonary nodule.  She does have a greater than 40 pack-year history of smoking, continues smoking about a half a pack a day.  She has no history of cancer, no known family history of lung cancer.  She was scheduled for repeat CT of the chest at 6 months past her scan performed 10/03/22, and have this performed 03/03/22.  She missed her follow-up appointment, and was unable to reschedule until today.  She admits to mild chronic cough with clear secretions, no hemoptysis, no chest pain, no back pain, no shoulder pain, no fever, no chills, and no weight loss.        Current Outpatient Medications   Medication Instructions    alendronate (FOSAMAX) 5 MG Tab 1 tablet Orally Once a day    ALPRAZolam (XANAX) 0.5 mg, Oral, 2 times daily PRN    amLODIPine (NORVASC) 10 mg, Oral, Daily    amLODIPine (NORVASC) 5 mg, Oral    atorvastatin (LIPITOR) 40 mg, Oral, Daily    buPROPion (WELLBUTRIN SR) 150 mg, Oral, Every morning    carvediloL (COREG) 6.25 mg, Oral, 2 times daily with meals    clopidogreL (PLAVIX) 75 mg tablet 1 tablet Orally Once a day    clopidogreL (PLAVIX) 75 mg, Oral, Daily    cyanocobalamin (VITAMIN B-12) 1,000 mcg, Oral, Daily    cyanocobalamin 1,000 mcg, Intramuscular, Every 28 days    DULoxetine (CYMBALTA) 60 MG capsule 1 capsule    DULoxetine (CYMBALTA) 30 mg, Oral, Nightly    DULoxetine (CYMBALTA) 60 mg, Oral, Every morning    ergocalciferol (ERGOCALCIFEROL) 50,000 Units, Oral, Every 7 days    ferrous gluconate (FERGON) 324 mg, Oral, Every other day    folic acid (FOLVITE) 1 mg, Oral, Daily    gabapentin (NEURONTIN) 400 mg, Oral, 3 times daily    gabapentin (NEURONTIN) 600 mg, Oral, 3 times daily    gabapentin (NEURONTIN) 800 mg, Oral, 3 times daily    garlic (GARLIQUE ORAL) 1 tablet, Oral, Daily     lisinopriL (PRINIVIL,ZESTRIL) 20 MG tablet 1 tablet Orally Once a day    lisinopriL 10 mg, Oral, Daily    loperamide (IMODIUM) 2 mg capsule Take 2 tablets in the morning and 1 tablet after every loose stool, no more than 6 tablets a day    methotrexate 2.5 MG Tab Take 4 tablets (10 mg total) by mouth every 7 days for 7 days, THEN 5 tablets (12.5 mg total) every 7 days for 7 days, THEN 6 tablets (15 mg total) every 7 days.    mirtazapine (REMERON) 15 MG tablet TAKE ONE TABLET BY MOUTH ONCE A DAY AT BEDTIME    nicotine (NICODERM CQ) 21 mg/24 hr 1 patch, Transdermal, Daily    nitroGLYCERIN (NITROSTAT) 0.4 MG SL tablet 1 tablet, Sublingual, Every 5 min PRN    pantoprazole (PROTONIX) 40 mg, Oral, Daily    pravastatin (PRAVACHOL) 40 MG tablet 1 tablet Orally Once a day    pregabalin (LYRICA) 25 mg, Oral, 2 times daily    red yeast rice 600 mg Cap 3 capsules, Oral, 2 times daily    rosuvastatin (CRESTOR) 40 mg, Oral    traZODone (DESYREL) 300 mg, Oral, Nightly            No data to display                Objective:   LMP  (LMP Unknown)     Physical Exam  Constitutional:       Appearance: Normal appearance.   HENT:      Mouth/Throat:      Mouth: Mucous membranes are moist.      Pharynx: Oropharynx is clear.   Eyes:      Conjunctiva/sclera: Conjunctivae normal.      Pupils: Pupils are equal, round, and reactive to light.   Cardiovascular:      Rate and Rhythm: Normal rate and regular rhythm.   Pulmonary:      Breath sounds: Normal breath sounds. No wheezing, rhonchi or rales.   Abdominal:      General: Bowel sounds are normal.      Palpations: Abdomen is soft.   Musculoskeletal:      Right lower leg: No edema.      Left lower leg: No edema.   Lymphadenopathy:      Cervical: No cervical adenopathy.   Neurological:      Mental Status: She is alert and oriented to person, place, and time.         Lab Results   Component Value Date/Time     09/11/2023 01:03 PM    K 3.9 09/11/2023 01:03 PM    CO2 27 09/11/2023 01:03 PM     BUN 8.4 (L) 09/11/2023 01:03 PM    CREATININE 0.81 09/11/2023 01:03 PM    CALCIUM 9.9 09/11/2023 01:03 PM    MG 1.60 09/11/2023 01:03 PM    AST 16 09/11/2023 01:03 PM    ALT 10 09/11/2023 01:03 PM    ALKPHOS 63 09/11/2023 01:03 PM    ALBUMIN 3.7 09/11/2023 01:03 PM    INR 0.95 04/07/2023 01:46 PM    PROTIME 12.6 04/07/2023 01:46 PM    PTT 30.9 04/07/2023 01:46 PM     Lab Results   Component Value Date/Time    WBC 7.26 09/11/2023 01:03 PM    HGB 11.1 (L) 09/11/2023 01:03 PM    HCT 35.4 (L) 09/11/2023 01:03 PM     09/11/2023 01:03 PM    MCV 90.8 09/11/2023 01:03 PM    RDW 14.0 09/11/2023 01:03 PM     This clinic date.  CT chest (03/02/2023, my reading of images):  Study is performed without IV contrast.  No obvious mediastinal adenopathy, few subcentimeter nodes appreciated.  There is an approximate 6 x 6 mm noncalcified pulmonary nodule noted right lower lobe abutting the anterior fissure (axial image 29), previously measured 8 x 6 mm axial imaging at same level.  Nodule measures 6 x 4 mm (coronal image 35), previously 1.1 x 0.5 cm at same coronal image 10/03/2022.    Assessment:     Approximate 6 mm solid right lower lobe pulmonary nodule, increased risk stratification for malignancy due to long smoking history.  Nodule appears slightly decreased in size over 6 months prior.  Patient missed prior scheduled clinic appointment.  She continues smoking on a daily basis.    Plan:     I have discussed the above CT findings in detail with patient today.  I have explained findings and differential diagnostic possibilities..  Recommend follow-up CT 6 months after the last study performed, which is already past due.  Will schedule asap with follow-up in Pulmonary clinic after.    Katie Nix MD, [facility]  [facility]

## 2023-11-19 PROCEDURE — 89055 LEUKOCYTE ASSESSMENT FECAL: CPT | Performed by: INTERNAL MEDICINE

## 2023-11-19 PROCEDURE — 86318 IA INFECTIOUS AGENT ANTIBODY: CPT | Performed by: INTERNAL MEDICINE

## 2023-11-19 PROCEDURE — 87329 GIARDIA AG IA: CPT | Performed by: INTERNAL MEDICINE

## 2023-11-19 PROCEDURE — 87045 FECES CULTURE AEROBIC BACT: CPT | Performed by: INTERNAL MEDICINE

## 2023-11-20 ENCOUNTER — HOSPITAL ENCOUNTER (OUTPATIENT)
Dept: RADIOLOGY | Facility: HOSPITAL | Age: 58
Discharge: HOME | End: 2023-11-20
Attending: INTERNAL MEDICINE
Payer: MEDICAID

## 2023-11-20 DIAGNOSIS — Z72.0 TOBACCO USE: ICD-10-CM

## 2023-11-20 DIAGNOSIS — R91.1 NODULE OF LOWER LOBE OF RIGHT LUNG: ICD-10-CM

## 2023-11-20 PROCEDURE — 71250 CT THORAX DX C-: CPT | Mod: TC

## 2023-12-04 ENCOUNTER — OFFICE VISIT (OUTPATIENT)
Dept: PULMONOLOGY | Facility: CLINIC | Age: 58
End: 2023-12-04
Payer: MEDICAID

## 2023-12-04 ENCOUNTER — HOSPITAL ENCOUNTER (EMERGENCY)
Facility: HOSPITAL | Age: 58
Discharge: HOME | End: 2023-12-04
Attending: STUDENT IN AN ORGANIZED HEALTH CARE EDUCATION/TRAINING PROGRAM
Payer: MEDICAID

## 2023-12-04 VITALS
WEIGHT: 134.19 LBS | HEIGHT: 60 IN | SYSTOLIC BLOOD PRESSURE: 138 MMHG | TEMPERATURE: 98 F | OXYGEN SATURATION: 98 % | DIASTOLIC BLOOD PRESSURE: 87 MMHG | BODY MASS INDEX: 26.35 KG/M2 | HEART RATE: 69 BPM | RESPIRATION RATE: 18 BRPM

## 2023-12-04 VITALS
TEMPERATURE: 98 F | OXYGEN SATURATION: 99 % | SYSTOLIC BLOOD PRESSURE: 149 MMHG | HEIGHT: 60 IN | WEIGHT: 134.06 LBS | RESPIRATION RATE: 20 BRPM | HEART RATE: 74 BPM | BODY MASS INDEX: 26.32 KG/M2 | DIASTOLIC BLOOD PRESSURE: 79 MMHG

## 2023-12-04 DIAGNOSIS — M79.673 FOOT PAIN: ICD-10-CM

## 2023-12-04 DIAGNOSIS — S99.919A ANKLE INJURY: ICD-10-CM

## 2023-12-04 DIAGNOSIS — R91.1 SOLITARY PULMONARY NODULE: ICD-10-CM

## 2023-12-04 DIAGNOSIS — R91.8 MULTIPLE LUNG NODULES: Primary | ICD-10-CM

## 2023-12-04 DIAGNOSIS — S93.401A SPRAIN OF RIGHT ANKLE, UNSPECIFIED LIGAMENT, INITIAL ENCOUNTER: Primary | ICD-10-CM

## 2023-12-04 DIAGNOSIS — S69.90XA WRIST INJURY: ICD-10-CM

## 2023-12-04 DIAGNOSIS — F17.200 NEEDS SMOKING CESSATION EDUCATION: ICD-10-CM

## 2023-12-04 PROCEDURE — 1159F MED LIST DOCD IN RCRD: CPT | Mod: CPTII,,, | Performed by: HOSPITALIST

## 2023-12-04 PROCEDURE — 3008F BODY MASS INDEX DOCD: CPT | Mod: CPTII,,, | Performed by: HOSPITALIST

## 2023-12-04 PROCEDURE — 3008F PR BODY MASS INDEX (BMI) DOCUMENTED: ICD-10-PCS | Mod: CPTII,,, | Performed by: HOSPITALIST

## 2023-12-04 PROCEDURE — 1159F PR MEDICATION LIST DOCUMENTED IN MEDICAL RECORD: ICD-10-PCS | Mod: CPTII,,, | Performed by: HOSPITALIST

## 2023-12-04 PROCEDURE — 3075F PR MOST RECENT SYSTOLIC BLOOD PRESS GE 130-139MM HG: ICD-10-PCS | Mod: CPTII,,, | Performed by: HOSPITALIST

## 2023-12-04 PROCEDURE — 99214 OFFICE O/P EST MOD 30 MIN: CPT | Mod: S$PBB,,, | Performed by: HOSPITALIST

## 2023-12-04 PROCEDURE — 4010F ACE/ARB THERAPY RXD/TAKEN: CPT | Mod: CPTII,,, | Performed by: HOSPITALIST

## 2023-12-04 PROCEDURE — 96372 THER/PROPH/DIAG INJ SC/IM: CPT | Performed by: PHYSICIAN ASSISTANT

## 2023-12-04 PROCEDURE — 99215 OFFICE O/P EST HI 40 MIN: CPT | Mod: 25,PBBFAC

## 2023-12-04 PROCEDURE — 99284 EMERGENCY DEPT VISIT MOD MDM: CPT | Mod: 27

## 2023-12-04 PROCEDURE — 3079F PR MOST RECENT DIASTOLIC BLOOD PRESSURE 80-89 MM HG: ICD-10-PCS | Mod: CPTII,,, | Performed by: HOSPITALIST

## 2023-12-04 PROCEDURE — 3075F SYST BP GE 130 - 139MM HG: CPT | Mod: CPTII,,, | Performed by: HOSPITALIST

## 2023-12-04 PROCEDURE — 3079F DIAST BP 80-89 MM HG: CPT | Mod: CPTII,,, | Performed by: HOSPITALIST

## 2023-12-04 PROCEDURE — 63600175 PHARM REV CODE 636 W HCPCS: Performed by: PHYSICIAN ASSISTANT

## 2023-12-04 PROCEDURE — 4010F PR ACE/ARB THEARPY RXD/TAKEN: ICD-10-PCS | Mod: CPTII,,, | Performed by: HOSPITALIST

## 2023-12-04 PROCEDURE — 99214 PR OFFICE/OUTPT VISIT, EST, LEVL IV, 30-39 MIN: ICD-10-PCS | Mod: S$PBB,,, | Performed by: HOSPITALIST

## 2023-12-04 RX ORDER — DICLOFENAC SODIUM 75 MG/1
75 TABLET, DELAYED RELEASE ORAL 2 TIMES DAILY PRN
Qty: 15 TABLET | Refills: 0 | Status: SHIPPED | OUTPATIENT
Start: 2023-12-04

## 2023-12-04 RX ORDER — KETOROLAC TROMETHAMINE 30 MG/ML
30 INJECTION, SOLUTION INTRAMUSCULAR; INTRAVENOUS
Status: COMPLETED | OUTPATIENT
Start: 2023-12-04 | End: 2023-12-04

## 2023-12-04 RX ORDER — CYCLOBENZAPRINE HCL 10 MG
10 TABLET ORAL 3 TIMES DAILY PRN
Qty: 15 TABLET | Refills: 0 | Status: SHIPPED | OUTPATIENT
Start: 2023-12-04 | End: 2023-12-09

## 2023-12-04 RX ADMIN — KETOROLAC TROMETHAMINE 30 MG: 30 INJECTION, SOLUTION INTRAMUSCULAR; INTRAVENOUS at 12:12

## 2023-12-04 NOTE — DISCHARGE INSTRUCTIONS
Report to Emergency Department if symptoms return or worsen; [facility] - Medicine Clinic Within 1 to 2 days, It is important that you follow up with your primary care provider or specialist if indicated for further evaluation, workup, and treatment as necessary. The exam and treatment you received in Emergency Department was for an urgent problem and NOT INTENDED AS COMPLETE CARE. It is important that you FOLLOW UP with a doctor for ongoing care. If your symptoms become WORSE or you DO NOT IMPROVE and you are unable to reach your health care provider, you should RETURN to the Emergency Department. The Emergency Department provider has provided a PRELIMINARY INTERPRETATION of all your studies. A final interpretation may be done after you are discharged. If a change in your diagnosis or treatment is needed WE WILL CONTACT YOU. It is critical that we have a CURRENT PHONE NUMBER FOR YOU.

## 2023-12-04 NOTE — PROGRESS NOTES
Subjective:     Chief Complaint: Est pt/ lung nodule; CT 11/20/2023 (LV 11/13/2023)      HPI: Sandra Burns is a 58 y.o. female initially seen in [facility] by Dr. Pereira 02/2023 for evaluation of a right lower lobe approximate 1 cm pulmonary nodule.      She does have a greater than 40 pack-year history of smoking, continues smoking about a half a pack a day.  She has no history of cancer, no known family history of lung cancer.        Diagnostics:  CT Chest:  10/22.   10 mm right lower lobe nodule.  03/23.   9 mm right lower lobe nodule.  4 mm right middle lobe nodule.  11/23.   8 mm right lower lobe nodule.  4 mm right upper lobe nodule.  No adenopathy.    Today's visit:  Patient comes to the office today   For follow-up post CT scan    Past Medical History:   Diagnosis Date    Anemia, unspecified     Coronary artery disease     History of esophagogastroduodenoscopy (EGD) 11/01/2022    Hypertension     PAD (peripheral artery disease)     PVD (peripheral vascular disease) with claudication     Thyroid disease          Review of Systems   Constitutional:  Negative for chills, fever and malaise/fatigue.   HENT:  Negative for sinus pain.    Respiratory:  Negative for cough, hemoptysis, sputum production, shortness of breath, wheezing and stridor.    Cardiovascular:  Negative for chest pain, palpitations, orthopnea, claudication and leg swelling.   Gastrointestinal:  Negative for constipation, heartburn and vomiting.   Musculoskeletal:  Negative for falls, joint pain, myalgias and neck pain.   Skin:  Negative for rash.   Neurological:  Negative for dizziness, speech change, focal weakness, seizures, loss of consciousness and weakness.   Psychiatric/Behavioral:  Negative for depression and suicidal ideas. The patient is not nervous/anxious.            Social History     Socioeconomic History    Marital status: Single   Tobacco Use    Smoking status: Every Day     Current packs/day: 0.25     Average  packs/day: 0.3 packs/day for 40.0 years (10.0 ttl pk-yrs)     Types: Cigarettes    Smokeless tobacco: Never    Tobacco comments:     5 cigs per day; on nicotine patches   Substance and Sexual Activity    Alcohol use: Not Currently    Drug use: Yes     Types: Marijuana     Comment: some days    Sexual activity: Not Currently   Social History Narrative    ** Merged History Encounter **              Current Outpatient Medications   Medication Instructions    alendronate (FOSAMAX) 5 MG Tab Every other day    ALPRAZolam (XANAX) 0.5 mg, Oral, 2 times daily PRN    amLODIPine (NORVASC) 10 mg, Oral, Daily    amLODIPine (NORVASC) 5 mg, Oral    atorvastatin (LIPITOR) 40 mg, Oral, Daily    buPROPion (WELLBUTRIN SR) 150 mg, Oral, Every morning    carvediloL (COREG) 6.25 mg, Oral, 2 times daily with meals    clopidogreL (PLAVIX) 75 mg tablet 1 tablet Orally Once a day    clopidogreL (PLAVIX) 75 mg, Oral, Daily    cyanocobalamin (VITAMIN B-12) 1,000 mcg, Oral, Daily    cyanocobalamin 1,000 mcg, Intramuscular, Every 28 days    DULoxetine (CYMBALTA) 60 MG capsule 1 capsule    DULoxetine (CYMBALTA) 30 mg, Oral, Nightly    DULoxetine (CYMBALTA) 60 mg, Oral, Every morning    ergocalciferol (ERGOCALCIFEROL) 50,000 Units, Oral, Every 7 days    ferrous gluconate (FERGON) 324 mg, Oral, Every other day    folic acid (FOLVITE) 1 mg, Oral, Daily    gabapentin (NEURONTIN) 400 mg, Oral, 3 times daily    gabapentin (NEURONTIN) 600 mg, Oral, 3 times daily    gabapentin (NEURONTIN) 800 mg, Oral, 3 times daily    garlic (GARLIQUE ORAL) 1 tablet, Oral, Daily    lisinopriL (PRINIVIL,ZESTRIL) 20 MG tablet 1 tablet Orally Once a day    lisinopriL 10 mg, Oral, Daily    loperamide (IMODIUM) 2 mg capsule Take 2 tablets in the morning and 1 tablet after every loose stool, no more than 6 tablets a day    methotrexate 2.5 MG Tab Take 4 tablets (10 mg total) by mouth every 7 days for 7 days, THEN 5 tablets (12.5 mg total) every 7 days for 7 days, THEN 6  tablets (15 mg total) every 7 days.    mirtazapine (REMERON) 15 MG tablet TAKE ONE TABLET BY MOUTH ONCE A DAY AT BEDTIME    nicotine (NICODERM CQ) 21 mg/24 hr 1 patch, Transdermal, Daily    nitroGLYCERIN (NITROSTAT) 0.4 MG SL tablet 1 tablet, Sublingual, Every 5 min PRN    pantoprazole (PROTONIX) 40 mg, Oral, Daily    pravastatin (PRAVACHOL) 40 MG tablet 1 tablet Orally Once a day    pregabalin (LYRICA) 25 mg, Oral, 2 times daily    red yeast rice 600 mg Cap 3 capsules, Oral, 2 times daily    rosuvastatin (CRESTOR) 40 mg, Oral    traZODone (DESYREL) 300 mg, Oral, Nightly             Objective:   /87 (BP Location: Left arm)   Pulse 69   Temp 98 °F (36.7 °C) (Oral)   Resp 18   Ht 5' (1.524 m)   Wt 60.9 kg (134 lb 3.2 oz)   LMP  (LMP Unknown)   SpO2 98% Comment: room air  BMI 26.21 kg/m²     Physical Exam  Constitutional:       General: She is not in acute distress.     Appearance: Normal appearance. She is normal weight. She is not ill-appearing, toxic-appearing or diaphoretic.   HENT:      Head: Normocephalic and atraumatic.      Right Ear: External ear normal.      Left Ear: External ear normal.      Nose: No congestion or rhinorrhea.      Mouth/Throat:      Mouth: Mucous membranes are moist.      Pharynx: Oropharynx is clear. No oropharyngeal exudate or posterior oropharyngeal erythema.   Eyes:      General: No scleral icterus.        Right eye: No discharge.         Left eye: No discharge.      Extraocular Movements: Extraocular movements intact.      Pupils: Pupils are equal, round, and reactive to light.   Neck:      Vascular: No carotid bruit.   Cardiovascular:      Rate and Rhythm: Normal rate and regular rhythm.      Heart sounds: Normal heart sounds. No murmur heard.     No gallop.   Pulmonary:      Effort: No respiratory distress.      Breath sounds: Normal breath sounds. No stridor. No wheezing, rhonchi or rales.   Chest:      Chest wall: No tenderness.   Abdominal:      General: Abdomen is  flat. Bowel sounds are normal. There is no distension.      Palpations: Abdomen is soft. There is no mass.      Tenderness: There is no abdominal tenderness. There is no guarding or rebound.   Musculoskeletal:         General: No swelling, tenderness, deformity or signs of injury. Normal range of motion.      Cervical back: No rigidity or tenderness.      Right lower leg: No edema.      Left lower leg: No edema.   Lymphadenopathy:      Cervical: No cervical adenopathy.   Skin:     Coloration: Skin is not jaundiced.      Findings: No bruising, lesion or rash.   Neurological:      General: No focal deficit present.      Mental Status: She is alert and oriented to person, place, and time.      Cranial Nerves: No cranial nerve deficit.      Sensory: No sensory deficit.      Motor: No weakness.      Coordination: Coordination normal.      Gait: Gait normal.      Deep Tendon Reflexes: Reflexes normal.   Psychiatric:         Mood and Affect: Mood normal.         Behavior: Behavior normal.         Thought Content: Thought content normal.         Judgment: Judgment normal.           Imaging:  I have personally reviewed the pertinent recent imaging. CT of the chest from November 2023 is stable with an 8 mm right lower lobe nodule and a 4 mm right upper lobe nodule.  No mediastinal adenopathy seen.    Assessment:       Right lower lobe nodule.  This was initially identified in October 2022 measuring 10 mm.  Now measuring 8 mm.    Chronic 4 mm right upper lobe nodule.  Unchanged since initial observation in 2022.    Chronic tobacco use.    Plan:       CT of the chest and lung mass clinic office visit in 1 year.    Smoking cessation strongly encouraged.    Florentin Bull MD

## 2023-12-04 NOTE — ED PROVIDER NOTES
Encounter Date: 12/4/2023       History     Chief Complaint   Patient presents with    Fall     Right ankle and right wrist pain after a fall a couple weeks ago. Ambulatory to triage, no distress.      58-year-old female with past medical history significant for osteopenia and PVD presents to ED complaining of approximately 2 week history of pain and swelling to right wrist and right ankle after a fall.  Patient reports she braced herself in FOOSH fashion at time of injury.  Reports she attempted come to the ED soon after injury, but ER was so busy she ended up going home.  Reports she is been taking over-the-counter pain medicine without significant improvement.  Patient is able to bear weight and ambulate in ED. Denies joint erythema, joint warmth, joint rigidity, numbness, paresthesia, paralysis, focal weakness, head injury, loss of consciousness.  Vital signs stable on arrival, patient in no acute distress.      Review of patient's allergies indicates:  No Known Allergies  Past Medical History:   Diagnosis Date    Anemia, unspecified     Coronary artery disease     History of esophagogastroduodenoscopy (EGD) 11/01/2022    Hypertension     PAD (peripheral artery disease)     PVD (peripheral vascular disease) with claudication     Thyroid disease      Past Surgical History:   Procedure Laterality Date    EXTRACORPOREAL SHOCK WAVE LITHOTRIPSY  2/22/2023    Procedure: LITHOTRIPSY- Peripheral Shockwave;  Surgeon: Adriel Valero MD;  Location: [facility] CATH LAB;  Service: Cardiology;;    HIP SURGERY      INSERTION, STENT, ARTERY  2/22/2023    Procedure: INSERTION, STENT, ARTERY;  Surgeon: Adriel Valero MD;  Location: [facility] CATH LAB;  Service: Cardiology;;    INTRALUMINAL GASTROINTESTINAL TRACT IMAGING VIA CAPSULE N/A 8/15/2023    Procedure: IMAGING PROCEDURE, GI TRACT, INTRALUMINAL, VIA CAPSULE;  Surgeon: Liza Del Rio MD;  Location: [facility] ENDOSCOPY;  Service: Gastroenterology;  Laterality: N/A;    INTRAVASCULAR  ULTRASOUND, NON-CORONARY  2/22/2023    Procedure: Intravascular Ultrasound, Non-Coronary;  Surgeon: Adriel Valero MD;  Location: [facility] CATH LAB;  Service: Cardiology;;    LEFT HEART CATHETERIZATION      PERIPHERAL ANGIOGRAPHY N/A 2/22/2023    Procedure: Peripheral angiography;  Surgeon: Adriel Valero MD;  Location: [facility] CATH LAB;  Service: Cardiology;  Laterality: N/A;  BILAT ILIAC INTERVENTION     No family history on file.  Social History     Tobacco Use    Smoking status: Every Day     Current packs/day: 0.25     Average packs/day: 0.3 packs/day for 40.0 years (10.0 ttl pk-yrs)     Types: Cigarettes    Smokeless tobacco: Never    Tobacco comments:     5 cigs per day; on nicotine patches   Substance Use Topics    Alcohol use: Not Currently    Drug use: Yes     Types: Marijuana     Comment: some days     Review of Systems   All other systems reviewed and are negative.      Physical Exam     Initial Vitals [12/04/23 1046]   BP Pulse Resp Temp SpO2   (!) 149/79 74 20 98.1 °F (36.7 °C) 99 %      MAP       --         Physical Exam    Nursing note and vitals reviewed.  Constitutional: She appears well-developed and well-nourished. She is not diaphoretic. No distress.   HENT:   Head: Normocephalic and atraumatic.   Mouth/Throat: Oropharynx is clear and moist.   Eyes: Conjunctivae and EOM are normal. Pupils are equal, round, and reactive to light.   Neck: Neck supple.   Normal range of motion.  Cardiovascular:  Normal rate, regular rhythm, normal heart sounds and intact distal pulses.     Exam reveals no gallop and no friction rub.       No murmur heard.  Pulmonary/Chest: Breath sounds normal. No respiratory distress. She has no wheezes. She has no rhonchi. She has no rales.   Abdominal: Abdomen is soft. Bowel sounds are normal. She exhibits no distension. There is no abdominal tenderness. There is no rebound and no guarding.   Musculoskeletal:         General: No edema. Normal range of motion.      Right wrist:  Tenderness (ulnar aspect) and bony tenderness present. No swelling, deformity, effusion, snuff box tenderness or crepitus. Normal range of motion. Normal pulse.      Left wrist: Normal.      Right hand: Normal.      Cervical back: Normal range of motion and neck supple.      Right lower leg: Normal.      Right ankle: Swelling present. No deformity or ecchymosis. Tenderness present over the lateral malleolus. Normal range of motion. Normal pulse.      Right Achilles Tendon: No tenderness. Disla's test negative.      Right foot: Normal.     Neurological: She is alert and oriented to person, place, and time. She has normal strength.   Skin: Skin is warm and dry. No rash noted. No pallor.   Psychiatric: She has a normal mood and affect. Thought content normal.         ED Course   Procedures  Labs Reviewed - No data to display       Imaging Results              X-Ray Foot Complete Right (Final result)  Result time 12/04/23 11:48:07      Final result by Mukul Flores MD (12/04/23 11:48:07)                   Impression:      No displaced fracture      Electronically signed by: Mukul Flores MD  Date:    12/04/2023  Time:    11:48               Narrative:    EXAMINATION:  Six images right foot and ankle    CLINICAL HISTORY:  Injury    COMPARISON:  None    FINDINGS:  Mild soft tissue swelling is present diffusely about the ankle.  Bones are demineralized.  No displaced fracture or dislocation is evident.  No radiopaque foreign body.                                       X-Ray Ankle Complete Right (Final result)  Result time 12/04/23 11:48:07      Final result by Mukul Flores MD (12/04/23 11:48:07)                   Impression:      No displaced fracture      Electronically signed by: Mukul Flores MD  Date:    12/04/2023  Time:    11:48               Narrative:    EXAMINATION:  Six images right foot and ankle    CLINICAL HISTORY:  Injury    COMPARISON:  None    FINDINGS:  Mild soft tissue swelling is present  diffusely about the ankle.  Bones are demineralized.  No displaced fracture or dislocation is evident.  No radiopaque foreign body.                                       X-Ray Wrist Complete Right (Final result)  Result time 12/04/23 11:48:42      Final result by Mukul Flores MD (12/04/23 11:48:42)                   Impression:      No displaced fracture identified      Electronically signed by: Mukul Flores MD  Date:    12/04/2023  Time:    11:48               Narrative:    EXAMINATION:  Three views right wrist    CLINICAL HISTORY:  Injury    COMPARISON:  None    FINDINGS:  Bones are demineralized.  No displaced fracture or dislocation.  Positioning somewhat limits evaluation.                                       Medications - No data to display  Medical Decision Making  Differential diagnosis: Includes but not limited to sprain, fracture, dislocation, arthritis     ED management:  No indication for acute medical intervention in ED at this time.    ED course:  X-ray of right ankle reveals mild diffuse soft tissue swelling, but otherwise unremarkable with no acute osseous abnormality.  No acute osseous abnormality noted on x-rays right wrist or right foot.  Patient has no snuffbox tenderness to raise concern for occult scaphoid fracture.  Patient is able to bear weight and ambulate in ED without difficulty, stable for discharge.  I will send patient home with medications for symptom relief and place referral to orthopedics clinic for further evaluation and treatment of right ankle sprain.  Instructed patient to contact PCP for close follow-up.  ED precautions given for new or worsening symptoms and patient verbalized understanding.    Amount and/or Complexity of Data Reviewed  Radiology: ordered and independent interpretation performed. Decision-making details documented in ED Course.                                      Clinical Impression:  Final diagnoses:  [S99.919A] Ankle injury  [M79.673] Foot  pain  [S69.90XA] Wrist injury  [S93.401A] Sprain of right ankle, unspecified ligament, initial encounter (Primary)          ED Disposition Condition    Discharge Good          ED Prescriptions       Medication Sig Dispense Start Date End Date Auth. Provider    diclofenac (VOLTAREN) 75 MG EC tablet Take 1 tablet (75 mg total) by mouth 2 (two) times daily as needed (pain). 15 tablet 12/4/2023 -- Mic Potts PA    cyclobenzaprine (FLEXERIL) 10 MG tablet Take 1 tablet (10 mg total) by mouth 3 (three) times daily as needed for Muscle spasms. 15 tablet 12/4/2023 12/9/2023 Mic Potts PA          Follow-up Information       Follow up With Specialties Details Why Contact Info    Justyna Dobbs MD Internal Medicine In 3 days  [address] [facility] [id number] [phone]      [facility] Orthopedics In 1 week  [facility] [address] [phone]    [facility] Emergency Medicine  As needed, If symptoms worsen [address] [place] [id number] [phone]             Mic Potts PA  12/04/23 1201

## 2023-12-07 ENCOUNTER — OFFICE VISIT (OUTPATIENT)
Dept: RHEUMATOLOGY | Facility: CLINIC | Age: 58
End: 2023-12-07
Payer: MEDICAID

## 2023-12-07 VITALS
OXYGEN SATURATION: 97 % | BODY MASS INDEX: 25.34 KG/M2 | DIASTOLIC BLOOD PRESSURE: 64 MMHG | RESPIRATION RATE: 18 BRPM | HEIGHT: 61 IN | TEMPERATURE: 98 F | SYSTOLIC BLOOD PRESSURE: 98 MMHG | HEART RATE: 77 BPM | WEIGHT: 134.19 LBS

## 2023-12-07 DIAGNOSIS — I10 HYPERTENSION, UNSPECIFIED TYPE: ICD-10-CM

## 2023-12-07 DIAGNOSIS — I25.10 CORONARY ARTERY DISEASE, UNSPECIFIED VESSEL OR LESION TYPE, UNSPECIFIED WHETHER ANGINA PRESENT, UNSPECIFIED WHETHER NATIVE OR TRANSPLANTED HEART: ICD-10-CM

## 2023-12-07 DIAGNOSIS — D84.821 DRUG-INDUCED IMMUNODEFICIENCY: ICD-10-CM

## 2023-12-07 DIAGNOSIS — M79.642 PAIN IN BOTH HANDS: ICD-10-CM

## 2023-12-07 DIAGNOSIS — F17.200 CURRENT SMOKER: ICD-10-CM

## 2023-12-07 DIAGNOSIS — R76.8 POSITIVE ANA (ANTINUCLEAR ANTIBODY): ICD-10-CM

## 2023-12-07 DIAGNOSIS — R76.8 HEPATITIS B CORE ANTIBODY POSITIVE: ICD-10-CM

## 2023-12-07 DIAGNOSIS — F31.9 BIPOLAR AFFECTIVE DISORDER, REMISSION STATUS UNSPECIFIED: ICD-10-CM

## 2023-12-07 DIAGNOSIS — M05.79 RHEUMATOID ARTHRITIS INVOLVING MULTIPLE SITES WITH POSITIVE RHEUMATOID FACTOR: Primary | ICD-10-CM

## 2023-12-07 DIAGNOSIS — D64.9 ANEMIA, UNSPECIFIED TYPE: ICD-10-CM

## 2023-12-07 DIAGNOSIS — F32.A DEPRESSION, UNSPECIFIED DEPRESSION TYPE: ICD-10-CM

## 2023-12-07 DIAGNOSIS — M85.80 OSTEOPENIA, UNSPECIFIED LOCATION: ICD-10-CM

## 2023-12-07 DIAGNOSIS — Z79.899 DRUG-INDUCED IMMUNODEFICIENCY: ICD-10-CM

## 2023-12-07 DIAGNOSIS — B18.2 CHRONIC HEPATITIS C WITHOUT HEPATIC COMA: ICD-10-CM

## 2023-12-07 DIAGNOSIS — M79.641 PAIN IN BOTH HANDS: ICD-10-CM

## 2023-12-07 DIAGNOSIS — R91.1 PULMONARY NODULE: ICD-10-CM

## 2023-12-07 PROCEDURE — 99205 OFFICE O/P NEW HI 60 MIN: CPT | Mod: S$PBB,25,, | Performed by: INTERNAL MEDICINE

## 2023-12-07 PROCEDURE — 4010F ACE/ARB THERAPY RXD/TAKEN: CPT | Mod: CPTII,,, | Performed by: INTERNAL MEDICINE

## 2023-12-07 PROCEDURE — 99406 PR TOBACCO USE CESSATION INTERMEDIATE 3-10 MINUTES: ICD-10-PCS | Mod: S$PBB,,, | Performed by: INTERNAL MEDICINE

## 2023-12-07 PROCEDURE — 99205 PR OFFICE/OUTPT VISIT, NEW, LEVL V, 60-74 MIN: ICD-10-PCS | Mod: S$PBB,25,, | Performed by: INTERNAL MEDICINE

## 2023-12-07 PROCEDURE — 3078F PR MOST RECENT DIASTOLIC BLOOD PRESSURE < 80 MM HG: ICD-10-PCS | Mod: CPTII,,, | Performed by: INTERNAL MEDICINE

## 2023-12-07 PROCEDURE — 99215 OFFICE O/P EST HI 40 MIN: CPT | Mod: PBBFAC,25 | Performed by: INTERNAL MEDICINE

## 2023-12-07 PROCEDURE — 3074F PR MOST RECENT SYSTOLIC BLOOD PRESSURE < 130 MM HG: ICD-10-PCS | Mod: CPTII,,, | Performed by: INTERNAL MEDICINE

## 2023-12-07 PROCEDURE — 3008F BODY MASS INDEX DOCD: CPT | Mod: CPTII,,, | Performed by: INTERNAL MEDICINE

## 2023-12-07 PROCEDURE — 99406 BEHAV CHNG SMOKING 3-10 MIN: CPT | Mod: S$PBB,,, | Performed by: INTERNAL MEDICINE

## 2023-12-07 PROCEDURE — 3078F DIAST BP <80 MM HG: CPT | Mod: CPTII,,, | Performed by: INTERNAL MEDICINE

## 2023-12-07 PROCEDURE — 3008F PR BODY MASS INDEX (BMI) DOCUMENTED: ICD-10-PCS | Mod: CPTII,,, | Performed by: INTERNAL MEDICINE

## 2023-12-07 PROCEDURE — 3074F SYST BP LT 130 MM HG: CPT | Mod: CPTII,,, | Performed by: INTERNAL MEDICINE

## 2023-12-07 PROCEDURE — 96372 THER/PROPH/DIAG INJ SC/IM: CPT | Mod: PBBFAC

## 2023-12-07 PROCEDURE — 4010F PR ACE/ARB THEARPY RXD/TAKEN: ICD-10-PCS | Mod: CPTII,,, | Performed by: INTERNAL MEDICINE

## 2023-12-07 RX ORDER — METHYLPREDNISOLONE ACETATE 40 MG/ML
80 INJECTION, SUSPENSION INTRA-ARTICULAR; INTRALESIONAL; INTRAMUSCULAR; SOFT TISSUE
Status: COMPLETED | OUTPATIENT
Start: 2023-12-07 | End: 2023-12-07

## 2023-12-07 RX ORDER — METHYLPREDNISOLONE ACETATE 80 MG/ML
80 INJECTION, SUSPENSION INTRA-ARTICULAR; INTRALESIONAL; INTRAMUSCULAR; SOFT TISSUE
Status: DISCONTINUED | OUTPATIENT
Start: 2023-12-07 | End: 2023-12-07

## 2023-12-07 RX ADMIN — METHYLPREDNISOLONE ACETATE 80 MG: 40 INJECTION, SUSPENSION INTRA-ARTICULAR; INTRALESIONAL; INTRAMUSCULAR; SOFT TISSUE at 12:12
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
## 2023-12-08 DIAGNOSIS — E53.8 VITAMIN B12 DEFICIENCY: Primary | ICD-10-CM

## 2023-12-08 DIAGNOSIS — K31.819 ANGIODYSPLASIA OF DUODENUM: ICD-10-CM

## 2023-12-08 DIAGNOSIS — D50.0 IRON DEFICIENCY ANEMIA DUE TO CHRONIC BLOOD LOSS: ICD-10-CM

## 2023-12-09 NOTE — PROGRESS NOTES
History:  Past Medical History:   Diagnosis Date    Anemia, unspecified     Coronary artery disease     History of esophagogastroduodenoscopy (EGD) 11/01/2022    Hypertension     Osteopenia     PAD (peripheral artery disease)     PVD (peripheral vascular disease) with claudication     Rheumatoid arthritis, unspecified     Thyroid disease    Past medical history:  B12 deficiency.  Anemia.  Iron-deficiency.  History of peripheral vascular disease, on dual antiplatelets.  Hypothyroidism.  CAD, S/P PCI.  Chronic hepatitis-B.  Chronic hepatitis-C. Depression.  GERD.  Right lower lung lobe nodule.  02/22/2023: Bilateral lower extremity stent placement, with resolution of bilateral lower extremity pain  Social history:  .  Lives with her brother in [place], [place].  No children.  Never became pregnant.  Disabled.  Does not work.  Has been smoking half a pack of cigarettes daily for 45 years.  Has been smoking marijuana every other day for 40 years.  No other illicit drugs.  No alcohol abuse.  Family history:  Negative for cancers or blood dyscrasias.  Health maintenance:  PCP at [facility].  -05/02/2023:  Bilateral screening mammogram (comparison:  12/03/2020 mammogram):  BI-RADS 2 (benign)  -02/24/2022: Stool for occult blood pos  -history of multiple colonoscopies and endoscopies: Angiodysplastic lesions in duodenum  Past Surgical History:   Procedure Laterality Date    EXTRACORPOREAL SHOCK WAVE LITHOTRIPSY  2/22/2023    Procedure: LITHOTRIPSY- Peripheral Shockwave;  Surgeon: Adriel Valero MD;  Location: [facility] CATH LAB;  Service: Cardiology;;    HIP SURGERY      INSERTION, STENT, ARTERY  2/22/2023    Procedure: INSERTION, STENT, ARTERY;  Surgeon: Adriel Valero MD;  Location: [facility] CATH LAB;  Service: Cardiology;;    INTRALUMINAL GASTROINTESTINAL TRACT IMAGING VIA CAPSULE N/A 8/15/2023    Procedure: IMAGING PROCEDURE, GI TRACT, INTRALUMINAL, VIA CAPSULE;  Surgeon: Liza Del Rio MD;  Location:  [facility] ENDOSCOPY;  Service: Gastroenterology;  Laterality: N/A;    INTRAVASCULAR ULTRASOUND, NON-CORONARY  2/22/2023    Procedure: Intravascular Ultrasound, Non-Coronary;  Surgeon: Adriel Valero MD;  Location: [facility] CATH LAB;  Service: Cardiology;;    LEFT HEART CATHETERIZATION      PERIPHERAL ANGIOGRAPHY N/A 2/22/2023    Procedure: Peripheral angiography;  Surgeon: Adriel Valero MD;  Location: [facility] CATH LAB;  Service: Cardiology;  Laterality: N/A;  BILAT ILIAC INTERVENTION      Social History     Socioeconomic History    Marital status: Single   Tobacco Use    Smoking status: Every Day     Current packs/day: 0.25     Average packs/day: 0.3 packs/day for 40.0 years (10.0 ttl pk-yrs)     Types: Cigarettes    Smokeless tobacco: Never    Tobacco comments:     5 cigs per day; on nicotine patches   Substance and Sexual Activity    Alcohol use: Not Currently    Drug use: Yes     Types: Marijuana     Comment: some days    Sexual activity: Not Currently   Social History Narrative    ** Merged History Encounter **           Family History   Problem Relation Age of Onset    Kidney failure Mother     Heart disease Mother     Hypertension Mother     No Known Problems Father         Reason for Follow-up:  Iron-deficiency anemia   Vitamin B12 deficiency   Angiodysplasia of duodenum    History of Present Illness:   iron deficiency anemia         Oncologic/Hematologic History:  Oncology History    No history exists.   57-year-old female, referred from Internal Medicine, for evaluation of anemia.      Admitted to [facility] 04/07/2023-04/08/2023:  57-year-old female with significant history of HTN, PVD status post intervention, hypothyroidism, CAD status post PCI, right lower lobe lung nodule, bipolar disorder, depression, GERD, chronic hepatitis-B/chronic hepatitis-C and iron deficiency anemia presented to the ED with complaints of fatigue, generalized weakness and outpatient labs revealing anemia.  Patient was referred to the  ED for admission to further evaluate anemia.  On Plavix.  Patient had a colonoscopy 2.  Transfused with PRBC x1 unit.  No overt bleeding.  No history of alcohol use.  GI recommended to hold off on endoscopic evaluation.  Hemoccult-pos but numerous endoscopy procedures by Dr. Keyon Hatch, her GI, unrevealing in the past.  Patient on aspirin and Plavix which were continued.  Hemoglobin improved appropriately post PRBC x1 unit.  Iron deficient.  B12 deficient.  04/03/2023: Outside blood work showed ferritin 8.8.  B12 209.    History of iron-deficiency anemia.  History of B12 deficiency.  Admitted to [facility] 04/07/2023 with fatigue, weakness, dizziness.  Outside labs had shown anemia.  History of multiple endoscopies with Dr. Keyon Hatch, GI, with no source of GI bleed found.  04/03/2023: Blood work showed iron-deficiency, ferritin 8.8, B12 level 209.    History of duodenal angiodysplasias a couple of years ago    Blood transfusion in the past   EGD 11/2021: Hiatal hernia, GE ring, nonbleeding angioectasias   Colonoscopy 09/2021: Normal   EGD and colonoscopy 2022: Esophageal dilation, no bleeding, colon polyp    History of hepatitis-C: S/P treatment; S/P occlusive 2020; posttreatment viral load undetectable; FibroScan F1 (no liver cirrhosis)    09/09/2020: Limited abdominal ultrasound (comparison:  05/07/2013): Liver cirrhosis; no focal hepatic lesion    -12/16/2020: EGD (liver cirrhosis, rule out esophageal varices):  3 diminutive nonbleeding angiodysplastic lesions in the 2nd portion of duodenum  -09/22/2021:  Colonoscopy (indication:  Occult blood in stool; personal history of colon polyps): Normal mucosa in whole:  -11/15/2021: EGD (Keyon Hatch MD) (indication:  Dysphagia, oropharyngeal, nausea, heartburn, abdominal bloating, GI metaplasia, generalized abdominal pain):  Erythema lower 3rd of esophagus compatible with esophagitis; erythema antrum, compatible with gastritis; esophageal hiatal hernia; a single  bleeding angiectasia seen in duodenal bulb, treated with monopolar cautery successfully (thermotherapy) (pathology:  Gastric antrum biopsy, reactive gastropathy with mild nonspecific chronic inflammation, negative for intestinal metaplasia; negative for H pylori; gastric body biopsy, reactive gastropathy, mild nonspecific chronic inflammation, negative for intestinal metaplasia, negative for H pylori)    Labs reviewed:  Hemoglobin: 8.1 (05/16/2023); 9.0 (04/08/2023); 7.5 (04/07/2023); 6.8 (04/03/2023); 8.0 (03/13/2023); 11.6 (10/28/2022); 13.2 (10/03/2022), etc.   MCV: Consistently normal with mild macrocytosis on a couple of occasions  Rest of CBC unremarkable  05/16/2023: Serum iron 50.  TIBC 323.  Ferritin 16.8.  Transferrin saturation 15%.  B12 level 705.  Folate 38.8.    04/03/2023: Serum iron 8.  TIBC 344.  Ferritin 8.8.  Transferrin saturation 2%.  B12 level 209, low.  Folate 8.7   10/11/2022: Serum iron 69.  TIBC 292.  Ferritin 27.13.  Transferrin saturation 24%.  09/09/2020:  Ferritin 18.8, hemoglobin 9.8, MCV 93.7  Haptoglobin normal  04/03/2023:  Hemoglobin 6.8.  Retic count 1.89%.  LDH normal.  05/17/2023: Celiac serology negative    04/08/2023:  Hepatitis-C antibody pos (treated in the past with Epclusa, with a negative hepatitis-A viral load subsequently   09/09/2020:  Hepatitis-B core antibody total reactive.  Hepatitis-B surface antibody negative.  Hepatitis-B surface antigen negative.  HIV negative    05/16/2023:  RBC osmotic fragility normal  04/03/2023:  Hemoglobin electrophoresis:  No abnormal hemoglobin or beta thalassemia    05/22/2023:  Pleasant lady.  Presents for initial hematology consultation.  In no acute discomfort.    Always feels tired.  No abdominal pain, nausea or vomiting.  Denies hematemesis, melena, or hematochezia.  Fair appetite.  Main complaint is chronic tiredness.  Has been taking iron pill every other day for last 2 years.  No GI side effects.  Started vitamin B12 pill x2  daily, a month ago.  We will check her iron stores and B12 level at this time.  Says that she was transfused around Easter time and a couple of years ago as well.  No history of cancers.  Chronic generalized fatigue, swelling in the legs, cough, and numbness and tingling in hands.  Generalized body pains, 9/10, which she attributes to rheumatoid arthritis.    Interval History:  FERAHEME (FERUMOXYTOL) TWO DOSES   [No matching plan found]     12/11/2023:   -11/14/2023:  Stool occult blood positive  -11/20/2023: CT chest without contrast (follow-up of lung nodule) (comparison: CT chest 03/02/2023):  Few solid lung nodules stable since 03/02/2023 (solid 8 mm right lower lobe nodule, stable; solid 4 mm right upper lobe nodule and few small solid nodules, stable)  -12/04/2023:  Pulmonary follow-up: Repeat CT chest in 1 year  -follows with Rheumatology for rheumatoid arthritis involving multiple joints, positive rheumatoid factor, positive MALIKA  -12/11/2020: Hemoglobin 10.0, slightly declining but more or less, stable.  Transferrin saturation 17%.  Ferritin level is pending.  She did notice about of hematochezia 2 or 3 weeks ago.  Says that she is scheduled for colonoscopy with Dr. Keyon Hatch, , [place], in January.  Denies abdominal pain, nausea, vomiting.  Great appetite.  No unintentional weight loss.  ECOG 1.  CMP reviewed.  Continues to smoke half a pack of cigarettes daily.  Denies hemoptysis, chest pain, or undue dyspnea.  No unusual headaches or focal neurological symptoms, either.    Medications:  Current Outpatient Medications on File Prior to Visit   Medication Sig Dispense Refill    alendronate (FOSAMAX) 5 MG Tab every other day.      ALPRAZolam (XANAX) 0.5 MG tablet Take 0.5 mg by mouth 2 (two) times daily as needed for Anxiety (anxiety).      amLODIPine (NORVASC) 10 MG tablet Take 1 tablet (10 mg total) by mouth once daily. 90 tablet 1    buPROPion (WELLBUTRIN SR) 150 MG TBSR 12 hr tablet Take 150 mg by  mouth every morning.      carvediloL (COREG) 6.25 MG tablet Take 1 tablet (6.25 mg total) by mouth 2 (two) times daily with meals. 90 tablet 3    clopidogreL (PLAVIX) 75 mg tablet Take 75 mg by mouth once daily.      cyanocobalamin (VITAMIN B-12) 1000 MCG tablet Take 1 tablet (1,000 mcg total) by mouth once daily. 180 tablet 1    diclofenac (VOLTAREN) 75 MG EC tablet Take 1 tablet (75 mg total) by mouth 2 (two) times daily as needed (pain). 15 tablet 0    DULoxetine (CYMBALTA) 30 MG capsule Take 1 capsule (30 mg total) by mouth every evening. 90 capsule 1    DULoxetine (CYMBALTA) 60 MG capsule Take 60 mg by mouth every morning.      ergocalciferol (ERGOCALCIFEROL) 50,000 unit Cap Take 1 capsule (50,000 Units total) by mouth every 7 days. 60 capsule 1    ferrous gluconate (FERGON) 324 MG tablet Take 1 tablet (324 mg total) by mouth every other day. 90 tablet 1    folic acid (FOLVITE) 1 MG tablet Take 1 tablet (1 mg total) by mouth once daily. 90 tablet 1    lisinopriL 10 MG tablet Take 1 tablet (10 mg total) by mouth once daily. 90 tablet 3    loperamide (IMODIUM) 2 mg capsule Take 2 tablets in the morning and 1 tablet after every loose stool, no more than 6 tablets a day 20 capsule 0    mirtazapine (REMERON) 15 MG tablet TAKE ONE TABLET BY MOUTH ONCE A DAY AT BEDTIME      nicotine (NICODERM CQ) 21 mg/24 hr Place 1 patch onto the skin once daily. 60 patch 0    nitroGLYCERIN (NITROSTAT) 0.4 MG SL tablet Place 1 tablet under the tongue every 5 (five) minutes as needed.      rosuvastatin (CRESTOR) 40 MG Tab Take 40 mg by mouth.      traZODone (DESYREL) 300 MG tablet Take 300 mg by mouth nightly.      pregabalin (LYRICA) 25 MG capsule Take 1 capsule (25 mg total) by mouth 2 (two) times daily. (Patient not taking: Reported on 8/14/2023) 120 capsule 1     Current Facility-Administered Medications on File Prior to Visit   Medication Dose Route Frequency Provider Last Rate Last Admin    ferric gluconate 62.5 mg/5 mL  "injection 125 mg  125 mg Intravenous 1 time in Clinic/HOD Akin Gan MD           Review of Systems:   All systems reviewed and found to be negative except for the symptoms detailed above    Physical Examination:   VITAL SIGNS:   Vitals:    12/11/23 1523   BP: 98/64   Pulse: 83   Temp: 97 °F (36.1 °C)       GENERAL:  In no apparent distress.    HEAD:  No signs of head trauma.  EYES:  Pupils are equal.  Extraocular motions intact.    EARS:  Hearing grossly intact.  MOUTH:  Oropharynx is normal.   NECK:  No adenopathy, no JVD.     CHEST:  Chest with clear breath sounds bilaterally.  No wheezes, rales, rhonchi.    CARDIAC:  Regular rate and rhythm.  S1 and S2, without murmurs, gallops, rubs.  VASCULAR:  No Edema.  Peripheral pulses normal and equal in all extremities.  ABDOMEN:  Soft, without detectable tenderness.  No sign of distention.  No   rebound or guarding, and no masses palpated.   Bowel Sounds normal.  MUSCULOSKELETAL:  Good range of motion of all major joints. Extremities without clubbing, cyanosis or edema.    NEUROLOGIC EXAM:  Alert and oriented x 3.  No focal sensory or strength deficits.   Speech normal.  Follows commands.  PSYCHIATRIC:  Mood normal.    No results for input(s): "CBC" in the last 72 hours.   No results for input(s): "CMP" in the last 72 hours.     Assessment:  Problem List Items Addressed This Visit          Pulmonary    Solitary pulmonary nodule - Primary       Oncology    Iron deficiency anemia due to chronic blood loss       Endocrine    Vitamin B12 deficiency       GI    Angiodysplasia of duodenum     Iron-deficiency anemia, B12 deficiency anemia:  (Multiple angioectasias in duodenal, without bleeding; multiple angioectasias in jejunum, without bleeding; multiple angioectasias in ileum, without bleeding):  -chronic anemia, ranging between 6.8-8.1; was normal in October 2022  -chronic iron-deficiency  -PRBC x1 unit 04/03/2023 for hemoglobin 6.8  -Celiac serology negative " 05/17/2023  -RBC osmotic fragility normal 05/16/2023  -no hemoglobinopathy on hemoglobin electrophoresis 04/03/2023  -found to be B12 deficient when hospitalized 04/07/2023-04/08/2023 with symptomatic anemia, requiring PRBC x1 unit  -Ferrlecit 125 mg IV x2 (02/20/2023, 04/08/2023)  -12/16/2020: EGD:  No esophageal varices; 3 diminutive nonbleeding angiodysplastic lesions 2nd portion of duodenum   -11/15/2021: EGD:  Esophagitis, gastritis, a single bleeding angiectasia duodenal bulb treated with cautery (thermotherapy)  -09/22/2021: Colonoscopy:  Occult blood in stool: Normal  -EGD and colonoscopy 2022: Colon polyp  -no response to oral iron therapy x2-3 years; remained iron-deficiency  -S/P Feraheme 510 mg IV x2 (06/05/2023, 06/12/2023), with good response (hemoglobin improved to 12.3 and ferritin 122.03 on 07/10/2023  -07/12/2023:  Patient desired to have B12 shots rather than pills because she can not afford the pills  -08/15/2023: Video capsule endoscopy: Multiple angioectasias without bleeding in the duodenum; multiple angioectasias without bleeding in the jejunum; multiple angioectasias without bleeding in the ileum  -09/11/2023:  Hemoglobin 11.1, stable  -11/14/2023:  Stool occult blood positive      Vitamin B12 deficiency:  -diagnosed 04/03/2023: B12 level 209  -05/22/2023: Tells me that she has been taking 2 vitamin B12 pills for last 1 month.  -05/22/2023: Vitamin B12 level 521 (absorbing satisfactorily via oral route).   Intrinsic factor antibody negative.    Homocystine level 4.6 micromoles per L, suppressed (patient already on oral B12 supplementation).  Methylmalonic acid level 0.12 nanomoles /ml, normal (patient already on oral vitamin B12 supplementation, with adequate absorption)  -07/12/2023:  Patient desired to have B12 shots rather than pills because she can not afford the pills      History of hepatitis-C and liver cirrhosis:  -treated with Epclusa in 2020   -Posttreatment viral load  undetectable  -FibroScan F 1 (no liver cirrhosis)  -ultrasound 09/09/2020: Liver cirrhosis     -EGD 12/16/2020:  No esophageal varices      Plan:   Follow-up in March, with CBC, CMP, serum iron, TIBC, ferritin.  ----------------------      -chronic iron-deficiency anemia  -transfused in the past   -no response to oral iron therapy which she was taking for 2-3 years  -history of nonbleeding angiodysplastic lesions on EGD 12/16/2020, 11/15/2021; S/P endoscopic thermotherapy 11/15/2021  -treated with Feraheme x2 in 06/2023  -found to have multiple angioectasias without bleeding in duodenum, jejunum, and in the ileum on video capsule endoscopy 08/15/2023  -11/14/2023:  Stool occult blood positive (as expected with multiple angioectasias)  -12/11/2023:  Hemoglobin 10.0 (was 11.1 on 09/11/2023, 11.3 on 08/14/2023, 12.3 on 07/10/2023, etc).; MCV 86, normal; WBC, differential, platelets normal; serum iron, TIBC, ferritin, B12 level are pending  >>>  From Hematology perspective, surveil for recurrence of iron-deficiency anemia  Recheck CBC, serum iron, TIBC, and ferritin in 3 months (March 2024)    Found to be vitamin B12 deficient on labs 04/03/2023   -05/22/2023: Told me that she has been taking 2 vitamin B12 pills for 1 month   -05/22/2023:  B12 level normalized, 521, with oral B12 therapy; intrinsic factor antibody negative; homocystine level suppressed; methylmalonic acid level normal (the labs were checked while she was already on oral B12 therapy)  -09/11/2023:  For last couple she has discontinued B12 pills because she could not afford   -09/11/2023:  For last 2 months, her sister-in-law has been administering her monthly B12 shots;   >>>  Continue vitamin B12 1000 mcg IM (per her preference) every month; her sister in law administers the shots    From Hematology perspective, we will monitor iron and B12 deficiency anemia, and intervene to replete any deficiency if and when necessary    Subcentimeter lung  nodules  -noncontrast chest CT 11/20/2023:  Stable subcentimeter lung nodules  -12/04/2023:  Pulmonary follow-up: Repeat CT chest in 1 year    Follow-up in March, with CBC, CMP, serum iron, TIBC, ferritin.    Above discussed at length with the patient.  All questions answered.    Discussed labs and gave her copies of relevant reports.    Plan of management discussed.    She understands and agrees with this plan.    Follow-up:  No follow-ups on file.

## 2023-12-11 ENCOUNTER — HOSPITAL ENCOUNTER (EMERGENCY)
Facility: HOSPITAL | Age: 58
Discharge: HOME | End: 2023-12-11
Attending: STUDENT IN AN ORGANIZED HEALTH CARE EDUCATION/TRAINING PROGRAM
Payer: MEDICAID

## 2023-12-11 ENCOUNTER — OFFICE VISIT (OUTPATIENT)
Dept: HEMATOLOGY/ONCOLOGY | Facility: CLINIC | Age: 58
End: 2023-12-11
Attending: INTERNAL MEDICINE
Payer: MEDICAID

## 2023-12-11 VITALS
OXYGEN SATURATION: 96 % | RESPIRATION RATE: 18 BRPM | HEIGHT: 60 IN | WEIGHT: 133.63 LBS | TEMPERATURE: 98 F | SYSTOLIC BLOOD PRESSURE: 110 MMHG | HEART RATE: 69 BPM | DIASTOLIC BLOOD PRESSURE: 66 MMHG | BODY MASS INDEX: 26.23 KG/M2

## 2023-12-11 VITALS
DIASTOLIC BLOOD PRESSURE: 64 MMHG | HEART RATE: 83 BPM | SYSTOLIC BLOOD PRESSURE: 98 MMHG | HEIGHT: 60 IN | WEIGHT: 130.63 LBS | OXYGEN SATURATION: 98 % | BODY MASS INDEX: 25.64 KG/M2 | TEMPERATURE: 97 F

## 2023-12-11 DIAGNOSIS — E53.8 VITAMIN B12 DEFICIENCY: ICD-10-CM

## 2023-12-11 DIAGNOSIS — K31.819 ANGIODYSPLASIA OF DUODENUM: ICD-10-CM

## 2023-12-11 DIAGNOSIS — R91.1 SOLITARY PULMONARY NODULE: Primary | ICD-10-CM

## 2023-12-11 DIAGNOSIS — D50.0 IRON DEFICIENCY ANEMIA DUE TO CHRONIC BLOOD LOSS: ICD-10-CM

## 2023-12-11 DIAGNOSIS — D50.0 IRON DEFICIENCY ANEMIA DUE TO CHRONIC BLOOD LOSS: Primary | ICD-10-CM

## 2023-12-11 DIAGNOSIS — S93.401D SPRAIN OF RIGHT ANKLE, UNSPECIFIED LIGAMENT, SUBSEQUENT ENCOUNTER: Primary | ICD-10-CM

## 2023-12-11 PROCEDURE — 99214 OFFICE O/P EST MOD 30 MIN: CPT | Mod: S$PBB,,, | Performed by: INTERNAL MEDICINE

## 2023-12-11 PROCEDURE — 3008F PR BODY MASS INDEX (BMI) DOCUMENTED: ICD-10-PCS | Mod: CPTII,,, | Performed by: INTERNAL MEDICINE

## 2023-12-11 PROCEDURE — 3008F BODY MASS INDEX DOCD: CPT | Mod: CPTII,,, | Performed by: INTERNAL MEDICINE

## 2023-12-11 PROCEDURE — 63600175 PHARM REV CODE 636 W HCPCS: Performed by: PHYSICIAN ASSISTANT

## 2023-12-11 PROCEDURE — 3078F PR MOST RECENT DIASTOLIC BLOOD PRESSURE < 80 MM HG: ICD-10-PCS | Mod: CPTII,,, | Performed by: INTERNAL MEDICINE

## 2023-12-11 PROCEDURE — 4010F PR ACE/ARB THEARPY RXD/TAKEN: ICD-10-PCS | Mod: CPTII,,, | Performed by: INTERNAL MEDICINE

## 2023-12-11 PROCEDURE — 96372 THER/PROPH/DIAG INJ SC/IM: CPT | Performed by: PHYSICIAN ASSISTANT

## 2023-12-11 PROCEDURE — 3074F PR MOST RECENT SYSTOLIC BLOOD PRESSURE < 130 MM HG: ICD-10-PCS | Mod: CPTII,,, | Performed by: INTERNAL MEDICINE

## 2023-12-11 PROCEDURE — 99214 PR OFFICE/OUTPT VISIT, EST, LEVL IV, 30-39 MIN: ICD-10-PCS | Mod: S$PBB,,, | Performed by: INTERNAL MEDICINE

## 2023-12-11 PROCEDURE — 3074F SYST BP LT 130 MM HG: CPT | Mod: CPTII,,, | Performed by: INTERNAL MEDICINE

## 2023-12-11 PROCEDURE — 3078F DIAST BP <80 MM HG: CPT | Mod: CPTII,,, | Performed by: INTERNAL MEDICINE

## 2023-12-11 PROCEDURE — 4010F ACE/ARB THERAPY RXD/TAKEN: CPT | Mod: CPTII,,, | Performed by: INTERNAL MEDICINE

## 2023-12-11 PROCEDURE — 99215 OFFICE O/P EST HI 40 MIN: CPT | Mod: PBBFAC | Performed by: INTERNAL MEDICINE

## 2023-12-11 PROCEDURE — 99284 EMERGENCY DEPT VISIT MOD MDM: CPT | Mod: 27

## 2023-12-11 RX ORDER — KETOROLAC TROMETHAMINE 30 MG/ML
15 INJECTION, SOLUTION INTRAMUSCULAR; INTRAVENOUS
Status: COMPLETED | OUTPATIENT
Start: 2023-12-11 | End: 2023-12-11

## 2023-12-11 RX ORDER — ACETAMINOPHEN AND CODEINE PHOSPHATE 300; 30 MG/1; MG/1
1 TABLET ORAL EVERY 8 HOURS PRN
Qty: 9 TABLET | Refills: 0 | Status: SHIPPED | OUTPATIENT
Start: 2023-12-11 | End: 2023-12-21

## 2023-12-11 RX ADMIN — KETOROLAC TROMETHAMINE 15 MG: 30 INJECTION, SOLUTION INTRAMUSCULAR; INTRAVENOUS at 05:12

## 2023-12-11 NOTE — ED PROVIDER NOTES
Encounter Date: 12/11/2023     History     Chief Complaint   Patient presents with    Ankle Pain     Ongoing right ankle pain since a fall 1.5 months ago. Pain flare up x 7 days     Patient with pmhx of HTN, CAD, PAD, and RA presents today c/o right ankle pain that started over 1 month ago after ground level fall at home. Patient was seen in the ED last week for this and had xrays done which were negative. She reports continued pain in right ankle. Pain is worse with movement. She has been taking tylenol at home with no improvement.     The history is provided by the patient. No  was used.     Review of patient's allergies indicates:  No Known Allergies  Past Medical History:   Diagnosis Date    Anemia, unspecified     Coronary artery disease     History of esophagogastroduodenoscopy (EGD) 11/01/2022    Hypertension     Osteopenia     PAD (peripheral artery disease)     PVD (peripheral vascular disease) with claudication     Rheumatoid arthritis, unspecified     Thyroid disease      Past Surgical History:   Procedure Laterality Date    EXTRACORPOREAL SHOCK WAVE LITHOTRIPSY  2/22/2023    Procedure: LITHOTRIPSY- Peripheral Shockwave;  Surgeon: Adriel Valero MD;  Location: [facility] CATH LAB;  Service: Cardiology;;    HIP SURGERY      INSERTION, STENT, ARTERY  2/22/2023    Procedure: INSERTION, STENT, ARTERY;  Surgeon: Adriel Valero MD;  Location: [facility] CATH LAB;  Service: Cardiology;;    INTRALUMINAL GASTROINTESTINAL TRACT IMAGING VIA CAPSULE N/A 8/15/2023    Procedure: IMAGING PROCEDURE, GI TRACT, INTRALUMINAL, VIA CAPSULE;  Surgeon: Liza Del Rio MD;  Location: [facility] ENDOSCOPY;  Service: Gastroenterology;  Laterality: N/A;    INTRAVASCULAR ULTRASOUND, NON-CORONARY  2/22/2023    Procedure: Intravascular Ultrasound, Non-Coronary;  Surgeon: Adriel Valero MD;  Location: [facility] CATH LAB;  Service: Cardiology;;    LEFT HEART CATHETERIZATION      PERIPHERAL ANGIOGRAPHY N/A 2/22/2023    Procedure:  Peripheral angiography;  Surgeon: Adriel Valero MD;  Location: [facility] CATH LAB;  Service: Cardiology;  Laterality: N/A;  BILAT ILIAC INTERVENTION     Family History   Problem Relation Age of Onset    Kidney failure Mother     Heart disease Mother     Hypertension Mother     No Known Problems Father      Social History     Tobacco Use    Smoking status: Every Day     Current packs/day: 0.25     Average packs/day: 0.3 packs/day for 40.0 years (10.0 ttl pk-yrs)     Types: Cigarettes    Smokeless tobacco: Never    Tobacco comments:     5 cigs per day; on nicotine patches   Substance Use Topics    Alcohol use: Not Currently    Drug use: Yes     Types: Marijuana     Comment: some days     Review of Systems   Constitutional:  Negative for chills and fever.   Respiratory:  Negative for cough, chest tightness and shortness of breath.    Cardiovascular:  Negative for chest pain.   Gastrointestinal:  Negative for abdominal pain, constipation, diarrhea, nausea and vomiting.   Genitourinary:  Negative for flank pain.   Musculoskeletal:         Ankle pain   Skin:  Negative for color change, rash and wound.   Neurological:  Negative for syncope, light-headedness and headaches.   All other systems reviewed and are negative.      Physical Exam     Initial Vitals [12/11/23 1629]   BP Pulse Resp Temp SpO2   110/66 69 18 98.1 °F (36.7 °C) 96 %      MAP       --         Physical Exam    Nursing note and vitals reviewed.  Constitutional: She appears well-developed and well-nourished. She is not diaphoretic. No distress.   HENT:   Head: Normocephalic and atraumatic.   Mouth/Throat: Oropharynx is clear and moist. No oropharyngeal exudate.   Eyes: Conjunctivae and EOM are normal.   Neck: Neck supple.   Normal range of motion.  Cardiovascular:  Normal rate, regular rhythm, normal heart sounds and intact distal pulses.           Pulmonary/Chest: Breath sounds normal. No respiratory distress.   Abdominal: Abdomen is soft. She exhibits no  distension. There is no abdominal tenderness.   Musculoskeletal:      Cervical back: Normal range of motion and neck supple.      Comments: Right lateral ankle with mild swelling and tenderness to palpation. No erythema, ecchymosis, or deformity. Full AROM of right ankle, but increased pain with movement. NVI, 2+ dp pulses. Soft compartments.      Neurological: She is alert and oriented to person, place, and time. GCS score is 15. GCS eye subscore is 4. GCS verbal subscore is 5. GCS motor subscore is 6.   Skin: Skin is warm and dry. Capillary refill takes less than 2 seconds. No rash noted.   Psychiatric: She has a normal mood and affect.       ED Course   Procedures  Labs Reviewed - No data to display       Imaging Results              X-Ray Ankle Complete Right (Final result)  Result time 12/11/23 18:34:21      Final result by Stephanie Sharif MD (12/11/23 18:34:21)                   Impression:      No acute bony abnormality.      Electronically signed by: Stephanie Sharif  Date:    12/11/2023  Time:    18:34               Narrative:    EXAMINATION:  XR ANKLE COMPLETE 3 VIEW RIGHT    CLINICAL HISTORY:  Pain in unspecified ankle and joints of unspecified foot    COMPARISON:  X-rays dated 12/04/2023    FINDINGS:  There is no acute fracture or malalignment.  Soft tissue swelling is noted.                                       Medications   ketorolac injection 15 mg (15 mg Intramuscular Given 12/11/23 1741)     Medical Decision Making  Patient presents with right ankle pain for over 1 month.    Ddx: right ankle sprain, right ankle fracture, osteoarthritis, inflammatory arthritis    Patient is non-toxic appearing. Vitals stable. X-ray reviewed and discussed. Recommended patient take tylenol as needed for pain - she says that is not working and would like something a little stronger if possible.  reviewed. Patient is prescribed xanax and also medical marijuana. She says she doesn't have anymore medical marijuana  and can no longer afford it. I will prescribe her a short course of tylenol 3. I have advised her to f/u with her pcp for further eval/management. She is stable for discharge. ED precautions given.     Amount and/or Complexity of Data Reviewed  Radiology: ordered. Decision-making details documented in ED Course.    Risk  Prescription drug management.               ED Course as of 12/11/23 1851   Mon Dec 11, 2023   1841 X-Ray Ankle Complete Right [SA]      ED Course User Index  [SA] Elizabeth Bernal PA                     Clinical Impression:  Final diagnoses:  [S93.401D] Sprain of right ankle, unspecified ligament, subsequent encounter (Primary)      ED Disposition Condition    Discharge Stable          ED Prescriptions       Medication Sig Dispense Start Date End Date Auth. Provider    acetaminophen-codeine 300-30mg (TYLENOL #3) 300-30 mg Tab Take 1 tablet by mouth every 8 (eight) hours as needed (pain). 9 tablet 12/11/2023 12/21/2023 Elizabeth Bernal PA          Follow-up Information       Follow up With Specialties Details Why Contact Info    [facility] Emergency Medicine  If symptoms worsen return to ED immediately [address] [facility] [id number] [phone]    Justyna Dobbs MD Internal Medicine In 2 days  [address] [facility] [address]  [phone]               Elizabeth Bernal PA  12/11/23 1852

## 2023-12-13 ENCOUNTER — HOSPITAL ENCOUNTER (OUTPATIENT)
Dept: RADIOLOGY | Facility: HOSPITAL | Age: 58
Discharge: HOME | End: 2023-12-13
Attending: INTERNAL MEDICINE
Payer: MEDICAID

## 2023-12-13 ENCOUNTER — TELEPHONE (OUTPATIENT)
Dept: PULMONOLOGY | Facility: CLINIC | Age: 58
End: 2023-12-13
Payer: MEDICAID

## 2023-12-13 DIAGNOSIS — M05.79 RHEUMATOID ARTHRITIS INVOLVING MULTIPLE SITES WITH POSITIVE RHEUMATOID FACTOR: ICD-10-CM

## 2023-12-13 DIAGNOSIS — M79.641 PAIN IN BOTH HANDS: ICD-10-CM

## 2023-12-13 DIAGNOSIS — M79.642 PAIN IN BOTH HANDS: ICD-10-CM

## 2023-12-13 PROCEDURE — 73630 X-RAY EXAM OF FOOT: CPT | Mod: TC,RT

## 2023-12-13 PROCEDURE — 73070 X-RAY EXAM OF ELBOW: CPT | Mod: TC,LT

## 2023-12-13 PROCEDURE — 73030 X-RAY EXAM OF SHOULDER: CPT | Mod: TC,RT

## 2023-12-13 PROCEDURE — 72052 X-RAY EXAM NECK SPINE 6/>VWS: CPT | Mod: TC

## 2023-12-13 PROCEDURE — 73030 X-RAY EXAM OF SHOULDER: CPT | Mod: TC,LT

## 2023-12-13 PROCEDURE — 73130 X-RAY EXAM OF HAND: CPT | Mod: TC,RT

## 2023-12-13 PROCEDURE — 73070 X-RAY EXAM OF ELBOW: CPT | Mod: TC,RT

## 2023-12-13 PROCEDURE — 72070 X-RAY EXAM THORAC SPINE 2VWS: CPT | Mod: TC

## 2023-12-13 PROCEDURE — 73630 X-RAY EXAM OF FOOT: CPT | Mod: TC,LT

## 2023-12-13 PROCEDURE — 72114 X-RAY EXAM L-S SPINE BENDING: CPT | Mod: TC

## 2023-12-13 PROCEDURE — 73130 X-RAY EXAM OF HAND: CPT | Mod: TC,LT

## 2023-12-13 NOTE — TELEPHONE ENCOUNTER
----- Message from Becca Bob sent at 12/13/2023 11:22 AM CST -----  Pt is requesting a call back from [facility]. Pt can be reached @ [phone]

## 2023-12-14 ENCOUNTER — TELEPHONE (OUTPATIENT)
Dept: RHEUMATOLOGY | Facility: CLINIC | Age: 58
End: 2023-12-14
Payer: MEDICAID

## 2023-12-14 NOTE — TELEPHONE ENCOUNTER
Returned patient's call. She had questions about her recent CT and it showing mild emphysema. Informed patient that a diagnosis of lung nodules is associated with the visit and that Dr. Bull did review the CT scan at her visit on 12/04/2023. Encouraged patient to let her PCP know that she has had a recent CT scan when she sees them on 01/04/2024. Patient voiced understanding and is appreciative of the call.

## 2023-12-14 NOTE — TELEPHONE ENCOUNTER
"----- Message from Brynn Parada sent at 12/14/2023 11:58 AM CST -----  Regarding: Medication Issues  Patient called and stated the medication METHOTREXATE 2.5MG is making "every joint in her body hurt"     Patient stated she does not want to take this medication anymore and would like to speak to a nurse, patient can be reached at [phone]     Please Advise         "

## 2024-01-03 ENCOUNTER — OFFICE VISIT (OUTPATIENT)
Dept: INTERNAL MEDICINE | Facility: CLINIC | Age: 59
End: 2024-01-03
Payer: MEDICAID

## 2024-01-03 VITALS
HEIGHT: 60 IN | HEART RATE: 82 BPM | OXYGEN SATURATION: 96 % | TEMPERATURE: 98 F | RESPIRATION RATE: 20 BRPM | SYSTOLIC BLOOD PRESSURE: 132 MMHG | WEIGHT: 132.81 LBS | DIASTOLIC BLOOD PRESSURE: 73 MMHG | BODY MASS INDEX: 26.07 KG/M2

## 2024-01-03 DIAGNOSIS — R06.09 DYSPNEA ON EXERTION: ICD-10-CM

## 2024-01-03 DIAGNOSIS — R06.01 ORTHOPNEA: ICD-10-CM

## 2024-01-03 DIAGNOSIS — R49.0 HOARSENESS: Primary | ICD-10-CM

## 2024-01-03 PROCEDURE — 99215 OFFICE O/P EST HI 40 MIN: CPT | Mod: PBBFAC

## 2024-01-03 RX ORDER — ALENDRONATE SODIUM 5 MG/1
5 TABLET ORAL EVERY OTHER DAY
Qty: 15 TABLET | Refills: 5 | Status: SHIPPED | OUTPATIENT
Start: 2024-01-03 | End: 2024-01-04

## 2024-01-03 RX ORDER — CLOPIDOGREL BISULFATE 75 MG/1
75 TABLET ORAL DAILY
Qty: 30 TABLET | Refills: 5 | Status: SHIPPED | OUTPATIENT
Start: 2024-01-03 | End: 2024-07-01

## 2024-01-03 RX ORDER — AMLODIPINE BESYLATE 10 MG/1
10 TABLET ORAL DAILY
Qty: 90 TABLET | Refills: 1 | Status: SHIPPED | OUTPATIENT
Start: 2024-01-03

## 2024-01-03 RX ORDER — CARVEDILOL 6.25 MG/1
6.25 TABLET ORAL 2 TIMES DAILY WITH MEALS
Qty: 90 TABLET | Refills: 3
Start: 2024-01-03 | End: 2025-01-02

## 2024-01-03 RX ORDER — TIZANIDINE 4 MG/1
4 TABLET ORAL EVERY 6 HOURS PRN
Qty: 20 TABLET | Refills: 0 | Status: SHIPPED | OUTPATIENT
Start: 2024-01-03 | End: 2024-01-13

## 2024-01-03 RX ORDER — GUAIFENESIN 100 MG/5ML
200 SOLUTION ORAL 3 TIMES DAILY PRN
Qty: 180 ML | Refills: 0 | Status: SHIPPED | OUTPATIENT
Start: 2024-01-03 | End: 2024-01-13

## 2024-01-03 RX ORDER — LISINOPRIL 10 MG/1
10 TABLET ORAL DAILY
Qty: 90 TABLET | Refills: 3
Start: 2024-01-03 | End: 2025-01-02

## 2024-01-03 RX ORDER — IBUPROFEN 200 MG
1 TABLET ORAL DAILY
Qty: 60 PATCH | Refills: 0 | Status: SHIPPED | OUTPATIENT
Start: 2024-01-03

## 2024-01-03 NOTE — PROGRESS NOTES
INTERNAL MEDICINE RESIDENT CLINIC  CLINIC NOTE    Patient Name: Sandra Burns  YOB: 1965    PRESENTING HISTORY       History of Present Illness:  Ms. Sandra Burns is a 58 y.o. female w/ an active medical problem list including Bipolar Disorder, Depression, Chronic pain, GERD, HCV, HBV, HTN. She is presnting today to [facility] IM Resident clinic for follow up appointment. She had recent hospital stay for symptomatic anemia.     Ms. Burns was hospitalized for symptomatic anemia, combination of iron-deficiency and B12 deficiency. She has a history of GI bleeds in past but had recent EGD and c-scope with Dr. Hatch in [place] which were normal. I spoke with the NP over the phone just prior to the patient's hospitlization and there was consideration for small-capsule endoscopy. Inpatient GI wanted to wait on hematology evaluation prior to performing pill endoscopy. So, she was transfused and discharged on iron, B12, folate with outpatient f/u with hematology (has appointment today).     Today, she mostly complains of her joint pains, she saw Dr. Marsh who ordered a lot of labs ans xray and placed her on MTX, the patient will see her next week. She also complains of GRANGER and orthopnea, she states that she has history of CAD with stent placement. She has increased hoarseness that just got worse after having a sore throat, she claims to seeing ENT before who performed a laryngoscopy that was negative.      PSocH  -no alcohol use; smokes 1ppd for 41 years, no drug use    PFH  -ESRD and heart failure      Review of Systems   Constitutional:  Negative for chills and fever.   Respiratory:  Negative for cough and shortness of breath.    Cardiovascular:  Negative for chest pain, palpitations and leg swelling.   Gastrointestinal:  Positive for blood in stool. Negative for abdominal pain, constipation, diarrhea, heartburn, melena, nausea and vomiting.   Musculoskeletal:  Positive for joint pain.      Constitutional: no fever/chills  EENT: no sore throat, ear pain, sinus pain/congestion, nasal congestion/drainage  Respiratory: no cough, no wheezing, no shortness of breath  Cardiovascular: no chest pain, no palpitations, no edema  Gastrointestinal: as above  Genitourinary: no dysuria, no urinary frequency or urgency, no hematuria  Integumentary: no skin rash or abnormal lesion  Neurologic: no headache, no dizziness, no weakness or numbness  MSK: as above      MEDICATIONS & ALLERGIES:     Current Outpatient Medications on File Prior to Visit   Medication Sig    alendronate (FOSAMAX) 5 MG Tab every other day.    ALPRAZolam (XANAX) 0.5 MG tablet Take 0.5 mg by mouth 2 (two) times daily as needed for Anxiety (anxiety).    amLODIPine (NORVASC) 10 MG tablet Take 1 tablet (10 mg total) by mouth once daily.    carvediloL (COREG) 6.25 MG tablet Take 1 tablet (6.25 mg total) by mouth 2 (two) times daily with meals.    clopidogreL (PLAVIX) 75 mg tablet Take 75 mg by mouth once daily.    cyanocobalamin (VITAMIN B-12) 1000 MCG tablet Take 1 tablet (1,000 mcg total) by mouth once daily.    DULoxetine (CYMBALTA) 30 MG capsule Take 1 capsule (30 mg total) by mouth every evening.    DULoxetine (CYMBALTA) 60 MG capsule Take 60 mg by mouth every morning.    ergocalciferol (ERGOCALCIFEROL) 50,000 unit Cap Take 1 capsule (50,000 Units total) by mouth every 7 days.    ferrous gluconate (FERGON) 324 MG tablet Take 1 tablet (324 mg total) by mouth every other day.    folic acid (FOLVITE) 1 MG tablet Take 1 tablet (1 mg total) by mouth once daily.    lisinopriL 10 MG tablet Take 1 tablet (10 mg total) by mouth once daily.    mirtazapine (REMERON) 15 MG tablet TAKE ONE TABLET BY MOUTH ONCE A DAY AT BEDTIME    nicotine (NICODERM CQ) 21 mg/24 hr Place 1 patch onto the skin once daily.    nitroGLYCERIN (NITROSTAT) 0.4 MG SL tablet Place 1 tablet under the tongue every 5 (five) minutes as needed.    rosuvastatin (CRESTOR) 40 MG Tab Take 40  mg by mouth.    traZODone (DESYREL) 300 MG tablet Take 300 mg by mouth nightly.    buPROPion (WELLBUTRIN SR) 150 MG TBSR 12 hr tablet Take 150 mg by mouth every morning.    diclofenac (VOLTAREN) 75 MG EC tablet Take 1 tablet (75 mg total) by mouth 2 (two) times daily as needed (pain). (Patient not taking: Reported on 1/3/2024)    loperamide (IMODIUM) 2 mg capsule Take 2 tablets in the morning and 1 tablet after every loose stool, no more than 6 tablets a day (Patient not taking: Reported on 1/3/2024)    pregabalin (LYRICA) 25 MG capsule Take 1 capsule (25 mg total) by mouth 2 (two) times daily. (Patient not taking: Reported on 8/14/2023)     Current Facility-Administered Medications on File Prior to Visit   Medication    ferric gluconate 62.5 mg/5 mL injection 125 mg       Review of patient's allergies indicates:  No Known Allergies    OBJECTIVE:   Vital Signs:  Vitals:    01/03/24 1006   BP: 132/73   Pulse: 82   Resp: 20   Temp: 97.5 °F (36.4 °C)   SpO2: 96%   Weight: 60.2 kg (132 lb 12.8 oz)   Height: 5' (1.524 m)         No results found for this or any previous visit (from the past 24 hour(s)).      Physical Exam    General - Appears comfortable, appropriatley conversive   Mental Status - alert and oriented x 3, speaking in logical, relevant sentences   HEENT - no rhinorrhea   Cardiac - RRR, no murmurs, rubs, or gallops; no edema in LE   Respiratory - breathing comfortably; clear to ascultation bilaterally   Abdominal - nondistended, soft, nontender to palpation   Extremities - tender in PIP of left 4'th digit. Some tenderness over a couple MCP's on left hand. Slightly tender over 5th MTP of left foot. Has some swelling near left wrist  Skin - no rashes or bruises seen on skin        ASSESSMENT & PLAN:     Symptomatic Anemia  -reticulocyte count normal  -folate, B12 levels were low - has been on oral supplements with normalization of levels  -Contiue B12 and folate 1 tablet daily  -continue iron supplement  every-other-day. Discussed with [facility] hematology who will arrange for IV iron infusions.   -has an appointment tomorrow with Dr. Jiménez small capsule endoscopy. Since she has Medicaid, it would have to be done here at [facility]. Will try to get it scheduled for when GI is available again.  -remainder of anemia workup is normal    Joint Pains  Seems combined osteoarthritis and rheumatoid arthritis  -labwork is positive for RF and MALIKA. Negative CCP and ds-DNA. Am ordering additional autoimmune labs today  -Following up with Dr. Marsh  -Gave her a course of prednisone 20 for 5 days with no improvement, avoiding long periods due to risk of GI bleed  -Continue MTX, and folic acid supplements    Hoarseness  Has worsened symptoms  Claims to have laryngoscopy 2 years ago  Still smoking  Referring to ENT    BLE Claudication  -Got bilateral lower extremity stents. Is compliant with Plavix.    GI bleed  -Had pill camera showing telangiactacias  -avoid NSAIDS and steroids    Osteopenia  -DEXA 2/2023 - FRAX score 0.8% and 8.4% for hip and major osteoporotic fracture. No need for bisphosphonates  -low vitamin D in April/2023, started her on vitamin D supplement.    CAD  -Scheduled PCI x1 for 90% stenosis in 2013 for stable angina  -Has GRANGER - following [facility]  - Ordering TTE for symptoms concerning for CHF     HCV s/p treatment  -s/p Epclusa treatment 2020; post-treatment viral load undetectable  -Fibroscan F1 so doesn't have cirrhosis  -no longer follows ID clinic since she was cured    Hx positive HBV core antibody  -Hx HBV Core IgG positive with negative surface antigen - suggests prior infection, not active    Tobacco abuse  -has dyspnea - she says she had PFT done at [facility] but I don't seen the records. I will call [facility]  -will discuss nicotine patches in future      RTC in 4 months    Justyna Dobbs MD  Internal Medicine PGY-1

## 2024-01-04 ENCOUNTER — TELEPHONE (OUTPATIENT)
Dept: INTERNAL MEDICINE | Facility: CLINIC | Age: 59
End: 2024-01-04
Payer: MEDICAID

## 2024-01-04 NOTE — TELEPHONE ENCOUNTER
GOOD MORNING FERNANDA SUTTON WITH [facility] REQUESTING TO SPEAK WITH CARMEN'S NURSE.

## 2024-01-12 ENCOUNTER — OFFICE VISIT (OUTPATIENT)
Dept: RHEUMATOLOGY | Facility: CLINIC | Age: 59
End: 2024-01-12
Payer: MEDICAID

## 2024-01-12 VITALS
HEART RATE: 84 BPM | BODY MASS INDEX: 25.76 KG/M2 | OXYGEN SATURATION: 97 % | WEIGHT: 131.19 LBS | TEMPERATURE: 98 F | HEIGHT: 60 IN | SYSTOLIC BLOOD PRESSURE: 123 MMHG | DIASTOLIC BLOOD PRESSURE: 70 MMHG

## 2024-01-12 DIAGNOSIS — I25.10 CORONARY ARTERY DISEASE, UNSPECIFIED VESSEL OR LESION TYPE, UNSPECIFIED WHETHER ANGINA PRESENT, UNSPECIFIED WHETHER NATIVE OR TRANSPLANTED HEART: ICD-10-CM

## 2024-01-12 DIAGNOSIS — R76.8 POSITIVE ANA (ANTINUCLEAR ANTIBODY): ICD-10-CM

## 2024-01-12 DIAGNOSIS — M79.642 PAIN IN BOTH HANDS: ICD-10-CM

## 2024-01-12 DIAGNOSIS — R76.8 HEPATITIS B CORE ANTIBODY POSITIVE: ICD-10-CM

## 2024-01-12 DIAGNOSIS — D84.821 DRUG-INDUCED IMMUNODEFICIENCY: ICD-10-CM

## 2024-01-12 DIAGNOSIS — F32.A DEPRESSION, UNSPECIFIED DEPRESSION TYPE: ICD-10-CM

## 2024-01-12 DIAGNOSIS — M79.641 PAIN IN BOTH HANDS: ICD-10-CM

## 2024-01-12 DIAGNOSIS — Z79.899 DRUG-INDUCED IMMUNODEFICIENCY: ICD-10-CM

## 2024-01-12 DIAGNOSIS — Z86.19 HISTORY OF HEPATITIS C: ICD-10-CM

## 2024-01-12 DIAGNOSIS — M85.80 OSTEOPENIA, UNSPECIFIED LOCATION: ICD-10-CM

## 2024-01-12 DIAGNOSIS — F17.210 NICOTINE DEPENDENCE, CIGARETTES, UNCOMPLICATED: ICD-10-CM

## 2024-01-12 DIAGNOSIS — R91.1 PULMONARY NODULE: ICD-10-CM

## 2024-01-12 DIAGNOSIS — M05.79 RHEUMATOID ARTHRITIS INVOLVING MULTIPLE SITES WITH POSITIVE RHEUMATOID FACTOR: Primary | ICD-10-CM

## 2024-01-12 DIAGNOSIS — F31.9 BIPOLAR AFFECTIVE DISORDER, REMISSION STATUS UNSPECIFIED: ICD-10-CM

## 2024-01-12 PROCEDURE — 99406 BEHAV CHNG SMOKING 3-10 MIN: CPT | Mod: S$PBB,,, | Performed by: INTERNAL MEDICINE

## 2024-01-12 PROCEDURE — 4010F ACE/ARB THERAPY RXD/TAKEN: CPT | Mod: CPTII,,, | Performed by: INTERNAL MEDICINE

## 2024-01-12 PROCEDURE — 99215 OFFICE O/P EST HI 40 MIN: CPT | Mod: PBBFAC | Performed by: INTERNAL MEDICINE

## 2024-01-12 PROCEDURE — 3074F SYST BP LT 130 MM HG: CPT | Mod: CPTII,,, | Performed by: INTERNAL MEDICINE

## 2024-01-12 PROCEDURE — 3008F BODY MASS INDEX DOCD: CPT | Mod: CPTII,,, | Performed by: INTERNAL MEDICINE

## 2024-01-12 PROCEDURE — 1159F MED LIST DOCD IN RCRD: CPT | Mod: CPTII,,, | Performed by: INTERNAL MEDICINE

## 2024-01-12 PROCEDURE — 99215 OFFICE O/P EST HI 40 MIN: CPT | Mod: S$PBB,25,, | Performed by: INTERNAL MEDICINE

## 2024-01-12 PROCEDURE — 3078F DIAST BP <80 MM HG: CPT | Mod: CPTII,,, | Performed by: INTERNAL MEDICINE

## 2024-01-12 RX ORDER — LEFLUNOMIDE 20 MG/1
20 TABLET ORAL DAILY
Qty: 30 TABLET | Refills: 1 | Status: SHIPPED | OUTPATIENT
Start: 2024-01-12 | End: 2024-02-08 | Stop reason: SDUPTHER

## 2024-01-12 RX ORDER — PREDNISONE 10 MG/1
TABLET ORAL
Qty: 30 TABLET | Refills: 0 | Status: SHIPPED | OUTPATIENT
Start: 2024-01-12
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
## 2024-01-22 ENCOUNTER — TELEPHONE (OUTPATIENT)
Dept: INTERNAL MEDICINE | Facility: CLINIC | Age: 59
End: 2024-01-22
Payer: MEDICAID

## 2024-01-22 NOTE — TELEPHONE ENCOUNTER
TELEPHONE VM:      [facility] ASKED THAT YOU CALL THEM REGARDING ONE OF MS. AGUILAR'S MEDICATIONS.

## 2024-01-31 ENCOUNTER — TELEPHONE (OUTPATIENT)
Dept: HEMATOLOGY/ONCOLOGY | Facility: CLINIC | Age: 59
End: 2024-01-31
Payer: MEDICAID

## 2024-01-31 ENCOUNTER — DOCUMENTATION ONLY (OUTPATIENT)
Dept: HEMATOLOGY/ONCOLOGY | Facility: CLINIC | Age: 59
End: 2024-01-31
Payer: MEDICAID

## 2024-01-31 DIAGNOSIS — D62 ACUTE BLOOD LOSS ANEMIA: ICD-10-CM

## 2024-01-31 DIAGNOSIS — D50.0 IRON DEFICIENCY ANEMIA DUE TO CHRONIC BLOOD LOSS: Primary | ICD-10-CM

## 2024-01-31 DIAGNOSIS — E53.8 VITAMIN B12 DEFICIENCY: ICD-10-CM

## 2024-01-31 NOTE — PROGRESS NOTES
"called patient back. patient states that she thinks she has a "stomach bleed." states that she went to her GI doctor and they found blood in her stool and is now requesting that we move appointment up to have her labs drawn. She states that she has been having lots of uncomfortable cramping and just has a "feeling" that she is "bleeding in her stomach." Consulted with Dr. Herndon. Will have patient come in to have labs drawn. Patient notified and voices understanding.   "

## 2024-02-08 ENCOUNTER — OFFICE VISIT (OUTPATIENT)
Dept: RHEUMATOLOGY | Facility: CLINIC | Age: 59
End: 2024-02-08
Payer: MEDICAID

## 2024-02-08 VITALS
BODY MASS INDEX: 24.66 KG/M2 | TEMPERATURE: 98 F | DIASTOLIC BLOOD PRESSURE: 71 MMHG | HEART RATE: 90 BPM | SYSTOLIC BLOOD PRESSURE: 101 MMHG | OXYGEN SATURATION: 98 % | RESPIRATION RATE: 18 BRPM | WEIGHT: 125.63 LBS | HEIGHT: 60 IN

## 2024-02-08 DIAGNOSIS — M05.79 RHEUMATOID ARTHRITIS INVOLVING MULTIPLE SITES WITH POSITIVE RHEUMATOID FACTOR: Primary | ICD-10-CM

## 2024-02-08 DIAGNOSIS — M79.641 PAIN IN BOTH HANDS: ICD-10-CM

## 2024-02-08 DIAGNOSIS — I25.10 CORONARY ARTERY DISEASE, UNSPECIFIED VESSEL OR LESION TYPE, UNSPECIFIED WHETHER ANGINA PRESENT, UNSPECIFIED WHETHER NATIVE OR TRANSPLANTED HEART: ICD-10-CM

## 2024-02-08 DIAGNOSIS — R76.8 HEPATITIS B CORE ANTIBODY POSITIVE: ICD-10-CM

## 2024-02-08 DIAGNOSIS — Z79.899 DRUG-INDUCED IMMUNODEFICIENCY: ICD-10-CM

## 2024-02-08 DIAGNOSIS — F17.210 NICOTINE DEPENDENCE, CIGARETTES, UNCOMPLICATED: ICD-10-CM

## 2024-02-08 DIAGNOSIS — M79.642 PAIN IN BOTH HANDS: ICD-10-CM

## 2024-02-08 DIAGNOSIS — Z86.19 HISTORY OF HEPATITIS C: ICD-10-CM

## 2024-02-08 DIAGNOSIS — R76.8 POSITIVE ANA (ANTINUCLEAR ANTIBODY): ICD-10-CM

## 2024-02-08 DIAGNOSIS — R91.1 PULMONARY NODULE: ICD-10-CM

## 2024-02-08 DIAGNOSIS — D84.821 DRUG-INDUCED IMMUNODEFICIENCY: ICD-10-CM

## 2024-02-08 PROCEDURE — 4010F ACE/ARB THERAPY RXD/TAKEN: CPT | Mod: CPTII,,, | Performed by: INTERNAL MEDICINE

## 2024-02-08 PROCEDURE — 99214 OFFICE O/P EST MOD 30 MIN: CPT | Mod: S$PBB,,, | Performed by: INTERNAL MEDICINE

## 2024-02-08 PROCEDURE — 3078F DIAST BP <80 MM HG: CPT | Mod: CPTII,,, | Performed by: INTERNAL MEDICINE

## 2024-02-08 PROCEDURE — 3074F SYST BP LT 130 MM HG: CPT | Mod: CPTII,,, | Performed by: INTERNAL MEDICINE

## 2024-02-08 PROCEDURE — 99215 OFFICE O/P EST HI 40 MIN: CPT | Mod: PBBFAC | Performed by: INTERNAL MEDICINE

## 2024-02-08 PROCEDURE — 3008F BODY MASS INDEX DOCD: CPT | Mod: CPTII,,, | Performed by: INTERNAL MEDICINE

## 2024-02-08 PROCEDURE — 1159F MED LIST DOCD IN RCRD: CPT | Mod: CPTII,,, | Performed by: INTERNAL MEDICINE

## 2024-02-08 RX ORDER — CYANOCOBALAMIN, ISOPROPYL ALCOHOL 1000MCG/ML
1000 KIT INJECTION
COMMUNITY
End: 2024-05-22

## 2024-02-08 RX ORDER — LEFLUNOMIDE 20 MG/1
20 TABLET ORAL DAILY
Qty: 30 TABLET | Refills: 3 | Status: SHIPPED | OUTPATIENT
Start: 2024-02-08 | End: 2024-05-22 | Stop reason: SDUPTHER
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
## 2024-03-09 PROBLEM — K74.60 LIVER CIRRHOSIS: Status: ACTIVE | Noted: 2024-03-09

## 2024-03-09 PROBLEM — K55.20 ANGIODYSPLASIA OF SMALL INTESTINE: Status: ACTIVE | Noted: 2024-03-09

## 2024-03-09 NOTE — PROGRESS NOTES
History:  Past Medical History:   Diagnosis Date    Anemia, unspecified     Coronary artery disease     History of esophagogastroduodenoscopy (EGD) 11/01/2022    Hypertension     Osteopenia     PAD (peripheral artery disease)     PVD (peripheral vascular disease) with claudication     Rheumatoid arthritis, unspecified     Thyroid disease    Past medical history:  B12 deficiency.  Anemia.  Iron-deficiency.  History of peripheral vascular disease, on dual antiplatelets.  Hypothyroidism.  CAD, S/P PCI.  Chronic hepatitis-B.  Chronic hepatitis-C. Depression.  GERD.  Right lower lung lobe nodule.  02/22/2023: Bilateral lower extremity stent placement, with resolution of bilateral lower extremity pain  Social history:  .  Lives with her brother in [place], [place].  No children.  Never became pregnant.  Disabled.  Does not work.  Has been smoking half a pack of cigarettes daily for 45 years.  Has been smoking marijuana every other day for 40 years.  No other illicit drugs.  No alcohol abuse.  Family history:  Negative for cancers or blood dyscrasias.  Health maintenance:  PCP at [facility].  -05/02/2023:  Bilateral screening mammogram (comparison:  12/03/2020 mammogram):  BI-RADS 2 (benign)  -02/24/2022: Stool for occult blood pos  -history of multiple colonoscopies and endoscopies: Angiodysplastic lesions in duodenum  Past Surgical History:   Procedure Laterality Date    EXTRACORPOREAL SHOCK WAVE LITHOTRIPSY  2/22/2023    Procedure: LITHOTRIPSY- Peripheral Shockwave;  Surgeon: Adriel Valero MD;  Location: [facility] CATH LAB;  Service: Cardiology;;    HIP SURGERY      INSERTION, STENT, ARTERY  2/22/2023    Procedure: INSERTION, STENT, ARTERY;  Surgeon: Adriel Valero MD;  Location: [facility] CATH LAB;  Service: Cardiology;;    INTRALUMINAL GASTROINTESTINAL TRACT IMAGING VIA CAPSULE N/A 8/15/2023    Procedure: IMAGING PROCEDURE, GI TRACT, INTRALUMINAL, VIA CAPSULE;  Surgeon: Liza Del Rio MD;  Location:  [facility] ENDOSCOPY;  Service: Gastroenterology;  Laterality: N/A;    INTRAVASCULAR ULTRASOUND, NON-CORONARY  2/22/2023    Procedure: Intravascular Ultrasound, Non-Coronary;  Surgeon: Adriel Valero MD;  Location: [facility] CATH LAB;  Service: Cardiology;;    LEFT HEART CATHETERIZATION      PERIPHERAL ANGIOGRAPHY N/A 2/22/2023    Procedure: Peripheral angiography;  Surgeon: Adriel Valero MD;  Location: [facility] CATH LAB;  Service: Cardiology;  Laterality: N/A;  BILAT ILIAC INTERVENTION      Social History     Socioeconomic History    Marital status: Single   Tobacco Use    Smoking status: Every Day     Current packs/day: 0.25     Average packs/day: 0.3 packs/day for 40.0 years (10.0 ttl pk-yrs)     Types: Cigarettes    Smokeless tobacco: Never    Tobacco comments:     5 cigs per day; on nicotine patches   Substance and Sexual Activity    Alcohol use: Not Currently    Drug use: Yes     Types: Marijuana     Comment: some days    Sexual activity: Not Currently   Social History Narrative    ** Merged History Encounter **           Family History   Problem Relation Age of Onset    Kidney failure Mother     Heart disease Mother     Hypertension Mother     No Known Problems Father         Reason for Follow-up:  Iron-deficiency anemia   Vitamin B12 deficiency   Angiodysplasia of duodenum    History of Present Illness:   Iron deficiency anemia        Oncologic/Hematologic History:  Oncology History    No history exists.   57-year-old female, referred from Internal Medicine, for evaluation of anemia.      Admitted to [facility] 04/07/2023-04/08/2023:  57-year-old female with significant history of HTN, PVD status post intervention, hypothyroidism, CAD status post PCI, right lower lobe lung nodule, bipolar disorder, depression, GERD, chronic hepatitis-B/chronic hepatitis-C and iron deficiency anemia presented to the ED with complaints of fatigue, generalized weakness and outpatient labs revealing anemia.  Patient was referred to the ED  for admission to further evaluate anemia.  On Plavix.  Patient had a colonoscopy 2.  Transfused with PRBC x1 unit.  No overt bleeding.  No history of alcohol use.  GI recommended to hold off on endoscopic evaluation.  Hemoccult-pos but numerous endoscopy procedures by Dr. Keyon Hatch, her GI, unrevealing in the past.  Patient on aspirin and Plavix which were continued.  Hemoglobin improved appropriately post PRBC x1 unit.  Iron deficient.  B12 deficient.  04/03/2023: Outside blood work showed ferritin 8.8.  B12 209.    History of iron-deficiency anemia.  History of B12 deficiency.  Admitted to [facility] 04/07/2023 with fatigue, weakness, dizziness.  Outside labs had shown anemia.  History of multiple endoscopies with Dr. Keyon Hatch, GI, with no source of GI bleed found.  04/03/2023: Blood work showed iron-deficiency, ferritin 8.8, B12 level 209.    History of duodenal angiodysplasias a couple of years ago    Blood transfusion in the past   EGD 11/2021: Hiatal hernia, GE ring, nonbleeding angioectasias   Colonoscopy 09/2021: Normal   EGD and colonoscopy 2022: Esophageal dilation, no bleeding, colon polyp    History of hepatitis-C: S/P treatment; S/P occlusive 2020; posttreatment viral load undetectable; FibroScan F1 (no liver cirrhosis)    09/09/2020: Limited abdominal ultrasound (comparison:  05/07/2013): Liver cirrhosis; no focal hepatic lesion    -12/16/2020: EGD (liver cirrhosis, rule out esophageal varices):  3 diminutive nonbleeding angiodysplastic lesions in the 2nd portion of duodenum  -09/22/2021:  Colonoscopy (indication:  Occult blood in stool; personal history of colon polyps): Normal mucosa in whole:  -11/15/2021: EGD (Keyon Hatch MD) (indication:  Dysphagia, oropharyngeal, nausea, heartburn, abdominal bloating, GI metaplasia, generalized abdominal pain):  Erythema lower 3rd of esophagus compatible with esophagitis; erythema antrum, compatible with gastritis; esophageal hiatal hernia; a single  bleeding angiectasia seen in duodenal bulb, treated with monopolar cautery successfully (thermotherapy) (pathology:  Gastric antrum biopsy, reactive gastropathy with mild nonspecific chronic inflammation, negative for intestinal metaplasia; negative for H pylori; gastric body biopsy, reactive gastropathy, mild nonspecific chronic inflammation, negative for intestinal metaplasia, negative for H pylori)    Labs reviewed:  Hemoglobin: 8.1 (05/16/2023); 9.0 (04/08/2023); 7.5 (04/07/2023); 6.8 (04/03/2023); 8.0 (03/13/2023); 11.6 (10/28/2022); 13.2 (10/03/2022), etc.   MCV: Consistently normal with mild macrocytosis on a couple of occasions  Rest of CBC unremarkable  05/16/2023: Serum iron 50.  TIBC 323.  Ferritin 16.8.  Transferrin saturation 15%.  B12 level 705.  Folate 38.8.    04/03/2023: Serum iron 8.  TIBC 344.  Ferritin 8.8.  Transferrin saturation 2%.  B12 level 209, low.  Folate 8.7   10/11/2022: Serum iron 69.  TIBC 292.  Ferritin 27.13.  Transferrin saturation 24%.  09/09/2020:  Ferritin 18.8, hemoglobin 9.8, MCV 93.7  Haptoglobin normal  04/03/2023:  Hemoglobin 6.8.  Retic count 1.89%.  LDH normal.  05/17/2023: Celiac serology negative    04/08/2023:  Hepatitis-C antibody pos (treated in the past with Epclusa, with a negative hepatitis-A viral load subsequently   09/09/2020:  Hepatitis-B core antibody total reactive.  Hepatitis-B surface antibody negative.  Hepatitis-B surface antigen negative.  HIV negative    05/16/2023:  RBC osmotic fragility normal  04/03/2023:  Hemoglobin electrophoresis:  No abnormal hemoglobin or beta thalassemia    05/22/2023:  Pleasant lady.  Presents for initial hematology consultation.  In no acute discomfort.    Always feels tired.  No abdominal pain, nausea or vomiting.  Denies hematemesis, melena, or hematochezia.  Fair appetite.  Main complaint is chronic tiredness.  Has been taking iron pill every other day for last 2 years.  No GI side effects.  Started vitamin B12 pill x2  daily, a month ago.  We will check her iron stores and B12 level at this time.  Says that she was transfused around Easter time and a couple of years ago as well.  No history of cancers.  Chronic generalized fatigue, swelling in the legs, cough, and numbness and tingling in hands.  Generalized body pains, 9/10, which she attributes to rheumatoid arthritis.    Interval History:  FERAHEME (FERUMOXYTOL) TWO DOSES   [No matching plan found]     03/11/2024:   -12/13/2020:  Anti Smith antibody negative; RNP antibody negative; HCV RNA undetected  -02/08/2024:  Hemoglobin 11.3 (was 9.8 on 12/13/2023); MCV normal; rest of CBC unremarkable; ferritin 43.48, normal (was 54.49 on 12/11/2023); transferrin saturation 56% (was 17% on 12/11/2023)  -follows up with Rheumatology for rheumatoid arthritis involving multiple joints  -03/11/2024:  Hemoglobin 9.6, down from 11.3 on 02/08/2024; ferritin level is pending; transferrin saturation 13%, remains low  Presents for a follow-up visit.  Feeling fatigued.  Feeling tired.  A week ago, had black, tarry bowel movement.  No abdominal pain, nausea, vomiting.  No hematemesis.  No hematochezia.  Appetite is okay.  Taking iron pill every other day which, obviously, is not working.  No dizziness, palpitations, or dyspnea.  Says that she missed her GI appointment and is in the process of rescheduling.  I emphasized the importance of follow-up with GI because she is bleeding in her intestine which is causing recurrent iron-deficiency anemia.    Medications:  Current Outpatient Medications on File Prior to Visit   Medication Sig Dispense Refill    ALPRAZolam (XANAX) 0.5 MG tablet Take 0.5 mg by mouth 2 (two) times daily as needed for Anxiety (anxiety).      amLODIPine (NORVASC) 10 MG tablet Take 1 tablet (10 mg total) by mouth once daily. 90 tablet 1    buPROPion (WELLBUTRIN SR) 150 MG TBSR 12 hr tablet Take 150 mg by mouth every morning.      carvediloL (COREG) 6.25 MG tablet Take 1 tablet (6.25  mg total) by mouth 2 (two) times daily with meals. 90 tablet 3    clopidogreL (PLAVIX) 75 mg tablet Take 1 tablet (75 mg total) by mouth once daily. 30 tablet 5    cyanocobalamin, vitamin B-12, (B-12 COMPLIANCE) 1,000 mcg/mL Kit Inject 1,000 mcg as directed every 28 days.      DULoxetine (CYMBALTA) 30 MG capsule Take 1 capsule (30 mg total) by mouth every evening. 90 capsule 1    DULoxetine (CYMBALTA) 60 MG capsule Take 60 mg by mouth every morning.      ergocalciferol (ERGOCALCIFEROL) 50,000 unit Cap Take 1 capsule (50,000 Units total) by mouth every 7 days. 60 capsule 1    ferrous gluconate (FERGON) 324 MG tablet Take 1 tablet (324 mg total) by mouth every other day. 90 tablet 1    folic acid (FOLVITE) 1 MG tablet Take 1 tablet (1 mg total) by mouth once daily. 90 tablet 1    leflunomide (ARAVA) 20 MG Tab Take 1 tablet (20 mg total) by mouth once daily. 30 tablet 3    lisinopriL 10 MG tablet Take 1 tablet (10 mg total) by mouth once daily. 90 tablet 3    mirtazapine (REMERON) 15 MG tablet TAKE ONE TABLET BY MOUTH ONCE A DAY AT BEDTIME      nicotine (NICODERM CQ) 21 mg/24 hr Place 1 patch onto the skin once daily. 60 patch 0    nitroGLYCERIN (NITROSTAT) 0.4 MG SL tablet Place 1 tablet under the tongue every 5 (five) minutes as needed.      rosuvastatin (CRESTOR) 40 MG Tab Take 40 mg by mouth once daily.      traZODone (DESYREL) 300 MG tablet Take 300 mg by mouth nightly.      UNABLE TO FIND Ranitidine HCl 150 MG      cyanocobalamin (VITAMIN B-12) 1000 MCG tablet Take 1 tablet (1,000 mcg total) by mouth once daily. (Patient not taking: Reported on 2/8/2024) 180 tablet 1    diclofenac (VOLTAREN) 75 MG EC tablet Take 1 tablet (75 mg total) by mouth 2 (two) times daily as needed (pain). (Patient not taking: Reported on 1/3/2024) 15 tablet 0    loperamide (IMODIUM) 2 mg capsule Take 2 tablets in the morning and 1 tablet after every loose stool, no more than 6 tablets a day (Patient not taking: Reported on 2/8/2024) 20  "capsule 0    predniSONE (DELTASONE) 10 MG tablet Take 2 tablets daily for 1 week and then decrease dose to 1 tablet daily for 1 week and stop. (Patient not taking: Reported on 2/8/2024) 30 tablet 0    pregabalin (LYRICA) 25 MG capsule Take 1 capsule (25 mg total) by mouth 2 (two) times daily. (Patient not taking: Reported on 8/14/2023) 120 capsule 1     Current Facility-Administered Medications on File Prior to Visit   Medication Dose Route Frequency Provider Last Rate Last Admin    ferric gluconate 62.5 mg/5 mL injection 125 mg  125 mg Intravenous 1 time in Clinic/HOD Akin Gan MD           Review of Systems:   All systems reviewed and found to be negative except for the symptoms detailed above    Physical Examination:   VITAL SIGNS:   Vitals:    03/11/24 1405   BP: (!) 145/86   Pulse: 74   Resp: 17   Temp: 97.6 °F (36.4 °C)         GENERAL:  In no apparent distress.    HEAD:  No signs of head trauma.  EYES:  Pupils are equal.  Extraocular motions intact.    EARS:  Hearing grossly intact.  MOUTH:  Oropharynx is normal.   NECK:  No adenopathy, no JVD.     CHEST:  Chest with clear breath sounds bilaterally.  No wheezes, rales, rhonchi.    CARDIAC:  Regular rate and rhythm.  S1 and S2, without murmurs, gallops, rubs.  VASCULAR:  No Edema.  Peripheral pulses normal and equal in all extremities.  ABDOMEN:  Soft, without detectable tenderness.  No sign of distention.  No   rebound or guarding, and no masses palpated.   Bowel Sounds normal.  MUSCULOSKELETAL:  Good range of motion of all major joints. Extremities without clubbing, cyanosis or edema.    NEUROLOGIC EXAM:  Alert and oriented x 3.  No focal sensory or strength deficits.   Speech normal.  Follows commands.  PSYCHIATRIC:  Mood normal.    No results for input(s): "CBC" in the last 72 hours.   No results for input(s): "CMP" in the last 72 hours.     Assessment:  Problem List Items Addressed This Visit          Pulmonary    Solitary pulmonary nodule    "    Oncology    Iron deficiency anemia due to chronic blood loss - Primary       Endocrine    Vitamin B12 deficiency       GI    Hepatitis C virus infection    Angiodysplasia of duodenum    Angiodysplasia of small intestine    Liver cirrhosis     Iron-deficiency anemia, B12 deficiency anemia:  (Multiple angioectasias in duodenal, without bleeding; multiple angioectasias in jejunum, without bleeding; multiple angioectasias in ileum, without bleeding):  -chronic anemia, ranging between 6.8-8.1; was normal in October 2022  -chronic iron-deficiency  -PRBC x1 unit 04/03/2023 for hemoglobin 6.8  -Celiac serology negative 05/17/2023  -RBC osmotic fragility normal 05/16/2023  -no hemoglobinopathy on hemoglobin electrophoresis 04/03/2023  -found to be B12 deficient when hospitalized 04/07/2023-04/08/2023 with symptomatic anemia, requiring PRBC x1 unit  -Ferrlecit 125 mg IV x2 (02/20/2023, 04/08/2023)  -12/16/2020: EGD:  No esophageal varices; 3 diminutive nonbleeding angiodysplastic lesions 2nd portion of duodenum   -11/15/2021: EGD:  Esophagitis, gastritis, a single bleeding angiectasia duodenal bulb treated with cautery (thermotherapy)  -09/22/2021: Colonoscopy:  Occult blood in stool: Normal  -EGD and colonoscopy 2022: Colon polyp  -no response to oral iron therapy x2-3 years; remained iron-deficiency  -S/P Feraheme 510 mg IV x2 (06/05/2023, 06/12/2023), with good response (hemoglobin improved to 12.3 and ferritin 122.03 on 07/10/2023  -07/12/2023:  Patient desired to have B12 shots rather than pills because she can not afford the pills  -08/15/2023: Video capsule endoscopy: Multiple angioectasias without bleeding in the duodenum; multiple angioectasias without bleeding in the jejunum; multiple angioectasias without bleeding in the ileum  -09/11/2023:  Hemoglobin 11.1, stable  -11/14/2023:  Stool occult blood positive  -02/08/2024:  Hemoglobin 11.3 (was 9.8 on 12/13/2023); ferritin 43.48 (was 54.49 on  12/11/2023)  -03/11/2024:  Hemoglobin 9.6, down from 11.3 on 02/08/2024; ferritin level is pending; transferrin saturation 13%, remains low    Vitamin B12 deficiency:  -diagnosed 04/03/2023: B12 level 209  -05/22/2023: Tells me that she has been taking 2 vitamin B12 pills for last 1 month.  -05/22/2023: Vitamin B12 level 521 (absorbing satisfactorily via oral route).   Intrinsic factor antibody negative.    Homocystine level 4.6 micromoles per L, suppressed (patient already on oral B12 supplementation).  Methylmalonic acid level 0.12 nanomoles /ml, normal (patient already on oral vitamin B12 supplementation, with adequate absorption)  -07/12/2023:  Patient desired to have B12 shots rather than pills because she can not afford the pills      History of hepatitis-C and liver cirrhosis:  -treated with Epclusa in 2020   -Posttreatment viral load undetectable  -FibroScan F 1 (no liver cirrhosis)  -ultrasound 09/09/2020: Liver cirrhosis     -EGD 12/16/2020:  No esophageal varices      Plan:   -hemoglobin declining; remains iron-deficiency  -proceed with intravenous iron therapy with Feraheme 510 mg IV x2, administered a week apart  -assess response with repeat CBC, serum iron, TIBC, ferritin in 6 weeks  -follow-up with GI for history of multiple angioectasias in intestinal tract  -continue vitamin B12 1000 mcg IM every month   Follow-up with Pulmonary: Repeat CT chest without contrast in November 2024  Follow-up in 6 weeks with repeat labs  -----------------------------------      -chronic iron-deficiency anemia  -transfused in the past   -no response to oral iron therapy which she was taking for 2-3 years  -history of nonbleeding angiodysplastic lesions on EGD 12/16/2020, 11/15/2021; S/P endoscopic thermotherapy 11/15/2021  -treated with Feraheme x2 in 06/2023  -found to have multiple angioectasias without bleeding in duodenum, jejunum, and in the ileum on video capsule endoscopy 08/15/2023  -11/14/2023:  Stool occult  blood positive (as expected with multiple angioectasias)  -12/11/2023:  Hemoglobin 10.0 (was 11.1 on 09/11/2023, 11.3 on 08/14/2023, 12.3 on 07/10/2023, etc).; ferritin 54.49 (was 18.98 on 09/11/2023); B12 level 435   -02/08/2024:  Hemoglobin 11.3 (was 9.8 on 12/13/2023); ferritin 43.48 (was 54.49 on 12/11/2023)  -03/11/2024:  Hemoglobin 9.6, down from 11.3 on 02/08/2024; ferritin level is pending; transferrin saturation 13%, remains low  >>>  -hemoglobin declining; remains iron-deficient  -taking iron pill 3 X a week; has failed oral iron therapy; advised her to discontinue iron pills  -proceed with intravenous iron therapy with Feraheme 510 mg IV x2, administered a week apart  -assess response with repeat CBC, serum iron, TIBC, ferritin in 6 weeks  -follow-up with GI for history of multiple angioectasias in intestinal tract    Found to be vitamin B12 deficient on labs 04/03/2023   -05/22/2023: Told me that she has been taking 2 vitamin B12 pills for 1 month   -05/22/2023:  B12 level normalized, 521, with oral B12 therapy; intrinsic factor antibody negative; homocystine level suppressed; methylmalonic acid level normal (the labs were checked while she was already on oral B12 therapy)  -09/11/2023:  For last couple she has discontinued B12 pills because she could not afford   -09/11/2023:  For last 2 months, her sister-in-law has been administering her monthly B12 shots;   -12/11/2023:  Hemoglobin 10.0 (was 11.1 on 09/11/2023, 11.3 on 08/14/2023, 12.3 on 07/10/2023, etc).; ferritin 54.49 (was 18.98 on 09/11/2023); B12 level 435   >>>  Continue vitamin B12 1000 mcg IM (per her preference) every month; her sister in law administers the shots    From Hematology perspective, we will monitor iron and B12 deficiency anemia, and intervene to replete any deficiency if and when necessary    Subcentimeter lung nodules  -noncontrast chest CT 11/20/2023:  Stable subcentimeter lung nodules  -12/04/2023:  Pulmonary follow-up:  Repeat CT chest in 1 year    Follow-up in 6 weeks with repeat labs    Above discussed at length with the patient.  All questions answered.    Discussed labs and gave her copies of relevant reports.    Plan of management discussed.    She understands and agrees with this plan.    Follow-up:  No follow-ups on file.

## 2024-03-11 ENCOUNTER — APPOINTMENT (OUTPATIENT)
Dept: HEMATOLOGY/ONCOLOGY | Facility: CLINIC | Age: 59
End: 2024-03-11
Payer: MEDICAID

## 2024-03-11 ENCOUNTER — OFFICE VISIT (OUTPATIENT)
Dept: HEMATOLOGY/ONCOLOGY | Facility: CLINIC | Age: 59
End: 2024-03-11
Attending: INTERNAL MEDICINE
Payer: MEDICAID

## 2024-03-11 VITALS
HEART RATE: 74 BPM | HEIGHT: 60 IN | RESPIRATION RATE: 17 BRPM | BODY MASS INDEX: 25.76 KG/M2 | TEMPERATURE: 98 F | OXYGEN SATURATION: 99 % | DIASTOLIC BLOOD PRESSURE: 86 MMHG | SYSTOLIC BLOOD PRESSURE: 145 MMHG | WEIGHT: 131.19 LBS

## 2024-03-11 DIAGNOSIS — K31.819 ANGIODYSPLASIA OF DUODENUM: ICD-10-CM

## 2024-03-11 DIAGNOSIS — R91.1 SOLITARY PULMONARY NODULE: ICD-10-CM

## 2024-03-11 DIAGNOSIS — D50.0 IRON DEFICIENCY ANEMIA DUE TO CHRONIC BLOOD LOSS: ICD-10-CM

## 2024-03-11 DIAGNOSIS — D62 ACUTE BLOOD LOSS ANEMIA: ICD-10-CM

## 2024-03-11 DIAGNOSIS — E53.8 VITAMIN B12 DEFICIENCY: ICD-10-CM

## 2024-03-11 DIAGNOSIS — D50.0 IRON DEFICIENCY ANEMIA DUE TO CHRONIC BLOOD LOSS: Primary | ICD-10-CM

## 2024-03-11 DIAGNOSIS — K74.60 HEPATIC CIRRHOSIS, UNSPECIFIED HEPATIC CIRRHOSIS TYPE, UNSPECIFIED WHETHER ASCITES PRESENT: ICD-10-CM

## 2024-03-11 DIAGNOSIS — K55.20 ANGIODYSPLASIA OF SMALL INTESTINE: ICD-10-CM

## 2024-03-11 DIAGNOSIS — B18.2 CHRONIC HEPATITIS C WITHOUT HEPATIC COMA: ICD-10-CM

## 2024-03-11 LAB
ALBUMIN SERPL-MCNC: 3.4 G/DL (ref 3.5–5)
ALBUMIN/GLOB SERPL: 0.9 RATIO (ref 1.1–2)
ALP SERPL-CCNC: 67 UNIT/L (ref 40–150)
ALT SERPL-CCNC: 6 UNIT/L (ref 0–55)
AST SERPL-CCNC: 16 UNIT/L (ref 5–34)
BASOPHILS # BLD AUTO: 0.07 X10(3)/MCL
BASOPHILS NFR BLD AUTO: 1.1 %
BILIRUB SERPL-MCNC: 0.3 MG/DL
BUN SERPL-MCNC: 10.7 MG/DL (ref 9.8–20.1)
CALCIUM SERPL-MCNC: 9.3 MG/DL (ref 8.4–10.2)
CHLORIDE SERPL-SCNC: 102 MMOL/L (ref 98–107)
CO2 SERPL-SCNC: 29 MMOL/L (ref 22–29)
CREAT SERPL-MCNC: 0.72 MG/DL (ref 0.55–1.02)
EOSINOPHIL # BLD AUTO: 0.25 X10(3)/MCL (ref 0–0.9)
EOSINOPHIL NFR BLD AUTO: 3.9 %
ERYTHROCYTE [DISTWIDTH] IN BLOOD BY AUTOMATED COUNT: 14.7 % (ref 11.5–17)
FERRITIN SERPL-MCNC: 36.27 NG/ML (ref 4.63–204)
GFR SERPLBLD CREATININE-BSD FMLA CKD-EPI: >60 MLS/MIN/1.73/M2
GLOBULIN SER-MCNC: 3.7 GM/DL (ref 2.4–3.5)
GLUCOSE SERPL-MCNC: 112 MG/DL (ref 74–100)
HCT VFR BLD AUTO: 30.5 % (ref 37–47)
HGB BLD-MCNC: 9.6 G/DL (ref 12–16)
IMM GRANULOCYTES # BLD AUTO: 0.03 X10(3)/MCL (ref 0–0.04)
IMM GRANULOCYTES NFR BLD AUTO: 0.5 %
IRON SATN MFR SERPL: 13 % (ref 20–50)
IRON SERPL-MCNC: 36 UG/DL (ref 50–170)
LYMPHOCYTES # BLD AUTO: 1.53 X10(3)/MCL (ref 0.6–4.6)
LYMPHOCYTES NFR BLD AUTO: 23.6 %
MCH RBC QN AUTO: 28 PG (ref 27–31)
MCHC RBC AUTO-ENTMCNC: 31.5 G/DL (ref 33–36)
MCV RBC AUTO: 88.9 FL (ref 80–94)
MONOCYTES # BLD AUTO: 0.88 X10(3)/MCL (ref 0.1–1.3)
MONOCYTES NFR BLD AUTO: 13.6 %
NEUTROPHILS # BLD AUTO: 3.72 X10(3)/MCL (ref 2.1–9.2)
NEUTROPHILS NFR BLD AUTO: 57.3 %
NRBC BLD AUTO-RTO: 0 %
PLATELET # BLD AUTO: 274 X10(3)/MCL (ref 130–400)
PMV BLD AUTO: 9.8 FL (ref 7.4–10.4)
POTASSIUM SERPL-SCNC: 3.7 MMOL/L (ref 3.5–5.1)
PROT SERPL-MCNC: 7.1 GM/DL (ref 6.4–8.3)
RBC # BLD AUTO: 3.43 X10(6)/MCL (ref 4.2–5.4)
SODIUM SERPL-SCNC: 137 MMOL/L (ref 136–145)
TIBC SERPL-MCNC: 245 UG/DL (ref 70–310)
TIBC SERPL-MCNC: 281 UG/DL (ref 250–450)
TRANSFERRIN SERPL-MCNC: 268 MG/DL (ref 180–382)
WBC # SPEC AUTO: 6.48 X10(3)/MCL (ref 4.5–11.5)

## 2024-03-11 PROCEDURE — 1159F MED LIST DOCD IN RCRD: CPT | Mod: CPTII,,, | Performed by: INTERNAL MEDICINE

## 2024-03-11 PROCEDURE — 36415 COLL VENOUS BLD VENIPUNCTURE: CPT

## 2024-03-11 PROCEDURE — 85025 COMPLETE CBC W/AUTO DIFF WBC: CPT

## 2024-03-11 PROCEDURE — 99214 OFFICE O/P EST MOD 30 MIN: CPT | Mod: S$PBB,,, | Performed by: INTERNAL MEDICINE

## 2024-03-11 PROCEDURE — 3077F SYST BP >= 140 MM HG: CPT | Mod: CPTII,,, | Performed by: INTERNAL MEDICINE

## 2024-03-11 PROCEDURE — 99215 OFFICE O/P EST HI 40 MIN: CPT | Mod: PBBFAC | Performed by: INTERNAL MEDICINE

## 2024-03-11 PROCEDURE — 3008F BODY MASS INDEX DOCD: CPT | Mod: CPTII,,, | Performed by: INTERNAL MEDICINE

## 2024-03-11 PROCEDURE — 82728 ASSAY OF FERRITIN: CPT

## 2024-03-11 PROCEDURE — 4010F ACE/ARB THERAPY RXD/TAKEN: CPT | Mod: CPTII,,, | Performed by: INTERNAL MEDICINE

## 2024-03-11 PROCEDURE — 3079F DIAST BP 80-89 MM HG: CPT | Mod: CPTII,,, | Performed by: INTERNAL MEDICINE

## 2024-03-11 PROCEDURE — 80053 COMPREHEN METABOLIC PANEL: CPT

## 2024-03-11 PROCEDURE — 83540 ASSAY OF IRON: CPT

## 2024-03-11 PROCEDURE — 1160F RVW MEDS BY RX/DR IN RCRD: CPT | Mod: CPTII,,, | Performed by: INTERNAL MEDICINE

## 2024-03-11 RX ORDER — HEPARIN 100 UNIT/ML
500 SYRINGE INTRAVENOUS
Status: CANCELLED | OUTPATIENT
Start: 2024-03-18

## 2024-03-11 RX ORDER — HEPARIN 100 UNIT/ML
500 SYRINGE INTRAVENOUS
Status: CANCELLED | OUTPATIENT
Start: 2024-03-25

## 2024-03-11 RX ORDER — SODIUM CHLORIDE 0.9 % (FLUSH) 0.9 %
10 SYRINGE (ML) INJECTION
Status: CANCELLED | OUTPATIENT
Start: 2024-03-18

## 2024-03-11 RX ORDER — SODIUM CHLORIDE 0.9 % (FLUSH) 0.9 %
10 SYRINGE (ML) INJECTION
Status: CANCELLED | OUTPATIENT
Start: 2024-03-25

## 2024-03-11 NOTE — Clinical Note
New wrap-up:  -hemoglobin declining; remains iron-deficiency -proceed with intravenous iron therapy with Feraheme 510 mg IV x2, administered a week apart -assess response with repeat CBC, serum iron, TIBC, ferritin in 6 weeks -follow-up with GI for history of multiple angioectasias in intestinal tract -continue vitamin B12 1000 mcg IM every month  Follow-up with Pulmonary: Repeat CT chest without contrast in November 2024 Follow-up in 6 weeks with repeat labs

## 2024-03-11 NOTE — Clinical Note
Recheck CBC, serum iron, TIBC, ferritin in May, then follow-up with NP Continue vitamin B12 1000 mcg IM every month Repeat CT chest without contrast in December 2024 Follow-up with NP in May, with labs.

## 2024-03-20 ENCOUNTER — INFUSION (OUTPATIENT)
Dept: INFUSION THERAPY | Facility: HOSPITAL | Age: 59
End: 2024-03-20
Attending: INTERNAL MEDICINE
Payer: MEDICAID

## 2024-03-20 VITALS
BODY MASS INDEX: 24.66 KG/M2 | TEMPERATURE: 98 F | DIASTOLIC BLOOD PRESSURE: 81 MMHG | HEART RATE: 82 BPM | OXYGEN SATURATION: 98 % | SYSTOLIC BLOOD PRESSURE: 137 MMHG | WEIGHT: 125.63 LBS | HEIGHT: 60 IN | RESPIRATION RATE: 20 BRPM

## 2024-03-20 DIAGNOSIS — D50.0 IRON DEFICIENCY ANEMIA DUE TO CHRONIC BLOOD LOSS: Primary | ICD-10-CM

## 2024-03-20 PROCEDURE — 96374 THER/PROPH/DIAG INJ IV PUSH: CPT

## 2024-03-20 PROCEDURE — 63600175 PHARM REV CODE 636 W HCPCS: Mod: JZ,JG | Performed by: INTERNAL MEDICINE

## 2024-03-20 PROCEDURE — 25000003 PHARM REV CODE 250: Performed by: INTERNAL MEDICINE

## 2024-03-20 RX ORDER — SODIUM CHLORIDE 0.9 % (FLUSH) 0.9 %
10 SYRINGE (ML) INJECTION
Status: DISCONTINUED | OUTPATIENT
Start: 2024-03-20 | End: 2024-03-20 | Stop reason: HOSPADM

## 2024-03-20 RX ORDER — HEPARIN 100 UNIT/ML
500 SYRINGE INTRAVENOUS
Status: DISCONTINUED | OUTPATIENT
Start: 2024-03-20 | End: 2024-03-20 | Stop reason: HOSPADM

## 2024-03-20 RX ADMIN — FERUMOXYTOL 510 MG: 510 INJECTION INTRAVENOUS at 01:03

## 2024-03-21 ENCOUNTER — DOCUMENTATION ONLY (OUTPATIENT)
Dept: INTERNAL MEDICINE | Facility: CLINIC | Age: 59
End: 2024-03-21
Payer: MEDICAID

## 2024-03-21 LAB — CRC RECOMMENDATION EXT: NORMAL

## 2024-03-25 ENCOUNTER — TELEPHONE (OUTPATIENT)
Dept: INTERNAL MEDICINE | Facility: CLINIC | Age: 59
End: 2024-03-25
Payer: MEDICAID

## 2024-03-25 NOTE — TELEPHONE ENCOUNTER
----- Message from Bethany Ray sent at 3/22/2024  1:37 PM CDT -----  Regarding: Dr. Dobbs-ENT Referral  Good afternoon, patient has called in requesting a referral to the ENT clinic for bilateral ear pain. Patient states she has had multiple surgeries on her ears and believes both are impacted with wax. Patient was referred to Dr. Hough but the request was denied due to her insurance. Patient will RTC on 05/20/24. Thank you

## 2024-04-04 ENCOUNTER — INFUSION (OUTPATIENT)
Dept: INFUSION THERAPY | Facility: HOSPITAL | Age: 59
End: 2024-04-04
Attending: INTERNAL MEDICINE
Payer: MEDICAID

## 2024-04-04 VITALS
WEIGHT: 122.81 LBS | OXYGEN SATURATION: 98 % | RESPIRATION RATE: 20 BRPM | DIASTOLIC BLOOD PRESSURE: 79 MMHG | HEART RATE: 76 BPM | HEIGHT: 60 IN | SYSTOLIC BLOOD PRESSURE: 131 MMHG | TEMPERATURE: 98 F | BODY MASS INDEX: 24.11 KG/M2

## 2024-04-04 DIAGNOSIS — D50.0 IRON DEFICIENCY ANEMIA DUE TO CHRONIC BLOOD LOSS: Primary | ICD-10-CM

## 2024-04-04 PROCEDURE — 25000003 PHARM REV CODE 250: Performed by: INTERNAL MEDICINE

## 2024-04-04 PROCEDURE — 96365 THER/PROPH/DIAG IV INF INIT: CPT

## 2024-04-04 PROCEDURE — 63600175 PHARM REV CODE 636 W HCPCS: Mod: JZ,JG | Performed by: INTERNAL MEDICINE

## 2024-04-04 RX ORDER — SODIUM CHLORIDE 0.9 % (FLUSH) 0.9 %
10 SYRINGE (ML) INJECTION
Status: DISCONTINUED | OUTPATIENT
Start: 2024-04-04 | End: 2024-04-04 | Stop reason: HOSPADM

## 2024-04-04 RX ORDER — HEPARIN 100 UNIT/ML
500 SYRINGE INTRAVENOUS
Status: DISCONTINUED | OUTPATIENT
Start: 2024-04-04 | End: 2024-04-04 | Stop reason: HOSPADM

## 2024-04-04 RX ADMIN — FERUMOXYTOL 510 MG: 510 INJECTION INTRAVENOUS at 11:04

## 2024-04-10 ENCOUNTER — OFFICE VISIT (OUTPATIENT)
Dept: GYNECOLOGY | Facility: CLINIC | Age: 59
End: 2024-04-10
Payer: MEDICAID

## 2024-04-10 VITALS
SYSTOLIC BLOOD PRESSURE: 140 MMHG | TEMPERATURE: 98 F | DIASTOLIC BLOOD PRESSURE: 72 MMHG | WEIGHT: 122.81 LBS | RESPIRATION RATE: 18 BRPM | HEART RATE: 69 BPM | BODY MASS INDEX: 24.11 KG/M2 | OXYGEN SATURATION: 97 % | HEIGHT: 60 IN

## 2024-04-10 DIAGNOSIS — L29.2 VULVAR ITCHING: ICD-10-CM

## 2024-04-10 DIAGNOSIS — N95.2 ATROPHIC VAGINITIS: ICD-10-CM

## 2024-04-10 DIAGNOSIS — Z01.419 WOMEN'S ANNUAL ROUTINE GYNECOLOGICAL EXAMINATION: Primary | ICD-10-CM

## 2024-04-10 LAB
CLUE CELLS VAG QL WET PREP: NORMAL
T VAGINALIS VAG QL WET PREP: NORMAL
WBC #/AREA VAG WET PREP: NORMAL
YEAST SPEC QL WET PREP: NORMAL

## 2024-04-10 PROCEDURE — 3077F SYST BP >= 140 MM HG: CPT | Mod: CPTII,,, | Performed by: NURSE PRACTITIONER

## 2024-04-10 PROCEDURE — 4010F ACE/ARB THERAPY RXD/TAKEN: CPT | Mod: CPTII,,, | Performed by: NURSE PRACTITIONER

## 2024-04-10 PROCEDURE — 87491 CHLMYD TRACH DNA AMP PROBE: CPT | Performed by: NURSE PRACTITIONER

## 2024-04-10 PROCEDURE — 99215 OFFICE O/P EST HI 40 MIN: CPT | Mod: PBBFAC | Performed by: NURSE PRACTITIONER

## 2024-04-10 PROCEDURE — 99213 OFFICE O/P EST LOW 20 MIN: CPT | Mod: 25,S$PBB,, | Performed by: NURSE PRACTITIONER

## 2024-04-10 PROCEDURE — 88174 CYTOPATH C/V AUTO IN FLUID: CPT | Performed by: NURSE PRACTITIONER

## 2024-04-10 PROCEDURE — 87624 HPV HI-RISK TYP POOLED RSLT: CPT

## 2024-04-10 PROCEDURE — 87624 HPV HI-RISK TYP POOLED RSLT: CPT | Performed by: NURSE PRACTITIONER

## 2024-04-10 PROCEDURE — 1160F RVW MEDS BY RX/DR IN RCRD: CPT | Mod: CPTII,,, | Performed by: NURSE PRACTITIONER

## 2024-04-10 PROCEDURE — 1159F MED LIST DOCD IN RCRD: CPT | Mod: CPTII,,, | Performed by: NURSE PRACTITIONER

## 2024-04-10 PROCEDURE — 3008F BODY MASS INDEX DOCD: CPT | Mod: CPTII,,, | Performed by: NURSE PRACTITIONER

## 2024-04-10 PROCEDURE — 87210 SMEAR WET MOUNT SALINE/INK: CPT | Performed by: NURSE PRACTITIONER

## 2024-04-10 PROCEDURE — 87591 N.GONORRHOEAE DNA AMP PROB: CPT | Performed by: NURSE PRACTITIONER

## 2024-04-10 PROCEDURE — 87491 CHLMYD TRACH DNA AMP PROBE: CPT

## 2024-04-10 PROCEDURE — 99386 PREV VISIT NEW AGE 40-64: CPT | Mod: S$PBB,,, | Performed by: NURSE PRACTITIONER

## 2024-04-10 PROCEDURE — 87591 N.GONORRHOEAE DNA AMP PROB: CPT

## 2024-04-10 PROCEDURE — 3078F DIAST BP <80 MM HG: CPT | Mod: CPTII,,, | Performed by: NURSE PRACTITIONER

## 2024-04-10 RX ORDER — BISMUTH SUBSALICYLATE 262 MG/1
2 TABLET ORAL 4 TIMES DAILY
COMMUNITY
Start: 2024-04-03

## 2024-04-10 RX ORDER — CLOBETASOL PROPIONATE 0.5 MG/G
OINTMENT TOPICAL
Qty: 1 EACH | Refills: 1 | Status: SHIPPED | OUTPATIENT
Start: 2024-04-10 | End: 2024-06-12

## 2024-04-10 RX ORDER — ESTRADIOL 0.1 MG/G
CREAM VAGINAL
Qty: 42.5 G | Refills: 3 | Status: SHIPPED | OUTPATIENT
Start: 2024-04-10

## 2024-04-10 NOTE — PROGRESS NOTES
"Patient ID: Sandra Burns is a 58 y.o. female.    Chief Complaint: Well Woman      Review of patient's allergies indicates:  No Known Allergies      Past Medical History:   Diagnosis Date    Anemia, unspecified     Coronary artery disease     Herpes simplex virus (HSV) infection     History of esophagogastroduodenoscopy (EGD) 11/01/2022    Hypertension     Osteopenia     PAD (peripheral artery disease)     PVD (peripheral vascular disease) with claudication     Rheumatoid arthritis, unspecified     Thyroid disease             HPI:  The patient is G0 here for annual gyn exam. Denies hx of abnormal pap. States last pap was 2-3 years ago with outside pcp. Pt states is postmenopausal since early 40s. Denies vaginal bleeding. Denies pain. Denies breast complaints. Pt has hx of left breast biopsy with benign pathology. Pt is not sexually active and states has not been since 2010.     Today, pt c/o vulvar "bumps". States onset was one year ago with severe itching. States was seen in an ER and was diagnosed with herpes based on inspection. States no swabs or labs were performed. States lesions have never resolved and denies being given any antivirals. Pt has been applying hydrogen peroxide to help with itching. States that has made it worse. She takes hot showers. Denies using scented soaps. Pt also c/o yellow tinged vaginal discharge and dryness.     Review of Systems:   Negative except for findings in HPI     Objective:   BP (!) 140/72 (BP Location: Right arm, Patient Position: Sitting, BP Method: Medium (Manual))   Pulse 69   Temp 98.1 °F (36.7 °C) (Oral)   Resp 18   Ht 5' (1.524 m)   Wt 55.7 kg (122 lb 12.7 oz)   LMP  (LMP Unknown)   SpO2 97%   BMI 23.98 kg/m²    Physical Exam:  GENERAL: Pt is aware and alert and  in no acute distress.  BREASTS: Bilateral-No masses, nipple discharge, skin changes, or tenderness.  ABDOMEN: Soft, non tender.  VULVA:  discoloration/erythema noted to clitoral ward expanding to " right labia majora; no visible lesions; no skin thickening  URETHRA: No lesions  BLADDER: No tenderness.  VAGINA: Mucosa atrophic,scant amount of yellow tinged discharge; no lesions.  CERVIX:  no CMT, NO discharge; NO lesions  BIMANUAL EXAM: reveals a 8-week-sized uterus. The uterus is mobile, nontender, no palpable masses. Gume adnexa reveal no evidence of masses; no fullness   SKIN: Warm and Dry  PSYCHIATRIC: Patient is awake and alert. Mood and affect are normal.    Assessment:   Women's annual routine gynecological examination  -     Liquid-Based Pap Smear, Screening Screening  -     Wet Prep, Genital    Vulvar itching  -     clobetasol 0.05% (TEMOVATE) 0.05 % Oint; Apply twice daily for one month, then daily for one month, then 1-2x per week as needed for itching  Dispense: 1 each; Refill: 1  -     Wet Prep, Genital    Atrophic vaginitis  -     estradioL (ESTRACE) 0.01 % (0.1 mg/gram) vaginal cream; Insert 1g inside the vagina every night for 2 weeks then twice per week  Dispense: 42.5 g; Refill: 3  -     Wet Prep, Genital            1. Women's annual routine gynecological examination    2. Vulvar itching    3. Atrophic vaginitis             -pap/hpv/gc/wet prep  -no visible lesions/bumps noted on exam; erythema/discoloration likely lichen simplex chronicus; begin topical clobetasol at tapering dose; Pt to rtc in 8 weeks; if no improvement, consider vulvar biopsy  -begin vaginal estrace for atrophic vaginitis  -avoid scented products; avoid bubble baths; avoid douching  Plan:       Follow up in about 8 weeks (around 6/5/2024).

## 2024-04-12 LAB
CHLAMYDIA TRACHOMATIS: NEGATIVE
HIGH RISK HPV: NEGATIVE
NEISSERIA GONORRHOEAE: NEGATIVE
PSYCHE PATHOLOGY RESULT: NORMAL

## 2024-04-16 ENCOUNTER — TELEPHONE (OUTPATIENT)
Dept: RHEUMATOLOGY | Facility: CLINIC | Age: 59
End: 2024-04-16
Payer: MEDICAID

## 2024-04-16 NOTE — TELEPHONE ENCOUNTER
----- Message from Mehnaz Miranda sent at 4/16/2024  3:40 PM CDT -----  Regarding: Pain  Pt requesting pain medication for pain in arm going up to shoulder. Pt can be contacte [phone]

## 2024-04-16 NOTE — TELEPHONE ENCOUNTER
Pt states her whole arm and back area is in pain . It is making it difficult for her to move. She also states she having joint swelling.

## 2024-04-19 ENCOUNTER — TELEPHONE (OUTPATIENT)
Dept: INTERNAL MEDICINE | Facility: CLINIC | Age: 59
End: 2024-04-19
Payer: MEDICAID

## 2024-04-19 NOTE — TELEPHONE ENCOUNTER
----- Message from Bethany Ray sent at 4/19/2024 11:51 AM CDT -----  Regarding: Dr. Dobbs-Pain Relief  Good morning, patient has called in again to check on her request for medication due to arthritis pain. Patient states she called Dr. Marsh in Rheumatology  and was asked to call her PCP.  Patient was advised to seek treatment in the ER/[facility].

## 2024-05-09 DIAGNOSIS — D50.0 IRON DEFICIENCY ANEMIA DUE TO CHRONIC BLOOD LOSS: Primary | ICD-10-CM

## 2024-05-10 ENCOUNTER — TELEPHONE (OUTPATIENT)
Dept: HEMATOLOGY/ONCOLOGY | Facility: CLINIC | Age: 59
End: 2024-05-10
Payer: MEDICAID

## 2024-05-10 NOTE — TELEPHONE ENCOUNTER
Called patient to confirm appointment for 5/13/24. Patient did not answer the phone left a voice message.

## 2024-05-16 ENCOUNTER — OFFICE VISIT (OUTPATIENT)
Dept: OTOLARYNGOLOGY | Facility: CLINIC | Age: 59
End: 2024-05-16
Payer: MEDICAID

## 2024-05-16 VITALS
BODY MASS INDEX: 23.95 KG/M2 | DIASTOLIC BLOOD PRESSURE: 64 MMHG | TEMPERATURE: 98 F | HEART RATE: 68 BPM | SYSTOLIC BLOOD PRESSURE: 123 MMHG | HEIGHT: 60 IN | WEIGHT: 122 LBS | OXYGEN SATURATION: 98 %

## 2024-05-16 DIAGNOSIS — H91.90 HEARING LOSS, UNSPECIFIED HEARING LOSS TYPE, UNSPECIFIED LATERALITY: Primary | ICD-10-CM

## 2024-05-16 DIAGNOSIS — R06.09 DYSPNEA ON EXERTION: ICD-10-CM

## 2024-05-16 DIAGNOSIS — R49.0 HOARSENESS: ICD-10-CM

## 2024-05-16 DIAGNOSIS — R06.01 ORTHOPNEA: ICD-10-CM

## 2024-05-16 DIAGNOSIS — H66.90 CHRONIC OTITIS MEDIA, UNSPECIFIED OTITIS MEDIA TYPE: ICD-10-CM

## 2024-05-16 PROCEDURE — 99213 OFFICE O/P EST LOW 20 MIN: CPT | Mod: PBBFAC | Performed by: STUDENT IN AN ORGANIZED HEALTH CARE EDUCATION/TRAINING PROGRAM
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
## 2024-05-21 NOTE — PROGRESS NOTES
Patient seen and evaluated by PCP Dr. Dobbs  Please refer to clinic note from Dr Dilia Samano MD  [facility] Internal Medicine, PGY3

## 2024-05-22 ENCOUNTER — OFFICE VISIT (OUTPATIENT)
Dept: RHEUMATOLOGY | Facility: CLINIC | Age: 59
End: 2024-05-22
Payer: MEDICAID

## 2024-05-22 VITALS
HEART RATE: 71 BPM | HEIGHT: 60 IN | RESPIRATION RATE: 18 BRPM | TEMPERATURE: 98 F | SYSTOLIC BLOOD PRESSURE: 144 MMHG | OXYGEN SATURATION: 98 % | DIASTOLIC BLOOD PRESSURE: 78 MMHG | BODY MASS INDEX: 22.95 KG/M2 | WEIGHT: 116.88 LBS

## 2024-05-22 DIAGNOSIS — F17.210 NICOTINE DEPENDENCE, CIGARETTES, UNCOMPLICATED: ICD-10-CM

## 2024-05-22 DIAGNOSIS — M05.79 RHEUMATOID ARTHRITIS INVOLVING MULTIPLE SITES WITH POSITIVE RHEUMATOID FACTOR: Primary | ICD-10-CM

## 2024-05-22 DIAGNOSIS — I10 PRIMARY HYPERTENSION: ICD-10-CM

## 2024-05-22 DIAGNOSIS — M15.9 PRIMARY OSTEOARTHRITIS INVOLVING MULTIPLE JOINTS: ICD-10-CM

## 2024-05-22 DIAGNOSIS — D84.821 DRUG-INDUCED IMMUNODEFICIENCY: ICD-10-CM

## 2024-05-22 DIAGNOSIS — Z79.899 HIGH RISK MEDICATION USE: ICD-10-CM

## 2024-05-22 DIAGNOSIS — Z86.19 HISTORY OF HEPATITIS C: ICD-10-CM

## 2024-05-22 DIAGNOSIS — R76.8 POSITIVE ANA (ANTINUCLEAR ANTIBODY): ICD-10-CM

## 2024-05-22 DIAGNOSIS — D64.9 ANEMIA, UNSPECIFIED TYPE: ICD-10-CM

## 2024-05-22 DIAGNOSIS — I25.10 CORONARY ARTERY DISEASE, UNSPECIFIED VESSEL OR LESION TYPE, UNSPECIFIED WHETHER ANGINA PRESENT, UNSPECIFIED WHETHER NATIVE OR TRANSPLANTED HEART: ICD-10-CM

## 2024-05-22 DIAGNOSIS — K55.20 ANGIODYSPLASIA OF SMALL INTESTINE: ICD-10-CM

## 2024-05-22 DIAGNOSIS — R76.8 HEPATITIS B CORE ANTIBODY POSITIVE: ICD-10-CM

## 2024-05-22 DIAGNOSIS — Z79.899 DRUG-INDUCED IMMUNODEFICIENCY: ICD-10-CM

## 2024-05-22 PROCEDURE — 1160F RVW MEDS BY RX/DR IN RCRD: CPT | Mod: CPTII,,, | Performed by: NURSE PRACTITIONER

## 2024-05-22 PROCEDURE — 99406 BEHAV CHNG SMOKING 3-10 MIN: CPT | Mod: S$PBB,,, | Performed by: NURSE PRACTITIONER

## 2024-05-22 PROCEDURE — 3077F SYST BP >= 140 MM HG: CPT | Mod: CPTII,,, | Performed by: NURSE PRACTITIONER

## 2024-05-22 PROCEDURE — 1159F MED LIST DOCD IN RCRD: CPT | Mod: CPTII,,, | Performed by: NURSE PRACTITIONER

## 2024-05-22 PROCEDURE — 99215 OFFICE O/P EST HI 40 MIN: CPT | Mod: S$PBB,25,, | Performed by: NURSE PRACTITIONER

## 2024-05-22 PROCEDURE — 4010F ACE/ARB THERAPY RXD/TAKEN: CPT | Mod: CPTII,,, | Performed by: NURSE PRACTITIONER

## 2024-05-22 PROCEDURE — 3078F DIAST BP <80 MM HG: CPT | Mod: CPTII,,, | Performed by: NURSE PRACTITIONER

## 2024-05-22 PROCEDURE — 99215 OFFICE O/P EST HI 40 MIN: CPT | Mod: PBBFAC | Performed by: NURSE PRACTITIONER

## 2024-05-22 PROCEDURE — 3008F BODY MASS INDEX DOCD: CPT | Mod: CPTII,,, | Performed by: NURSE PRACTITIONER

## 2024-05-22 RX ORDER — AMLODIPINE BESYLATE 5 MG/1
5 TABLET ORAL DAILY
COMMUNITY
Start: 2024-04-16

## 2024-05-22 RX ORDER — CYANOCOBALAMIN 1000 UG/ML
1000 INJECTION, SOLUTION INTRAMUSCULAR; SUBCUTANEOUS
COMMUNITY
Start: 2024-04-11

## 2024-05-22 RX ORDER — HYDROCORTISONE 25 MG/G
CREAM TOPICAL 2 TIMES DAILY
COMMUNITY
Start: 2024-03-19

## 2024-05-22 RX ORDER — FOLIC ACID 1 MG/1
1 TABLET ORAL DAILY
Qty: 90 TABLET | Refills: 1 | Status: SHIPPED | OUTPATIENT
Start: 2024-05-22 | End: 2025-05-22

## 2024-05-22 RX ORDER — LEFLUNOMIDE 20 MG/1
20 TABLET ORAL DAILY
Qty: 30 TABLET | Refills: 3 | Status: SHIPPED | OUTPATIENT
Start: 2024-05-22

## 2024-05-22 RX ORDER — L. ACIDOPHILUS/L. RHAMNOSUS 5B CELL
1 CAPSULE,DELAYED RELEASE (ENTERIC COATED) ORAL DAILY
COMMUNITY

## 2024-05-23 ENCOUNTER — OFFICE VISIT (OUTPATIENT)
Dept: INTERNAL MEDICINE | Facility: CLINIC | Age: 59
End: 2024-05-23
Payer: MEDICAID

## 2024-05-23 VITALS
RESPIRATION RATE: 19 BRPM | OXYGEN SATURATION: 96 % | TEMPERATURE: 100 F | HEART RATE: 86 BPM | HEIGHT: 60 IN | DIASTOLIC BLOOD PRESSURE: 76 MMHG | SYSTOLIC BLOOD PRESSURE: 138 MMHG | WEIGHT: 120.81 LBS | BODY MASS INDEX: 23.72 KG/M2

## 2024-05-23 DIAGNOSIS — Z12.31 SCREENING MAMMOGRAM FOR BREAST CANCER: Primary | ICD-10-CM

## 2024-05-23 PROCEDURE — 99215 OFFICE O/P EST HI 40 MIN: CPT | Mod: PBBFAC | Performed by: STUDENT IN AN ORGANIZED HEALTH CARE EDUCATION/TRAINING PROGRAM

## 2024-05-23 RX ORDER — PANTOPRAZOLE SODIUM 40 MG/1
40 TABLET, DELAYED RELEASE ORAL DAILY
Qty: 90 TABLET | Refills: 3 | Status: SHIPPED | OUTPATIENT
Start: 2024-05-23 | End: 2025-05-23

## 2024-05-23 RX ORDER — MICONAZOLE NITRATE 2 %
2 CREAM (GRAM) TOPICAL
Qty: 100 EACH | Refills: 1 | Status: SHIPPED | OUTPATIENT
Start: 2024-05-23

## 2024-05-23 NOTE — PROGRESS NOTES
INTERNAL MEDICINE RESIDENT CLINIC  CLINIC NOTE    Patient Name: Sandra Burns  YOB: 1965    PRESENTING HISTORY       History of Present Illness:  Ms. Sandra Burns is a 58 y.o. female w/ an active medical problem list including Bipolar Disorder, Depression, Chronic pain, GERD, HCV, HBV, HTN. She is presnting today to [facility] IM Resident clinic for follow up appointment. She had recent hospital stay for symptomatic anemia.     Ms. Burns was hospitalized for symptomatic anemia, combination of iron-deficiency and B12 deficiency. She has a history of GI bleeds in past but had recent EGD and c-scope with Dr. Hatch in [place] which were normal. I spoke with the NP over the phone just prior to the patient's hospitlization and there was consideration for small-capsule endoscopy. Inpatient GI wanted to wait on hematology evaluation prior to performing pill endoscopy. So, she was transfused and discharged on iron, B12, folate with outpatient f/u with hematology (has appointment today).     Today, complained about social stress, she said she will leave her brother's house, she doesn't know where to go, she denies being depressed, she states that she is aggravated and anxious, she was crying in the room, she said she is seeing a psychiatrist.      PSocH  -no alcohol use; smokes 1ppd for 41 years, no drug use    PFH  -ESRD and heart failure      Review of Systems   Constitutional:  Negative for chills and fever.   Respiratory:  Negative for cough and shortness of breath.    Cardiovascular:  Negative for chest pain, palpitations and leg swelling.   Gastrointestinal:  Positive for blood in stool. Negative for abdominal pain, constipation, diarrhea, heartburn, melena, nausea and vomiting.   Musculoskeletal:  Positive for joint pain.     Constitutional: no fever/chills  EENT: no sore throat, ear pain, sinus pain/congestion, nasal congestion/drainage  Respiratory: no cough, no wheezing, no shortness of  breath  Cardiovascular: no chest pain, no palpitations, no edema  Gastrointestinal: as above  Genitourinary: no dysuria, no urinary frequency or urgency, no hematuria  Integumentary: no skin rash or abnormal lesion  Neurologic: no headache, no dizziness, no weakness or numbness  MSK: as above      MEDICATIONS & ALLERGIES:     Current Outpatient Medications on File Prior to Visit   Medication Sig    ALPRAZolam (XANAX) 0.5 MG tablet Take 0.5 mg by mouth 2 (two) times daily as needed for Anxiety (anxiety).    amLODIPine (NORVASC) 5 MG tablet Take 5 mg by mouth once daily.    carvediloL (COREG) 6.25 MG tablet Take 1 tablet (6.25 mg total) by mouth 2 (two) times daily with meals.    clobetasol 0.05% (TEMOVATE) 0.05 % Oint Apply twice daily for one month, then daily for one month, then 1-2x per week as needed for itching    clopidogreL (PLAVIX) 75 mg tablet Take 1 tablet (75 mg total) by mouth once daily.    cyanocobalamin 1,000 mcg/mL injection Inject 1,000 mcg into the muscle every 7 days.    DULoxetine (CYMBALTA) 30 MG capsule Take 1 capsule (30 mg total) by mouth every evening.    DULoxetine (CYMBALTA) 60 MG capsule Take 60 mg by mouth every morning.    ergocalciferol (ERGOCALCIFEROL) 50,000 unit Cap Take 1 capsule (50,000 Units total) by mouth every 7 days.    estradioL (ESTRACE) 0.01 % (0.1 mg/gram) vaginal cream Insert 1g inside the vagina every night for 2 weeks then twice per week    folic acid (FOLVITE) 1 MG tablet Take 1 tablet (1 mg total) by mouth once daily.    hydrocortisone (ANUSOL-HC) 2.5 % rectal cream Place rectally 2 (two) times daily.    L.acidoph,rhamn-B.breve,longum (PRIMADOPHILUS BIFIDUS) 5 billion cell CpDR Take 1 capsule by mouth once daily.    leflunomide (ARAVA) 20 MG Tab Take 1 tablet (20 mg total) by mouth once daily.    lisinopriL 10 MG tablet Take 1 tablet (10 mg total) by mouth once daily.    nitroGLYCERIN (NITROSTAT) 0.4 MG SL tablet Place 1 tablet under the tongue every 5 (five) minutes  as needed.    PEPTO-BISMOL 262 mg Tab Take 2 tablets by mouth 4 (four) times daily.    rosuvastatin (CRESTOR) 40 MG Tab Take 40 mg by mouth once daily.    traZODone (DESYREL) 300 MG tablet Take 450 mg by mouth nightly.    buPROPion (WELLBUTRIN SR) 150 MG TBSR 12 hr tablet Take 150 mg by mouth every morning. (Patient not taking: Reported on 5/22/2024)    loperamide (IMODIUM) 2 mg capsule Take 2 tablets in the morning and 1 tablet after every loose stool, no more than 6 tablets a day (Patient not taking: Reported on 2/8/2024)    mirtazapine (REMERON) 15 MG tablet TAKE ONE TABLET BY MOUTH ONCE A DAY AT BEDTIME (Patient not taking: Reported on 5/22/2024)    nicotine (NICODERM CQ) 21 mg/24 hr Place 1 patch onto the skin once daily. (Patient not taking: Reported on 5/23/2024)    predniSONE (DELTASONE) 10 MG tablet Take 2 tablets daily for 1 week and then decrease dose to 1 tablet daily for 1 week and stop. (Patient not taking: Reported on 5/22/2024)    pregabalin (LYRICA) 25 MG capsule Take 1 capsule (25 mg total) by mouth 2 (two) times daily. (Patient not taking: Reported on 8/14/2023)    UNABLE TO FIND Ranitidine HCl 150 MG (Patient not taking: Reported on 4/10/2024)     Current Facility-Administered Medications on File Prior to Visit   Medication    ferric gluconate 62.5 mg/5 mL injection 125 mg       Review of patient's allergies indicates:  No Known Allergies    OBJECTIVE:   Vital Signs:  Vitals:    05/23/24 1443   BP: 138/76   Pulse: 86   Resp: 19   Temp: 99.5 °F (37.5 °C)   TempSrc: Oral   SpO2: 96%   Weight: 54.8 kg (120 lb 12.8 oz)   Height: 5' (1.524 m)         No results found for this or any previous visit (from the past 24 hour(s)).      Physical Exam    General - Appears comfortable, appropriatley conversive   Mental Status - alert and oriented x 3, speaking in logical, relevant sentences   HEENT - no rhinorrhea   Cardiac - RRR, no murmurs, rubs, or gallops; no edema in LE   Respiratory - breathing  comfortably; clear to ascultation bilaterally   Abdominal - nondistended, soft, nontender to palpation   Extremities - tender in PIP of left 4'th digit. Some tenderness over a couple MCP's on left hand. Slightly tender over 5th MTP of left foot. Has some swelling near left wrist  Skin - no rashes or bruises seen on skin        ASSESSMENT & PLAN:     Symptomatic Anemia  -reticulocyte count normal  -folate, B12 levels were low - has been on oral supplements with normalization of levels  -Contiue B12 and folate 1 tablet daily  -continue iron supplement every-other-day. Discussed with [facility] hematology who will arrange for IV iron infusions.   -has an appointment tomorrow with Dr. Jiménez small capsule endoscopy. Since she has Medicaid, it would have to be done here at [facility]. Will try to get it scheduled for when GI is available again.  -remainder of anemia workup is normal    Joint Pains  Seems combined osteoarthritis and rheumatoid arthritis  -labwork is positive for RF and MALIKA. Negative CCP and ds-DNA. Am ordering additional autoimmune labs today  -Following up with Dr. Marsh  -Gave her a course of prednisone 20 for 5 days with no improvement, avoiding long periods due to risk of GI bleed  -Continue Arava 20 mg    Hoarseness  Has worsened symptoms  Claims to have laryngoscopy 2 years ago  Still smoking  Seeing ENT ENT    BLE Claudication  -Got bilateral lower extremity stents. Is compliant with Plavix.    GI bleed  -Had pill camera showing telangiactacias  -avoid NSAIDS and steroids  -Start protonix    Osteopenia  -DEXA 2/2023 - FRAX score 0.8% and 8.4% for hip and major osteoporotic fracture. No need for bisphosphonates  -low vitamin D in April/2023, started her on vitamin D supplement.    CAD  -Scheduled PCI x1 for 90% stenosis in 2013 for stable angina  -Has GRANGER - following [facility]  - Ordering TTE for symptoms concerning for CHF     HCV s/p treatment  -s/p Epclusa treatment 2020; post-treatment viral load  undetectable  -Fibroscan F1 so doesn't have cirrhosis  -no longer follows ID clinic since she was cured    Hx positive HBV core antibody  -Hx HBV Core IgG positive with negative surface antigen - suggests prior infection, not active    Tobacco abuse  -has dyspnea - she says she had PFT done at [facility] but I don't seen the records. I will call [facility]  -will discuss nicotine patches in future      RTC in 6 months    Justyna Dobbs MD  Internal Medicine PGY-1

## 2024-05-29 ENCOUNTER — LAB VISIT (OUTPATIENT)
Dept: LAB | Facility: HOSPITAL | Age: 59
End: 2024-05-29
Attending: NURSE PRACTITIONER
Payer: MEDICAID

## 2024-05-29 DIAGNOSIS — Z79.899 HIGH RISK MEDICATION USE: ICD-10-CM

## 2024-05-29 DIAGNOSIS — R76.8 POSITIVE ANA (ANTINUCLEAR ANTIBODY): ICD-10-CM

## 2024-05-29 DIAGNOSIS — Z79.899 DRUG-INDUCED IMMUNODEFICIENCY: ICD-10-CM

## 2024-05-29 DIAGNOSIS — D84.821 DRUG-INDUCED IMMUNODEFICIENCY: ICD-10-CM

## 2024-05-29 DIAGNOSIS — M05.79 RHEUMATOID ARTHRITIS INVOLVING MULTIPLE SITES WITH POSITIVE RHEUMATOID FACTOR: ICD-10-CM

## 2024-05-29 LAB
25(OH)D3+25(OH)D2 SERPL-MCNC: 76 NG/ML (ref 30–80)
ALBUMIN SERPL-MCNC: 3.7 G/DL (ref 3.5–5)
ALBUMIN/GLOB SERPL: 1.1 RATIO (ref 1.1–2)
ALP SERPL-CCNC: 69 UNIT/L (ref 40–150)
ALT SERPL-CCNC: 7 UNIT/L (ref 0–55)
ANION GAP SERPL CALC-SCNC: 8 MEQ/L
AST SERPL-CCNC: 15 UNIT/L (ref 5–34)
BASOPHILS # BLD AUTO: 0.03 X10(3)/MCL
BASOPHILS NFR BLD AUTO: 0.7 %
BILIRUB SERPL-MCNC: 0.3 MG/DL
BILIRUB UR QL STRIP.AUTO: NEGATIVE
BUN SERPL-MCNC: 7.9 MG/DL (ref 9.8–20.1)
C3 SERPL-MCNC: 146 MG/DL (ref 80–173)
C4 SERPL-MCNC: 29 MG/DL (ref 13–46)
CALCIUM SERPL-MCNC: 9.4 MG/DL (ref 8.4–10.2)
CHLORIDE SERPL-SCNC: 106 MMOL/L (ref 98–107)
CLARITY UR: CLEAR
CO2 SERPL-SCNC: 28 MMOL/L (ref 22–29)
COLOR UR AUTO: YELLOW
CREAT SERPL-MCNC: 0.75 MG/DL (ref 0.55–1.02)
CREAT UR-MCNC: 142.4 MG/DL (ref 45–106)
CREAT/UREA NIT SERPL: 11
CRP SERPL-MCNC: 3.3 MG/L
EOSINOPHIL # BLD AUTO: 0.31 X10(3)/MCL (ref 0–0.9)
EOSINOPHIL NFR BLD AUTO: 7.5 %
ERYTHROCYTE [DISTWIDTH] IN BLOOD BY AUTOMATED COUNT: 14.8 % (ref 11.5–17)
ERYTHROCYTE [SEDIMENTATION RATE] IN BLOOD: 30 MM/HR (ref 0–20)
GFR SERPLBLD CREATININE-BSD FMLA CKD-EPI: >60 ML/MIN/1.73/M2
GLOBULIN SER-MCNC: 3.5 GM/DL (ref 2.4–3.5)
GLUCOSE SERPL-MCNC: 106 MG/DL (ref 74–100)
GLUCOSE UR QL STRIP: NEGATIVE
HBV CORE AB SERPL QL IA: REACTIVE
HBV SURFACE AB SER-ACNC: 22.61 MIU/ML
HBV SURFACE AB SERPL IA-ACNC: REACTIVE M[IU]/ML
HBV SURFACE AG SERPL QL IA: NONREACTIVE
HCT VFR BLD AUTO: 39 % (ref 37–47)
HCV AB SERPL QL IA: REACTIVE
HGB BLD-MCNC: 12.5 G/DL (ref 12–16)
HGB UR QL STRIP: NEGATIVE
HIV 1+2 AB+HIV1 P24 AG SERPL QL IA: NONREACTIVE
IMM GRANULOCYTES # BLD AUTO: 0 X10(3)/MCL (ref 0–0.04)
IMM GRANULOCYTES NFR BLD AUTO: 0 %
KETONES UR QL STRIP: NEGATIVE
LEUKOCYTE ESTERASE UR QL STRIP: NEGATIVE
LYMPHOCYTES # BLD AUTO: 1.52 X10(3)/MCL (ref 0.6–4.6)
LYMPHOCYTES NFR BLD AUTO: 37 %
MCH RBC QN AUTO: 29.6 PG (ref 27–31)
MCHC RBC AUTO-ENTMCNC: 32.1 G/DL (ref 33–36)
MCV RBC AUTO: 92.4 FL (ref 80–94)
MONOCYTES # BLD AUTO: 0.45 X10(3)/MCL (ref 0.1–1.3)
MONOCYTES NFR BLD AUTO: 10.9 %
NEUTROPHILS # BLD AUTO: 1.8 X10(3)/MCL (ref 2.1–9.2)
NEUTROPHILS NFR BLD AUTO: 43.9 %
NITRITE UR QL STRIP: NEGATIVE
PH UR STRIP: 5.5 [PH]
PLATELET # BLD AUTO: 214 X10(3)/MCL (ref 130–400)
PMV BLD AUTO: 11.1 FL (ref 7.4–10.4)
POTASSIUM SERPL-SCNC: 4.2 MMOL/L (ref 3.5–5.1)
PROT SERPL-MCNC: 7.2 GM/DL (ref 6.4–8.3)
PROT UR QL STRIP: NEGATIVE
PROT UR STRIP-MCNC: 13.9 MG/DL
RBC # BLD AUTO: 4.22 X10(6)/MCL (ref 4.2–5.4)
SODIUM SERPL-SCNC: 142 MMOL/L (ref 136–145)
SP GR UR STRIP.AUTO: >=1.03 (ref 1–1.03)
URINE PROTEIN/CREATININE RATIO (OLG): 0.1
UROBILINOGEN UR STRIP-ACNC: 0.2
WBC # SPEC AUTO: 4.11 X10(3)/MCL (ref 4.5–11.5)

## 2024-05-29 PROCEDURE — 87389 HIV-1 AG W/HIV-1&-2 AB AG IA: CPT

## 2024-05-29 PROCEDURE — 80053 COMPREHEN METABOLIC PANEL: CPT

## 2024-05-29 PROCEDURE — 87340 HEPATITIS B SURFACE AG IA: CPT

## 2024-05-29 PROCEDURE — 86706 HEP B SURFACE ANTIBODY: CPT

## 2024-05-29 PROCEDURE — 36415 COLL VENOUS BLD VENIPUNCTURE: CPT

## 2024-05-29 PROCEDURE — 85652 RBC SED RATE AUTOMATED: CPT

## 2024-05-29 PROCEDURE — 86140 C-REACTIVE PROTEIN: CPT

## 2024-05-29 PROCEDURE — 86480 TB TEST CELL IMMUN MEASURE: CPT

## 2024-05-29 PROCEDURE — 85025 COMPLETE CBC W/AUTO DIFF WBC: CPT

## 2024-05-29 PROCEDURE — 82306 VITAMIN D 25 HYDROXY: CPT

## 2024-05-29 PROCEDURE — 84156 ASSAY OF PROTEIN URINE: CPT

## 2024-05-29 PROCEDURE — 86160 COMPLEMENT ANTIGEN: CPT

## 2024-05-29 PROCEDURE — 86704 HEP B CORE ANTIBODY TOTAL: CPT

## 2024-05-29 PROCEDURE — 82570 ASSAY OF URINE CREATININE: CPT

## 2024-05-29 PROCEDURE — 81003 URINALYSIS AUTO W/O SCOPE: CPT

## 2024-05-29 PROCEDURE — 86803 HEPATITIS C AB TEST: CPT

## 2024-05-31 LAB
GAMMA INTERFERON BACKGROUND BLD IA-ACNC: 0.15 IU/ML
M TB IFN-G BLD-IMP: NEGATIVE
M TB IFN-G CD4+ BCKGRND COR BLD-ACNC: -0.06 IU/ML
M TB IFN-G CD4+CD8+ BCKGRND COR BLD-ACNC: -0.01 IU/ML
MITOGEN IGNF BCKGRD COR BLD-ACNC: 7.96 IU/ML

## 2024-06-03 ENCOUNTER — TELEPHONE (OUTPATIENT)
Dept: HEMATOLOGY/ONCOLOGY | Facility: CLINIC | Age: 59
End: 2024-06-03
Payer: MEDICAID

## 2024-06-04 ENCOUNTER — TELEPHONE (OUTPATIENT)
Dept: HEMATOLOGY/ONCOLOGY | Facility: CLINIC | Age: 59
End: 2024-06-04
Payer: MEDICAID

## 2024-06-04 DIAGNOSIS — D50.0 IRON DEFICIENCY ANEMIA DUE TO CHRONIC BLOOD LOSS: Primary | ICD-10-CM

## 2024-06-04 NOTE — TELEPHONE ENCOUNTER
Attempted to contact patient to confirm appointment for 6/5/24. Patient did not answer the phone left a voice message.

## 2024-06-05 ENCOUNTER — TELEPHONE (OUTPATIENT)
Dept: INTERNAL MEDICINE | Facility: CLINIC | Age: 59
End: 2024-06-05
Payer: MEDICAID

## 2024-06-05 NOTE — TELEPHONE ENCOUNTER
----- Message from Bethanyamrik Ray sent at 6/5/2024 10:42 AM CDT -----  Regarding: Dr. Dobbs-Ortho Referral  Good morning, patient has called in to request a new referral to the Ortho clinic for bilateral ankle pain (hurts more in the rt.). Thank you

## 2024-06-05 NOTE — TELEPHONE ENCOUNTER
Patient requesting a new Ortho referral due to ankle pain. Noted current Ortho referral was ordered by ROB Lopez and patient stated she informed NP during her last visit.   Imaging needs to be ordered first before patient can be seen by Ortho for ankle pain.  Please review and advise

## 2024-06-11 ENCOUNTER — TELEPHONE (OUTPATIENT)
Dept: RHEUMATOLOGY | Facility: CLINIC | Age: 59
End: 2024-06-11
Payer: MEDICAID

## 2024-06-11 ENCOUNTER — TELEPHONE (OUTPATIENT)
Dept: HEMATOLOGY/ONCOLOGY | Facility: CLINIC | Age: 59
End: 2024-06-11
Payer: MEDICAID

## 2024-06-11 NOTE — TELEPHONE ENCOUNTER
Called patient to confirm appointment for 6/13/24. Labs at 12:00 pm and NP visit at 1:00 pm. Patient confirmed and verbalized understanding.

## 2024-06-11 NOTE — TELEPHONE ENCOUNTER
Spoke to pt informed of that she has apt for hand / wrist orthro apt 7/24/24. The referral for ankle would have to come from her PCP. Pt verbalized understanding

## 2024-06-11 NOTE — TELEPHONE ENCOUNTER
Spoke to pt informed of message. Pt verbalized understanding                      ----- Message from CRIS Gama sent at 6/11/2024  3:31 PM CDT -----  Please advise the patient that all lab are noted to be clinically acceptable at this time, we will continue to monitor at future lab draws. Her Vitamin D is noted at 76- she can stop Rx Ergocalciferol weekly and start OTC Vitamin D 1000 IU po qd- we will continue to monitor at future visit.

## 2024-06-12 ENCOUNTER — TELEPHONE (OUTPATIENT)
Dept: HEMATOLOGY/ONCOLOGY | Facility: CLINIC | Age: 59
End: 2024-06-12
Payer: MEDICAID

## 2024-06-12 ENCOUNTER — TELEPHONE (OUTPATIENT)
Dept: INTERNAL MEDICINE | Facility: CLINIC | Age: 59
End: 2024-06-12
Payer: MEDICAID

## 2024-06-12 ENCOUNTER — OFFICE VISIT (OUTPATIENT)
Dept: GYNECOLOGY | Facility: CLINIC | Age: 59
End: 2024-06-12
Payer: MEDICAID

## 2024-06-12 VITALS
HEART RATE: 70 BPM | TEMPERATURE: 98 F | BODY MASS INDEX: 23.68 KG/M2 | WEIGHT: 120.63 LBS | RESPIRATION RATE: 18 BRPM | HEIGHT: 60 IN | OXYGEN SATURATION: 97 % | DIASTOLIC BLOOD PRESSURE: 70 MMHG | SYSTOLIC BLOOD PRESSURE: 137 MMHG

## 2024-06-12 DIAGNOSIS — L30.9 VULVAR DERMATITIS: Primary | ICD-10-CM

## 2024-06-12 DIAGNOSIS — D50.0 IRON DEFICIENCY ANEMIA DUE TO CHRONIC BLOOD LOSS: Primary | ICD-10-CM

## 2024-06-12 DIAGNOSIS — M25.579 ANKLE PAIN, UNSPECIFIED CHRONICITY, UNSPECIFIED LATERALITY: Primary | ICD-10-CM

## 2024-06-12 DIAGNOSIS — N39.41 URGE INCONTINENCE OF URINE: ICD-10-CM

## 2024-06-12 LAB
BACTERIA #/AREA URNS AUTO: ABNORMAL /HPF
BILIRUB UR QL STRIP.AUTO: NEGATIVE
CLARITY UR: CLEAR
COLOR UR AUTO: ABNORMAL
GLUCOSE UR QL STRIP: NORMAL
HGB UR QL STRIP: NEGATIVE
HYALINE CASTS #/AREA URNS LPF: ABNORMAL /LPF
KETONES UR QL STRIP: NEGATIVE
LEUKOCYTE ESTERASE UR QL STRIP: NEGATIVE
NITRITE UR QL STRIP: NEGATIVE
PH UR STRIP: 5.5 [PH]
PROT UR QL STRIP: NEGATIVE
RBC #/AREA URNS AUTO: ABNORMAL /HPF
SP GR UR STRIP.AUTO: 1.01 (ref 1–1.03)
SQUAMOUS #/AREA URNS LPF: ABNORMAL /HPF
UROBILINOGEN UR STRIP-ACNC: NORMAL
WBC #/AREA URNS AUTO: ABNORMAL /HPF

## 2024-06-12 PROCEDURE — 99213 OFFICE O/P EST LOW 20 MIN: CPT | Mod: PBBFAC | Performed by: NURSE PRACTITIONER

## 2024-06-12 PROCEDURE — 99213 OFFICE O/P EST LOW 20 MIN: CPT | Mod: S$PBB,,, | Performed by: NURSE PRACTITIONER

## 2024-06-12 PROCEDURE — 1159F MED LIST DOCD IN RCRD: CPT | Mod: CPTII,,, | Performed by: NURSE PRACTITIONER

## 2024-06-12 PROCEDURE — 81001 URINALYSIS AUTO W/SCOPE: CPT | Performed by: NURSE PRACTITIONER

## 2024-06-12 PROCEDURE — 4010F ACE/ARB THERAPY RXD/TAKEN: CPT | Mod: CPTII,,, | Performed by: NURSE PRACTITIONER

## 2024-06-12 PROCEDURE — 3008F BODY MASS INDEX DOCD: CPT | Mod: CPTII,,, | Performed by: NURSE PRACTITIONER

## 2024-06-12 PROCEDURE — 1160F RVW MEDS BY RX/DR IN RCRD: CPT | Mod: CPTII,,, | Performed by: NURSE PRACTITIONER

## 2024-06-12 PROCEDURE — 3078F DIAST BP <80 MM HG: CPT | Mod: CPTII,,, | Performed by: NURSE PRACTITIONER

## 2024-06-12 PROCEDURE — 3075F SYST BP GE 130 - 139MM HG: CPT | Mod: CPTII,,, | Performed by: NURSE PRACTITIONER

## 2024-06-12 RX ORDER — CLOBETASOL PROPIONATE 0.5 MG/G
OINTMENT TOPICAL
Qty: 1 EACH | Refills: 2 | Status: SHIPPED | OUTPATIENT
Start: 2024-06-12

## 2024-06-12 NOTE — PROGRESS NOTES
Patient ID: Sandra Burns is a 58 y.o. female.    Chief Complaint: Vulvar Itch      Review of patient's allergies indicates:  No Known Allergies        HPI:  The patient is G0 here for f/u vulvar dermatitis. Pt was given clobetasol ointment at tapering dose. State itching has resolved. Pt also c/o urinary incontinence. States wakes up in the middle of the night soiled. Denies frequency/dysuria. States has urge incontinence.       Review of Systems:   Negative except for findings in HPI     Objective:   /70   Pulse 70   Temp 98.3 °F (36.8 °C) (Oral)   Resp 18   Ht 5' (1.524 m)   Wt 54.7 kg (120 lb 9.6 oz)   LMP  (LMP Unknown)   SpO2 97%   BMI 23.55 kg/m²    Physical Exam:  GENERAL: Pt is aware and alert and  in no acute distress.  ABDOMEN: Soft, non tender.  VULVA:  No lesions or skin changes.No erythema ;no excoriations  PSYCHIATRIC: Patient is awake and alert. Mood and affect are normal.    Assessment:   Vulvar dermatitis  -     clobetasol 0.05% (TEMOVATE) 0.05 % Oint; Apply 1-2x per week as needed  Dispense: 1 each; Refill: 2    Urge incontinence of urine  -     Urinalysis, Reflex to Urine Culture  -     Ambulatory referral/consult to Urology; Future; Expected date: 09/12/2024            1. Vulvar dermatitis    2. Urge incontinence of urine             -avoid scented products to vaginal area; use clobetasol 1-2x per week as needed  Plan:       Follow up in about 1 year (around 6/12/2025).

## 2024-06-12 NOTE — TELEPHONE ENCOUNTER
She needs a new referral to Ortho so they can treat her new complaint of ankle pain. Currently she's only being treated for wrist pain. Unfortunately this is a requirement of [facility] Ortho clinic with new complaints. Please review and advise

## 2024-06-12 NOTE — TELEPHONE ENCOUNTER
Called patient to confirm appointment for 6/13/24. Patient did not answer the phone, left a voice message.

## 2024-06-12 NOTE — TELEPHONE ENCOUNTER
----- Message from Bethany Ray sent at 6/11/2024  2:34 PM CDT -----  Regarding: Dr. Dobbs-Ortho Referral  Good afternoon, patient has called in again to request that the new referral to Ortho includes her right wrist and right ankle only and she also needs a referral to the GI clinic here for stomach issues (bloating, colitis and diarrhea). Thank you

## 2024-06-13 ENCOUNTER — OFFICE VISIT (OUTPATIENT)
Dept: HEMATOLOGY/ONCOLOGY | Facility: CLINIC | Age: 59
End: 2024-06-13
Payer: MEDICAID

## 2024-06-13 ENCOUNTER — APPOINTMENT (OUTPATIENT)
Dept: HEMATOLOGY/ONCOLOGY | Facility: CLINIC | Age: 59
End: 2024-06-13
Payer: MEDICAID

## 2024-06-13 VITALS
TEMPERATURE: 99 F | BODY MASS INDEX: 23.52 KG/M2 | SYSTOLIC BLOOD PRESSURE: 135 MMHG | DIASTOLIC BLOOD PRESSURE: 73 MMHG | HEART RATE: 83 BPM | WEIGHT: 119.81 LBS | OXYGEN SATURATION: 95 % | HEIGHT: 60 IN

## 2024-06-13 DIAGNOSIS — K62.5 BRIGHT RED RECTAL BLEEDING: ICD-10-CM

## 2024-06-13 DIAGNOSIS — D50.0 IRON DEFICIENCY ANEMIA DUE TO CHRONIC BLOOD LOSS: Primary | ICD-10-CM

## 2024-06-13 DIAGNOSIS — D50.0 IRON DEFICIENCY ANEMIA DUE TO CHRONIC BLOOD LOSS: ICD-10-CM

## 2024-06-13 LAB
ALBUMIN SERPL-MCNC: 3.8 G/DL (ref 3.5–5)
ALBUMIN/GLOB SERPL: 1.1 RATIO (ref 1.1–2)
ALP SERPL-CCNC: 68 UNIT/L (ref 40–150)
ALT SERPL-CCNC: 8 UNIT/L (ref 0–55)
ANION GAP SERPL CALC-SCNC: 9 MEQ/L
AST SERPL-CCNC: 17 UNIT/L (ref 5–34)
BASOPHILS # BLD AUTO: 0.06 X10(3)/MCL
BASOPHILS NFR BLD AUTO: 0.8 %
BILIRUB SERPL-MCNC: 0.3 MG/DL
BUN SERPL-MCNC: 12.9 MG/DL (ref 9.8–20.1)
CALCIUM SERPL-MCNC: 9.6 MG/DL (ref 8.4–10.2)
CHLORIDE SERPL-SCNC: 102 MMOL/L (ref 98–107)
CO2 SERPL-SCNC: 25 MMOL/L (ref 22–29)
CREAT SERPL-MCNC: 0.8 MG/DL (ref 0.55–1.02)
CREAT/UREA NIT SERPL: 16
EOSINOPHIL # BLD AUTO: 0.22 X10(3)/MCL (ref 0–0.9)
EOSINOPHIL NFR BLD AUTO: 2.9 %
ERYTHROCYTE [DISTWIDTH] IN BLOOD BY AUTOMATED COUNT: 14.3 % (ref 11.5–17)
FERRITIN SERPL-MCNC: 92.58 NG/ML (ref 4.63–204)
GFR SERPLBLD CREATININE-BSD FMLA CKD-EPI: >60 ML/MIN/1.73/M2
GLOBULIN SER-MCNC: 3.6 GM/DL (ref 2.4–3.5)
GLUCOSE SERPL-MCNC: 164 MG/DL (ref 74–100)
HCT VFR BLD AUTO: 33.5 % (ref 37–47)
HGB BLD-MCNC: 10.9 G/DL (ref 12–16)
IMM GRANULOCYTES # BLD AUTO: 0.03 X10(3)/MCL (ref 0–0.04)
IMM GRANULOCYTES NFR BLD AUTO: 0.4 %
IRON SATN MFR SERPL: 11 % (ref 20–50)
IRON SERPL-MCNC: 26 UG/DL (ref 50–170)
LYMPHOCYTES # BLD AUTO: 1.77 X10(3)/MCL (ref 0.6–4.6)
LYMPHOCYTES NFR BLD AUTO: 23.5 %
MCH RBC QN AUTO: 29.5 PG (ref 27–31)
MCHC RBC AUTO-ENTMCNC: 32.5 G/DL (ref 33–36)
MCV RBC AUTO: 90.8 FL (ref 80–94)
MONOCYTES # BLD AUTO: 0.63 X10(3)/MCL (ref 0.1–1.3)
MONOCYTES NFR BLD AUTO: 8.4 %
NEUTROPHILS # BLD AUTO: 4.82 X10(3)/MCL (ref 2.1–9.2)
NEUTROPHILS NFR BLD AUTO: 64 %
NRBC BLD AUTO-RTO: 0 %
PLATELET # BLD AUTO: 279 X10(3)/MCL (ref 130–400)
PMV BLD AUTO: 10.8 FL (ref 7.4–10.4)
POTASSIUM SERPL-SCNC: 4.2 MMOL/L (ref 3.5–5.1)
PROT SERPL-MCNC: 7.4 GM/DL (ref 6.4–8.3)
RBC # BLD AUTO: 3.69 X10(6)/MCL (ref 4.2–5.4)
SODIUM SERPL-SCNC: 136 MMOL/L (ref 136–145)
TIBC SERPL-MCNC: 216 UG/DL (ref 70–310)
TIBC SERPL-MCNC: 242 UG/DL (ref 250–450)
TRANSFERRIN SERPL-MCNC: 221 MG/DL (ref 180–382)
WBC # SPEC AUTO: 7.53 X10(3)/MCL (ref 4.5–11.5)

## 2024-06-13 PROCEDURE — 4010F ACE/ARB THERAPY RXD/TAKEN: CPT | Mod: CPTII,,,

## 2024-06-13 PROCEDURE — 99214 OFFICE O/P EST MOD 30 MIN: CPT | Mod: S$PBB,,,

## 2024-06-13 PROCEDURE — 1159F MED LIST DOCD IN RCRD: CPT | Mod: CPTII,,,

## 2024-06-13 PROCEDURE — 83540 ASSAY OF IRON: CPT

## 2024-06-13 PROCEDURE — 3008F BODY MASS INDEX DOCD: CPT | Mod: CPTII,,,

## 2024-06-13 PROCEDURE — 3075F SYST BP GE 130 - 139MM HG: CPT | Mod: CPTII,,,

## 2024-06-13 PROCEDURE — 85025 COMPLETE CBC W/AUTO DIFF WBC: CPT

## 2024-06-13 PROCEDURE — 1160F RVW MEDS BY RX/DR IN RCRD: CPT | Mod: CPTII,,,

## 2024-06-13 PROCEDURE — 82728 ASSAY OF FERRITIN: CPT

## 2024-06-13 PROCEDURE — 3078F DIAST BP <80 MM HG: CPT | Mod: CPTII,,,

## 2024-06-13 PROCEDURE — 80053 COMPREHEN METABOLIC PANEL: CPT

## 2024-06-13 PROCEDURE — 36415 COLL VENOUS BLD VENIPUNCTURE: CPT

## 2024-06-13 PROCEDURE — 99213 OFFICE O/P EST LOW 20 MIN: CPT | Mod: PBBFAC

## 2024-06-13 NOTE — PROGRESS NOTES
Reason for Follow-up:  Iron-deficiency anemia   Vitamin B12 deficiency   Angiodysplasia of duodenum    History:  Past medical history:  B12 deficiency.  Anemia.  Iron-deficiency.  History of peripheral vascular disease, on dual antiplatelets.  Hypothyroidism.  CAD, S/P PCI.  Chronic hepatitis-B.  Chronic hepatitis-C. Depression.  GERD.  Right lower lung lobe nodule.  02/22/2023: Bilateral lower extremity stent placement, with resolution of bilateral lower extremity pain  Social history:  .  Lives with her brother in [place], [place].  No children.  Never became pregnant.  Disabled.  Does not work.  Has been smoking half a pack of cigarettes daily for 45 years.  Has been smoking marijuana every other day for 40 years.  No other illicit drugs.  No alcohol abuse.  Family history:  Negative for cancers or blood dyscrasias.  Health maintenance:  PCP at [facility].  -05/02/2023:  Bilateral screening mammogram (comparison:  12/03/2020 mammogram):  BI-RADS 2 (benign)  -02/24/2022: Stool for occult blood pos  -history of multiple colonoscopies and endoscopies: Angiodysplastic lesions in duodenum    History of Present Illness:   No chief complaint on file.        Oncologic/Hematologic History:  Oncology History    No history exists.   57-year-old female, referred from Internal Medicine, for evaluation of anemia.      Admitted to [facility] 04/07/2023-04/08/2023:  57-year-old female with significant history of HTN, PVD status post intervention, hypothyroidism, CAD status post PCI, right lower lobe lung nodule, bipolar disorder, depression, GERD, chronic hepatitis-B/chronic hepatitis-C and iron deficiency anemia presented to the ED with complaints of fatigue, generalized weakness and outpatient labs revealing anemia.  Patient was referred to the ED for admission to further evaluate anemia.  On Plavix.  Patient had a colonoscopy 2.  Transfused with PRBC x1 unit.  No overt bleeding.  No history of alcohol  use.  GI recommended to hold off on endoscopic evaluation.  Hemoccult-pos but numerous endoscopy procedures by Dr. Keyon Hatch, her GI, unrevealing in the past.  Patient on aspirin and Plavix which were continued.  Hemoglobin improved appropriately post PRBC x1 unit.  Iron deficient.  B12 deficient.  04/03/2023: Outside blood work showed ferritin 8.8.  B12 209.    History of iron-deficiency anemia.  History of B12 deficiency.  Admitted to [facility] 04/07/2023 with fatigue, weakness, dizziness.  Outside labs had shown anemia.  History of multiple endoscopies with Dr. Keyon Hatch, GI, with no source of GI bleed found.  04/03/2023: Blood work showed iron-deficiency, ferritin 8.8, B12 level 209.    History of duodenal angiodysplasias a couple of years ago    Blood transfusion in the past   EGD 11/2021: Hiatal hernia, GE ring, nonbleeding angioectasias   Colonoscopy 09/2021: Normal   EGD and colonoscopy 2022: Esophageal dilation, no bleeding, colon polyp    History of hepatitis-C: S/P treatment; S/P occlusive 2020; posttreatment viral load undetectable; FibroScan F1 (no liver cirrhosis)    09/09/2020: Limited abdominal ultrasound (comparison:  05/07/2013): Liver cirrhosis; no focal hepatic lesion    -12/16/2020: EGD (liver cirrhosis, rule out esophageal varices):  3 diminutive nonbleeding angiodysplastic lesions in the 2nd portion of duodenum  -09/22/2021:  Colonoscopy (indication:  Occult blood in stool; personal history of colon polyps): Normal mucosa in whole:  -11/15/2021: EGD (Keyon Hatch MD) (indication:  Dysphagia, oropharyngeal, nausea, heartburn, abdominal bloating, GI metaplasia, generalized abdominal pain):  Erythema lower 3rd of esophagus compatible with esophagitis; erythema antrum, compatible with gastritis; esophageal hiatal hernia; a single bleeding angiectasia seen in duodenal bulb, treated with monopolar cautery successfully (thermotherapy) (pathology:  Gastric antrum biopsy, reactive gastropathy  with mild nonspecific chronic inflammation, negative for intestinal metaplasia; negative for H pylori; gastric body biopsy, reactive gastropathy, mild nonspecific chronic inflammation, negative for intestinal metaplasia, negative for H pylori)    Labs reviewed:  Hemoglobin: 8.1 (05/16/2023); 9.0 (04/08/2023); 7.5 (04/07/2023); 6.8 (04/03/2023); 8.0 (03/13/2023); 11.6 (10/28/2022); 13.2 (10/03/2022), etc.   MCV: Consistently normal with mild macrocytosis on a couple of occasions  Rest of CBC unremarkable  05/16/2023: Serum iron 50.  TIBC 323.  Ferritin 16.8.  Transferrin saturation 15%.  B12 level 705.  Folate 38.8.    04/03/2023: Serum iron 8.  TIBC 344.  Ferritin 8.8.  Transferrin saturation 2%.  B12 level 209, low.  Folate 8.7   10/11/2022: Serum iron 69.  TIBC 292.  Ferritin 27.13.  Transferrin saturation 24%.  09/09/2020:  Ferritin 18.8, hemoglobin 9.8, MCV 93.7  Haptoglobin normal  04/03/2023:  Hemoglobin 6.8.  Retic count 1.89%.  LDH normal.  05/17/2023: Celiac serology negative    04/08/2023:  Hepatitis-C antibody pos (treated in the past with Epclusa, with a negative hepatitis-A viral load subsequently   09/09/2020:  Hepatitis-B core antibody total reactive.  Hepatitis-B surface antibody negative.  Hepatitis-B surface antigen negative.  HIV negative    05/16/2023:  RBC osmotic fragility normal  04/03/2023:  Hemoglobin electrophoresis:  No abnormal hemoglobin or beta thalassemia    05/22/2023:  Pleasant lady.  Presents for initial hematology consultation.  In no acute discomfort.    Always feels tired.  No abdominal pain, nausea or vomiting.  Denies hematemesis, melena, or hematochezia.  Fair appetite.  Main complaint is chronic tiredness.  Has been taking iron pill every other day for last 2 years.  No GI side effects.  Started vitamin B12 pill x2 daily, a month ago.  We will check her iron stores and B12 level at this time.  Says that she was transfused around Easter time and a couple of years ago as well.   No history of cancers.  Chronic generalized fatigue, swelling in the legs, cough, and numbness and tingling in hands.  Generalized body pains, 9/10, which she attributes to rheumatoid arthritis.    Interval History 6/17/24:  Patient presented to the clinic today for a scheduled clinic visit to follow up on her diagnosis of Iron Deficiency Anemia. She has no acute complaints today. She reports feeling fatigue but not more than usual. She does report having rectal bleeding with defecation. Bright red blood. She reports having bleeding with each defecation. She denies any weakness or bleeding with urination. Labwork was reviewed with the patient. All future appointments were discussed with the patient.       Review of Systems:   All systems reviewed and found to be negative except for the symptoms detailed above    Physical Examination:   VITAL SIGNS:   Vitals:    06/13/24 1306   BP: 135/73   Pulse:    Temp:            Physical Exam  Vitals reviewed.   Constitutional:       Appearance: Normal appearance.   HENT:      Head: Normocephalic and atraumatic.      Mouth/Throat:      Mouth: Mucous membranes are moist.   Cardiovascular:      Rate and Rhythm: Normal rate and regular rhythm.      Pulses: Normal pulses.      Heart sounds: Normal heart sounds.   Pulmonary:      Effort: Pulmonary effort is normal.      Breath sounds: Normal breath sounds.   Abdominal:      General: Bowel sounds are normal.      Palpations: Abdomen is soft.   Skin:     General: Skin is warm and dry.   Neurological:      Mental Status: She is alert and oriented to person, place, and time.   Psychiatric:         Mood and Affect: Mood normal.         Behavior: Behavior normal.         Thought Content: Thought content normal.         Judgment: Judgment normal.          Assessment:  Iron-deficiency anemia, B12 deficiency anemia:  (Multiple angioectasias in duodenal, without bleeding; multiple angioectasias in jejunum, without bleeding; multiple  angioectasias in ileum, without bleeding):  -chronic anemia, ranging between 6.8-8.1; was normal in October 2022  -chronic iron-deficiency  -PRBC x1 unit 04/03/2023 for hemoglobin 6.8  -Celiac serology negative 05/17/2023  -RBC osmotic fragility normal 05/16/2023  -no hemoglobinopathy on hemoglobin electrophoresis 04/03/2023  -found to be B12 deficient when hospitalized 04/07/2023-04/08/2023 with symptomatic anemia, requiring PRBC x1 unit  -Ferrlecit 125 mg IV x2 (02/20/2023, 04/08/2023)  -12/16/2020: EGD:  No esophageal varices; 3 diminutive nonbleeding angiodysplastic lesions 2nd portion of duodenum   -11/15/2021: EGD:  Esophagitis, gastritis, a single bleeding angiectasia duodenal bulb treated with cautery (thermotherapy)  -09/22/2021: Colonoscopy:  Occult blood in stool: Normal  -EGD and colonoscopy 2022: Colon polyp  -no response to oral iron therapy x2-3 years; remained iron-deficiency  -S/P Feraheme 510 mg IV x2 (06/05/2023, 06/12/2023), with good response (hemoglobin improved to 12.3 and ferritin 122.03 on 07/10/2023  -07/12/2023:  Patient desired to have B12 shots rather than pills because she can not afford the pills  -08/15/2023: Video capsule endoscopy: Multiple angioectasias without bleeding in the duodenum; multiple angioectasias without bleeding in the jejunum; multiple angioectasias without bleeding in the ileum  -09/11/2023:  Hemoglobin 11.1, stable  -11/14/2023:  Stool occult blood positive  -02/08/2024:  Hemoglobin 11.3 (was 9.8 on 12/13/2023); ferritin 43.48 (was 54.49 on 12/11/2023)  -03/11/2024:  Hemoglobin 9.6, down from 11.3 on 02/08/2024; ferritin level is pending; transferrin saturation 13%, remains low    Vitamin B12 deficiency:  -diagnosed 04/03/2023: B12 level 209  -05/22/2023: Tells me that she has been taking 2 vitamin B12 pills for last 1 month.  -05/22/2023: Vitamin B12 level 521 (absorbing satisfactorily via oral route).   Intrinsic factor antibody negative.    Homocystine level 4.6  micromoles per L, suppressed (patient already on oral B12 supplementation).  Methylmalonic acid level 0.12 nanomoles /ml, normal (patient already on oral vitamin B12 supplementation, with adequate absorption)  -07/12/2023:  Patient desired to have B12 shots rather than pills because she can not afford the pills      History of hepatitis-C and liver cirrhosis:  -treated with Epclusa in 2020   -Posttreatment viral load undetectable  -FibroScan F 1 (no liver cirrhosis)  -ultrasound 09/09/2020: Liver cirrhosis     -EGD 12/16/2020:  No esophageal varices      Plan:   Iron-deficiency anemia:   Continue vitamin B12 1000 mcg IM every month   Follow-up with Pulmonary: Repeat CT chest without contrast in November 2024  RTC with me in 1 month with labs prior (CBC/CMP/TIBC/Ferrtin/Iron)    Rectal Bleeding:   Refer to GI at [facility]. She is being seen by Dr. Chacon in [place]-Wants to  be seen here. -Referral sent to GI    History of multiple angioectasias:   Follow-up with GI for history of multiple angioectasias in intestinal tract        -chronic iron-deficiency anemia  -transfused in the past   -no response to oral iron therapy which she was taking for 2-3 years  -history of nonbleeding angiodysplastic lesions on EGD 12/16/2020, 11/15/2021; S/P endoscopic thermotherapy 11/15/2021  -treated with Feraheme x2 in 06/2023  -found to have multiple angioectasias without bleeding in duodenum, jejunum, and in the ileum on video capsule endoscopy 08/15/2023  -11/14/2023:  Stool occult blood positive (as expected with multiple angioectasias)  -12/11/2023:  Hemoglobin 10.0 (was 11.1 on 09/11/2023, 11.3 on 08/14/2023, 12.3 on 07/10/2023, etc).; ferritin 54.49 (was 18.98 on 09/11/2023); B12 level 435   -02/08/2024:  Hemoglobin 11.3 (was 9.8 on 12/13/2023); ferritin 43.48 (was 54.49 on 12/11/2023)  -03/11/2024:  Hemoglobin 9.6, down from 11.3 on 02/08/2024; ferritin level is pending; transferrin saturation 13%, remains  low  >>>  -hemoglobin declining; remains iron-deficient  -taking iron pill 3 X a week; has failed oral iron therapy; advised her to discontinue iron pills  -proceed with intravenous iron therapy with Feraheme 510 mg IV x2, administered a week apart  -assess response with repeat CBC, serum iron, TIBC, ferritin in 6 weeks  -follow-up with GI for history of multiple angioectasias in intestinal tract    Found to be vitamin B12 deficient on labs 04/03/2023   -05/22/2023: Told me that she has been taking 2 vitamin B12 pills for 1 month   -05/22/2023:  B12 level normalized, 521, with oral B12 therapy; intrinsic factor antibody negative; homocystine level suppressed; methylmalonic acid level normal (the labs were checked while she was already on oral B12 therapy)  -09/11/2023:  For last couple she has discontinued B12 pills because she could not afford   -09/11/2023:  For last 2 months, her sister-in-law has been administering her monthly B12 shots;   -12/11/2023:  Hemoglobin 10.0 (was 11.1 on 09/11/2023, 11.3 on 08/14/2023, 12.3 on 07/10/2023, etc).; ferritin 54.49 (was 18.98 on 09/11/2023); B12 level 435   >>>  Continue vitamin B12 1000 mcg IM (per her preference) every month; her sister in law administers the shots    From Hematology perspective, we will monitor iron and B12 deficiency anemia, and intervene to replete any deficiency if and when necessary    Subcentimeter lung nodules  -noncontrast chest CT 11/20/2023:  Stable subcentimeter lung nodules  -12/04/2023:  Pulmonary follow-up: Repeat CT chest in 1 year    Above discussed at length with the patient.  All questions answered.    Discussed labs and gave her copies of relevant reports.    Plan of management discussed.    She understands and agrees with this plan.    Follow-up:  No follow-ups on file.

## 2024-06-24 ENCOUNTER — TELEPHONE (OUTPATIENT)
Dept: INTERNAL MEDICINE | Facility: CLINIC | Age: 59
End: 2024-06-24
Payer: MEDICAID

## 2024-06-24 DIAGNOSIS — M79.642 PAIN IN BOTH HANDS: Primary | ICD-10-CM

## 2024-06-24 DIAGNOSIS — M79.641 PAIN IN BOTH HANDS: Primary | ICD-10-CM

## 2024-06-24 NOTE — TELEPHONE ENCOUNTER
Pharmacist at [facility] pt asked him to request Gabapentin 600mg 3 times a day with a 90 day supply. Previously prescribed by Dr Villanueva. Please Advise

## 2024-06-25 RX ORDER — GABAPENTIN 600 MG/1
600 TABLET ORAL 3 TIMES DAILY
Qty: 90 TABLET | Refills: 11 | Status: SHIPPED | OUTPATIENT
Start: 2024-06-25 | End: 2025-06-25

## 2024-06-26 NOTE — PROGRESS NOTES
I have reviewed and agree with the resident's findings, including all diagnostic interpretations and plans as written.     Vazquez Corrigan M.D.

## 2024-06-28 ENCOUNTER — TELEPHONE (OUTPATIENT)
Dept: INTERNAL MEDICINE | Facility: CLINIC | Age: 59
End: 2024-06-28
Payer: MEDICAID

## 2024-06-28 ENCOUNTER — TELEPHONE (OUTPATIENT)
Dept: RHEUMATOLOGY | Facility: CLINIC | Age: 59
End: 2024-06-28
Payer: MEDICAID

## 2024-06-28 NOTE — TELEPHONE ENCOUNTER
----- Message from Rosa Lira sent at 6/28/2024 11:24 AM CDT -----  Regarding: pain med request  Pt called bc she is a lot of pain and wants to know can something be called in for pain.    [phone]

## 2024-06-28 NOTE — TELEPHONE ENCOUNTER
Pt called requesting pain meds. States the gabapentin doesn't work . She is presenting taking 600 mg and 400 mg at night. She has tired to contact Dr. Rodriguez ;However they have not answered. Encouraged patient to take all meds as order.Msg will be sent to her PCP.

## 2024-06-28 NOTE — TELEPHONE ENCOUNTER
Pt states her living situation has changed recently meaning she no longer has a steady home.She has been experiencing generalized body pain. States her wrist are constantly swollen and in pain.  She has been taking tylenol to help with the pain but it is not helping. Did recommend pt to call her PCP , go to urgent care or the ER . Pt verbalized understanding.

## 2024-07-11 DIAGNOSIS — D50.0 IRON DEFICIENCY ANEMIA DUE TO CHRONIC BLOOD LOSS: Primary | ICD-10-CM

## 2024-07-12 ENCOUNTER — TELEPHONE (OUTPATIENT)
Dept: HEMATOLOGY/ONCOLOGY | Facility: CLINIC | Age: 59
End: 2024-07-12
Payer: MEDICAID

## 2024-07-15 ENCOUNTER — DOCUMENTATION ONLY (OUTPATIENT)
Dept: HEMATOLOGY/ONCOLOGY | Facility: CLINIC | Age: 59
End: 2024-07-15

## 2024-07-15 ENCOUNTER — OFFICE VISIT (OUTPATIENT)
Dept: HEMATOLOGY/ONCOLOGY | Facility: CLINIC | Age: 59
End: 2024-07-15
Payer: MEDICAID

## 2024-07-15 VITALS
SYSTOLIC BLOOD PRESSURE: 94 MMHG | WEIGHT: 116.19 LBS | TEMPERATURE: 99 F | DIASTOLIC BLOOD PRESSURE: 57 MMHG | HEART RATE: 71 BPM | HEIGHT: 60 IN | OXYGEN SATURATION: 97 % | BODY MASS INDEX: 22.81 KG/M2

## 2024-07-15 DIAGNOSIS — D50.0 IRON DEFICIENCY ANEMIA DUE TO CHRONIC BLOOD LOSS: Primary | ICD-10-CM

## 2024-07-15 DIAGNOSIS — K62.5 BRIGHT RED RECTAL BLEEDING: ICD-10-CM

## 2024-07-15 PROCEDURE — 3008F BODY MASS INDEX DOCD: CPT | Mod: CPTII,,,

## 2024-07-15 PROCEDURE — 3078F DIAST BP <80 MM HG: CPT | Mod: CPTII,,,

## 2024-07-15 PROCEDURE — 3074F SYST BP LT 130 MM HG: CPT | Mod: CPTII,,,

## 2024-07-15 PROCEDURE — 99214 OFFICE O/P EST MOD 30 MIN: CPT | Mod: S$PBB,,,

## 2024-07-15 PROCEDURE — 4010F ACE/ARB THERAPY RXD/TAKEN: CPT | Mod: CPTII,,,

## 2024-07-15 PROCEDURE — 99213 OFFICE O/P EST LOW 20 MIN: CPT | Mod: PBBFAC

## 2024-07-15 PROCEDURE — 1159F MED LIST DOCD IN RCRD: CPT | Mod: CPTII,,,

## 2024-07-15 NOTE — PROGRESS NOTES
Reason for Follow-up:  Iron-deficiency anemia   Vitamin B12 deficiency   Angiodysplasia of duodenum    History:  Past medical history:  B12 deficiency.  Anemia.  Iron-deficiency.  History of peripheral vascular disease, on dual antiplatelets.  Hypothyroidism.  CAD, S/P PCI.  Chronic hepatitis-B.  Chronic hepatitis-C. Depression.  GERD.  Right lower lung lobe nodule.  02/22/2023: Bilateral lower extremity stent placement, with resolution of bilateral lower extremity pain  Social history:  .  Lives with her brother in [place], [place].  No children.  Never became pregnant.  Disabled.  Does not work.  Has been smoking half a pack of cigarettes daily for 45 years.  Has been smoking marijuana every other day for 40 years.  No other illicit drugs.  No alcohol abuse.  Family history:  Negative for cancers or blood dyscrasias.  Health maintenance:  PCP at [facility].  -05/02/2023:  Bilateral screening mammogram (comparison:  12/03/2020 mammogram):  BI-RADS 2 (benign)  -02/24/2022: Stool for occult blood pos  -history of multiple colonoscopies and endoscopies: Angiodysplastic lesions in duodenum    History of Present Illness:   Pain (Pain stated pain in neck, shoulders, hands and legs. Patient stated pain level is a 10. Patient also stated she has been having headaches for the past few days. ), OTHER (Patient has been having trouble swallowing and has a cough. ), and Shortness of Breath        Oncologic/Hematologic History:  Oncology History    No history exists.   57-year-old female, referred from Internal Medicine, for evaluation of anemia.      Admitted to [facility] 04/07/2023-04/08/2023:  57-year-old female with significant history of HTN, PVD status post intervention, hypothyroidism, CAD status post PCI, right lower lobe lung nodule, bipolar disorder, depression, GERD, chronic hepatitis-B/chronic hepatitis-C and iron deficiency anemia presented to the ED with complaints of fatigue, generalized  weakness and outpatient labs revealing anemia.  Patient was referred to the ED for admission to further evaluate anemia.  On Plavix.  Patient had a colonoscopy 2.  Transfused with PRBC x1 unit.  No overt bleeding.  No history of alcohol use.  GI recommended to hold off on endoscopic evaluation.  Hemoccult-pos but numerous endoscopy procedures by Dr. Keyon Hatch, her GI, unrevealing in the past.  Patient on aspirin and Plavix which were continued.  Hemoglobin improved appropriately post PRBC x1 unit.  Iron deficient.  B12 deficient.  04/03/2023: Outside blood work showed ferritin 8.8.  B12 209.    History of iron-deficiency anemia.  History of B12 deficiency.  Admitted to [facility] 04/07/2023 with fatigue, weakness, dizziness.  Outside labs had shown anemia.  History of multiple endoscopies with Dr. Keyon Hatch, GI, with no source of GI bleed found.  04/03/2023: Blood work showed iron-deficiency, ferritin 8.8, B12 level 209.    History of duodenal angiodysplasias a couple of years ago    Blood transfusion in the past   EGD 11/2021: Hiatal hernia, GE ring, nonbleeding angioectasias   Colonoscopy 09/2021: Normal   EGD and colonoscopy 2022: Esophageal dilation, no bleeding, colon polyp    History of hepatitis-C: S/P treatment; S/P occlusive 2020; posttreatment viral load undetectable; FibroScan F1 (no liver cirrhosis)    09/09/2020: Limited abdominal ultrasound (comparison:  05/07/2013): Liver cirrhosis; no focal hepatic lesion    -12/16/2020: EGD (liver cirrhosis, rule out esophageal varices):  3 diminutive nonbleeding angiodysplastic lesions in the 2nd portion of duodenum  -09/22/2021:  Colonoscopy (indication:  Occult blood in stool; personal history of colon polyps): Normal mucosa in whole:  -11/15/2021: EGD (Keyon Hatch MD) (indication:  Dysphagia, oropharyngeal, nausea, heartburn, abdominal bloating, GI metaplasia, generalized abdominal pain):  Erythema lower 3rd of esophagus compatible with esophagitis;  erythema antrum, compatible with gastritis; esophageal hiatal hernia; a single bleeding angiectasia seen in duodenal bulb, treated with monopolar cautery successfully (thermotherapy) (pathology:  Gastric antrum biopsy, reactive gastropathy with mild nonspecific chronic inflammation, negative for intestinal metaplasia; negative for H pylori; gastric body biopsy, reactive gastropathy, mild nonspecific chronic inflammation, negative for intestinal metaplasia, negative for H pylori)    Labs reviewed:  Hemoglobin: 8.1 (05/16/2023); 9.0 (04/08/2023); 7.5 (04/07/2023); 6.8 (04/03/2023); 8.0 (03/13/2023); 11.6 (10/28/2022); 13.2 (10/03/2022), etc.   MCV: Consistently normal with mild macrocytosis on a couple of occasions  Rest of CBC unremarkable  05/16/2023: Serum iron 50.  TIBC 323.  Ferritin 16.8.  Transferrin saturation 15%.  B12 level 705.  Folate 38.8.    04/03/2023: Serum iron 8.  TIBC 344.  Ferritin 8.8.  Transferrin saturation 2%.  B12 level 209, low.  Folate 8.7   10/11/2022: Serum iron 69.  TIBC 292.  Ferritin 27.13.  Transferrin saturation 24%.  09/09/2020:  Ferritin 18.8, hemoglobin 9.8, MCV 93.7  Haptoglobin normal  04/03/2023:  Hemoglobin 6.8.  Retic count 1.89%.  LDH normal.  05/17/2023: Celiac serology negative    04/08/2023:  Hepatitis-C antibody pos (treated in the past with Epclusa, with a negative hepatitis-A viral load subsequently   09/09/2020:  Hepatitis-B core antibody total reactive.  Hepatitis-B surface antibody negative.  Hepatitis-B surface antigen negative.  HIV negative    05/16/2023:  RBC osmotic fragility normal  04/03/2023:  Hemoglobin electrophoresis:  No abnormal hemoglobin or beta thalassemia    Interval History 7/15/24:  Patient presented to the clinic today for a scheduled clinic visit to follow up on her diagnosis of Iron Deficiency Anemia. She has no acute complaints today. She reports feeling fatigue but not more than usual. She does report having rectal bleeding with defecation  approximately once a month. Bright red blood. She reports that the bleeding with each defecation has subsided. She denies any weakness or bleeding with urination. Labwork was reviewed with the patient. All future appointments were discussed with the patient.       Review of Systems:   All systems reviewed and found to be negative except for the symptoms detailed above    Physical Examination:   VITAL SIGNS:   Vitals:    07/15/24 1255   BP: (!) 94/57   Pulse:    Temp:        Physical Exam  Vitals reviewed.   Constitutional:       Appearance: Normal appearance.   HENT:      Head: Normocephalic and atraumatic.      Mouth/Throat:      Mouth: Mucous membranes are moist.   Cardiovascular:      Rate and Rhythm: Normal rate and regular rhythm.      Pulses: Normal pulses.      Heart sounds: Normal heart sounds.   Pulmonary:      Effort: Pulmonary effort is normal.      Breath sounds: Normal breath sounds.   Abdominal:      General: Bowel sounds are normal.      Palpations: Abdomen is soft.   Skin:     General: Skin is warm and dry.   Neurological:      Mental Status: She is alert and oriented to person, place, and time.   Psychiatric:         Mood and Affect: Mood normal.         Behavior: Behavior normal.         Thought Content: Thought content normal.         Judgment: Judgment normal.        Assessment:  Iron-deficiency anemia, B12 deficiency anemia:  (Multiple angioectasias in duodenal, without bleeding; multiple angioectasias in jejunum, without bleeding; multiple angioectasias in ileum, without bleeding):  -chronic anemia, ranging between 6.8-8.1; was normal in October 2022  -chronic iron-deficiency  -PRBC x1 unit 04/03/2023 for hemoglobin 6.8  -Celiac serology negative 05/17/2023  -RBC osmotic fragility normal 05/16/2023  -no hemoglobinopathy on hemoglobin electrophoresis 04/03/2023  -found to be B12 deficient when hospitalized 04/07/2023-04/08/2023 with symptomatic anemia, requiring PRBC x1 unit  -Ferrlecit 125 mg  IV x2 (02/20/2023, 04/08/2023)  -12/16/2020: EGD:  No esophageal varices; 3 diminutive nonbleeding angiodysplastic lesions 2nd portion of duodenum   -11/15/2021: EGD:  Esophagitis, gastritis, a single bleeding angiectasia duodenal bulb treated with cautery (thermotherapy)  -09/22/2021: Colonoscopy:  Occult blood in stool: Normal  -EGD and colonoscopy 2022: Colon polyp  -no response to oral iron therapy x2-3 years; remained iron-deficiency  -S/P Feraheme 510 mg IV x2 (06/05/2023, 06/12/2023), with good response (hemoglobin improved to 12.3 and ferritin 122.03 on 07/10/2023  -07/12/2023:  Patient desired to have B12 shots rather than pills because she can not afford the pills  -08/15/2023: Video capsule endoscopy: Multiple angioectasias without bleeding in the duodenum; multiple angioectasias without bleeding in the jejunum; multiple angioectasias without bleeding in the ileum  -09/11/2023:  Hemoglobin 11.1, stable  -11/14/2023:  Stool occult blood positive  -02/08/2024:  Hemoglobin 11.3 (was 9.8 on 12/13/2023); ferritin 43.48 (was 54.49 on 12/11/2023)  -03/11/2024:  Hemoglobin 9.6, down from 11.3 on 02/08/2024; ferritin level is pending; transferrin saturation 13%, remains low    Vitamin B12 deficiency:  -diagnosed 04/03/2023: B12 level 209  -05/22/2023: Tells me that she has been taking 2 vitamin B12 pills for last 1 month.  -05/22/2023: Vitamin B12 level 521 (absorbing satisfactorily via oral route).   Intrinsic factor antibody negative.    Homocystine level 4.6 micromoles per L, suppressed (patient already on oral B12 supplementation).  Methylmalonic acid level 0.12 nanomoles /ml, normal (patient already on oral vitamin B12 supplementation, with adequate absorption)  -07/12/2023:  Patient desired to have B12 shots rather than pills because she can not afford the pills      History of hepatitis-C and liver cirrhosis:  -treated with Epclusa in 2020   -Posttreatment viral load undetectable  -FibroScan F 1 (no liver  cirrhosis)  -ultrasound 2020: Liver cirrhosis     -EGD 2020:  No esophageal varices      Plan:   Iron-deficiency anemia:   Continue vitamin B12 1000 mcg IM every month   Follow-up with Pulmonary: Repeat CT chest without contrast in 2024  RTC with me in 1 month with labs prior (CBC/CMP/TIBC/Ferrtin/Iron)    Low Blood pressure readin/57  Stated that she took her blood pressure medications today.   Instructed her to FU with PCP about this reading.   Denies any Dizziness, headaches or syncopal episodes       Rectal Bleeding:   Refer to GI at [facility]. She is being seen by Dr. Chacon in [place]-Wants to  be seen here. -Referral sent to GI    History of multiple angioectasias:   Follow-up with GI for history of multiple angioectasias in intestinal tract        -chronic iron-deficiency anemia  -transfused in the past   -no response to oral iron therapy which she was taking for 2-3 years  -history of nonbleeding angiodysplastic lesions on EGD 2020, 11/15/2021; S/P endoscopic thermotherapy 11/15/2021  -treated with Feraheme x2 in 2023  -found to have multiple angioectasias without bleeding in duodenum, jejunum, and in the ileum on video capsule endoscopy 08/15/2023  -2023:  Stool occult blood positive (as expected with multiple angioectasias)  -2023:  Hemoglobin 10.0 (was 11.1 on 2023, 11.3 on 2023, 12.3 on 07/10/2023, etc).; ferritin 54.49 (was 18.98 on 2023); B12 level 435   -2024:  Hemoglobin 11.3 (was 9.8 on 2023); ferritin 43.48 (was 54.49 on 2023)  -2024:  Hemoglobin 9.6, down from 11.3 on 2024; ferritin level is pending; transferrin saturation 13%, remains low  >>>  -hemoglobin declining; remains iron-deficient  -taking iron pill 3 X a week; has failed oral iron therapy; advised her to discontinue iron pills  -proceed with intravenous iron therapy with Feraheme 510 mg IV x2, administered a week apart  -assess response  with repeat CBC, serum iron, TIBC, ferritin in 6 weeks  -follow-up with GI for history of multiple angioectasias in intestinal tract    Found to be vitamin B12 deficient on labs 04/03/2023   -05/22/2023: Told me that she has been taking 2 vitamin B12 pills for 1 month   -05/22/2023:  B12 level normalized, 521, with oral B12 therapy; intrinsic factor antibody negative; homocystine level suppressed; methylmalonic acid level normal (the labs were checked while she was already on oral B12 therapy)  -09/11/2023:  For last couple she has discontinued B12 pills because she could not afford   -09/11/2023:  For last 2 months, her sister-in-law has been administering her monthly B12 shots;   -12/11/2023:  Hemoglobin 10.0 (was 11.1 on 09/11/2023, 11.3 on 08/14/2023, 12.3 on 07/10/2023, etc).; ferritin 54.49 (was 18.98 on 09/11/2023); B12 level 435   >>>  Continue vitamin B12 1000 mcg IM (per her preference) every month; her sister in law administers the shots    From Hematology perspective, we will monitor iron and B12 deficiency anemia, and intervene to replete any deficiency if and when necessary    Subcentimeter lung nodules  -noncontrast chest CT 11/20/2023:  Stable subcentimeter lung nodules  -12/04/2023:  Pulmonary follow-up: Repeat CT chest in 1 year    Above discussed at length with the patient.  All questions answered.    Discussed labs and gave her copies of relevant reports.    Plan of management discussed.    She understands and agrees with this plan.    Follow-up:  No follow-ups on file.

## 2024-07-15 NOTE — PROGRESS NOTES
Attempted to contact patient to inform her that Enoc Hdz NP would not be giving her iron this month and will see her back in 1 month. Left a VM for patient to give a call back.

## 2024-07-25 DIAGNOSIS — M79.641 PAIN IN BOTH HANDS: Primary | ICD-10-CM

## 2024-07-25 DIAGNOSIS — M79.642 PAIN IN BOTH HANDS: Primary | ICD-10-CM

## 2024-08-08 ENCOUNTER — OFFICE VISIT (OUTPATIENT)
Dept: RHEUMATOLOGY | Facility: CLINIC | Age: 59
End: 2024-08-08
Payer: MEDICAID

## 2024-08-08 ENCOUNTER — TELEPHONE (OUTPATIENT)
Dept: RHEUMATOLOGY | Facility: CLINIC | Age: 59
End: 2024-08-08
Payer: MEDICAID

## 2024-08-08 VITALS
HEIGHT: 60 IN | SYSTOLIC BLOOD PRESSURE: 129 MMHG | WEIGHT: 117 LBS | OXYGEN SATURATION: 98 % | RESPIRATION RATE: 16 BRPM | HEART RATE: 72 BPM | DIASTOLIC BLOOD PRESSURE: 81 MMHG | TEMPERATURE: 98 F | BODY MASS INDEX: 22.97 KG/M2

## 2024-08-08 DIAGNOSIS — F17.210 NICOTINE DEPENDENCE, CIGARETTES, UNCOMPLICATED: ICD-10-CM

## 2024-08-08 DIAGNOSIS — M05.79 RHEUMATOID ARTHRITIS INVOLVING MULTIPLE SITES WITH POSITIVE RHEUMATOID FACTOR: Primary | ICD-10-CM

## 2024-08-08 DIAGNOSIS — I10 PRIMARY HYPERTENSION: ICD-10-CM

## 2024-08-08 DIAGNOSIS — R76.8 HEPATITIS B CORE ANTIBODY POSITIVE: ICD-10-CM

## 2024-08-08 DIAGNOSIS — Z79.899 DRUG-INDUCED IMMUNODEFICIENCY: ICD-10-CM

## 2024-08-08 DIAGNOSIS — R76.8 POSITIVE ANA (ANTINUCLEAR ANTIBODY): ICD-10-CM

## 2024-08-08 DIAGNOSIS — K55.20 ANGIODYSPLASIA OF SMALL INTESTINE: ICD-10-CM

## 2024-08-08 DIAGNOSIS — Z86.19 HISTORY OF HEPATITIS C: ICD-10-CM

## 2024-08-08 DIAGNOSIS — D64.9 ANEMIA, UNSPECIFIED TYPE: ICD-10-CM

## 2024-08-08 DIAGNOSIS — M15.9 PRIMARY OSTEOARTHRITIS INVOLVING MULTIPLE JOINTS: ICD-10-CM

## 2024-08-08 DIAGNOSIS — D84.821 DRUG-INDUCED IMMUNODEFICIENCY: ICD-10-CM

## 2024-08-08 PROCEDURE — G2211 COMPLEX E/M VISIT ADD ON: HCPCS | Mod: S$PBB,,, | Performed by: INTERNAL MEDICINE

## 2024-08-08 PROCEDURE — 3074F SYST BP LT 130 MM HG: CPT | Mod: CPTII,,, | Performed by: INTERNAL MEDICINE

## 2024-08-08 PROCEDURE — 99215 OFFICE O/P EST HI 40 MIN: CPT | Mod: PBBFAC | Performed by: INTERNAL MEDICINE

## 2024-08-08 PROCEDURE — 3079F DIAST BP 80-89 MM HG: CPT | Mod: CPTII,,, | Performed by: INTERNAL MEDICINE

## 2024-08-08 PROCEDURE — 3008F BODY MASS INDEX DOCD: CPT | Mod: CPTII,,, | Performed by: INTERNAL MEDICINE

## 2024-08-08 PROCEDURE — 99215 OFFICE O/P EST HI 40 MIN: CPT | Mod: S$PBB,,, | Performed by: INTERNAL MEDICINE

## 2024-08-08 PROCEDURE — 1159F MED LIST DOCD IN RCRD: CPT | Mod: CPTII,,, | Performed by: INTERNAL MEDICINE

## 2024-08-08 PROCEDURE — 4010F ACE/ARB THERAPY RXD/TAKEN: CPT | Mod: CPTII,,, | Performed by: INTERNAL MEDICINE

## 2024-08-08 RX ORDER — HYDROXYCHLOROQUINE SULFATE 200 MG/1
200 TABLET, FILM COATED ORAL DAILY
Qty: 30 TABLET | Refills: 5 | Status: SHIPPED | OUTPATIENT
Start: 2024-08-08

## 2024-08-08 RX ORDER — LEFLUNOMIDE 20 MG/1
20 TABLET ORAL DAILY
Qty: 30 TABLET | Refills: 3 | Status: SHIPPED | OUTPATIENT
Start: 2024-08-08

## 2024-08-08 RX ORDER — HYDROXYCHLOROQUINE SULFATE 200 MG/1
200 TABLET, FILM COATED ORAL 2 TIMES DAILY
Qty: 60 TABLET | Refills: 5 | Status: SHIPPED | OUTPATIENT
Start: 2024-08-08 | End: 2024-08-08

## 2024-08-10 ENCOUNTER — LAB VISIT (OUTPATIENT)
Dept: LAB | Facility: HOSPITAL | Age: 59
End: 2024-08-10
Attending: INTERNAL MEDICINE
Payer: MEDICAID

## 2024-08-10 DIAGNOSIS — M05.79 RHEUMATOID ARTHRITIS INVOLVING MULTIPLE SITES WITH POSITIVE RHEUMATOID FACTOR: ICD-10-CM

## 2024-08-10 DIAGNOSIS — D50.0 IRON DEFICIENCY ANEMIA DUE TO CHRONIC BLOOD LOSS: ICD-10-CM

## 2024-08-10 LAB
ALBUMIN SERPL-MCNC: 3.5 G/DL (ref 3.5–5)
ALBUMIN/GLOB SERPL: 1 RATIO (ref 1.1–2)
ALP SERPL-CCNC: 73 UNIT/L (ref 40–150)
ALT SERPL-CCNC: 11 UNIT/L (ref 0–55)
ANION GAP SERPL CALC-SCNC: 5 MEQ/L
AST SERPL-CCNC: 17 UNIT/L (ref 5–34)
BASOPHILS # BLD AUTO: 0.07 X10(3)/MCL
BASOPHILS NFR BLD AUTO: 0.9 %
BILIRUB SERPL-MCNC: 0.2 MG/DL
BUN SERPL-MCNC: 11.7 MG/DL (ref 9.8–20.1)
CALCIUM SERPL-MCNC: 9.5 MG/DL (ref 8.4–10.2)
CHLORIDE SERPL-SCNC: 105 MMOL/L (ref 98–107)
CO2 SERPL-SCNC: 30 MMOL/L (ref 22–29)
CREAT SERPL-MCNC: 0.75 MG/DL (ref 0.55–1.02)
CREAT/UREA NIT SERPL: 16
CRP SERPL-MCNC: 6.5 MG/L
EOSINOPHIL # BLD AUTO: 0.23 X10(3)/MCL (ref 0–0.9)
EOSINOPHIL NFR BLD AUTO: 2.9 %
ERYTHROCYTE [DISTWIDTH] IN BLOOD BY AUTOMATED COUNT: 14.1 % (ref 11.5–17)
ERYTHROCYTE [SEDIMENTATION RATE] IN BLOOD: 130 MM/HR (ref 0–20)
GFR SERPLBLD CREATININE-BSD FMLA CKD-EPI: >60 ML/MIN/1.73/M2
GLOBULIN SER-MCNC: 3.5 GM/DL (ref 2.4–3.5)
GLUCOSE SERPL-MCNC: 126 MG/DL (ref 74–100)
HCT VFR BLD AUTO: 33.1 % (ref 37–47)
HGB BLD-MCNC: 9.9 G/DL (ref 12–16)
IMM GRANULOCYTES # BLD AUTO: 0.02 X10(3)/MCL (ref 0–0.04)
IMM GRANULOCYTES NFR BLD AUTO: 0.2 %
LYMPHOCYTES # BLD AUTO: 0.98 X10(3)/MCL (ref 0.6–4.6)
LYMPHOCYTES NFR BLD AUTO: 12.2 %
MAGNESIUM SERPL-MCNC: 1.8 MG/DL (ref 1.6–2.6)
MCH RBC QN AUTO: 29.5 PG (ref 27–31)
MCHC RBC AUTO-ENTMCNC: 29.9 G/DL (ref 33–36)
MCV RBC AUTO: 98.5 FL (ref 80–94)
MONOCYTES # BLD AUTO: 0.54 X10(3)/MCL (ref 0.1–1.3)
MONOCYTES NFR BLD AUTO: 6.7 %
NEUTROPHILS # BLD AUTO: 6.17 X10(3)/MCL (ref 2.1–9.2)
NEUTROPHILS NFR BLD AUTO: 77.1 %
PLATELET # BLD AUTO: 283 X10(3)/MCL (ref 130–400)
PMV BLD AUTO: 11 FL (ref 7.4–10.4)
POTASSIUM SERPL-SCNC: 5.4 MMOL/L (ref 3.5–5.1)
PROT SERPL-MCNC: 7 GM/DL (ref 6.4–8.3)
RBC # BLD AUTO: 3.36 X10(6)/MCL (ref 4.2–5.4)
SODIUM SERPL-SCNC: 140 MMOL/L (ref 136–145)
WBC # BLD AUTO: 8.01 X10(3)/MCL (ref 4.5–11.5)

## 2024-08-10 PROCEDURE — 36415 COLL VENOUS BLD VENIPUNCTURE: CPT

## 2024-08-10 PROCEDURE — 83735 ASSAY OF MAGNESIUM: CPT

## 2024-08-10 PROCEDURE — 85652 RBC SED RATE AUTOMATED: CPT

## 2024-08-10 PROCEDURE — 86140 C-REACTIVE PROTEIN: CPT

## 2024-08-10 PROCEDURE — 80053 COMPREHEN METABOLIC PANEL: CPT

## 2024-08-10 PROCEDURE — 85025 COMPLETE CBC W/AUTO DIFF WBC: CPT

## 2024-08-12 NOTE — PROGRESS NOTES
Please let her know inflammatory markers are elevated could not because she was feeling bad, may have had viral infection.  I will see her back in 4-6 weeks and repeat these tests.  Potassium level is elevated, could be because of the blood pressure medication she is taking, asked her to decrease potassium in her diet and follow-up with PCP as soon as possible.

## 2024-08-13 ENCOUNTER — TELEPHONE (OUTPATIENT)
Dept: RHEUMATOLOGY | Facility: CLINIC | Age: 59
End: 2024-08-13
Payer: MEDICAID

## 2024-08-13 NOTE — TELEPHONE ENCOUNTER
Returned pt call in regards to Potassium levels. Pt wanted to know what her Potassium levels was. Notified pt of her levels pt confirmed understanding and said she would notify her nurse.

## 2024-08-13 NOTE — TELEPHONE ENCOUNTER
----- Message from Joanna Marsh MD sent at 8/12/2024  4:23 PM CDT -----  Please let her know inflammatory markers are elevated could not because she was feeling bad, may have had viral infection.  I will see her back in 4-6 weeks and repeat these tests.  Potassium level is elevated, could be because of the blood pressure medication she is taking, asked her to decrease potassium in her diet and follow-up with PCP as soon as possible.      Called and notified pt of results pt confirmed understanding. Asked pt if she had any questions or concerns pt stated that she did not have any. Pt states she does not see her provider until November. Pt states she been calling and no luck.

## 2024-08-14 ENCOUNTER — OFFICE VISIT (OUTPATIENT)
Dept: ORTHOPEDICS | Facility: CLINIC | Age: 59
End: 2024-08-14
Payer: MEDICAID

## 2024-08-14 VITALS
DIASTOLIC BLOOD PRESSURE: 87 MMHG | TEMPERATURE: 99 F | BODY MASS INDEX: 24.09 KG/M2 | HEART RATE: 87 BPM | HEIGHT: 59 IN | SYSTOLIC BLOOD PRESSURE: 132 MMHG | WEIGHT: 119.5 LBS

## 2024-08-14 DIAGNOSIS — S93.491A SPRAIN OF ANTERIOR TALOFIBULAR LIGAMENT OF RIGHT ANKLE, INITIAL ENCOUNTER: Primary | ICD-10-CM

## 2024-08-14 PROCEDURE — 1159F MED LIST DOCD IN RCRD: CPT | Mod: CPTII,,, | Performed by: NURSE PRACTITIONER

## 2024-08-14 PROCEDURE — 3008F BODY MASS INDEX DOCD: CPT | Mod: CPTII,,, | Performed by: NURSE PRACTITIONER

## 2024-08-14 PROCEDURE — 99213 OFFICE O/P EST LOW 20 MIN: CPT | Mod: PBBFAC | Performed by: NURSE PRACTITIONER

## 2024-08-14 PROCEDURE — 1160F RVW MEDS BY RX/DR IN RCRD: CPT | Mod: CPTII,,, | Performed by: NURSE PRACTITIONER

## 2024-08-14 PROCEDURE — 3075F SYST BP GE 130 - 139MM HG: CPT | Mod: CPTII,,, | Performed by: NURSE PRACTITIONER

## 2024-08-14 PROCEDURE — 3079F DIAST BP 80-89 MM HG: CPT | Mod: CPTII,,, | Performed by: NURSE PRACTITIONER

## 2024-08-14 PROCEDURE — 4010F ACE/ARB THERAPY RXD/TAKEN: CPT | Mod: CPTII,,, | Performed by: NURSE PRACTITIONER

## 2024-08-14 PROCEDURE — 99203 OFFICE O/P NEW LOW 30 MIN: CPT | Mod: S$PBB,,, | Performed by: NURSE PRACTITIONER

## 2024-08-15 DIAGNOSIS — D50.0 IRON DEFICIENCY ANEMIA DUE TO CHRONIC BLOOD LOSS: Primary | ICD-10-CM

## 2024-08-16 ENCOUNTER — TELEPHONE (OUTPATIENT)
Dept: RHEUMATOLOGY | Facility: CLINIC | Age: 59
End: 2024-08-16
Payer: MEDICAID

## 2024-08-16 ENCOUNTER — PATIENT MESSAGE (OUTPATIENT)
Dept: RHEUMATOLOGY | Facility: CLINIC | Age: 59
End: 2024-08-16
Payer: MEDICAID

## 2024-08-16 ENCOUNTER — TELEPHONE (OUTPATIENT)
Dept: HEMATOLOGY/ONCOLOGY | Facility: CLINIC | Age: 59
End: 2024-08-16
Payer: MEDICAID

## 2024-08-16 DIAGNOSIS — D50.0 IRON DEFICIENCY ANEMIA DUE TO CHRONIC BLOOD LOSS: Primary | ICD-10-CM

## 2024-08-16 NOTE — TELEPHONE ENCOUNTER
Advised patient through portal messaging system. I will attempt again to reach out to the patient via phone call.    You can continue Leflunomide (Arava) 20 mg by mouth once a day. For the Hydroxychloroquine  (Plaquenil) 200 mg by mouth once a day, you can ONLY start if your baseline eye exam is NORMAL after seeing the ophthalmology. If it is ABNORMAL, please do NOT start plaquenil. Please do not hesitate to reach out if you have any questions.    ----- Message from Cynthia Rodgers sent at 8/16/2024 10:24 AM CDT -----  Regarding: medication  Pt called in because the pharmacy got 2 prescriptions of medication and wanted to know which one to fill.      Callback [phone]

## 2024-08-17 RX ORDER — IBUPROFEN 200 MG
1 TABLET ORAL DAILY
Qty: 60 PATCH | Refills: 0 | Status: SHIPPED | OUTPATIENT
Start: 2024-08-17

## 2024-08-19 ENCOUNTER — TELEPHONE (OUTPATIENT)
Dept: RHEUMATOLOGY | Facility: CLINIC | Age: 59
End: 2024-08-19
Payer: MEDICAID

## 2024-08-19 ENCOUNTER — OFFICE VISIT (OUTPATIENT)
Dept: HEMATOLOGY/ONCOLOGY | Facility: CLINIC | Age: 59
End: 2024-08-19
Payer: MEDICAID

## 2024-08-19 ENCOUNTER — LAB VISIT (OUTPATIENT)
Dept: HEMATOLOGY/ONCOLOGY | Facility: CLINIC | Age: 59
End: 2024-08-19
Payer: MEDICAID

## 2024-08-19 ENCOUNTER — INFUSION (OUTPATIENT)
Dept: INFUSION THERAPY | Facility: HOSPITAL | Age: 59
End: 2024-08-19
Attending: INTERNAL MEDICINE
Payer: MEDICAID

## 2024-08-19 VITALS
DIASTOLIC BLOOD PRESSURE: 75 MMHG | WEIGHT: 120.38 LBS | OXYGEN SATURATION: 98 % | BODY MASS INDEX: 24.27 KG/M2 | HEIGHT: 59 IN | TEMPERATURE: 98 F | HEART RATE: 87 BPM | SYSTOLIC BLOOD PRESSURE: 127 MMHG

## 2024-08-19 VITALS
HEART RATE: 80 BPM | DIASTOLIC BLOOD PRESSURE: 84 MMHG | RESPIRATION RATE: 20 BRPM | HEIGHT: 59 IN | BODY MASS INDEX: 24.27 KG/M2 | TEMPERATURE: 98 F | SYSTOLIC BLOOD PRESSURE: 123 MMHG | OXYGEN SATURATION: 96 % | WEIGHT: 120.38 LBS

## 2024-08-19 DIAGNOSIS — D50.0 IRON DEFICIENCY ANEMIA DUE TO CHRONIC BLOOD LOSS: ICD-10-CM

## 2024-08-19 DIAGNOSIS — D50.0 IRON DEFICIENCY ANEMIA DUE TO CHRONIC BLOOD LOSS: Primary | ICD-10-CM

## 2024-08-19 DIAGNOSIS — K62.5 RECTAL BLEEDING: ICD-10-CM

## 2024-08-19 LAB
ABO + RH BLD: NORMAL
ABO + RH BLD: NORMAL
ABORH RETYPE: NORMAL
ALBUMIN SERPL-MCNC: 3.4 G/DL (ref 3.5–5)
ALBUMIN/GLOB SERPL: 0.9 RATIO (ref 1.1–2)
ALP SERPL-CCNC: 64 UNIT/L (ref 40–150)
ALT SERPL-CCNC: 9 UNIT/L (ref 0–55)
ANION GAP SERPL CALC-SCNC: 11 MEQ/L
AST SERPL-CCNC: 19 UNIT/L (ref 5–34)
BASOPHILS # BLD AUTO: 0.08 X10(3)/MCL
BASOPHILS NFR BLD AUTO: 0.7 %
BILIRUB SERPL-MCNC: 0.3 MG/DL
BLD PROD TYP BPU: NORMAL
BLD PROD TYP BPU: NORMAL
BLOOD UNIT EXPIRATION DATE: NORMAL
BLOOD UNIT EXPIRATION DATE: NORMAL
BLOOD UNIT TYPE CODE: 5100
BLOOD UNIT TYPE CODE: 5100
BUN SERPL-MCNC: 16.7 MG/DL (ref 9.8–20.1)
CALCIUM SERPL-MCNC: 9.3 MG/DL (ref 8.4–10.2)
CHLORIDE SERPL-SCNC: 100 MMOL/L (ref 98–107)
CO2 SERPL-SCNC: 22 MMOL/L (ref 22–29)
CREAT SERPL-MCNC: 0.88 MG/DL (ref 0.55–1.02)
CREAT/UREA NIT SERPL: 19
CROSSMATCH INTERPRETATION: NORMAL
CROSSMATCH INTERPRETATION: NORMAL
DISPENSE STATUS: NORMAL
DISPENSE STATUS: NORMAL
EOSINOPHIL # BLD AUTO: 0.21 X10(3)/MCL (ref 0–0.9)
EOSINOPHIL NFR BLD AUTO: 1.9 %
ERYTHROCYTE [DISTWIDTH] IN BLOOD BY AUTOMATED COUNT: 14.2 % (ref 11.5–17)
FERRITIN SERPL-MCNC: 20.18 NG/ML (ref 4.63–204)
GFR SERPLBLD CREATININE-BSD FMLA CKD-EPI: >60 ML/MIN/1.73/M2
GLOBULIN SER-MCNC: 3.6 GM/DL (ref 2.4–3.5)
GLUCOSE SERPL-MCNC: 154 MG/DL (ref 74–100)
GROUP & RH: NORMAL
HCT VFR BLD AUTO: 24.1 % (ref 37–47)
HGB BLD-MCNC: 7.4 G/DL (ref 12–16)
IMM GRANULOCYTES # BLD AUTO: 0.07 X10(3)/MCL (ref 0–0.04)
IMM GRANULOCYTES NFR BLD AUTO: 0.6 %
INDIRECT COOMBS: NORMAL
IRON SATN MFR SERPL: 7 % (ref 20–50)
IRON SERPL-MCNC: 21 UG/DL (ref 50–170)
LYMPHOCYTES # BLD AUTO: 1.36 X10(3)/MCL (ref 0.6–4.6)
LYMPHOCYTES NFR BLD AUTO: 12.4 %
MCH RBC QN AUTO: 28.9 PG (ref 27–31)
MCHC RBC AUTO-ENTMCNC: 30.7 G/DL (ref 33–36)
MCV RBC AUTO: 94.1 FL (ref 80–94)
MONOCYTES # BLD AUTO: 0.85 X10(3)/MCL (ref 0.1–1.3)
MONOCYTES NFR BLD AUTO: 7.8 %
NEUTROPHILS # BLD AUTO: 8.39 X10(3)/MCL (ref 2.1–9.2)
NEUTROPHILS NFR BLD AUTO: 76.6 %
NRBC BLD AUTO-RTO: 0 %
PLATELET # BLD AUTO: 330 X10(3)/MCL (ref 130–400)
PMV BLD AUTO: 10.6 FL (ref 7.4–10.4)
POTASSIUM SERPL-SCNC: 3.8 MMOL/L (ref 3.5–5.1)
PROT SERPL-MCNC: 7 GM/DL (ref 6.4–8.3)
RBC # BLD AUTO: 2.56 X10(6)/MCL (ref 4.2–5.4)
SODIUM SERPL-SCNC: 133 MMOL/L (ref 136–145)
SPECIMEN OUTDATE: NORMAL
TIBC SERPL-MCNC: 295 UG/DL (ref 70–310)
TIBC SERPL-MCNC: 316 UG/DL (ref 250–450)
TRANSFERRIN SERPL-MCNC: 300 MG/DL (ref 180–382)
UNIT NUMBER: NORMAL
UNIT NUMBER: NORMAL
WBC # BLD AUTO: 10.96 X10(3)/MCL (ref 4.5–11.5)

## 2024-08-19 PROCEDURE — 3074F SYST BP LT 130 MM HG: CPT | Mod: CPTII,,,

## 2024-08-19 PROCEDURE — 80053 COMPREHEN METABOLIC PANEL: CPT

## 2024-08-19 PROCEDURE — 36415 COLL VENOUS BLD VENIPUNCTURE: CPT | Mod: 59

## 2024-08-19 PROCEDURE — 36415 COLL VENOUS BLD VENIPUNCTURE: CPT

## 2024-08-19 PROCEDURE — 86901 BLOOD TYPING SEROLOGIC RH(D): CPT | Performed by: INTERNAL MEDICINE

## 2024-08-19 PROCEDURE — 86850 RBC ANTIBODY SCREEN: CPT | Performed by: INTERNAL MEDICINE

## 2024-08-19 PROCEDURE — P9016 RBC LEUKOCYTES REDUCED: HCPCS

## 2024-08-19 PROCEDURE — 36430 TRANSFUSION BLD/BLD COMPNT: CPT

## 2024-08-19 PROCEDURE — 99215 OFFICE O/P EST HI 40 MIN: CPT | Mod: S$PBB,,,

## 2024-08-19 PROCEDURE — 1159F MED LIST DOCD IN RCRD: CPT | Mod: CPTII,,,

## 2024-08-19 PROCEDURE — 85025 COMPLETE CBC W/AUTO DIFF WBC: CPT

## 2024-08-19 PROCEDURE — 83550 IRON BINDING TEST: CPT

## 2024-08-19 PROCEDURE — 3078F DIAST BP <80 MM HG: CPT | Mod: CPTII,,,

## 2024-08-19 PROCEDURE — 86923 COMPATIBILITY TEST ELECTRIC: CPT

## 2024-08-19 PROCEDURE — 4010F ACE/ARB THERAPY RXD/TAKEN: CPT | Mod: CPTII,,,

## 2024-08-19 PROCEDURE — 3008F BODY MASS INDEX DOCD: CPT | Mod: CPTII,,,

## 2024-08-19 PROCEDURE — 1160F RVW MEDS BY RX/DR IN RCRD: CPT | Mod: CPTII,,,

## 2024-08-19 PROCEDURE — 99213 OFFICE O/P EST LOW 20 MIN: CPT | Mod: PBBFAC

## 2024-08-19 PROCEDURE — 25000003 PHARM REV CODE 250

## 2024-08-19 PROCEDURE — 86900 BLOOD TYPING SEROLOGIC ABO: CPT | Performed by: INTERNAL MEDICINE

## 2024-08-19 PROCEDURE — 83540 ASSAY OF IRON: CPT

## 2024-08-19 PROCEDURE — 82728 ASSAY OF FERRITIN: CPT

## 2024-08-19 RX ORDER — DIPHENHYDRAMINE HCL 25 MG
25 CAPSULE ORAL
Status: CANCELLED | OUTPATIENT
Start: 2024-08-19

## 2024-08-19 RX ORDER — SODIUM CHLORIDE 0.9 % (FLUSH) 0.9 %
10 SYRINGE (ML) INJECTION
OUTPATIENT
Start: 2024-08-26

## 2024-08-19 RX ORDER — HYDROCODONE BITARTRATE AND ACETAMINOPHEN 500; 5 MG/1; MG/1
TABLET ORAL ONCE
Status: CANCELLED | OUTPATIENT
Start: 2024-08-19 | End: 2024-08-19

## 2024-08-19 RX ORDER — HYDROCODONE BITARTRATE AND ACETAMINOPHEN 500; 5 MG/1; MG/1
TABLET ORAL ONCE
Status: COMPLETED | OUTPATIENT
Start: 2024-08-19 | End: 2024-08-19

## 2024-08-19 RX ORDER — ACETAMINOPHEN 325 MG/1
650 TABLET ORAL
Status: DISPENSED | OUTPATIENT
Start: 2024-08-19

## 2024-08-19 RX ORDER — HEPARIN 100 UNIT/ML
500 SYRINGE INTRAVENOUS
OUTPATIENT
Start: 2024-09-02

## 2024-08-19 RX ORDER — SODIUM CHLORIDE 0.9 % (FLUSH) 0.9 %
10 SYRINGE (ML) INJECTION
OUTPATIENT
Start: 2024-09-02

## 2024-08-19 RX ORDER — DIPHENHYDRAMINE HCL 25 MG
25 CAPSULE ORAL
Status: COMPLETED | OUTPATIENT
Start: 2024-08-19 | End: 2024-08-19

## 2024-08-19 RX ORDER — ACETAMINOPHEN 325 MG/1
650 TABLET ORAL
Status: CANCELLED | OUTPATIENT
Start: 2024-08-19

## 2024-08-19 RX ORDER — HEPARIN 100 UNIT/ML
500 SYRINGE INTRAVENOUS
OUTPATIENT
Start: 2024-08-26

## 2024-08-19 RX ADMIN — SODIUM CHLORIDE: 9 INJECTION, SOLUTION INTRAVENOUS at 10:08

## 2024-08-19 RX ADMIN — DIPHENHYDRAMINE HYDROCHLORIDE 25 MG: 25 CAPSULE ORAL at 10:08

## 2024-08-19 NOTE — NURSING
0909  Pt did labs and saw A Wilder NP today.  H/H 7.4/24.1 so 2 units of blood being ordered.  Pt amb to infusion.  Rates LOP 10/10 to back, hips, and legs.  Pt does report a smoker's cough, SOB, and tired.  States was nauseated last week.

## 2024-08-19 NOTE — TELEPHONE ENCOUNTER
Patient questioned whether to start Hydroxychloroquine 200 mg by mouth twice a day or once a day. I advised her to start Hydroxychloroquine 200 mg by mouth once a day ONLY if her eye exam is normal per her ophthalmologist.    ----- Message from Cynthia Rodgers sent at 8/16/2024 10:24 AM CDT -----  Regarding: medication  Pt called in because the pharmacy got 2 prescriptions of medication and wanted to know which one to fill.      Callback [phone]

## 2024-08-19 NOTE — Clinical Note
Give 2 units of PRBC today in infusion  Give Fereheme in infusion x 2 doses (1 week) apart (8/26 & 9/2)  RTC with me 8 weeks after her last iron infusion (10/28) with labs prior (CBC/CMP/Iron level/ferrtin)

## 2024-08-19 NOTE — PROGRESS NOTES
Reason for Follow-up:  Iron-deficiency anemia   Vitamin B12 deficiency   Angiodysplasia of duodenum    History:  Past medical history:  B12 deficiency.  Anemia.  Iron-deficiency.  History of peripheral vascular disease, on dual antiplatelets.  Hypothyroidism.  CAD, S/P PCI.  Chronic hepatitis-B.  Chronic hepatitis-C. Depression.  GERD.  Right lower lung lobe nodule.  02/22/2023: Bilateral lower extremity stent placement, with resolution of bilateral lower extremity pain  Social history:  .  Lives with her brother in [place], [place].  No children.  Never became pregnant.  Disabled.  Does not work.  Has been smoking half a pack of cigarettes daily for 45 years.  Has been smoking marijuana every other day for 40 years.  No other illicit drugs.  No alcohol abuse.  Family history:  Negative for cancers or blood dyscrasias.  Health maintenance:  PCP at [facility].  -05/02/2023:  Bilateral screening mammogram (comparison:  12/03/2020 mammogram):  BI-RADS 2 (benign)  -02/24/2022: Stool for occult blood pos  -history of multiple colonoscopies and endoscopies: Angiodysplastic lesions in duodenum    History of Present Illness:   Pain (Patient stated pain in legs, back and hip. Patient stated pain level is a 10), Fatigue, Shortness of Breath, and Nausea        Oncologic/Hematologic History:  Oncology History    No history exists.   57-year-old female, referred from Internal Medicine, for evaluation of anemia.      Admitted to [facility] 04/07/2023-04/08/2023:  57-year-old female with significant history of HTN, PVD status post intervention, hypothyroidism, CAD status post PCI, right lower lobe lung nodule, bipolar disorder, depression, GERD, chronic hepatitis-B/chronic hepatitis-C and iron deficiency anemia presented to the ED with complaints of fatigue, generalized weakness and outpatient labs revealing anemia.  Patient was referred to the ED for admission to further evaluate anemia.  On Plavix.  Patient  had a colonoscopy 2.  Transfused with PRBC x1 unit.  No overt bleeding.  No history of alcohol use.  GI recommended to hold off on endoscopic evaluation.  Hemoccult-pos but numerous endoscopy procedures by Dr. Keyon Hatch, her GI, unrevealing in the past.  Patient on aspirin and Plavix which were continued.  Hemoglobin improved appropriately post PRBC x1 unit.  Iron deficient.  B12 deficient.  04/03/2023: Outside blood work showed ferritin 8.8.  B12 209.    History of iron-deficiency anemia.  History of B12 deficiency.  Admitted to [facility] 04/07/2023 with fatigue, weakness, dizziness.  Outside labs had shown anemia.  History of multiple endoscopies with Dr. Keyon Hatch, GI, with no source of GI bleed found.  04/03/2023: Blood work showed iron-deficiency, ferritin 8.8, B12 level 209.    History of duodenal angiodysplasias a couple of years ago    Blood transfusion in the past   EGD 11/2021: Hiatal hernia, GE ring, nonbleeding angioectasias   Colonoscopy 09/2021: Normal   EGD and colonoscopy 2022: Esophageal dilation, no bleeding, colon polyp    History of hepatitis-C: S/P treatment; S/P occlusive 2020; posttreatment viral load undetectable; FibroScan F1 (no liver cirrhosis)    09/09/2020: Limited abdominal ultrasound (comparison:  05/07/2013): Liver cirrhosis; no focal hepatic lesion    -12/16/2020: EGD (liver cirrhosis, rule out esophageal varices):  3 diminutive nonbleeding angiodysplastic lesions in the 2nd portion of duodenum  -09/22/2021:  Colonoscopy (indication:  Occult blood in stool; personal history of colon polyps): Normal mucosa in whole:  -11/15/2021: EGD (Keyon Hatch MD) (indication:  Dysphagia, oropharyngeal, nausea, heartburn, abdominal bloating, GI metaplasia, generalized abdominal pain):  Erythema lower 3rd of esophagus compatible with esophagitis; erythema antrum, compatible with gastritis; esophageal hiatal hernia; a single bleeding angiectasia seen in duodenal bulb, treated with  monopolar cautery successfully (thermotherapy) (pathology:  Gastric antrum biopsy, reactive gastropathy with mild nonspecific chronic inflammation, negative for intestinal metaplasia; negative for H pylori; gastric body biopsy, reactive gastropathy, mild nonspecific chronic inflammation, negative for intestinal metaplasia, negative for H pylori)    Labs reviewed:  Hemoglobin: 8.1 (05/16/2023); 9.0 (04/08/2023); 7.5 (04/07/2023); 6.8 (04/03/2023); 8.0 (03/13/2023); 11.6 (10/28/2022); 13.2 (10/03/2022), etc.   MCV: Consistently normal with mild macrocytosis on a couple of occasions  Rest of CBC unremarkable  05/16/2023: Serum iron 50.  TIBC 323.  Ferritin 16.8.  Transferrin saturation 15%.  B12 level 705.  Folate 38.8.    04/03/2023: Serum iron 8.  TIBC 344.  Ferritin 8.8.  Transferrin saturation 2%.  B12 level 209, low.  Folate 8.7   10/11/2022: Serum iron 69.  TIBC 292.  Ferritin 27.13.  Transferrin saturation 24%.  09/09/2020:  Ferritin 18.8, hemoglobin 9.8, MCV 93.7  Haptoglobin normal  04/03/2023:  Hemoglobin 6.8.  Retic count 1.89%.  LDH normal.  05/17/2023: Celiac serology negative    04/08/2023:  Hepatitis-C antibody pos (treated in the past with Epclusa, with a negative hepatitis-A viral load subsequently   09/09/2020:  Hepatitis-B core antibody total reactive.  Hepatitis-B surface antibody negative.  Hepatitis-B surface antigen negative.  HIV negative    05/16/2023:  RBC osmotic fragility normal  04/03/2023:  Hemoglobin electrophoresis:  No abnormal hemoglobin or beta thalassemia    Interval History 8/19/24:  Patient presented to the clinic today for a scheduled clinic visit to follow up on her diagnosis of Iron Deficiency Anemia. She has no acute complaints today. She reports feeling fatigue more than normal. She also reports having some shortness of breath. She denies any bleeding with elimination. However she reports having some dark stool for several months. She reports that she does have an appointment  with GI scheduled in January 2025. She denies any bright red blood anywhere. She denies any weakness , she does report eating ice all day, as well as restless leg syndrome. Labwork was reviewed with the patient. All future appointments were discussed with the patient.     Review of Systems:   All systems reviewed and found to be negative except for the symptoms detailed above    Physical Examination:   VITAL SIGNS:   Vitals:    08/19/24 0834   BP: 127/75   Pulse: 87   Temp: 98.4 °F (36.9 °C)       Physical Exam  Vitals reviewed.   Constitutional:       Appearance: Normal appearance.   HENT:      Head: Normocephalic and atraumatic.      Mouth/Throat:      Mouth: Mucous membranes are moist.   Cardiovascular:      Rate and Rhythm: Normal rate and regular rhythm.      Pulses: Normal pulses.      Heart sounds: Normal heart sounds.   Pulmonary:      Effort: Pulmonary effort is normal.      Breath sounds: Normal breath sounds.   Abdominal:      General: Bowel sounds are normal.      Palpations: Abdomen is soft.   Skin:     General: Skin is warm and dry.   Neurological:      Mental Status: She is alert and oriented to person, place, and time.   Psychiatric:         Mood and Affect: Mood normal.         Behavior: Behavior normal.         Thought Content: Thought content normal.         Judgment: Judgment normal.          Assessment:  Iron-deficiency anemia, B12 deficiency anemia:  (Multiple angioectasias in duodenal, without bleeding; multiple angioectasias in jejunum, without bleeding; multiple angioectasias in ileum, without bleeding):  -chronic anemia, ranging between 6.8-8.1; was normal in October 2022  -chronic iron-deficiency  -PRBC x1 unit 04/03/2023 for hemoglobin 6.8  -Celiac serology negative 05/17/2023  -RBC osmotic fragility normal 05/16/2023  -no hemoglobinopathy on hemoglobin electrophoresis 04/03/2023  -found to be B12 deficient when hospitalized 04/07/2023-04/08/2023 with symptomatic anemia, requiring PRBC x1  unit  -Ferrlecit 125 mg IV x2 (02/20/2023, 04/08/2023)  -12/16/2020: EGD:  No esophageal varices; 3 diminutive nonbleeding angiodysplastic lesions 2nd portion of duodenum   -11/15/2021: EGD:  Esophagitis, gastritis, a single bleeding angiectasia duodenal bulb treated with cautery (thermotherapy)  -09/22/2021: Colonoscopy:  Occult blood in stool: Normal  -EGD and colonoscopy 2022: Colon polyp  -no response to oral iron therapy x2-3 years; remained iron-deficiency  -S/P Feraheme 510 mg IV x2 (06/05/2023, 06/12/2023), with good response (hemoglobin improved to 12.3 and ferritin 122.03 on 07/10/2023  -07/12/2023:  Patient desired to have B12 shots rather than pills because she can not afford the pills  -08/15/2023: Video capsule endoscopy: Multiple angioectasias without bleeding in the duodenum; multiple angioectasias without bleeding in the jejunum; multiple angioectasias without bleeding in the ileum  -09/11/2023:  Hemoglobin 11.1, stable  -11/14/2023:  Stool occult blood positive  -02/08/2024:  Hemoglobin 11.3 (was 9.8 on 12/13/2023); ferritin 43.48 (was 54.49 on 12/11/2023)  -03/11/2024:  Hemoglobin 9.6, down from 11.3 on 02/08/2024; ferritin level is pending; transferrin saturation 13%, remains low    Vitamin B12 deficiency:  -diagnosed 04/03/2023: B12 level 209  -05/22/2023: Tells me that she has been taking 2 vitamin B12 pills for last 1 month.  -05/22/2023: Vitamin B12 level 521 (absorbing satisfactorily via oral route).   Intrinsic factor antibody negative.    Homocystine level 4.6 micromoles per L, suppressed (patient already on oral B12 supplementation).  Methylmalonic acid level 0.12 nanomoles /ml, normal (patient already on oral vitamin B12 supplementation, with adequate absorption)  -07/12/2023:  Patient desired to have B12 shots rather than pills because she can not afford the pills      History of hepatitis-C and liver cirrhosis:  -treated with Epclusa in 2020   -Posttreatment viral load  undetectable  -FibroScan F 1 (no liver cirrhosis)  -ultrasound 09/09/2020: Liver cirrhosis     -EGD 12/16/2020:  No esophageal varices      Plan:   Iron-deficiency anemia:   Continue vitamin B12 1000 mcg IM every month   Follow-up with Pulmonary: Repeat CT chest without contrast in November 2024  H/H today is 7.4/24.1  Give 2 units of PRBC today in infusion   Iron level today is 21  Ferrtin level today is 20.18  Will need Feraheme 510 x 2 doses- orders placed   RTC with me in 8 weeks with labs prior (CBC/CMP/TIBC/Ferrtin level) post completion of iron infusion       Rectal Bleeding:   FU on Referral to GI at [facility]. She is being seen by Dr. Chacon in [place]-Wants to  be seen here.   Black tarry stool   Has an appt on 1/17/25   Nurse will call to try and get a soonner appt if possible- New appointment is scheduled for 8/22/24     History of multiple angioectasias:   Follow-up with GI for history of multiple angioectasias in intestinal tract    -chronic iron-deficiency anemia  -transfused in the past   -no response to oral iron therapy which she was taking for 2-3 years  -history of nonbleeding angiodysplastic lesions on EGD 12/16/2020, 11/15/2021; S/P endoscopic thermotherapy 11/15/2021  -treated with Feraheme x2 in 06/2023  -found to have multiple angioectasias without bleeding in duodenum, jejunum, and in the ileum on video capsule endoscopy 08/15/2023  -11/14/2023:  Stool occult blood positive (as expected with multiple angioectasias)  -12/11/2023:  Hemoglobin 10.0 (was 11.1 on 09/11/2023, 11.3 on 08/14/2023, 12.3 on 07/10/2023, etc).; ferritin 54.49 (was 18.98 on 09/11/2023); B12 level 435   -02/08/2024:  Hemoglobin 11.3 (was 9.8 on 12/13/2023); ferritin 43.48 (was 54.49 on 12/11/2023)  -03/11/2024:  Hemoglobin 9.6, down from 11.3 on 02/08/2024; ferritin level is pending; transferrin saturation 13%, remains low  >>>  -hemoglobin declining; remains iron-deficient  -taking iron pill 3 X a week; has failed oral  iron therapy; advised her to discontinue iron pills  -proceed with intravenous iron therapy with Feraheme 510 mg IV x2, administered a week apart  -assess response with repeat CBC, serum iron, TIBC, ferritin in 6 weeks  -follow-up with GI for history of multiple angioectasias in intestinal tract    Found to be vitamin B12 deficient on labs 04/03/2023   -05/22/2023: Told me that she has been taking 2 vitamin B12 pills for 1 month   -05/22/2023:  B12 level normalized, 521, with oral B12 therapy; intrinsic factor antibody negative; homocystine level suppressed; methylmalonic acid level normal (the labs were checked while she was already on oral B12 therapy)  -09/11/2023:  For last couple she has discontinued B12 pills because she could not afford   -09/11/2023:  For last 2 months, her sister-in-law has been administering her monthly B12 shots;   -12/11/2023:  Hemoglobin 10.0 (was 11.1 on 09/11/2023, 11.3 on 08/14/2023, 12.3 on 07/10/2023, etc).; ferritin 54.49 (was 18.98 on 09/11/2023); B12 level 435   >>>  Continue vitamin B12 1000 mcg IM (per her preference) every month; her sister in law administers the shots    From Hematology perspective, we will monitor iron and B12 deficiency anemia, and intervene to replete any deficiency if and when necessary    Subcentimeter lung nodules  -noncontrast chest CT 11/20/2023:  Stable subcentimeter lung nodules  -12/04/2023:  Pulmonary follow-up: Repeat CT chest in 1 year    Above discussed at length with the patient.  All questions answered.    Discussed labs and gave her copies of relevant reports.    Plan of management discussed.    She understands and agrees with this plan.    Follow-up:  No follow-ups on file.

## 2024-08-21 ENCOUNTER — TELEPHONE (OUTPATIENT)
Dept: INTERNAL MEDICINE | Facility: CLINIC | Age: 59
End: 2024-08-21
Payer: MEDICAID

## 2024-08-21 NOTE — TELEPHONE ENCOUNTER
Pt called requesting a referral to Pulmonologist to have Dr Toño Ferrera take a 2nd look at her results from [facility] Pulmonologist. Please Advise

## 2024-08-22 ENCOUNTER — PATIENT MESSAGE (OUTPATIENT)
Dept: INTERNAL MEDICINE | Facility: CLINIC | Age: 59
End: 2024-08-22
Payer: MEDICAID

## 2024-08-22 ENCOUNTER — OFFICE VISIT (OUTPATIENT)
Dept: GASTROENTEROLOGY | Facility: CLINIC | Age: 59
End: 2024-08-22
Payer: MEDICAID

## 2024-08-22 VITALS
TEMPERATURE: 98 F | HEIGHT: 59 IN | BODY MASS INDEX: 24.44 KG/M2 | WEIGHT: 121.25 LBS | OXYGEN SATURATION: 96 % | SYSTOLIC BLOOD PRESSURE: 136 MMHG | HEART RATE: 79 BPM | DIASTOLIC BLOOD PRESSURE: 77 MMHG

## 2024-08-22 DIAGNOSIS — K64.8 INTERNAL HEMORRHOIDS WITHOUT COMPLICATION: ICD-10-CM

## 2024-08-22 DIAGNOSIS — K29.70 GASTRITIS, PRESENCE OF BLEEDING UNSPECIFIED, UNSPECIFIED CHRONICITY, UNSPECIFIED GASTRITIS TYPE: ICD-10-CM

## 2024-08-22 DIAGNOSIS — R93.5 ABNORMAL ABDOMINAL ULTRASOUND: ICD-10-CM

## 2024-08-22 DIAGNOSIS — D50.0 IRON DEFICIENCY ANEMIA DUE TO CHRONIC BLOOD LOSS: Primary | ICD-10-CM

## 2024-08-22 DIAGNOSIS — Z72.0 TOBACCO USER: ICD-10-CM

## 2024-08-22 DIAGNOSIS — Z86.010 PERSONAL HISTORY OF COLONIC POLYPS: ICD-10-CM

## 2024-08-22 DIAGNOSIS — K20.90 ESOPHAGITIS: ICD-10-CM

## 2024-08-22 DIAGNOSIS — K44.9 ESOPHAGEAL HIATAL HERNIA: ICD-10-CM

## 2024-08-22 DIAGNOSIS — K55.20 ANGIODYSPLASIA OF SMALL INTESTINE: ICD-10-CM

## 2024-08-22 PROBLEM — Z86.0100 PERSONAL HISTORY OF COLONIC POLYPS: Status: ACTIVE | Noted: 2024-08-22

## 2024-08-22 PROCEDURE — 1159F MED LIST DOCD IN RCRD: CPT | Mod: CPTII,,, | Performed by: NURSE PRACTITIONER

## 2024-08-22 PROCEDURE — 1160F RVW MEDS BY RX/DR IN RCRD: CPT | Mod: CPTII,,, | Performed by: NURSE PRACTITIONER

## 2024-08-22 PROCEDURE — 3075F SYST BP GE 130 - 139MM HG: CPT | Mod: CPTII,,, | Performed by: NURSE PRACTITIONER

## 2024-08-22 PROCEDURE — 3078F DIAST BP <80 MM HG: CPT | Mod: CPTII,,, | Performed by: NURSE PRACTITIONER

## 2024-08-22 PROCEDURE — 99205 OFFICE O/P NEW HI 60 MIN: CPT | Mod: S$PBB,,, | Performed by: NURSE PRACTITIONER

## 2024-08-22 PROCEDURE — 99215 OFFICE O/P EST HI 40 MIN: CPT | Mod: PBBFAC | Performed by: NURSE PRACTITIONER

## 2024-08-22 PROCEDURE — 4010F ACE/ARB THERAPY RXD/TAKEN: CPT | Mod: CPTII,,, | Performed by: NURSE PRACTITIONER

## 2024-08-22 PROCEDURE — 3008F BODY MASS INDEX DOCD: CPT | Mod: CPTII,,, | Performed by: NURSE PRACTITIONER

## 2024-08-22 NOTE — ASSESSMENT & PLAN NOTE
She underwent colonoscopy November 9, 2022 with findings of hyperplastic polyp in the descending colon with polypectomy.  She underwent colonoscopy March 19, 2024 with findings of grade 2 internal hemorrhoids and otherwise normal exam.  Biopsies revealed findings consistent with collagenous colitis.

## 2024-08-22 NOTE — ASSESSMENT & PLAN NOTE
See plan for primary diagnosis  Abdominal ultrasound September 9, 2020 revealed findings suggestive of cirrhosis, no focal hepatic lesions examination. If there is concern recommend  dedicated imaging to further evaluate.  Fibroscan  Abdominal ultrasound

## 2024-08-22 NOTE — ASSESSMENT & PLAN NOTE
Abdominal ultrasound September 9, 2020 revealed findings suggestive of cirrhosis, no focal hepatic lesions examination. If there is concern recommend  dedicated imaging to further evaluate.  She underwent EGD November 2, 2022 with findings of esophagitis, gastritis, normal mucosa in the duodenum, normal mucosa in the duodenal bulb, normal mucosa in the 2nd part of the duodenum, esophageal hiatal hernia, concentric rings and linear furrows in the lower 3rd of the esophagus and middle 3rd of the esophagus compatible with eosinophilic esophagitis, ring in the gastroesophageal junction s/p dilation with 51 [place] Savary type dilator.  Gastric and duodenal biopsies were benign.  Distal esophageal biopsy benign.  Mid esophageal biopsy benign.  Proximal esophageal biopsy with squamous mucosa with patchy mildly increased intraepithelial lymphocytes, Alcian blue/PAS stain highlights a thickened squamous epithelium; no fungal organisms seen, no evidence of eosinophilic esophagitis.  The findings are nonspecific and the differential diagnosis includes reflux, stasis (achalasia, pseudoachalasia), contact mucositis similar to contact dermatitis and entities in a differential diagnosis of lichenoid mucositis including graft versus host disease, connective tissue disease, erythema multiforme and lichen planus.  The [place] bodies of lichen planus or not detected.  She underwent colonoscopy November 9, 2022 with findings of hyperplastic polyp in the descending colon with polypectomy.  Video capsule endoscopy August 15, 2023 revealed multiple angioectasias without bleeding in the duodenum, multiple angioectasias without bleeding in the jejunum, multiple angiectasias without bleeding in the ileum.  She was recommended oral iron supplementation and upper device assisted enteroscopy versus push enteroscopy with drop in hemoglobin level.  She underwent colonoscopy March 19, 2024 with findings of grade 2 internal hemorrhoids and  otherwise normal exam.  Biopsies revealed findings consistent with collagenous colitis (requested pathology results for further review from Dr. Hatch).  Repeat post transfusion CBC today  GERD lifestyle modifications  Reflux precautions  Limit NSAID use  Recommend tobacco cessation  EGD with push enteroscopy sooner than later after cardiac clearance  H pylori stool antigen  FibroScan   Abdominal ultrasound  Call with updates   ER precautions provided  Follow-up clinic visit with NP in 4 months in a 45 minute slot

## 2024-08-22 NOTE — PROGRESS NOTES
Subjective:       Patient ID: Sandra Burns is a 58 y.o. female.    Chief Complaint: Bloated    This 58-year-old  female with HCV s/p treatment, CAD, HSV, hypertension, osteopenia, PAD, rheumatoid arthritis, thyroid disease, tobacco use is referred for HOWARD.  She presents unaccompanied.  She is a previous patient of Dr. Hatch.  She reports frequent bloating for the past several months.  She denies any specific triggers or relieving factors.  She describes black tarry stools for the past year.  Hemoglobin 7.4, hematocrit 24.1 August 19, 2024 and she was administered 2 units PRBCs.  Feraheme infusion scheduled August 28, 2024 and September 4, 2024.  She denies any melena or black tarry stools since her blood transfusion.  She reports feeling generally unwell, tired, and weak.  She has occasional nausea without vomiting.  She denies any exacerbating or relieving factors.  She typically eats 1 meal per day and denies any unintentional weight loss.  She denies fever, chills, hematemesis, odynophagia dysphagia, or early satiety.  She denies much abdominal pain aside from occasional discomfort at ventral hernia site.  She is scheduled to see a surgeon soon for hernia repair.  She has occasional acid reflux symptoms throughout the day and takes pantoprazole 40 mg as needed.  Stools are described as soft stools with decreased diarrhea over time since diagnosis of collagenous colitis in March 2024 (requested pathology results from colonoscopy for further review).  She usually has 1-2 bowel movements per day.  She denies hematochezia, fecal urgency, fecal incontinence, or pain with defecation.    Abdominal ultrasound September 9, 2020 revealed findings suggestive of cirrhosis, no focal hepatic lesions examination. If there is concern recommend  dedicated imaging to further evaluate.      She underwent EGD November 2, 2022 with findings of esophagitis, gastritis, normal mucosa in the duodenum, normal mucosa in the  duodenal bulb, normal mucosa in the 2nd part of the duodenum, esophageal hiatal hernia, concentric rings and linear furrows in the lower 3rd of the esophagus and middle 3rd of the esophagus compatible with eosinophilic esophagitis, ring in the gastroesophageal junction s/p dilation with 51 Polish Savary type dilator.  Gastric and duodenal biopsies were benign.  Distal esophageal biopsy benign.  Mid esophageal biopsy benign.  Proximal esophageal biopsy with squamous mucosa with patchy mildly increased intraepithelial lymphocytes, Alcian blue/PAS stain highlights a thickened squamous epithelium; no fungal organisms seen, no evidence of eosinophilic esophagitis.  The findings are nonspecific and the differential diagnosis includes reflux, stasis (achalasia, pseudoachalasia), contact mucositis similar to contact dermatitis and entities in a differential diagnosis of lichenoid mucositis including graft versus host disease, connective tissue disease, erythema multiforme and lichen planus.  The [place] bodies of lichen planus or not detected.    She underwent colonoscopy November 9, 2022 with findings of hyperplastic polyp in the descending colon with polypectomy.    Video capsule endoscopy August 15, 2023 revealed multiple angioectasias without bleeding in the duodenum, multiple angioectasias without bleeding in the jejunum, multiple angiectasias without bleeding in the ileum.  She was recommended oral iron supplementation and upper device assisted enteroscopy versus push enteroscopy with drop in hemoglobin level.    She underwent colonoscopy March 19, 2024 with findings of grade 2 internal hemorrhoids and otherwise normal exam.  Biopsies revealed findings consistent with collagenous colitis.    She takes Plavix 75 mg daily. She smokes 10 cigarettes daily and denies alcohol use. She smokes marijuana daily. She denies a family history of IBD, colon polyps, or colon cancer.      Review of patient's allergies indicates:    Allergen Reactions    Benadryl itch relief Other (See Comments)     RESTLESS LEGS       Past Medical History:   Diagnosis Date    Anemia, unspecified     Coronary artery disease     Herpes simplex virus (HSV) infection     History of esophagogastroduodenoscopy (EGD) 11/01/2022    Hypertension     Osteopenia     PAD (peripheral artery disease)     PVD (peripheral vascular disease) with claudication     Rheumatoid arthritis, unspecified     Thyroid disease      Past Surgical History:   Procedure Laterality Date    COLONOSCOPY W/ BIOPSIES      EXTRACORPOREAL SHOCK WAVE LITHOTRIPSY  02/22/2023    Procedure: LITHOTRIPSY- Peripheral Shockwave;  Surgeon: Adriel Valero MD;  Location: [facility] CATH LAB;  Service: Cardiology;;    HIP SURGERY      INSERTION, STENT, ARTERY  02/22/2023    Procedure: INSERTION, STENT, ARTERY;  Surgeon: Adriel Valero MD;  Location: [facility] CATH LAB;  Service: Cardiology;;    INTRALUMINAL GASTROINTESTINAL TRACT IMAGING VIA CAPSULE N/A 08/15/2023    Procedure: IMAGING PROCEDURE, GI TRACT, INTRALUMINAL, VIA CAPSULE;  Surgeon: Liza Del Rio MD;  Location: [facility] ENDOSCOPY;  Service: Gastroenterology;  Laterality: N/A;    INTRAVASCULAR ULTRASOUND, NON-CORONARY  02/22/2023    Procedure: Intravascular Ultrasound, Non-Coronary;  Surgeon: Adriel Valero MD;  Location: [facility] CATH LAB;  Service: Cardiology;;    LEFT HEART CATHETERIZATION      PERIPHERAL ANGIOGRAPHY N/A 02/22/2023    Procedure: Peripheral angiography;  Surgeon: Adriel Valero MD;  Location: [facility] CATH LAB;  Service: Cardiology;  Laterality: N/A;  BILAT ILIAC INTERVENTION     Family History:   family history includes Heart disease in her mother; Hypertension in her mother; Kidney failure in her mother; No Known Problems in her father.    Social History:    reports that she has been smoking cigarettes. She has a 10 pack-year smoking history. She has been exposed to tobacco smoke. She has never used smokeless tobacco. She reports that she  does not currently use alcohol. She reports current drug use. Drug: Marijuana.    Review of Systems  Negative except as noted in the HPI.      Objective:      Physical Exam  Constitutional:       Appearance: Normal appearance.   HENT:      Head: Normocephalic.      Mouth/Throat:      Mouth: Mucous membranes are moist.   Eyes:      Extraocular Movements: Extraocular movements intact.      Conjunctiva/sclera: Conjunctivae normal.      Pupils: Pupils are equal, round, and reactive to light.   Cardiovascular:      Rate and Rhythm: Normal rate and regular rhythm.      Pulses: Normal pulses.      Heart sounds: Normal heart sounds.   Pulmonary:      Effort: Pulmonary effort is normal.      Breath sounds: Normal breath sounds.   Abdominal:      General: Bowel sounds are normal.      Palpations: Abdomen is soft.      Comments: Ventral hernia noted upon exam, reproducible and nontender with palpation   Musculoskeletal:         General: Normal range of motion.      Cervical back: Normal range of motion and neck supple.   Skin:     General: Skin is warm and dry.   Neurological:      General: No focal deficit present.      Mental Status: She is alert and oriented to person, place, and time.   Psychiatric:         Mood and Affect: Mood normal.         Behavior: Behavior normal.         Thought Content: Thought content normal.         Judgment: Judgment normal.         Home Medications:     Current Outpatient Medications   Medication Sig    ALPRAZolam (XANAX) 0.5 MG tablet Take 0.5 mg by mouth 2 (two) times daily as needed for Anxiety (anxiety).    amLODIPine (NORVASC) 5 MG tablet Take 5 mg by mouth once daily.    carvediloL (COREG) 6.25 MG tablet Take 1 tablet (6.25 mg total) by mouth 2 (two) times daily with meals.    clopidogreL (PLAVIX) 75 mg tablet Take 1 tablet (75 mg total) by mouth once daily.    DULoxetine (CYMBALTA) 30 MG capsule Take 1 capsule (30 mg total) by mouth every evening.    DULoxetine (CYMBALTA) 60 MG capsule  Take 60 mg by mouth every morning.    folic acid (FOLVITE) 1 MG tablet Take 1 tablet (1 mg total) by mouth once daily.    gabapentin (NEURONTIN) 600 MG tablet Take 1 tablet (600 mg total) by mouth 3 (three) times daily.    leflunomide (ARAVA) 20 MG Tab Take 1 tablet (20 mg total) by mouth once daily.    lisinopriL 10 MG tablet Take 1 tablet (10 mg total) by mouth once daily.    nitroGLYCERIN (NITROSTAT) 0.4 MG SL tablet Place 1 tablet under the tongue every 5 (five) minutes as needed.    pantoprazole (PROTONIX) 40 MG tablet Take 1 tablet (40 mg total) by mouth once daily.    rosuvastatin (CRESTOR) 40 MG Tab Take 40 mg by mouth once daily.    traZODone (DESYREL) 300 MG tablet Take 450 mg by mouth nightly.    UNABLE TO FIND Ranitidine HCl 150 MG    buPROPion (WELLBUTRIN SR) 150 MG TBSR 12 hr tablet Take 150 mg by mouth every morning. (Patient not taking: Reported on 8/19/2024)    clobetasol 0.05% (TEMOVATE) 0.05 % Oint Apply 1-2x per week as needed (Patient not taking: Reported on 8/22/2024)    cyanocobalamin 1,000 mcg/mL injection Inject 1,000 mcg into the muscle every 7 days. (Patient not taking: Reported on 8/19/2024)    ergocalciferol (ERGOCALCIFEROL) 50,000 unit Cap Take 1 capsule (50,000 Units total) by mouth every 7 days. (Patient not taking: Reported on 8/19/2024)    estradioL (ESTRACE) 0.01 % (0.1 mg/gram) vaginal cream Insert 1g inside the vagina every night for 2 weeks then twice per week (Patient not taking: Reported on 8/22/2024)    hydrocortisone (ANUSOL-HC) 2.5 % rectal cream Place rectally 2 (two) times daily. (Patient not taking: Reported on 8/22/2024)    hydroxychloroquine (PLAQUENIL) 200 mg tablet Take 1 tablet (200 mg total) by mouth once daily. After food (Patient not taking: Reported on 8/22/2024)    L.acidoph,rhamn-B.breve,longum (PRIMADOPHILUS BIFIDUS) 5 billion cell CpDR Take 1 capsule by mouth once daily. (Patient not taking: Reported on 8/22/2024)    loperamide (IMODIUM) 2 mg capsule Take 2  tablets in the morning and 1 tablet after every loose stool, no more than 6 tablets a day (Patient not taking: Reported on 8/19/2024)    mirtazapine (REMERON) 15 MG tablet  (Patient not taking: Reported on 8/19/2024)    nicotine (NICODERM CQ) 21 mg/24 hr Place 1 patch onto the skin once daily. (Patient not taking: Reported on 8/19/2024)    nicotine, polacrilex, (NICORETTE) 2 mg Gum Take 1 each (2 mg total) by mouth as needed. (Patient not taking: Reported on 8/19/2024)    PEPTO-BISMOL 262 mg Tab Take 2 tablets by mouth 4 (four) times daily. (Patient not taking: Reported on 8/19/2024)     Current Facility-Administered Medications   Medication Frequency    acetaminophen tablet 650 mg PRN    ferric gluconate 62.5 mg/5 mL injection 125 mg 1 time in Clinic/HOD     Laboratory Results:     Recent Results (from the past 4032 hour(s))   Comprehensive Metabolic Panel    Collection Time: 03/11/24  1:35 PM   Result Value Ref Range    Sodium 137 136 - 145 mmol/L    Potassium 3.7 3.5 - 5.1 mmol/L    Chloride 102 98 - 107 mmol/L    CO2 29 22 - 29 mmol/L    Glucose 112 (H) 74 - 100 mg/dL    Blood Urea Nitrogen 10.7 9.8 - 20.1 mg/dL    Creatinine 0.72 0.55 - 1.02 mg/dL    Calcium 9.3 8.4 - 10.2 mg/dL    Protein Total 7.1 6.4 - 8.3 gm/dL    Albumin 3.4 (L) 3.5 - 5.0 g/dL    Globulin 3.7 (H) 2.4 - 3.5 gm/dL    Albumin/Globulin Ratio 0.9 (L) 1.1 - 2.0 ratio    Bilirubin Total 0.3 <=1.5 mg/dL    ALP 67 40 - 150 unit/L    ALT 6 0 - 55 unit/L    AST 16 5 - 34 unit/L    eGFR >60 mls/min/1.73/m2   Iron and TIBC    Collection Time: 03/11/24  1:35 PM   Result Value Ref Range    Iron Binding Capacity Unsaturated 245 70 - 310 ug/dL    Iron Level 36 (L) 50 - 170 ug/dL    Transferrin 268 180 - 382 mg/dL    Iron Binding Capacity Total 281 250 - 450 ug/dL    Iron Saturation 13 (L) 20 - 50 %   Ferritin    Collection Time: 03/11/24  1:35 PM   Result Value Ref Range    Ferritin Level 36.27 4.63 - 204.00 ng/mL   CBC with Differential    Collection  Time: 03/11/24  1:35 PM   Result Value Ref Range    WBC 6.48 4.50 - 11.50 x10(3)/mcL    RBC 3.43 (L) 4.20 - 5.40 x10(6)/mcL    Hgb 9.6 (L) 12.0 - 16.0 g/dL    Hct 30.5 (L) 37.0 - 47.0 %    MCV 88.9 80.0 - 94.0 fL    MCH 28.0 27.0 - 31.0 pg    MCHC 31.5 (L) 33.0 - 36.0 g/dL    RDW 14.7 11.5 - 17.0 %    Platelet 274 130 - 400 x10(3)/mcL    MPV 9.8 7.4 - 10.4 fL    Neut % 57.3 %    Lymph % 23.6 %    Mono % 13.6 %    Eos % 3.9 %    Basophil % 1.1 %    Lymph # 1.53 0.6 - 4.6 x10(3)/mcL    Neut # 3.72 2.1 - 9.2 x10(3)/mcL    Mono # 0.88 0.1 - 1.3 x10(3)/mcL    Eos # 0.25 0 - 0.9 x10(3)/mcL    Baso # 0.07 <=0.2 x10(3)/mcL    IG# 0.03 0 - 0.04 x10(3)/mcL    IG% 0.5 %    NRBC% 0.0 %   HM COLONOSCOPY    Collection Time: 03/21/24  9:01 AM   Result Value Ref Range    CRC Recommendation External No follow-up frequency specified    Liquid-Based Pap Smear, Screening Screening    Collection Time: 04/10/24  2:26 PM   Result Value Ref Range    Pathology Result     Wet Prep, Genital    Collection Time: 04/10/24  2:26 PM    Specimen: Cervicovaginal; Genital   Result Value Ref Range    WBC, Wet Prep None Seen None Seen    Clue Cells, Wet Prep None Seen None Seen    Trichomonas, Wet Prep None Seen None Seen    Yeast, Wet Prep None Seen None Seen   Chlamydia trachomatis    Collection Time: 04/10/24  2:26 PM    Specimen: Cervix; Genital   Result Value Ref Range    CHLAMYDIA TRACHOMATIS NEGATIVE NEGATIVE   Neisseria gonorrhoeae    Collection Time: 04/10/24  2:26 PM    Specimen: Cervix; Genital   Result Value Ref Range    NEISSERIA GONORRHOEAE NEGATIVE NEGATIVE   High Risk HPV    Collection Time: 04/10/24  2:26 PM    Specimen: Cervix; Genital   Result Value Ref Range    HIGH RISK HPV NEGATIVE NEGATIVE   Urinalysis    Collection Time: 05/29/24  2:17 PM   Result Value Ref Range    Color, UA Yellow Yellow, Light-Yellow, Dark Yellow, Nichole, Straw    Appearance, UA Clear Clear    Specific Gravity, UA >=1.030 1.005 - 1.030    pH, UA 5.5 5.0 - 8.5     Protein, UA Negative Negative    Glucose, UA Negative Negative, Normal    Ketones, UA Negative Negative    Blood, UA Negative Negative    Bilirubin, UA Negative Negative    Urobilinogen, UA 0.2 0.2, 1.0, Normal    Nitrites, UA Negative Negative    Leukocyte Esterase, UA Negative Negative   Protein/Creatinine Ratio, Urine    Collection Time: 05/29/24  2:17 PM   Result Value Ref Range    Urine Protein Level 13.9 mg/dL    Urine Creatinine 142.4 (H) 45.0 - 106.0 mg/dL    Urine Protein/Creatinine Ratio 0.1    C-Reactive Protein    Collection Time: 05/29/24  2:17 PM   Result Value Ref Range    CRP 3.30 <5.00 mg/L   Sedimentation rate    Collection Time: 05/29/24  2:17 PM   Result Value Ref Range    Sed Rate 30 (H) 0 - 20 mm/hr   Comprehensive Metabolic Panel    Collection Time: 05/29/24  2:17 PM   Result Value Ref Range    Sodium 142 136 - 145 mmol/L    Potassium 4.2 3.5 - 5.1 mmol/L    Chloride 106 98 - 107 mmol/L    CO2 28 22 - 29 mmol/L    Glucose 106 (H) 74 - 100 mg/dL    Blood Urea Nitrogen 7.9 (L) 9.8 - 20.1 mg/dL    Creatinine 0.75 0.55 - 1.02 mg/dL    Calcium 9.4 8.4 - 10.2 mg/dL    Protein Total 7.2 6.4 - 8.3 gm/dL    Albumin 3.7 3.5 - 5.0 g/dL    Globulin 3.5 2.4 - 3.5 gm/dL    Albumin/Globulin Ratio 1.1 1.1 - 2.0 ratio    Bilirubin Total 0.3 <=1.5 mg/dL    ALP 69 40 - 150 unit/L    ALT 7 0 - 55 unit/L    AST 15 5 - 34 unit/L    eGFR >60 mL/min/1.73/m2    Anion Gap 8.0 mEq/L    BUN/Creatinine Ratio 11    C4 Complement    Collection Time: 05/29/24  2:17 PM   Result Value Ref Range    C4 Complement 29.0 13.0 - 46.0 mg/dL   C3 Complement    Collection Time: 05/29/24  2:17 PM   Result Value Ref Range    C3 Complement 146 80 - 173 mg/dL   HIV 1/2 Ag/Ab (4th Gen)    Collection Time: 05/29/24  2:17 PM   Result Value Ref Range    HIV Nonreactive Nonreactive   Quantiferon Gold TB    Collection Time: 05/29/24  2:17 PM   Result Value Ref Range    QuantiFERON-Tb Gold Plus Result Negative Negative    TB1 Ag minus Nil  Result -0.06 IU/mL    TB2 Ag minus Nil Result -0.01 IU/mL    Mitogen minus Nil Result 7.96 IU/mL    Nil Result 0.15 IU/mL   Hepatitis C Antibody    Collection Time: 05/29/24  2:17 PM   Result Value Ref Range    Hep C Ab Interp Reactive (A) Nonreactive   Hepatitis B Surface Antigen    Collection Time: 05/29/24  2:17 PM   Result Value Ref Range    Hep BsAg Interp Nonreactive Nonreactive   Hepatitis B Core Antibody, Total    Collection Time: 05/29/24  2:17 PM   Result Value Ref Range    Hep BcAb Interp Reactive (A) Nonreactive   Hepatitis B Surface Ab, Qualitative    Collection Time: 05/29/24  2:17 PM   Result Value Ref Range    Hep BsAb Interp Reactive (A) Nonreactive    Hep BsAb 22.61 mIU/mL   Vitamin D    Collection Time: 05/29/24  2:17 PM   Result Value Ref Range    Vitamin D 76 30 - 80 ng/mL   CBC with Differential    Collection Time: 05/29/24  2:17 PM   Result Value Ref Range    WBC 4.11 (L) 4.50 - 11.50 x10(3)/mcL    RBC 4.22 4.20 - 5.40 x10(6)/mcL    Hgb 12.5 12.0 - 16.0 g/dL    Hct 39.0 37.0 - 47.0 %    MCV 92.4 80.0 - 94.0 fL    MCH 29.6 27.0 - 31.0 pg    MCHC 32.1 (L) 33.0 - 36.0 g/dL    RDW 14.8 11.5 - 17.0 %    Platelet 214 130 - 400 x10(3)/mcL    MPV 11.1 (H) 7.4 - 10.4 fL    Neut % 43.9 %    Lymph % 37.0 %    Mono % 10.9 %    Eos % 7.5 %    Basophil % 0.7 %    Lymph # 1.52 0.6 - 4.6 x10(3)/mcL    Neut # 1.80 (L) 2.1 - 9.2 x10(3)/mcL    Mono # 0.45 0.1 - 1.3 x10(3)/mcL    Eos # 0.31 0 - 0.9 x10(3)/mcL    Baso # 0.03 <=0.2 x10(3)/mcL    IG# 0.00 0 - 0.04 x10(3)/mcL    IG% 0.0 %   Urinalysis, Reflex to Urine Culture    Collection Time: 06/12/24 11:02 AM    Specimen: Urine   Result Value Ref Range    Color, UA Light-Yellow Yellow, Light-Yellow, Dark Yellow, Nichole, Straw    Appearance, UA Clear Clear    Specific Gravity, UA 1.009 1.005 - 1.030    pH, UA 5.5 5.0 - 8.5    Protein, UA Negative Negative    Glucose, UA Normal Negative, Normal    Ketones, UA Negative Negative    Blood, UA Negative Negative     Bilirubin, UA Negative Negative    Urobilinogen, UA Normal 0.2, 1.0, Normal    Nitrites, UA Negative Negative    Leukocyte Esterase, UA Negative Negative    WBC, UA 0-5 None Seen, 0-2, 3-5, 0-5 /HPF    Bacteria, UA None Seen None Seen /HPF    Squamous Epithelial Cells, UA Trace (A) None Seen /HPF    Hyaline Casts, UA None Seen None Seen /lpf    RBC, UA None Seen None Seen, 0-2, 3-5, 0-5 /HPF   Comprehensive Metabolic Panel    Collection Time: 06/13/24 12:21 PM   Result Value Ref Range    Sodium 136 136 - 145 mmol/L    Potassium 4.2 3.5 - 5.1 mmol/L    Chloride 102 98 - 107 mmol/L    CO2 25 22 - 29 mmol/L    Glucose 164 (H) 74 - 100 mg/dL    Blood Urea Nitrogen 12.9 9.8 - 20.1 mg/dL    Creatinine 0.80 0.55 - 1.02 mg/dL    Calcium 9.6 8.4 - 10.2 mg/dL    Protein Total 7.4 6.4 - 8.3 gm/dL    Albumin 3.8 3.5 - 5.0 g/dL    Globulin 3.6 (H) 2.4 - 3.5 gm/dL    Albumin/Globulin Ratio 1.1 1.1 - 2.0 ratio    Bilirubin Total 0.3 <=1.5 mg/dL    ALP 68 40 - 150 unit/L    ALT 8 0 - 55 unit/L    AST 17 5 - 34 unit/L    eGFR >60 mL/min/1.73/m2    Anion Gap 9.0 mEq/L    BUN/Creatinine Ratio 16    Iron and TIBC    Collection Time: 06/13/24 12:21 PM   Result Value Ref Range    Iron Binding Capacity Unsaturated 216 70 - 310 ug/dL    Iron Level 26 (L) 50 - 170 ug/dL    Transferrin 221 180 - 382 mg/dL    Iron Binding Capacity Total 242 (L) 250 - 450 ug/dL    Iron Saturation 11 (L) 20 - 50 %   Ferritin    Collection Time: 06/13/24 12:21 PM   Result Value Ref Range    Ferritin Level 92.58 4.63 - 204.00 ng/mL   CBC with Differential    Collection Time: 06/13/24 12:21 PM   Result Value Ref Range    WBC 7.53 4.50 - 11.50 x10(3)/mcL    RBC 3.69 (L) 4.20 - 5.40 x10(6)/mcL    Hgb 10.9 (L) 12.0 - 16.0 g/dL    Hct 33.5 (L) 37.0 - 47.0 %    MCV 90.8 80.0 - 94.0 fL    MCH 29.5 27.0 - 31.0 pg    MCHC 32.5 (L) 33.0 - 36.0 g/dL    RDW 14.3 11.5 - 17.0 %    Platelet 279 130 - 400 x10(3)/mcL    MPV 10.8 (H) 7.4 - 10.4 fL    Neut % 64.0 %    Lymph % 23.5  %    Mono % 8.4 %    Eos % 2.9 %    Basophil % 0.8 %    Lymph # 1.77 0.6 - 4.6 x10(3)/mcL    Neut # 4.82 2.1 - 9.2 x10(3)/mcL    Mono # 0.63 0.1 - 1.3 x10(3)/mcL    Eos # 0.22 0 - 0.9 x10(3)/mcL    Baso # 0.06 <=0.2 x10(3)/mcL    IG# 0.03 0 - 0.04 x10(3)/mcL    IG% 0.4 %    NRBC% 0.0 %   Comprehensive Metabolic Panel    Collection Time: 07/15/24 12:15 PM   Result Value Ref Range    Sodium 138 136 - 145 mmol/L    Potassium 4.7 3.5 - 5.1 mmol/L    Chloride 106 98 - 107 mmol/L    CO2 25 22 - 29 mmol/L    Glucose 143 (H) 74 - 100 mg/dL    Blood Urea Nitrogen 11.8 9.8 - 20.1 mg/dL    Creatinine 0.72 0.55 - 1.02 mg/dL    Calcium 9.1 8.4 - 10.2 mg/dL    Protein Total 6.6 6.4 - 8.3 gm/dL    Albumin 3.4 (L) 3.5 - 5.0 g/dL    Globulin 3.2 2.4 - 3.5 gm/dL    Albumin/Globulin Ratio 1.1 1.1 - 2.0 ratio    Bilirubin Total 0.3 <=1.5 mg/dL    ALP 61 40 - 150 unit/L    ALT 10 0 - 55 unit/L    AST 17 5 - 34 unit/L    eGFR >60 mL/min/1.73/m2    Anion Gap 7.0 mEq/L    BUN/Creatinine Ratio 16    Iron and TIBC    Collection Time: 07/15/24 12:15 PM   Result Value Ref Range    Iron Binding Capacity Unsaturated 204 70 - 310 ug/dL    Iron Level 46 (L) 50 - 170 ug/dL    Transferrin 232 180 - 382 mg/dL    Iron Binding Capacity Total 250 250 - 450 ug/dL    Iron Saturation 18 (L) 20 - 50 %   Ferritin    Collection Time: 07/15/24 12:15 PM   Result Value Ref Range    Ferritin Level 49.30 4.63 - 204.00 ng/mL   CBC with Differential    Collection Time: 07/15/24 12:15 PM   Result Value Ref Range    WBC 5.53 4.50 - 11.50 x10(3)/mcL    RBC 3.26 (L) 4.20 - 5.40 x10(6)/mcL    Hgb 10.1 (L) 12.0 - 16.0 g/dL    Hct 31.2 (L) 37.0 - 47.0 %    MCV 95.7 (H) 80.0 - 94.0 fL    MCH 31.0 27.0 - 31.0 pg    MCHC 32.4 (L) 33.0 - 36.0 g/dL    RDW 14.3 11.5 - 17.0 %    Platelet 216 130 - 400 x10(3)/mcL    MPV 10.6 (H) 7.4 - 10.4 fL    Neut % 60.4 %    Lymph % 24.6 %    Mono % 9.2 %    Eos % 4.7 %    Basophil % 0.9 %    Lymph # 1.36 0.6 - 4.6 x10(3)/mcL    Neut # 3.34  2.1 - 9.2 x10(3)/mcL    Mono # 0.51 0.1 - 1.3 x10(3)/mcL    Eos # 0.26 0 - 0.9 x10(3)/mcL    Baso # 0.05 <=0.2 x10(3)/mcL    IG# 0.01 0 - 0.04 x10(3)/mcL    IG% 0.2 %    NRBC% 0.0 %   Magnesium    Collection Time: 08/10/24 10:17 AM   Result Value Ref Range    Magnesium Level 1.80 1.60 - 2.60 mg/dL   Sedimentation rate    Collection Time: 08/10/24 10:17 AM   Result Value Ref Range    Sed Rate 130 (H) 0 - 20 mm/hr   C-Reactive Protein    Collection Time: 08/10/24 10:17 AM   Result Value Ref Range    CRP 6.50 (H) <5.00 mg/L   Comprehensive Metabolic Panel    Collection Time: 08/10/24 10:17 AM   Result Value Ref Range    Sodium 140 136 - 145 mmol/L    Potassium 5.4 (H) 3.5 - 5.1 mmol/L    Chloride 105 98 - 107 mmol/L    CO2 30 (H) 22 - 29 mmol/L    Glucose 126 (H) 74 - 100 mg/dL    Blood Urea Nitrogen 11.7 9.8 - 20.1 mg/dL    Creatinine 0.75 0.55 - 1.02 mg/dL    Calcium 9.5 8.4 - 10.2 mg/dL    Protein Total 7.0 6.4 - 8.3 gm/dL    Albumin 3.5 3.5 - 5.0 g/dL    Globulin 3.5 2.4 - 3.5 gm/dL    Albumin/Globulin Ratio 1.0 (L) 1.1 - 2.0 ratio    Bilirubin Total 0.2 <=1.5 mg/dL    ALP 73 40 - 150 unit/L    ALT 11 0 - 55 unit/L    AST 17 5 - 34 unit/L    eGFR >60 mL/min/1.73/m2    Anion Gap 5.0 mEq/L    BUN/Creatinine Ratio 16    CBC with Differential    Collection Time: 08/10/24 10:17 AM   Result Value Ref Range    WBC 8.01 4.50 - 11.50 x10(3)/mcL    RBC 3.36 (L) 4.20 - 5.40 x10(6)/mcL    Hgb 9.9 (L) 12.0 - 16.0 g/dL    Hct 33.1 (L) 37.0 - 47.0 %    MCV 98.5 (H) 80.0 - 94.0 fL    MCH 29.5 27.0 - 31.0 pg    MCHC 29.9 (L) 33.0 - 36.0 g/dL    RDW 14.1 11.5 - 17.0 %    Platelet 283 130 - 400 x10(3)/mcL    MPV 11.0 (H) 7.4 - 10.4 fL    Neut % 77.1 %    Lymph % 12.2 %    Mono % 6.7 %    Eos % 2.9 %    Basophil % 0.9 %    Lymph # 0.98 0.6 - 4.6 x10(3)/mcL    Neut # 6.17 2.1 - 9.2 x10(3)/mcL    Mono # 0.54 0.1 - 1.3 x10(3)/mcL    Eos # 0.23 0 - 0.9 x10(3)/mcL    Baso # 0.07 <=0.2 x10(3)/mcL    IG# 0.02 0 - 0.04 x10(3)/mcL    IG% 0.2 %    Comprehensive Metabolic Panel    Collection Time: 08/19/24  7:52 AM   Result Value Ref Range    Sodium 133 (L) 136 - 145 mmol/L    Potassium 3.8 3.5 - 5.1 mmol/L    Chloride 100 98 - 107 mmol/L    CO2 22 22 - 29 mmol/L    Glucose 154 (H) 74 - 100 mg/dL    Blood Urea Nitrogen 16.7 9.8 - 20.1 mg/dL    Creatinine 0.88 0.55 - 1.02 mg/dL    Calcium 9.3 8.4 - 10.2 mg/dL    Protein Total 7.0 6.4 - 8.3 gm/dL    Albumin 3.4 (L) 3.5 - 5.0 g/dL    Globulin 3.6 (H) 2.4 - 3.5 gm/dL    Albumin/Globulin Ratio 0.9 (L) 1.1 - 2.0 ratio    Bilirubin Total 0.3 <=1.5 mg/dL    ALP 64 40 - 150 unit/L    ALT 9 0 - 55 unit/L    AST 19 5 - 34 unit/L    eGFR >60 mL/min/1.73/m2    Anion Gap 11.0 mEq/L    BUN/Creatinine Ratio 19    Iron and TIBC    Collection Time: 08/19/24  7:52 AM   Result Value Ref Range    Iron Binding Capacity Unsaturated 295 70 - 310 ug/dL    Iron Level 21 (L) 50 - 170 ug/dL    Transferrin 300 180 - 382 mg/dL    Iron Binding Capacity Total 316 250 - 450 ug/dL    Iron Saturation 7 (L) 20 - 50 %   Ferritin    Collection Time: 08/19/24  7:52 AM   Result Value Ref Range    Ferritin Level 20.18 4.63 - 204.00 ng/mL   CBC with Differential    Collection Time: 08/19/24  7:52 AM   Result Value Ref Range    WBC 10.96 4.50 - 11.50 x10(3)/mcL    RBC 2.56 (L) 4.20 - 5.40 x10(6)/mcL    Hgb 7.4 (L) 12.0 - 16.0 g/dL    Hct 24.1 (L) 37.0 - 47.0 %    MCV 94.1 (H) 80.0 - 94.0 fL    MCH 28.9 27.0 - 31.0 pg    MCHC 30.7 (L) 33.0 - 36.0 g/dL    RDW 14.2 11.5 - 17.0 %    Platelet 330 130 - 400 x10(3)/mcL    MPV 10.6 (H) 7.4 - 10.4 fL    Neut % 76.6 %    Lymph % 12.4 %    Mono % 7.8 %    Eos % 1.9 %    Basophil % 0.7 %    Lymph # 1.36 0.6 - 4.6 x10(3)/mcL    Neut # 8.39 2.1 - 9.2 x10(3)/mcL    Mono # 0.85 0.1 - 1.3 x10(3)/mcL    Eos # 0.21 0 - 0.9 x10(3)/mcL    Baso # 0.08 <=0.2 x10(3)/mcL    IG# 0.07 (H) 0 - 0.04 x10(3)/mcL    IG% 0.6 %    NRBC% 0.0 %   ABORH RETYPE    Collection Time: 08/19/24  9:09 AM   Result Value Ref Range    ABORH  Retype O POS    Type & Screen    Collection Time: 08/19/24  9:09 AM   Result Value Ref Range    Group & Rh O POS     Indirect Raji GEL NEG     Specimen Outdate 08/22/2024 23:59    Prepare RBC 2 Units; H/H 7.4/24.1    Collection Time: 08/19/24  9:09 AM   Result Value Ref Range    UNIT NUMBER [id number]     UNIT ABO/RH O POS     DISPENSE STATUS Transfused     Unit Expiration [id number]     Product Code A8397L82     Unit Blood Type Code 5100     CROSSMATCH INTERPRETATION Compatible     UNIT NUMBER [id number]     UNIT ABO/RH O POS     DISPENSE STATUS Transfused     Unit Expiration [id number]     Product Code Y9944B50     Unit Blood Type Code 5100     CROSSMATCH INTERPRETATION Compatible      Imaging Results:     Narrative & Impression  EXAMINATION: Limited abdominal ultrasound     EXAMINATION DATE: 9/9/2020     COMPARISON: 5/7/2013     TECHNIQUE: Multiple sonographic images of the abdomen were obtained  involving the right upper quadrant.     CLINICAL HISTORY: Unspecified viral hepatitis     FINDINGS:     Liver demonstrates increased echogenicity with a nodular capsule. No  definitive focal hepatic lesion. No intrahepatic biliary ductal  dilatation. The portal vein is patent with hepatopedal flow.  Gallbladder is present without pericholecystic fluid, gallbladder wall  thickening, or cholelithiasis. Sonographic Saucedo sign is negative.  Common duct measures 5 mm.     The kidney measures 9.27 m in length. The parenchyma demonstrates  normal echogenicity with questionable cortical thinning. No evidence  for hydronephrosis or calculus.     The visualized pancreas, double aorta, IVC are within normal limits.     IMPRESSION:  1. Findings suggestive of cirrhosis.  2. No focal hepatic lesions examination. If there is concern recommend  dedicated imaging to further      Electronically Signed By: Alexis Jones DO  Date/Time Signed: 09/09/2020 13:43    Assessment/Plan:     Problem List Items Addressed This Visit           Oncology    Iron deficiency anemia due to chronic blood loss - Primary     Abdominal ultrasound September 9, 2020 revealed findings suggestive of cirrhosis, no focal hepatic lesions examination. If there is concern recommend  dedicated imaging to further evaluate.  She underwent EGD November 2, 2022 with findings of esophagitis, gastritis, normal mucosa in the duodenum, normal mucosa in the duodenal bulb, normal mucosa in the 2nd part of the duodenum, esophageal hiatal hernia, concentric rings and linear furrows in the lower 3rd of the esophagus and middle 3rd of the esophagus compatible with eosinophilic esophagitis, ring in the gastroesophageal junction s/p dilation with 51 Turkish Savary type dilator.  Gastric and duodenal biopsies were benign.  Distal esophageal biopsy benign.  Mid esophageal biopsy benign.  Proximal esophageal biopsy with squamous mucosa with patchy mildly increased intraepithelial lymphocytes, Alcian blue/PAS stain highlights a thickened squamous epithelium; no fungal organisms seen, no evidence of eosinophilic esophagitis.  The findings are nonspecific and the differential diagnosis includes reflux, stasis (achalasia, pseudoachalasia), contact mucositis similar to contact dermatitis and entities in a differential diagnosis of lichenoid mucositis including graft versus host disease, connective tissue disease, erythema multiforme and lichen planus.  The [place] bodies of lichen planus or not detected.  She underwent colonoscopy November 9, 2022 with findings of hyperplastic polyp in the descending colon with polypectomy.  Video capsule endoscopy August 15, 2023 revealed multiple angioectasias without bleeding in the duodenum, multiple angioectasias without bleeding in the jejunum, multiple angiectasias without bleeding in the ileum.  She was recommended oral iron supplementation and upper device assisted enteroscopy versus push enteroscopy with drop in hemoglobin level.  She underwent  colonoscopy March 19, 2024 with findings of grade 2 internal hemorrhoids and otherwise normal exam.  Biopsies revealed findings consistent with collagenous colitis (requested pathology results for further review from Dr. Hatch).  Repeat post transfusion CBC today  GERD lifestyle modifications  Reflux precautions  Limit NSAID use  Recommend tobacco cessation  EGD with push enteroscopy sooner than later after cardiac clearance  H pylori stool antigen  FibroScan   Abdominal ultrasound  Call with updates   ER precautions provided  Follow-up clinic visit with NP in 4 months in a 45 minute slot         Relevant Orders    CBC Auto Differential    Helicobacter Pylori Antigen Fecal EIA    Case Request Endoscopy: EGD (ESOPHAGOGASTRODUODENOSCOPY) (Completed)       GI    Angiodysplasia of small intestine     See plan for primary diagnosis         Relevant Orders    CBC Auto Differential    Helicobacter Pylori Antigen Fecal EIA    Case Request Endoscopy: EGD (ESOPHAGOGASTRODUODENOSCOPY) (Completed)    Esophagitis     See plan for primary diagnosis         Relevant Orders    CBC Auto Differential    Helicobacter Pylori Antigen Fecal EIA    Case Request Endoscopy: EGD (ESOPHAGOGASTRODUODENOSCOPY) (Completed)    Gastritis     See plan for primary diagnosis         Relevant Orders    CBC Auto Differential    Helicobacter Pylori Antigen Fecal EIA    Case Request Endoscopy: EGD (ESOPHAGOGASTRODUODENOSCOPY) (Completed)    Esophageal hiatal hernia     See plan for primary diagnosis         Relevant Orders    CBC Auto Differential    Helicobacter Pylori Antigen Fecal EIA    Case Request Endoscopy: EGD (ESOPHAGOGASTRODUODENOSCOPY) (Completed)    Personal history of colonic polyps     She underwent colonoscopy November 9, 2022 with findings of hyperplastic polyp in the descending colon with polypectomy.  She underwent colonoscopy March 19, 2024 with findings of grade 2 internal hemorrhoids and otherwise normal exam.  Biopsies revealed  findings consistent with collagenous colitis.         Internal hemorrhoids without complication     She underwent colonoscopy November 9, 2022 with findings of hyperplastic polyp in the descending colon with polypectomy.  She underwent colonoscopy March 19, 2024 with findings of grade 2 internal hemorrhoids and otherwise normal exam.  Biopsies revealed findings consistent with collagenous colitis.  Recommend soluble fiber supplementation  Avoid straining or sitting on the toilet for long periods of time  Hemorrhoidal banding discussed         Relevant Orders    CBC Auto Differential    Abnormal abdominal ultrasound     See plan for primary diagnosis  Abdominal ultrasound September 9, 2020 revealed findings suggestive of cirrhosis, no focal hepatic lesions examination. If there is concern recommend  dedicated imaging to further evaluate.  Fibroscan  Abdominal ultrasound         Relevant Orders    US Abdomen Limited    US Elastography Liver w/imaging       Other    Tobacco user     Recommend tobacco cessation.         Relevant Orders    Ambulatory referral/consult to Smoking Cessation Program

## 2024-08-22 NOTE — ASSESSMENT & PLAN NOTE
She underwent colonoscopy November 9, 2022 with findings of hyperplastic polyp in the descending colon with polypectomy.  She underwent colonoscopy March 19, 2024 with findings of grade 2 internal hemorrhoids and otherwise normal exam.  Biopsies revealed findings consistent with collagenous colitis.  Recommend soluble fiber supplementation  Avoid straining or sitting on the toilet for long periods of time  Hemorrhoidal banding discussed

## 2024-08-26 ENCOUNTER — LAB VISIT (OUTPATIENT)
Dept: LAB | Facility: HOSPITAL | Age: 59
End: 2024-08-26
Attending: NURSE PRACTITIONER
Payer: MEDICAID

## 2024-08-26 ENCOUNTER — TELEPHONE (OUTPATIENT)
Dept: GASTROENTEROLOGY | Facility: CLINIC | Age: 59
End: 2024-08-26
Payer: MEDICAID

## 2024-08-26 DIAGNOSIS — D50.0 IRON DEFICIENCY ANEMIA DUE TO CHRONIC BLOOD LOSS: ICD-10-CM

## 2024-08-26 DIAGNOSIS — K64.8 INTERNAL HEMORRHOIDS WITHOUT COMPLICATION: ICD-10-CM

## 2024-08-26 DIAGNOSIS — K20.90 ESOPHAGITIS: ICD-10-CM

## 2024-08-26 DIAGNOSIS — K44.9 ESOPHAGEAL HIATAL HERNIA: ICD-10-CM

## 2024-08-26 DIAGNOSIS — K55.20 ANGIODYSPLASIA OF SMALL INTESTINE: ICD-10-CM

## 2024-08-26 DIAGNOSIS — K29.70 GASTRITIS, PRESENCE OF BLEEDING UNSPECIFIED, UNSPECIFIED CHRONICITY, UNSPECIFIED GASTRITIS TYPE: ICD-10-CM

## 2024-08-26 LAB
BASOPHILS # BLD AUTO: 0.04 X10(3)/MCL
BASOPHILS NFR BLD AUTO: 0.7 %
EOSINOPHIL # BLD AUTO: 0.17 X10(3)/MCL (ref 0–0.9)
EOSINOPHIL NFR BLD AUTO: 2.9 %
ERYTHROCYTE [DISTWIDTH] IN BLOOD BY AUTOMATED COUNT: 14.2 % (ref 11.5–17)
H. PYLORI STOOL: NEGATIVE
HCT VFR BLD AUTO: 37.8 % (ref 37–47)
HGB BLD-MCNC: 11.6 G/DL (ref 12–16)
IMM GRANULOCYTES # BLD AUTO: 0.02 X10(3)/MCL (ref 0–0.04)
IMM GRANULOCYTES NFR BLD AUTO: 0.3 %
LYMPHOCYTES # BLD AUTO: 1.12 X10(3)/MCL (ref 0.6–4.6)
LYMPHOCYTES NFR BLD AUTO: 19.3 %
MCH RBC QN AUTO: 28.6 PG (ref 27–31)
MCHC RBC AUTO-ENTMCNC: 30.7 G/DL (ref 33–36)
MCV RBC AUTO: 93.3 FL (ref 80–94)
MONOCYTES # BLD AUTO: 0.52 X10(3)/MCL (ref 0.1–1.3)
MONOCYTES NFR BLD AUTO: 9 %
NEUTROPHILS # BLD AUTO: 3.93 X10(3)/MCL (ref 2.1–9.2)
NEUTROPHILS NFR BLD AUTO: 67.8 %
PLATELET # BLD AUTO: 309 X10(3)/MCL (ref 130–400)
PMV BLD AUTO: 10.1 FL (ref 7.4–10.4)
RBC # BLD AUTO: 4.05 X10(6)/MCL (ref 4.2–5.4)
WBC # BLD AUTO: 5.8 X10(3)/MCL (ref 4.5–11.5)

## 2024-08-26 PROCEDURE — 85025 COMPLETE CBC W/AUTO DIFF WBC: CPT

## 2024-08-26 PROCEDURE — 87338 HPYLORI STOOL AG IA: CPT

## 2024-08-26 PROCEDURE — 36415 COLL VENOUS BLD VENIPUNCTURE: CPT

## 2024-08-26 NOTE — PROGRESS NOTES
Please notify CBC improved from 1 week ago.  EGD with push enteroscopy should be moved up to a sooner date than what is currently scheduled in March 2025.  We are awaiting cardiac clearance in the meantime.  Thanks

## 2024-08-26 NOTE — TELEPHONE ENCOUNTER
----- Message from CRIS Calderon sent at 8/26/2024  3:23 PM CDT -----  Please notify CBC improved from 1 week ago.  EGD with push enteroscopy should be moved up to a sooner date than what is currently scheduled in March 2025.  We are awaiting cardiac clearance in the meantime.  Thanks

## 2024-08-26 NOTE — TELEPHONE ENCOUNTER
Contacted patient and informed her of lab results.   Patient says that she is currently in the ED as directed by GI lab as she has been fatigued since infusion. Patient says that she has been unable to get out of bed since getting blood.

## 2024-08-27 ENCOUNTER — HOSPITAL ENCOUNTER (EMERGENCY)
Facility: HOSPITAL | Age: 59
Discharge: HOME | End: 2024-08-27
Attending: EMERGENCY MEDICINE
Payer: MEDICAID

## 2024-08-27 VITALS
OXYGEN SATURATION: 98 % | RESPIRATION RATE: 16 BRPM | HEIGHT: 59 IN | WEIGHT: 116.38 LBS | SYSTOLIC BLOOD PRESSURE: 159 MMHG | HEART RATE: 74 BPM | TEMPERATURE: 99 F | BODY MASS INDEX: 23.46 KG/M2 | DIASTOLIC BLOOD PRESSURE: 82 MMHG

## 2024-08-27 DIAGNOSIS — R53.83 FATIGUE, UNSPECIFIED TYPE: Primary | ICD-10-CM

## 2024-08-27 DIAGNOSIS — R53.1 WEAKNESS: ICD-10-CM

## 2024-08-27 LAB
ALBUMIN SERPL-MCNC: 3.5 G/DL (ref 3.5–5)
ALBUMIN/GLOB SERPL: 0.9 RATIO (ref 1.1–2)
ALP SERPL-CCNC: 73 UNIT/L (ref 40–150)
ALT SERPL-CCNC: 10 UNIT/L (ref 0–55)
ANION GAP SERPL CALC-SCNC: 8 MEQ/L
APTT PPP: 25.4 SECONDS (ref 23.2–33.7)
AST SERPL-CCNC: 14 UNIT/L (ref 5–34)
BASOPHILS # BLD AUTO: 0.04 X10(3)/MCL
BASOPHILS NFR BLD AUTO: 0.7 %
BILIRUB DIRECT SERPL-MCNC: 0.1 MG/DL (ref 0–?)
BILIRUB SERPL-MCNC: 0.2 MG/DL
BUN SERPL-MCNC: 13.3 MG/DL (ref 9.8–20.1)
CALCIUM SERPL-MCNC: 9.7 MG/DL (ref 8.4–10.2)
CHLORIDE SERPL-SCNC: 105 MMOL/L (ref 98–107)
CO2 SERPL-SCNC: 26 MMOL/L (ref 22–29)
CREAT SERPL-MCNC: 0.65 MG/DL (ref 0.55–1.02)
CREAT/UREA NIT SERPL: 20
EOSINOPHIL # BLD AUTO: 0.2 X10(3)/MCL (ref 0–0.9)
EOSINOPHIL NFR BLD AUTO: 3.4 %
ERYTHROCYTE [DISTWIDTH] IN BLOOD BY AUTOMATED COUNT: 14.3 % (ref 11.5–17)
GFR SERPLBLD CREATININE-BSD FMLA CKD-EPI: >60 ML/MIN/1.73/M2
GLOBULIN SER-MCNC: 3.9 GM/DL (ref 2.4–3.5)
GLUCOSE SERPL-MCNC: 110 MG/DL (ref 74–100)
GROUP & RH: NORMAL
HCT VFR BLD AUTO: 36.5 % (ref 37–47)
HEMOCCULT SP1 STL QL: POSITIVE
HGB BLD-MCNC: 11.3 G/DL (ref 12–16)
IMM GRANULOCYTES # BLD AUTO: 0.02 X10(3)/MCL (ref 0–0.04)
IMM GRANULOCYTES NFR BLD AUTO: 0.3 %
INDIRECT COOMBS: NORMAL
INR PPP: 0.9
IRON SATN MFR SERPL: 10 % (ref 20–50)
IRON SERPL-MCNC: 32 UG/DL (ref 50–170)
LYMPHOCYTES # BLD AUTO: 1.13 X10(3)/MCL (ref 0.6–4.6)
LYMPHOCYTES NFR BLD AUTO: 19.4 %
MCH RBC QN AUTO: 28.8 PG (ref 27–31)
MCHC RBC AUTO-ENTMCNC: 31 G/DL (ref 33–36)
MCV RBC AUTO: 93.1 FL (ref 80–94)
MONOCYTES # BLD AUTO: 0.58 X10(3)/MCL (ref 0.1–1.3)
MONOCYTES NFR BLD AUTO: 10 %
NEUTROPHILS # BLD AUTO: 3.85 X10(3)/MCL (ref 2.1–9.2)
NEUTROPHILS NFR BLD AUTO: 66.2 %
PLATELET # BLD AUTO: 273 X10(3)/MCL (ref 130–400)
PMV BLD AUTO: 10.4 FL (ref 7.4–10.4)
POTASSIUM SERPL-SCNC: 4.3 MMOL/L (ref 3.5–5.1)
PROT SERPL-MCNC: 7.4 GM/DL (ref 6.4–8.3)
PROTHROMBIN TIME: <10 SECONDS (ref 12.5–14.5)
RBC # BLD AUTO: 3.92 X10(6)/MCL (ref 4.2–5.4)
SODIUM SERPL-SCNC: 139 MMOL/L (ref 136–145)
SPECIMEN OUTDATE: NORMAL
TIBC SERPL-MCNC: 291 UG/DL (ref 70–310)
TIBC SERPL-MCNC: 323 UG/DL (ref 250–450)
TRANSFERRIN SERPL-MCNC: 309 MG/DL (ref 180–382)
WBC # BLD AUTO: 5.82 X10(3)/MCL (ref 4.5–11.5)

## 2024-08-27 PROCEDURE — 83540 ASSAY OF IRON: CPT

## 2024-08-27 PROCEDURE — 86901 BLOOD TYPING SEROLOGIC RH(D): CPT

## 2024-08-27 PROCEDURE — 25000003 PHARM REV CODE 250

## 2024-08-27 PROCEDURE — 80053 COMPREHEN METABOLIC PANEL: CPT

## 2024-08-27 PROCEDURE — 93005 ELECTROCARDIOGRAM TRACING: CPT

## 2024-08-27 PROCEDURE — 96361 HYDRATE IV INFUSION ADD-ON: CPT

## 2024-08-27 PROCEDURE — 82272 OCCULT BLD FECES 1-3 TESTS: CPT

## 2024-08-27 PROCEDURE — 96374 THER/PROPH/DIAG INJ IV PUSH: CPT

## 2024-08-27 PROCEDURE — 86900 BLOOD TYPING SEROLOGIC ABO: CPT

## 2024-08-27 PROCEDURE — 85730 THROMBOPLASTIN TIME PARTIAL: CPT

## 2024-08-27 PROCEDURE — 93010 ELECTROCARDIOGRAM REPORT: CPT | Mod: ,,, | Performed by: INTERNAL MEDICINE

## 2024-08-27 PROCEDURE — 63600175 PHARM REV CODE 636 W HCPCS

## 2024-08-27 PROCEDURE — 85610 PROTHROMBIN TIME: CPT

## 2024-08-27 PROCEDURE — 99285 EMERGENCY DEPT VISIT HI MDM: CPT | Mod: 25

## 2024-08-27 PROCEDURE — 85025 COMPLETE CBC W/AUTO DIFF WBC: CPT

## 2024-08-27 PROCEDURE — 82248 BILIRUBIN DIRECT: CPT

## 2024-08-27 RX ORDER — KETOROLAC TROMETHAMINE 30 MG/ML
30 INJECTION, SOLUTION INTRAMUSCULAR; INTRAVENOUS
Status: COMPLETED | OUTPATIENT
Start: 2024-08-27 | End: 2024-08-27

## 2024-08-27 RX ADMIN — SODIUM CHLORIDE 1000 ML: 9 INJECTION, SOLUTION INTRAVENOUS at 01:08

## 2024-08-27 RX ADMIN — KETOROLAC TROMETHAMINE 30 MG: 30 INJECTION, SOLUTION INTRAMUSCULAR at 02:08

## 2024-08-27 NOTE — ED PROVIDER NOTES
Encounter Date: 8/27/2024       History     Chief Complaint   Patient presents with    Fatigue     Reports of increased fatigue for a few weeks. Patient states she was given 2 units of blood on 8/19 at the [facility] infusion clinic due to having bleeding in her colon. Patient states she had a colonoscopy but they couldn't reach the areas to fix it. Denies black/dark stools. Patient is to have an EDG scheduled but states it isn't until march of next year.     Sandra, 58 year old female presents to the ER for complaints of fatigue.  Pt reports ongoing for 2 weeks.  Began after receiving 2 units of blood.  Pt will a significant hx of GI Bleed of unknown origin. On plavix.  Pt last H/H 4/11.  Denies any abd pain or rectal bleeding.  Mild weakness.  Pale oral cavity and pale eye lids.  BBS CTA, RR. Home Covid negative.   Skin intact.  GCS 15, Neuro focal intact.       Allergies include Benadryl  PMHX:  Anemia, unspecified    · Coronary artery disease    · History of esophagogastroduodenoscopy (EGD)   · PAD (peripheral artery disease)    · PVD (peripheral vascular disease) with claudication    · Thyroid disease   PSXH:  Left heart catherization, peripheral angiography, hip sx.  Lithotripsy         The history is provided by the patient. No  was used.     Review of patient's allergies indicates:   Allergen Reactions    Benadryl itch relief Other (See Comments)     RESTLESS LEGS       Past Medical History:   Diagnosis Date    Anemia, unspecified     Coronary artery disease     Herpes simplex virus (HSV) infection     History of esophagogastroduodenoscopy (EGD) 11/01/2022    Hypertension     Osteopenia     PAD (peripheral artery disease)     PVD (peripheral vascular disease) with claudication     Rheumatoid arthritis, unspecified     Thyroid disease      Past Surgical History:   Procedure Laterality Date    COLONOSCOPY W/ BIOPSIES      EXTRACORPOREAL SHOCK WAVE LITHOTRIPSY  02/22/2023    Procedure: LITHOTRIPSY-  Peripheral Shockwave;  Surgeon: Adriel Valero MD;  Location: [facility] CATH LAB;  Service: Cardiology;;    HIP SURGERY      INSERTION, STENT, ARTERY  02/22/2023    Procedure: INSERTION, STENT, ARTERY;  Surgeon: Adriel Valero MD;  Location: [facility] CATH LAB;  Service: Cardiology;;    INTRALUMINAL GASTROINTESTINAL TRACT IMAGING VIA CAPSULE N/A 08/15/2023    Procedure: IMAGING PROCEDURE, GI TRACT, INTRALUMINAL, VIA CAPSULE;  Surgeon: Liza Del Rio MD;  Location: [facility] ENDOSCOPY;  Service: Gastroenterology;  Laterality: N/A;    INTRAVASCULAR ULTRASOUND, NON-CORONARY  02/22/2023    Procedure: Intravascular Ultrasound, Non-Coronary;  Surgeon: Adriel Valero MD;  Location: [facility] CATH LAB;  Service: Cardiology;;    LEFT HEART CATHETERIZATION      PERIPHERAL ANGIOGRAPHY N/A 02/22/2023    Procedure: Peripheral angiography;  Surgeon: Adriel Valero MD;  Location: [facility] CATH LAB;  Service: Cardiology;  Laterality: N/A;  BILAT ILIAC INTERVENTION     Family History   Problem Relation Name Age of Onset    Kidney failure Mother      Heart disease Mother      Hypertension Mother      No Known Problems Father       Social History     Tobacco Use    Smoking status: Every Day     Current packs/day: 0.25     Average packs/day: 0.3 packs/day for 40.0 years (10.0 ttl pk-yrs)     Types: Cigarettes     Passive exposure: Current    Smokeless tobacco: Never    Tobacco comments:     15 cigs per day; on nicotine patches   Substance Use Topics    Alcohol use: Not Currently    Drug use: Yes     Types: Marijuana     Comment: DAILY     Review of Systems   Constitutional:  Positive for fatigue.   HENT: Negative.     Eyes:         Pale sclera   Respiratory: Negative.     Cardiovascular: Negative.    Gastrointestinal: Negative.    Endocrine: Negative.    Genitourinary: Negative.    Allergic/Immunologic: Negative.    Neurological:  Positive for weakness.   Hematological:  Bruises/bleeds easily.   Psychiatric/Behavioral: Negative.     All other  systems reviewed and are negative.      Physical Exam     Initial Vitals [08/27/24 1230]   BP Pulse Resp Temp SpO2   126/75 87 20 98.5 °F (36.9 °C) 97 %      MAP       --         Physical Exam    Nursing note and vitals reviewed.  Constitutional: She appears well-developed and well-nourished.   HENT:   Head: Normocephalic and atraumatic.   Right Ear: External ear normal.   Left Ear: External ear normal.   Nose: Nose normal.   Pale oral pharynx   Eyes: Conjunctivae and EOM are normal. Pupils are equal, round, and reactive to light.   Pale sclera     Neck: Neck supple.   Normal range of motion.  Cardiovascular:  Normal rate, regular rhythm, normal heart sounds and intact distal pulses.           Pulmonary/Chest: Breath sounds normal.   Abdominal: Abdomen is soft. Bowel sounds are normal.   Musculoskeletal:         General: Normal range of motion.      Cervical back: Normal range of motion and neck supple.     Neurological: She is alert and oriented to person, place, and time. She has normal strength and normal reflexes. GCS score is 15. GCS eye subscore is 4. GCS verbal subscore is 5. GCS motor subscore is 6.   Skin: Skin is warm and dry. Capillary refill takes less than 2 seconds.   Psychiatric: She has a normal mood and affect. Her behavior is normal. Judgment and thought content normal.         ED Course   Procedures  Labs Reviewed   COMPREHENSIVE METABOLIC PANEL - Abnormal       Result Value    Sodium 139      Potassium 4.3      Chloride 105      CO2 26      Glucose 110 (*)     Blood Urea Nitrogen 13.3      Creatinine 0.65      Calcium 9.7      Protein Total 7.4      Albumin 3.5      Globulin 3.9 (*)     Albumin/Globulin Ratio 0.9 (*)     Bilirubin Total 0.2      ALP 73      ALT 10      AST 14      eGFR >60      Anion Gap 8.0      BUN/Creatinine Ratio 20     PROTIME-INR - Abnormal    PT <10.0 (*)     INR 0.9     OCCULT BLOOD, STOOL 1ST SPECIMEN - Abnormal    Occult Blood Stool 1 Positive (*)    CBC WITH  DIFFERENTIAL - Abnormal    WBC 5.82      RBC 3.92 (*)     Hgb 11.3 (*)     Hct 36.5 (*)     MCV 93.1      MCH 28.8      MCHC 31.0 (*)     RDW 14.3      Platelet 273      MPV 10.4      Neut % 66.2      Lymph % 19.4      Mono % 10.0      Eos % 3.4      Basophil % 0.7      Lymph # 1.13      Neut # 3.85      Mono # 0.58      Eos # 0.20      Baso # 0.04      IG# 0.02      IG% 0.3     IRON AND TIBC - Abnormal    Iron Binding Capacity Unsaturated 291      Iron Level 32 (*)     Transferrin 309      Iron Binding Capacity Total 323      Iron Saturation 10 (*)    APTT - Normal    PTT 25.4     BILIRUBIN, DIRECT - Normal    Bilirubin Direct 0.1     CBC W/ AUTO DIFFERENTIAL    Narrative:     The following orders were created for panel order CBC auto differential.  Procedure                               Abnormality         Status                     ---------                               -----------         ------                     CBC with Differential[[id number]]       Abnormal            Final result                 Please view results for these tests on the individual orders.   TYPE & SCREEN     EKG Readings: (Independently Interpreted)   Initial Reading: No STEMI. Rhythm: Normal Sinus Rhythm. Heart Rate: 79. Ectopy: No Ectopy. Axis: Normal.       Imaging Results              X-Ray Chest AP Portable (Final result)  Result time 08/27/24 14:00:54      Final result by Kian Lr MD (08/27/24 14:00:54)                   Impression:      No acute findings.      Electronically signed by: Kian Lr  Date:    08/27/2024  Time:    14:00               Narrative:    EXAMINATION:  XR CHEST AP PORTABLE    CLINICAL HISTORY:  weakness;    COMPARISON:  3 October 2022    FINDINGS:  Frontal view of the chest was obtained. Heart is not enlarged.  Grossly clear lungs.  No pneumothorax.                                       Medications   ketorolac injection 30 mg (has no administration in time range)   sodium chloride 0.9% bolus 1,000  mL 1,000 mL (0 mLs Intravenous Stopped 8/27/24 6497)     Medical Decision Making  Medical Decision Making    58 year old female presents to the ER for complaints of fatigue.  Pt reports ongoing for 2 weeks.  Began after receiving 2 units of blood.  Pt will a significant hx of GI Bleed of unknown origin. On plavix.  Pt last H/H 4/11.  Denies any abd pain or rectal bleeding.  Mild weakness.  Pale oral cavity and pale eye lids.  BBS CTA, RR.  Skin intact.  GCS 15, Neuro focal intact.     Differential Dx:  GI bleed, anemia, weakness     Pt reports improvement in fatigue while receiving fluids.  Rectal exam completed.  No obvious bleeding or hemorrhoids.      Pt reports she has an iron infusion tomorrow.  Is ready to go.      Amount and/or Complexity of Data Reviewed  Labs: ordered.     Details: CBC:  RBC 3.92, Hgb: 11.3, Hct: 36.5  PTT: 25.4/PT: < 10.0   CMP:  Glucose 110  Iron: 32   Occult blood:  positive   Radiology:      Details: Frontal view of the chest was obtained. Heart is not enlarged.  Grossly clear lungs.  No pneumothorax.    Risk  Risk Details: Discussed findings with Dr. Coughlin.  Ok with pt going home.  Follow up with pcp/GI as needed.                                       Clinical Impression:  Final diagnoses:  [R53.1] Weakness  [R53.83] Fatigue, unspecified type (Primary)          ED Disposition Condition    Discharge Stable          ED Prescriptions    None       Follow-up Information       Follow up With Specialties Details Why Contact Info    Justyna Dobbs MD Internal Medicine   [address] [facility] [address]  [phone]               Kirstin Baldwin NP  08/27/24 3023

## 2024-08-27 NOTE — DISCHARGE INSTRUCTIONS
Pt will be discharged home.  Ensure to make Iron infusion tomorrow.  Follow up with any worsening symptoms.

## 2024-08-28 ENCOUNTER — TELEPHONE (OUTPATIENT)
Dept: INTERNAL MEDICINE | Facility: CLINIC | Age: 59
End: 2024-08-28
Payer: MEDICAID

## 2024-08-28 ENCOUNTER — DOCUMENTATION ONLY (OUTPATIENT)
Dept: INFUSION THERAPY | Facility: HOSPITAL | Age: 59
End: 2024-08-28
Payer: MEDICAID

## 2024-08-28 LAB
OHS QRS DURATION: 66 MS
OHS QTC CALCULATION: 458 MS

## 2024-09-04 ENCOUNTER — DOCUMENTATION ONLY (OUTPATIENT)
Dept: INFUSION THERAPY | Facility: HOSPITAL | Age: 59
End: 2024-09-04
Payer: MEDICAID

## 2024-09-17 ENCOUNTER — INFUSION (OUTPATIENT)
Dept: INFUSION THERAPY | Facility: HOSPITAL | Age: 59
End: 2024-09-17
Attending: INTERNAL MEDICINE
Payer: MEDICAID

## 2024-09-17 VITALS
DIASTOLIC BLOOD PRESSURE: 55 MMHG | HEIGHT: 59 IN | OXYGEN SATURATION: 95 % | BODY MASS INDEX: 24.13 KG/M2 | RESPIRATION RATE: 16 BRPM | TEMPERATURE: 98 F | HEART RATE: 79 BPM | WEIGHT: 119.69 LBS | SYSTOLIC BLOOD PRESSURE: 109 MMHG

## 2024-09-17 DIAGNOSIS — D50.0 IRON DEFICIENCY ANEMIA DUE TO CHRONIC BLOOD LOSS: Primary | ICD-10-CM

## 2024-09-17 PROCEDURE — 63600175 PHARM REV CODE 636 W HCPCS: Mod: JZ,JG

## 2024-09-17 PROCEDURE — 96365 THER/PROPH/DIAG IV INF INIT: CPT

## 2024-09-17 PROCEDURE — 25000003 PHARM REV CODE 250

## 2024-09-17 RX ORDER — SODIUM CHLORIDE 0.9 % (FLUSH) 0.9 %
10 SYRINGE (ML) INJECTION
Status: DISCONTINUED | OUTPATIENT
Start: 2024-09-17 | End: 2024-09-17 | Stop reason: HOSPADM

## 2024-09-17 RX ORDER — HEPARIN 100 UNIT/ML
500 SYRINGE INTRAVENOUS
Status: DISCONTINUED | OUTPATIENT
Start: 2024-09-17 | End: 2024-09-17 | Stop reason: HOSPADM

## 2024-09-17 RX ADMIN — FERUMOXYTOL 510 MG: 510 INJECTION INTRAVENOUS at 11:09

## 2024-09-17 RX ADMIN — SODIUM CHLORIDE: 9 INJECTION, SOLUTION INTRAVENOUS at 11:09

## 2024-09-17 NOTE — NURSING
1100 Arrive for C1 D1 Feraheme q wk c/o of severe generalized pain take home medications for relief of pain.

## 2024-09-24 ENCOUNTER — INFUSION (OUTPATIENT)
Dept: INFUSION THERAPY | Facility: HOSPITAL | Age: 59
End: 2024-09-24
Attending: INTERNAL MEDICINE
Payer: MEDICAID

## 2024-09-24 VITALS
BODY MASS INDEX: 23.95 KG/M2 | SYSTOLIC BLOOD PRESSURE: 111 MMHG | RESPIRATION RATE: 20 BRPM | OXYGEN SATURATION: 95 % | HEIGHT: 59 IN | HEART RATE: 76 BPM | WEIGHT: 118.81 LBS | DIASTOLIC BLOOD PRESSURE: 64 MMHG | TEMPERATURE: 98 F

## 2024-09-24 DIAGNOSIS — D50.0 IRON DEFICIENCY ANEMIA DUE TO CHRONIC BLOOD LOSS: Primary | ICD-10-CM

## 2024-09-24 PROCEDURE — 96365 THER/PROPH/DIAG IV INF INIT: CPT

## 2024-09-24 PROCEDURE — 63600175 PHARM REV CODE 636 W HCPCS: Mod: JZ,JG

## 2024-09-24 PROCEDURE — 25000003 PHARM REV CODE 250

## 2024-09-24 RX ORDER — SODIUM CHLORIDE 0.9 % (FLUSH) 0.9 %
10 SYRINGE (ML) INJECTION
Status: DISCONTINUED | OUTPATIENT
Start: 2024-09-24 | End: 2024-09-24 | Stop reason: HOSPADM

## 2024-09-24 RX ORDER — HEPARIN 100 UNIT/ML
500 SYRINGE INTRAVENOUS
Status: DISCONTINUED | OUTPATIENT
Start: 2024-09-24 | End: 2024-09-24 | Stop reason: HOSPADM

## 2024-09-24 RX ADMIN — SODIUM CHLORIDE: 9 INJECTION, SOLUTION INTRAVENOUS at 01:09

## 2024-09-24 RX ADMIN — FERUMOXYTOL 510 MG: 510 INJECTION INTRAVENOUS at 01:09

## 2024-09-24 NOTE — NURSING
1300 Arrive for C1 D2 Feraheme q wk c/o of generalizes pain level 5/10 take home medication for relief of pain.

## 2024-09-30 ENCOUNTER — HOSPITAL ENCOUNTER (OUTPATIENT)
Dept: RADIOLOGY | Facility: HOSPITAL | Age: 59
Discharge: HOME | End: 2024-09-30
Attending: NURSE PRACTITIONER
Payer: MEDICAID

## 2024-09-30 DIAGNOSIS — R93.5 ABNORMAL ABDOMINAL ULTRASOUND: ICD-10-CM

## 2024-09-30 PROCEDURE — 76705 ECHO EXAM OF ABDOMEN: CPT | Mod: TC

## 2024-10-01 NOTE — PROGRESS NOTES
Please notify findings of atherosclerotic disease.  This should be deferred to her PCP or cardiologist for continued monitoring/workup.  She also has a midline fat containing hernia.  Otherwise unremarkable.  Thanks

## 2024-10-02 ENCOUNTER — PROCEDURE VISIT (OUTPATIENT)
Dept: GASTROENTEROLOGY | Facility: CLINIC | Age: 59
End: 2024-10-02
Payer: MEDICAID

## 2024-10-02 DIAGNOSIS — B18.2 CHRONIC HEPATITIS C WITHOUT HEPATIC COMA: ICD-10-CM

## 2024-10-02 DIAGNOSIS — K74.60 HEPATIC CIRRHOSIS, UNSPECIFIED HEPATIC CIRRHOSIS TYPE, UNSPECIFIED WHETHER ASCITES PRESENT: Primary | ICD-10-CM

## 2024-10-02 NOTE — PROGRESS NOTES
Patient here for FibroScan visit. Reports NPO X3 hours. Positioned patient for the procedure to expose the right rib cage. Explained procedure to the patient. FibroScan exam complete and was tolerated without any problems.

## 2024-10-09 ENCOUNTER — PATIENT MESSAGE (OUTPATIENT)
Dept: GASTROENTEROLOGY | Facility: CLINIC | Age: 59
End: 2024-10-09
Payer: MEDICAID

## 2024-10-30 ENCOUNTER — CLINICAL SUPPORT (OUTPATIENT)
Dept: AUDIOLOGY | Facility: HOSPITAL | Age: 59
End: 2024-10-30
Payer: MEDICAID

## 2024-10-30 DIAGNOSIS — H90.A12 CONDUCTIVE HEARING LOSS OF LEFT EAR WITH RESTRICTED HEARING OF RIGHT EAR: Primary | ICD-10-CM

## 2024-10-30 PROCEDURE — 92591 HC HEARING AID EXAM, BOTH EARS: CPT | Performed by: AUDIOLOGIST

## 2024-10-30 PROCEDURE — 92557 COMPREHENSIVE HEARING TEST: CPT | Performed by: AUDIOLOGIST

## 2024-10-30 PROCEDURE — 92567 TYMPANOMETRY: CPT | Performed by: AUDIOLOGIST
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
## 2024-11-11 ENCOUNTER — OFFICE VISIT (OUTPATIENT)
Dept: RHEUMATOLOGY | Facility: CLINIC | Age: 59
End: 2024-11-11
Payer: MEDICAID

## 2024-11-11 VITALS
HEART RATE: 79 BPM | BODY MASS INDEX: 24.12 KG/M2 | HEIGHT: 59 IN | OXYGEN SATURATION: 99 % | DIASTOLIC BLOOD PRESSURE: 76 MMHG | WEIGHT: 119.63 LBS | SYSTOLIC BLOOD PRESSURE: 122 MMHG | TEMPERATURE: 98 F | RESPIRATION RATE: 20 BRPM

## 2024-11-11 DIAGNOSIS — R76.8 POSITIVE ANA (ANTINUCLEAR ANTIBODY): ICD-10-CM

## 2024-11-11 DIAGNOSIS — D64.9 ANEMIA, UNSPECIFIED TYPE: ICD-10-CM

## 2024-11-11 DIAGNOSIS — M15.0 PRIMARY OSTEOARTHRITIS INVOLVING MULTIPLE JOINTS: ICD-10-CM

## 2024-11-11 DIAGNOSIS — D84.821 DRUG-INDUCED IMMUNODEFICIENCY: ICD-10-CM

## 2024-11-11 DIAGNOSIS — F17.210 NICOTINE DEPENDENCE, CIGARETTES, UNCOMPLICATED: ICD-10-CM

## 2024-11-11 DIAGNOSIS — M05.79 RHEUMATOID ARTHRITIS INVOLVING MULTIPLE SITES WITH POSITIVE RHEUMATOID FACTOR: Primary | ICD-10-CM

## 2024-11-11 DIAGNOSIS — I10 PRIMARY HYPERTENSION: ICD-10-CM

## 2024-11-11 DIAGNOSIS — Z86.19 HISTORY OF HEPATITIS C: ICD-10-CM

## 2024-11-11 DIAGNOSIS — R76.8 HEPATITIS B CORE ANTIBODY POSITIVE: ICD-10-CM

## 2024-11-11 DIAGNOSIS — Z79.899 DRUG-INDUCED IMMUNODEFICIENCY: ICD-10-CM

## 2024-11-11 DIAGNOSIS — Z79.899 HIGH RISK MEDICATION USE: ICD-10-CM

## 2024-11-11 DIAGNOSIS — I25.10 CORONARY ARTERY DISEASE, UNSPECIFIED VESSEL OR LESION TYPE, UNSPECIFIED WHETHER ANGINA PRESENT, UNSPECIFIED WHETHER NATIVE OR TRANSPLANTED HEART: ICD-10-CM

## 2024-11-11 PROCEDURE — 1159F MED LIST DOCD IN RCRD: CPT | Mod: CPTII,,, | Performed by: NURSE PRACTITIONER

## 2024-11-11 PROCEDURE — 3074F SYST BP LT 130 MM HG: CPT | Mod: CPTII,,, | Performed by: NURSE PRACTITIONER

## 2024-11-11 PROCEDURE — 4010F ACE/ARB THERAPY RXD/TAKEN: CPT | Mod: CPTII,,, | Performed by: NURSE PRACTITIONER

## 2024-11-11 PROCEDURE — 1160F RVW MEDS BY RX/DR IN RCRD: CPT | Mod: CPTII,,, | Performed by: NURSE PRACTITIONER

## 2024-11-11 PROCEDURE — 99215 OFFICE O/P EST HI 40 MIN: CPT | Mod: PBBFAC | Performed by: NURSE PRACTITIONER

## 2024-11-11 PROCEDURE — 3008F BODY MASS INDEX DOCD: CPT | Mod: CPTII,,, | Performed by: NURSE PRACTITIONER

## 2024-11-11 PROCEDURE — 99214 OFFICE O/P EST MOD 30 MIN: CPT | Mod: S$PBB,,, | Performed by: NURSE PRACTITIONER

## 2024-11-11 PROCEDURE — 3078F DIAST BP <80 MM HG: CPT | Mod: CPTII,,, | Performed by: NURSE PRACTITIONER

## 2024-11-11 RX ORDER — HYDROXYCHLOROQUINE SULFATE 200 MG/1
200 TABLET, FILM COATED ORAL DAILY
Qty: 30 TABLET | Refills: 5 | Status: SHIPPED | OUTPATIENT
Start: 2024-11-11

## 2024-11-11 RX ORDER — BUPROPION HYDROCHLORIDE 150 MG/1
1 TABLET, EXTENDED RELEASE ORAL DAILY
COMMUNITY
Start: 2024-09-09

## 2024-11-11 RX ORDER — PREDNISONE 5 MG/1
TABLET ORAL
Qty: 30 TABLET | Refills: 0 | Status: SHIPPED | OUTPATIENT
Start: 2024-11-11

## 2024-11-11 RX ORDER — DULOXETIN HYDROCHLORIDE 30 MG/1
30 CAPSULE, DELAYED RELEASE ORAL DAILY
COMMUNITY
Start: 2024-09-09

## 2024-11-11 RX ORDER — FOLIC ACID 1 MG/1
1 TABLET ORAL DAILY
Qty: 90 TABLET | Refills: 1 | Status: SHIPPED | OUTPATIENT
Start: 2024-11-11 | End: 2025-11-11

## 2024-11-11 RX ORDER — LEFLUNOMIDE 20 MG/1
20 TABLET ORAL DAILY
Qty: 30 TABLET | Refills: 3 | Status: SHIPPED | OUTPATIENT
Start: 2024-11-11

## 2024-11-15 ENCOUNTER — DOCUMENTATION ONLY (OUTPATIENT)
Dept: HEMATOLOGY/ONCOLOGY | Facility: CLINIC | Age: 59
End: 2024-11-15
Payer: MEDICAID

## 2024-11-15 NOTE — PROGRESS NOTES
Called to complete peer to peer for denied Chest CT scheduled on 11/25/24. Was informed that the Chest CT was approved for Florentin Bull MD on 10/27 and is good until 11/26/24.

## 2024-11-18 ENCOUNTER — TELEPHONE (OUTPATIENT)
Dept: HEMATOLOGY/ONCOLOGY | Facility: CLINIC | Age: 59
End: 2024-11-18
Payer: MEDICAID

## 2024-11-18 DIAGNOSIS — D50.0 IRON DEFICIENCY ANEMIA DUE TO CHRONIC BLOOD LOSS: Primary | ICD-10-CM

## 2024-11-18 NOTE — TELEPHONE ENCOUNTER
Attempted to contact patient to confirm appointment for 11/19/24. Patient did not answer the phone left a voice message. Called patient brother Nando Burns. Patient brother Nando Burns  did not answer the phone left a voice message.

## 2024-11-19 ENCOUNTER — OFFICE VISIT (OUTPATIENT)
Dept: HEMATOLOGY/ONCOLOGY | Facility: CLINIC | Age: 59
End: 2024-11-19
Payer: MEDICAID

## 2024-11-19 ENCOUNTER — APPOINTMENT (OUTPATIENT)
Dept: HEMATOLOGY/ONCOLOGY | Facility: CLINIC | Age: 59
End: 2024-11-19
Payer: MEDICAID

## 2024-11-19 VITALS
DIASTOLIC BLOOD PRESSURE: 59 MMHG | OXYGEN SATURATION: 98 % | HEART RATE: 71 BPM | BODY MASS INDEX: 24.39 KG/M2 | HEIGHT: 59 IN | SYSTOLIC BLOOD PRESSURE: 94 MMHG | TEMPERATURE: 99 F | WEIGHT: 121 LBS

## 2024-11-19 DIAGNOSIS — D50.0 IRON DEFICIENCY ANEMIA DUE TO CHRONIC BLOOD LOSS: ICD-10-CM

## 2024-11-19 DIAGNOSIS — D50.0 IRON DEFICIENCY ANEMIA DUE TO CHRONIC BLOOD LOSS: Primary | ICD-10-CM

## 2024-11-19 LAB
ALBUMIN SERPL-MCNC: 3.4 G/DL (ref 3.4–4.8)
ALBUMIN/GLOB SERPL: 1 RATIO (ref 1.1–2)
ALP SERPL-CCNC: 71 UNIT/L (ref 40–150)
ALT SERPL-CCNC: 7 UNIT/L (ref 0–55)
ANION GAP SERPL CALC-SCNC: 5 MEQ/L
AST SERPL-CCNC: 14 UNIT/L (ref 5–34)
BASOPHILS # BLD AUTO: 0.09 X10(3)/MCL
BASOPHILS NFR BLD AUTO: 1.3 %
BILIRUB SERPL-MCNC: 0.2 MG/DL
BUN SERPL-MCNC: 13.2 MG/DL (ref 9.8–20.1)
CALCIUM SERPL-MCNC: 8.8 MG/DL (ref 8.4–10.2)
CHLORIDE SERPL-SCNC: 103 MMOL/L (ref 98–107)
CO2 SERPL-SCNC: 28 MMOL/L (ref 23–31)
CREAT SERPL-MCNC: 0.73 MG/DL (ref 0.55–1.02)
CREAT/UREA NIT SERPL: 18
EOSINOPHIL # BLD AUTO: 0.27 X10(3)/MCL (ref 0–0.9)
EOSINOPHIL NFR BLD AUTO: 3.8 %
ERYTHROCYTE [DISTWIDTH] IN BLOOD BY AUTOMATED COUNT: 14.1 % (ref 11.5–17)
FERRITIN SERPL-MCNC: 30.29 NG/ML (ref 4.63–204)
GFR SERPLBLD CREATININE-BSD FMLA CKD-EPI: >60 ML/MIN/1.73/M2
GLOBULIN SER-MCNC: 3.4 GM/DL (ref 2.4–3.5)
GLUCOSE SERPL-MCNC: 140 MG/DL (ref 82–115)
HCT VFR BLD AUTO: 29.9 % (ref 37–47)
HGB BLD-MCNC: 9.8 G/DL (ref 12–16)
IMM GRANULOCYTES # BLD AUTO: 0.02 X10(3)/MCL (ref 0–0.04)
IMM GRANULOCYTES NFR BLD AUTO: 0.3 %
IRON SATN MFR SERPL: 10 % (ref 20–50)
IRON SERPL-MCNC: 28 UG/DL (ref 50–170)
LYMPHOCYTES # BLD AUTO: 1.28 X10(3)/MCL (ref 0.6–4.6)
LYMPHOCYTES NFR BLD AUTO: 18.2 %
MCH RBC QN AUTO: 30 PG (ref 27–31)
MCHC RBC AUTO-ENTMCNC: 32.8 G/DL (ref 33–36)
MCV RBC AUTO: 91.4 FL (ref 80–94)
MONOCYTES # BLD AUTO: 0.81 X10(3)/MCL (ref 0.1–1.3)
MONOCYTES NFR BLD AUTO: 11.5 %
NEUTROPHILS # BLD AUTO: 4.58 X10(3)/MCL (ref 2.1–9.2)
NEUTROPHILS NFR BLD AUTO: 64.9 %
NRBC BLD AUTO-RTO: 0 %
PLATELET # BLD AUTO: 314 X10(3)/MCL (ref 130–400)
PMV BLD AUTO: 10 FL (ref 7.4–10.4)
POTASSIUM SERPL-SCNC: 3.9 MMOL/L (ref 3.5–5.1)
PROT SERPL-MCNC: 6.8 GM/DL (ref 5.8–7.6)
RBC # BLD AUTO: 3.27 X10(6)/MCL (ref 4.2–5.4)
SODIUM SERPL-SCNC: 136 MMOL/L (ref 136–145)
TIBC SERPL-MCNC: 257 UG/DL (ref 70–310)
TIBC SERPL-MCNC: 285 UG/DL (ref 250–450)
TRANSFERRIN SERPL-MCNC: 256 MG/DL (ref 180–382)
WBC # BLD AUTO: 7.05 X10(3)/MCL (ref 4.5–11.5)

## 2024-11-19 PROCEDURE — 36415 COLL VENOUS BLD VENIPUNCTURE: CPT

## 2024-11-19 PROCEDURE — 83540 ASSAY OF IRON: CPT

## 2024-11-19 PROCEDURE — 85025 COMPLETE CBC W/AUTO DIFF WBC: CPT

## 2024-11-19 PROCEDURE — 1159F MED LIST DOCD IN RCRD: CPT | Mod: CPTII,,, | Performed by: NURSE PRACTITIONER

## 2024-11-19 PROCEDURE — 80053 COMPREHEN METABOLIC PANEL: CPT

## 2024-11-19 PROCEDURE — 82728 ASSAY OF FERRITIN: CPT

## 2024-11-19 PROCEDURE — 3008F BODY MASS INDEX DOCD: CPT | Mod: CPTII,,, | Performed by: NURSE PRACTITIONER

## 2024-11-19 PROCEDURE — 99215 OFFICE O/P EST HI 40 MIN: CPT | Mod: PBBFAC | Performed by: NURSE PRACTITIONER

## 2024-11-19 PROCEDURE — 3078F DIAST BP <80 MM HG: CPT | Mod: CPTII,,, | Performed by: NURSE PRACTITIONER

## 2024-11-19 PROCEDURE — 1160F RVW MEDS BY RX/DR IN RCRD: CPT | Mod: CPTII,,, | Performed by: NURSE PRACTITIONER

## 2024-11-19 PROCEDURE — 4010F ACE/ARB THERAPY RXD/TAKEN: CPT | Mod: CPTII,,, | Performed by: NURSE PRACTITIONER

## 2024-11-19 PROCEDURE — 3074F SYST BP LT 130 MM HG: CPT | Mod: CPTII,,, | Performed by: NURSE PRACTITIONER

## 2024-11-19 PROCEDURE — 99214 OFFICE O/P EST MOD 30 MIN: CPT | Mod: S$PBB,,, | Performed by: NURSE PRACTITIONER

## 2024-11-19 NOTE — PROGRESS NOTES
Reason for Follow-up:  Iron-deficiency anemia   Vitamin B12 deficiency   Angiodysplasia of duodenum    History:  Past medical history:  B12 deficiency.  Anemia.  Iron-deficiency.  History of peripheral vascular disease, on dual antiplatelets.  Hypothyroidism.  CAD, S/P PCI.  Chronic hepatitis-B.  Chronic hepatitis-C. Depression.  GERD.  Right lower lung lobe nodule.  02/22/2023: Bilateral lower extremity stent placement, with resolution of bilateral lower extremity pain  Social history:  .  Lives with her brother in [place], [place].  No children.  Never became pregnant.  Disabled.  Does not work.  Has been smoking half a pack of cigarettes daily for 45 years.  Has been smoking marijuana every other day for 40 years.  No other illicit drugs.  No alcohol abuse.  Family history:  Negative for cancers or blood dyscrasias.  Health maintenance:  PCP at [facility].  -05/02/2023:  Bilateral screening mammogram (comparison:  12/03/2020 mammogram):  BI-RADS 2 (benign)  -02/24/2022: Stool for occult blood pos  -history of multiple colonoscopies and endoscopies: Angiodysplastic lesions in duodenum    Oncologic/Hematologic History:  Oncology History    No history exists.   59-year-old female, referred from Internal Medicine, for evaluation of anemia.      Admitted to [facility] 04/07/2023-04/08/2023:  57-year-old female with significant history of HTN, PVD status post intervention, hypothyroidism, CAD status post PCI, right lower lobe lung nodule, bipolar disorder, depression, GERD, chronic hepatitis-B/chronic hepatitis-C and iron deficiency anemia presented to the ED with complaints of fatigue, generalized weakness and outpatient labs revealing anemia.  Patient was referred to the ED for admission to further evaluate anemia.  On Plavix.  Patient had a colonoscopy 2.  Transfused with PRBC x1 unit.  No overt bleeding.  No history of alcohol use.  GI recommended to hold off on endoscopic evaluation.   Hemoccult-pos but numerous endoscopy procedures by Dr. Keyon Hatch, her GI, unrevealing in the past.  Patient on aspirin and Plavix which were continued.  Hemoglobin improved appropriately post PRBC x1 unit.  Iron deficient.  B12 deficient.  04/03/2023: Outside blood work showed ferritin 8.8.  B12 209.    History of iron-deficiency anemia.  History of B12 deficiency.  Admitted to [facility] 04/07/2023 with fatigue, weakness, dizziness.  Outside labs had shown anemia.  History of multiple endoscopies with Dr. Keyon Hatch, GI, with no source of GI bleed found.  04/03/2023: Blood work showed iron-deficiency, ferritin 8.8, B12 level 209.    History of duodenal angiodysplasias a couple of years ago    Blood transfusion in the past   EGD 11/2021: Hiatal hernia, GE ring, nonbleeding angioectasias   Colonoscopy 09/2021: Normal   EGD and colonoscopy 2022: Esophageal dilation, no bleeding, colon polyp    History of hepatitis-C: S/P treatment; S/P occlusive 2020; posttreatment viral load undetectable; FibroScan F1 (no liver cirrhosis)    09/09/2020: Limited abdominal ultrasound (comparison:  05/07/2013): Liver cirrhosis; no focal hepatic lesion    -12/16/2020: EGD (liver cirrhosis, rule out esophageal varices):  3 diminutive nonbleeding angiodysplastic lesions in the 2nd portion of duodenum  -09/22/2021:  Colonoscopy (indication:  Occult blood in stool; personal history of colon polyps): Normal mucosa in whole:  -11/15/2021: EGD (Keyon Hatch MD) (indication:  Dysphagia, oropharyngeal, nausea, heartburn, abdominal bloating, GI metaplasia, generalized abdominal pain):  Erythema lower 3rd of esophagus compatible with esophagitis; erythema antrum, compatible with gastritis; esophageal hiatal hernia; a single bleeding angiectasia seen in duodenal bulb, treated with monopolar cautery successfully (thermotherapy) (pathology:  Gastric antrum biopsy, reactive gastropathy with mild nonspecific chronic inflammation, negative for  intestinal metaplasia; negative for H pylori; gastric body biopsy, reactive gastropathy, mild nonspecific chronic inflammation, negative for intestinal metaplasia, negative for H pylori)    Labs reviewed:  Hemoglobin: 8.1 (05/16/2023); 9.0 (04/08/2023); 7.5 (04/07/2023); 6.8 (04/03/2023); 8.0 (03/13/2023); 11.6 (10/28/2022); 13.2 (10/03/2022), etc.   MCV: Consistently normal with mild macrocytosis on a couple of occasions  Rest of CBC unremarkable  05/16/2023: Serum iron 50.  TIBC 323.  Ferritin 16.8.  Transferrin saturation 15%.  B12 level 705.  Folate 38.8.    04/03/2023: Serum iron 8.  TIBC 344.  Ferritin 8.8.  Transferrin saturation 2%.  B12 level 209, low.  Folate 8.7   10/11/2022: Serum iron 69.  TIBC 292.  Ferritin 27.13.  Transferrin saturation 24%.  09/09/2020:  Ferritin 18.8, hemoglobin 9.8, MCV 93.7  Haptoglobin normal  04/03/2023:  Hemoglobin 6.8.  Retic count 1.89%.  LDH normal.  05/17/2023: Celiac serology negative    04/08/2023:  Hepatitis-C antibody pos (treated in the past with Epclusa, with a negative hepatitis-A viral load subsequently   09/09/2020:  Hepatitis-B core antibody total reactive.  Hepatitis-B surface antibody negative.  Hepatitis-B surface antigen negative.  HIV negative    05/16/2023:  RBC osmotic fragility normal  04/03/2023:  Hemoglobin electrophoresis:  No abnormal hemoglobin or beta thalassemia    Interval History     11/19/24  Ms. Burns presents today for follow-up. She received Feraheme x 2 doses (9/17/24 and 9/24/24). She tolerated the treatments well but still notes fatigue. She has intermittent dark tarry stools. She continues to f/u with GI for  angiodysplasia of small intestine, esophagitis, gastritis, esophageal hiatal hernia and internal hemorrhoids  She has shortness of breath with exertion. She is not taking any oral iron. She continues to f/u with Rheumatology for possible RA, and is currently on Arava 20 mg with folic acid 1 mg. They proposed for he to start  Plaquenil as well.       8/19/24:  Patient presented to the clinic today for a scheduled clinic visit to follow up on her diagnosis of Iron Deficiency Anemia. She has no acute complaints today. She reports feeling fatigue more than normal. She also reports having some shortness of breath. She denies any bleeding with elimination. However she reports having some dark stool for several months. She reports that she does have an appointment with GI scheduled in January 2025. She denies any bright red blood anywhere. She denies any weakness , she does report eating ice all day, as well as restless leg syndrome. Labwork was reviewed with the patient. All future appointments were discussed with the patient.     Review of Systems:   All systems reviewed and found to be negative except for the symptoms detailed above    Physical Examination:   VITAL SIGNS:   Vitals:    11/19/24 1114   BP: (!) 94/59   Pulse:    Temp:        Physical Exam  Vitals reviewed.   Constitutional:       Appearance: Normal appearance.   HENT:      Head: Normocephalic and atraumatic.      Mouth/Throat:      Mouth: Mucous membranes are moist.   Cardiovascular:      Rate and Rhythm: Normal rate and regular rhythm.      Pulses: Normal pulses.      Heart sounds: Normal heart sounds.   Pulmonary:      Effort: Pulmonary effort is normal.      Breath sounds: Normal breath sounds.   Abdominal:      General: Bowel sounds are normal.      Palpations: Abdomen is soft.   Skin:     General: Skin is warm and dry.   Neurological:      Mental Status: She is alert and oriented to person, place, and time.   Psychiatric:         Mood and Affect: Mood normal.         Behavior: Behavior normal.         Thought Content: Thought content normal.         Judgment: Judgment normal.        Assessment:  Iron-deficiency anemia, B12 deficiency anemia:  (Multiple angioectasias in duodenal, without bleeding; multiple angioectasias in jejunum, without bleeding; multiple angioectasias  in ileum, without bleeding):  -chronic anemia, ranging between 6.8-8.1; was normal in October 2022  -chronic iron-deficiency  -PRBC x1 unit 04/03/2023 for hemoglobin 6.8  -Celiac serology negative 05/17/2023  -RBC osmotic fragility normal 05/16/2023  -no hemoglobinopathy on hemoglobin electrophoresis 04/03/2023  -found to be B12 deficient when hospitalized 04/07/2023-04/08/2023 with symptomatic anemia, requiring PRBC x1 unit  -Ferrlecit 125 mg IV x2 (02/20/2023, 04/08/2023)  -12/16/2020: EGD:  No esophageal varices; 3 diminutive nonbleeding angiodysplastic lesions 2nd portion of duodenum   -11/15/2021: EGD:  Esophagitis, gastritis, a single bleeding angiectasia duodenal bulb treated with cautery (thermotherapy)  -09/22/2021: Colonoscopy:  Occult blood in stool: Normal  -EGD and colonoscopy 2022: Colon polyp  -no response to oral iron therapy x2-3 years; remained iron-deficiency  -S/P Feraheme 510 mg IV x2 (06/05/2023, 06/12/2023), with good response (hemoglobin improved to 12.3 and ferritin 122.03 on 07/10/2023  -07/12/2023:  Patient desired to have B12 shots rather than pills because she can not afford the pills  -08/15/2023: Video capsule endoscopy: Multiple angioectasias without bleeding in the duodenum; multiple angioectasias without bleeding in the jejunum; multiple angioectasias without bleeding in the ileum  -09/11/2023:  Hemoglobin 11.1, stable  -11/14/2023:  Stool occult blood positive  -02/08/2024:  Hemoglobin 11.3 (was 9.8 on 12/13/2023); ferritin 43.48 (was 54.49 on 12/11/2023)  -03/11/2024:  Hemoglobin 9.6, down from 11.3 on 02/08/2024; ferritin level is pending; transferrin saturation 13%, remains low    Vitamin B12 deficiency:  -diagnosed 04/03/2023: B12 level 209  -05/22/2023: Tells me that she has been taking 2 vitamin B12 pills for last 1 month.  -05/22/2023: Vitamin B12 level 521 (absorbing satisfactorily via oral route).   Intrinsic factor antibody negative.    Homocystine level 4.6 micromoles  per L, suppressed (patient already on oral B12 supplementation).  Methylmalonic acid level 0.12 nanomoles /ml, normal (patient already on oral vitamin B12 supplementation, with adequate absorption)  -07/12/2023:  Patient desired to have B12 shots rather than pills because she can not afford the pills      History of hepatitis-C and liver cirrhosis:  -treated with Epclusa in 2020   -Posttreatment viral load undetectable  -FibroScan F 1 (no liver cirrhosis)  -ultrasound 09/09/2020: Liver cirrhosis     -EGD 12/16/2020:  No esophageal varices      Plan:   Iron-deficiency anemia:   Continue vitamin B12 1000 mcg IM every month   Follow-up with Pulmonary: Repeat CT chest without contrast in November 25, 2024  H/H today is 9.8/29.9  Iron sat is low at 10%  Ferrtin level today is 30  Will need another dose of Feraheme 510 x 2 doses- orders placed   RTC in 2 mos with labs prior (CBC/CMP/TIBC/Ferrtin level) post completion of iron infusion       Rectal Bleeding:   Continue f/u with GI (Berta Mendes)   EGD with push enteroscopy awaiting cardiac clearance  H pylori stool antigen-Negative   Abdominal ultrasound 10/1/24-Atherosclerotic disease.Midline fat containing hernia.  Internal hemorrhoids- GI suggested banding    History of multiple angioectasias:   Follow-up with GI for history of multiple angioectasias in intestinal tract    -chronic iron-deficiency anemia  -transfused in the past   -no response to oral iron therapy which she was taking for 2-3 years  -history of nonbleeding angiodysplastic lesions on EGD 12/16/2020, 11/15/2021; S/P endoscopic thermotherapy 11/15/2021  -treated with Feraheme x2 in 06/2023  -found to have multiple angioectasias without bleeding in duodenum, jejunum, and in the ileum on video capsule endoscopy 08/15/2023  -11/14/2023:  Stool occult blood positive (as expected with multiple angioectasias)  -12/11/2023:  Hemoglobin 10.0 (was 11.1 on 09/11/2023, 11.3 on 08/14/2023, 12.3 on 07/10/2023, etc).;  ferritin 54.49 (was 18.98 on 09/11/2023); B12 level 435   -02/08/2024:  Hemoglobin 11.3 (was 9.8 on 12/13/2023); ferritin 43.48 (was 54.49 on 12/11/2023)  -03/11/2024:  Hemoglobin 9.6, down from 11.3 on 02/08/2024; ferritin level is pending; transferrin saturation 13%, remains low  >>>  -hemoglobin declining; remains iron-deficient  -Previously taking iron pill 3 X a week; has failed oral iron therapy; advised her to discontinue iron pills  -proceed with intravenous iron therapy with Feraheme 510 mg IV x2, administered a week apart  -assess response with repeat CBC, serum iron, TIBC, ferritin in 6 weeks  -follow-up with GI for history of multiple angioectasias in intestinal tract    Found to be vitamin B12 deficient on labs 04/03/2023   -05/22/2023: Stated that she has been taking 2 vitamin B12 pills for 1 month   -05/22/2023:  B12 level normalized, 521, with oral B12 therapy; intrinsic factor antibody negative; homocystine level suppressed; methylmalonic acid level normal (the labs were checked while she was already on oral B12 therapy)  -09/11/2023:  For last couple she has discontinued B12 pills because she could not afford   -09/11/2023:  For last 2 months, her sister-in-law has been administering her monthly B12 shots;   -12/11/2023:  Hemoglobin 10.0 (was 11.1 on 09/11/2023, 11.3 on 08/14/2023, 12.3 on 07/10/2023, etc).; ferritin 54.49 (was 18.98 on 09/11/2023); B12 level 435   >>>  Continue vitamin B12 1000 mcg IM (per her preference) every month; her sister in law administers the shots    From Hematology perspective, we will monitor iron and B12 deficiency anemia, and intervene to replete any deficiency if and when necessary    Subcentimeter lung nodules  -noncontrast chest CT 11/20/2023:  Stable subcentimeter lung nodules  -12/04/2023:  Pulmonary follow-up: Repeat CT chest 11/25/24    Above discussed at length with the patient.  All questions answered.    Discussed labs and gave her copies of relevant  reports.    Plan of management discussed.    She understands and agrees with this plan.    Follow-up:  Follow up in about 2 months (around 1/19/2025) for MD + lab, iron infusion upon approval .

## 2024-11-25 ENCOUNTER — HOSPITAL ENCOUNTER (OUTPATIENT)
Dept: RADIOLOGY | Facility: HOSPITAL | Age: 59
Discharge: HOME | End: 2024-11-25
Attending: HOSPITALIST
Payer: MEDICAID

## 2024-11-25 DIAGNOSIS — R91.8 MULTIPLE LUNG NODULES: ICD-10-CM

## 2024-11-25 PROCEDURE — 71250 CT THORAX DX C-: CPT | Mod: TC
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
## 2024-11-26 ENCOUNTER — OFFICE VISIT (OUTPATIENT)
Dept: OTOLARYNGOLOGY | Facility: CLINIC | Age: 59
End: 2024-11-26
Payer: MEDICAID

## 2024-11-26 VITALS — SYSTOLIC BLOOD PRESSURE: 99 MMHG | HEART RATE: 74 BPM | DIASTOLIC BLOOD PRESSURE: 63 MMHG | TEMPERATURE: 98 F

## 2024-11-26 DIAGNOSIS — Z96.22 PATENT PRESSURE EQUALIZATION (PE) TUBE: ICD-10-CM

## 2024-11-26 DIAGNOSIS — R49.0 HOARSENESS: ICD-10-CM

## 2024-11-26 DIAGNOSIS — H90.6 MIXED CONDUCTIVE AND SENSORINEURAL HEARING LOSS OF BOTH EARS: Primary | ICD-10-CM

## 2024-11-26 DIAGNOSIS — H61.23 BILATERAL IMPACTED CERUMEN: ICD-10-CM

## 2024-11-26 PROCEDURE — 99214 OFFICE O/P EST MOD 30 MIN: CPT | Mod: PBBFAC | Performed by: NURSE PRACTITIONER

## 2024-11-26 PROCEDURE — 69210 REMOVE IMPACTED EAR WAX UNI: CPT | Mod: 50,PBBFAC | Performed by: NURSE PRACTITIONER

## 2024-11-26 RX ORDER — IBUPROFEN 200 MG
TABLET ORAL
COMMUNITY
Start: 2024-11-11

## 2024-11-26 RX ORDER — FAMOTIDINE 40 MG/1
40 TABLET, FILM COATED ORAL NIGHTLY
Qty: 90 TABLET | Refills: 3 | Status: SHIPPED | OUTPATIENT
Start: 2024-11-26 | End: 2025-11-26

## 2024-12-02 ENCOUNTER — PATIENT MESSAGE (OUTPATIENT)
Dept: RHEUMATOLOGY | Facility: CLINIC | Age: 59
End: 2024-12-02
Payer: MEDICAID

## 2024-12-02 ENCOUNTER — OFFICE VISIT (OUTPATIENT)
Dept: PULMONOLOGY | Facility: CLINIC | Age: 59
End: 2024-12-02
Payer: MEDICAID

## 2024-12-02 ENCOUNTER — INFUSION (OUTPATIENT)
Dept: INFUSION THERAPY | Facility: HOSPITAL | Age: 59
End: 2024-12-02
Attending: INTERNAL MEDICINE
Payer: MEDICAID

## 2024-12-02 VITALS
TEMPERATURE: 98 F | HEIGHT: 59 IN | WEIGHT: 122.19 LBS | DIASTOLIC BLOOD PRESSURE: 82 MMHG | BODY MASS INDEX: 24.63 KG/M2 | OXYGEN SATURATION: 99 % | SYSTOLIC BLOOD PRESSURE: 128 MMHG | RESPIRATION RATE: 18 BRPM | HEART RATE: 83 BPM

## 2024-12-02 VITALS
HEIGHT: 59 IN | SYSTOLIC BLOOD PRESSURE: 156 MMHG | DIASTOLIC BLOOD PRESSURE: 80 MMHG | OXYGEN SATURATION: 100 % | TEMPERATURE: 98 F | HEART RATE: 85 BPM | WEIGHT: 121.5 LBS | BODY MASS INDEX: 24.49 KG/M2 | RESPIRATION RATE: 20 BRPM

## 2024-12-02 DIAGNOSIS — R91.8 MULTIPLE PULMONARY NODULES: Primary | ICD-10-CM

## 2024-12-02 DIAGNOSIS — D50.0 IRON DEFICIENCY ANEMIA DUE TO CHRONIC BLOOD LOSS: Primary | ICD-10-CM

## 2024-12-02 PROCEDURE — 3079F DIAST BP 80-89 MM HG: CPT | Mod: CPTII,,, | Performed by: INTERNAL MEDICINE

## 2024-12-02 PROCEDURE — 3008F BODY MASS INDEX DOCD: CPT | Mod: CPTII,,, | Performed by: INTERNAL MEDICINE

## 2024-12-02 PROCEDURE — 99214 OFFICE O/P EST MOD 30 MIN: CPT | Mod: S$PBB,,, | Performed by: INTERNAL MEDICINE

## 2024-12-02 PROCEDURE — 96374 THER/PROPH/DIAG INJ IV PUSH: CPT

## 2024-12-02 PROCEDURE — 25000003 PHARM REV CODE 250: Performed by: NURSE PRACTITIONER

## 2024-12-02 PROCEDURE — 4010F ACE/ARB THERAPY RXD/TAKEN: CPT | Mod: CPTII,,, | Performed by: INTERNAL MEDICINE

## 2024-12-02 PROCEDURE — 63600175 PHARM REV CODE 636 W HCPCS: Mod: JZ,JG | Performed by: NURSE PRACTITIONER

## 2024-12-02 PROCEDURE — 1159F MED LIST DOCD IN RCRD: CPT | Mod: CPTII,,, | Performed by: INTERNAL MEDICINE

## 2024-12-02 PROCEDURE — 99215 OFFICE O/P EST HI 40 MIN: CPT | Mod: PBBFAC,25

## 2024-12-02 PROCEDURE — 3074F SYST BP LT 130 MM HG: CPT | Mod: CPTII,,, | Performed by: INTERNAL MEDICINE

## 2024-12-02 RX ORDER — SODIUM CHLORIDE 0.9 % (FLUSH) 0.9 %
10 SYRINGE (ML) INJECTION
OUTPATIENT
Start: 2024-12-03

## 2024-12-02 RX ORDER — SODIUM CHLORIDE 0.9 % (FLUSH) 0.9 %
10 SYRINGE (ML) INJECTION
Status: CANCELLED | OUTPATIENT
Start: 2024-12-02

## 2024-12-02 RX ORDER — SODIUM CHLORIDE 0.9 % (FLUSH) 0.9 %
10 SYRINGE (ML) INJECTION
Status: DISCONTINUED | OUTPATIENT
Start: 2024-12-02 | End: 2024-12-02 | Stop reason: HOSPADM

## 2024-12-02 RX ORDER — HEPARIN 100 UNIT/ML
500 SYRINGE INTRAVENOUS
OUTPATIENT
Start: 2024-12-03

## 2024-12-02 RX ORDER — HEPARIN 100 UNIT/ML
500 SYRINGE INTRAVENOUS
Status: CANCELLED | OUTPATIENT
Start: 2024-12-02

## 2024-12-02 RX ORDER — HEPARIN 100 UNIT/ML
500 SYRINGE INTRAVENOUS
Status: DISCONTINUED | OUTPATIENT
Start: 2024-12-02 | End: 2024-12-02 | Stop reason: HOSPADM

## 2024-12-02 RX ADMIN — FERUMOXYTOL 510 MG: 510 INJECTION INTRAVENOUS at 10:12

## 2024-12-02 NOTE — NURSING
Arrive for C1D1 Feraheme q wk c/o of generalized pain level 7/10 takes home medications for relief of pain.

## 2024-12-02 NOTE — PROGRESS NOTES
[facility] lung mass clinic    Subjective:        Chief Complaint: No chief complaint on file.      HPI: Sandra Burns is a 59 y.o. female.  She is followed through [facility] lung mass clinic with multiple solid pulmonary nodules, initial visit 02/2023.  She has a history of rheumatoid arthritis (on chronic Plaquenil), chronic iron-deficiency anemia, coronary artery disease, peripheral arterial disease, hypertension, and B12 deficiency.  She has a long history of smoking greater than 40 pack years, continues smoking around 1/2 pack per day.  She has no history of malignancy, no family history of lung cancer.    She presents back today for her scheduled [facility] [place] [place] follow-up after CT of the chest.  She admits to chronic cough, unchanged over baseline.      Current Outpatient Medications   Medication Instructions    ALPRAZolam (XANAX) 0.5 mg, 2 times daily PRN    amLODIPine (NORVASC) 5 mg, Daily    buPROPion (WELLBUTRIN SR) 150 MG TBSR 12 hr tablet 1 tablet, Daily    carvediloL (COREG) 6.25 mg, Oral, 2 times daily with meals    clopidogreL (PLAVIX) 75 mg, Oral, Daily    DULoxetine (CYMBALTA) 30 mg, Oral, Nightly    DULoxetine (CYMBALTA) 60 mg, Every morning    DULoxetine (CYMBALTA) 30 mg, Daily    famotidine (PEPCID) 40 mg, Oral, Nightly    folic acid (FOLVITE) 1 mg, Oral, Daily    gabapentin (NEURONTIN) 600 mg, Oral, 3 times daily    hydroxychloroquine (PLAQUENIL) 200 mg, Oral, Daily, After food    leflunomide (ARAVA) 20 mg, Oral, Daily    lisinopriL 10 mg, Oral, Daily    loperamide (IMODIUM) 2 mg capsule Take 2 tablets in the morning and 1 tablet after every loose stool, no more than 6 tablets a day    mirtazapine (REMERON) 15 MG tablet     nicotine (NICODERM CQ) 21 mg/24 hr SMARTSIG:Topical    nitroGLYCERIN (NITROSTAT) 0.4 MG SL tablet 1 tablet, Every 5 min PRN    pantoprazole (PROTONIX) 40 mg, Oral, Daily    PEPTO-BISMOL 262 mg Tab 2 tablets, 4 times daily    predniSONE (DELTASONE) 5 MG tablet Take 2 tab po qd x 3 days then take  1 tab po qd x 2 days prn RA flare.    rosuvastatin (CRESTOR) 40 mg, Daily    traZODone (DESYREL) 450 mg, Nightly            No data to display                Objective:   LMP  (LMP Unknown)     Physical Exam  Constitutional:       Appearance: Normal appearance.   Cardiovascular:      Rate and Rhythm: Normal rate and regular rhythm.   Pulmonary:      Breath sounds: No wheezing, rhonchi or rales.   Abdominal:      General: Bowel sounds are normal.   Lymphadenopathy:      Cervical: No cervical adenopathy.   Neurological:      Mental Status: She is alert and oriented to person, place, and time.         Lab Results   Component Value Date/Time     11/25/2024 10:16 AM    K 5.0 11/25/2024 10:16 AM     11/25/2024 10:16 AM    CO2 29 11/25/2024 10:16 AM    BUN 9.5 (L) 11/25/2024 10:16 AM    CREATININE 0.68 11/25/2024 10:16 AM    CALCIUM 9.4 11/25/2024 10:16 AM    MG 1.80 08/10/2024 10:17 AM    AST 15 11/25/2024 10:16 AM    ALT 10 11/25/2024 10:16 AM    ALKPHOS 69 11/25/2024 10:16 AM    ALBUMIN 3.4 11/25/2024 10:16 AM    INR 0.9 08/27/2024 01:08 PM     Lab Results   Component Value Date/Time    WBC 4.63 11/25/2024 10:16 AM    HGB 9.6 (L) 11/25/2024 10:16 AM    HCT 32.0 (L) 11/25/2024 10:16 AM     11/25/2024 10:16 AM    MCV 94.7 (H) 11/25/2024 10:16 AM    RDW 14.5 11/25/2024 10:16 AM     CT chest (11/25/2024, my reading of images):  Study is performed without IV contrast.  No obvious mediastinal adenopathy, although lack of IV contrast limits full mediastinal assessment.  There is an approximate 4 mm right upper lobe solid pulmonary nodule (axial image 27), unchanged in size and contour back to CT chest 10/03/2022.  There is an irregular approximate 5 mm solid pulmonary nodule right lower lobe (axial image 54), decreased in size from 8 mm on CT chest 11/20/2023, and 10 mm by CT chest 10/03/2022.  No pleural effusions.      Assessment:     Multiple solid pulmonary nodules:  5 mm solid nodule right lower lobe,  significantly decreased in size in comparison to serial CTs of chest back to 10/03/2022.  This appears consistent with benign etiology.  4 mm right upper lobe solid pulmonary nodule, unchanged in comparison to CT chest 10/03/2022, consistent with benign etiology.  Long history of smoking, continues smoking around 1/2 pack per day.  Rheumatoid arthritis on chronic Plaquenil    Plan:     I have discussed the above findings in detail with patient today.  I have explained that her multiple pulmonary nodules are now stable for greater than 2 years, consistent with benign etiology.  I have explained importance of complete smoking cessation, and have offered referral to smoking cessation clinic  No further scheduled CTs are indicated at this point forward.  She was a candidate to consider yearly LDCT lung cancer screening protocol, which can be obtained through her primary physician.      Katie Nix MD, [facility] lung mass clinic

## 2024-12-04 ENCOUNTER — TELEPHONE (OUTPATIENT)
Dept: RHEUMATOLOGY | Facility: CLINIC | Age: 59
End: 2024-12-04
Payer: MEDICAID

## 2024-12-04 NOTE — TELEPHONE ENCOUNTER
Placed a tc with no answer to communicate results/ message left to patient portal.  Awaiting  a rtc at this time.    2nd attempt

## 2024-12-06 NOTE — TELEPHONE ENCOUNTER
Placed a tc to patient requesting a rtc to discuss message sent via patient portal. No answer to both telephone number.     Will send patient a letter to discuss message left via patient portal.

## 2024-12-09 ENCOUNTER — DOCUMENTATION ONLY (OUTPATIENT)
Dept: INFUSION THERAPY | Facility: HOSPITAL | Age: 59
End: 2024-12-09
Payer: MEDICAID

## 2024-12-17 ENCOUNTER — DOCUMENTATION ONLY (OUTPATIENT)
Dept: INFUSION THERAPY | Facility: HOSPITAL | Age: 59
End: 2024-12-17
Payer: MEDICAID

## 2024-12-27 ENCOUNTER — INFUSION (OUTPATIENT)
Dept: INFUSION THERAPY | Facility: HOSPITAL | Age: 59
End: 2024-12-27
Attending: INTERNAL MEDICINE
Payer: MEDICAID

## 2024-12-27 VITALS
SYSTOLIC BLOOD PRESSURE: 157 MMHG | HEIGHT: 59 IN | HEART RATE: 81 BPM | WEIGHT: 117.75 LBS | BODY MASS INDEX: 23.74 KG/M2 | RESPIRATION RATE: 20 BRPM | TEMPERATURE: 98 F | OXYGEN SATURATION: 97 % | DIASTOLIC BLOOD PRESSURE: 80 MMHG

## 2024-12-27 DIAGNOSIS — D50.0 IRON DEFICIENCY ANEMIA DUE TO CHRONIC BLOOD LOSS: Primary | ICD-10-CM

## 2024-12-27 PROCEDURE — 63600175 PHARM REV CODE 636 W HCPCS: Mod: JG | Performed by: NURSE PRACTITIONER

## 2024-12-27 PROCEDURE — 96374 THER/PROPH/DIAG INJ IV PUSH: CPT

## 2024-12-27 PROCEDURE — 25000003 PHARM REV CODE 250: Performed by: NURSE PRACTITIONER

## 2024-12-27 RX ORDER — SODIUM CHLORIDE 0.9 % (FLUSH) 0.9 %
10 SYRINGE (ML) INJECTION
Status: DISCONTINUED | OUTPATIENT
Start: 2024-12-27 | End: 2024-12-27 | Stop reason: HOSPADM

## 2024-12-27 RX ORDER — HEPARIN 100 UNIT/ML
500 SYRINGE INTRAVENOUS
Status: DISCONTINUED | OUTPATIENT
Start: 2024-12-27 | End: 2024-12-27 | Stop reason: HOSPADM

## 2024-12-27 RX ADMIN — FERUMOXYTOL 510 MG: 510 INJECTION INTRAVENOUS at 10:12

## 2024-12-30 ENCOUNTER — OFFICE VISIT (OUTPATIENT)
Dept: INTERNAL MEDICINE | Facility: CLINIC | Age: 59
End: 2024-12-30
Payer: MEDICAID

## 2024-12-30 VITALS
HEIGHT: 59 IN | TEMPERATURE: 99 F | BODY MASS INDEX: 23.42 KG/M2 | DIASTOLIC BLOOD PRESSURE: 70 MMHG | WEIGHT: 116.19 LBS | OXYGEN SATURATION: 97 % | SYSTOLIC BLOOD PRESSURE: 112 MMHG | HEART RATE: 80 BPM | RESPIRATION RATE: 16 BRPM

## 2024-12-30 DIAGNOSIS — M06.9 RHEUMATOID ARTHRITIS, INVOLVING UNSPECIFIED SITE, UNSPECIFIED WHETHER RHEUMATOID FACTOR PRESENT: Primary | ICD-10-CM

## 2024-12-30 PROCEDURE — 99215 OFFICE O/P EST HI 40 MIN: CPT | Mod: PBBFAC

## 2024-12-30 RX ORDER — PREGABALIN 75 MG/1
75 CAPSULE ORAL 2 TIMES DAILY
COMMUNITY

## 2024-12-30 RX ORDER — CETIRIZINE HYDROCHLORIDE 10 MG/1
10 TABLET ORAL DAILY PRN
COMMUNITY

## 2024-12-30 RX ORDER — ACETAMINOPHEN 500 MG
500 TABLET ORAL EVERY 6 HOURS PRN
COMMUNITY

## 2024-12-30 NOTE — PROGRESS NOTES
INTERNAL MEDICINE RESIDENT CLINIC  CLINIC NOTE    Patient Name: Sandra Burns  YOB: 1965    PRESENTING HISTORY       History of Present Illness:  Ms. Sandra Burns is a 59 y.o. female w/ an active medical problem list including Bipolar Disorder, Depression, Chronic pain, GERD, HCV, HBV, HTN. She is presnting today to [facility] for follow up appointment. She had recent hospital stay for symptomatic anemia.     Ms. Burns was hospitalized for symptomatic anemia, combination of iron-deficiency and B12 deficiency. She has a history of GI bleeds in past but had recent EGD and c-scope with Dr. Hatch in [place] which were normal. I spoke with the NP over the phone just prior to the patient's hospitlization and there was consideration for small-capsule endoscopy. Inpatient GI wanted to wait on hematology evaluation prior to performing pill endoscopy. So, she was transfused and discharged on iron, B12, folate with outpatient f/u with hematology (has appointment today).     Today, complained about social stress, she said she will leave her brother's house, she doesn't know where to go, she denies being depressed, she states that she is aggravated and anxious, she was crying in the room, she said she is seeing a psychiatrist.      12/30/24: patient is complaining of right forearm pain. Stated she talked to rheumatologist but she did not give her pain meds. She stated she has been talking to her surgeon and cards about her hernia. Denies any pain today.       PS[facility]  -no alcohol use; smokes 1ppd for 41 years, no drug use    PFH  -ESRD and heart failure      Review of Systems   Constitutional:  Negative for chills and fever.   Respiratory:  Negative for cough and shortness of breath.    Cardiovascular:  Negative for chest pain, palpitations and leg swelling.   Gastrointestinal:  Negative for abdominal pain, blood in stool, constipation, diarrhea, heartburn, melena, nausea and vomiting.    Musculoskeletal:  Positive for joint pain.     Constitutional: no fever/chills  EENT: no sore throat, ear pain, sinus pain/congestion, nasal congestion/drainage  Respiratory: no cough, no wheezing, no shortness of breath  Cardiovascular: no chest pain, no palpitations, no edema  Gastrointestinal: as above  Genitourinary: no dysuria, no urinary frequency or urgency, no hematuria  Integumentary: no skin rash or abnormal lesion  Neurologic: no headache, no dizziness, no weakness or numbness  MSK: as above      MEDICATIONS & ALLERGIES:     Current Outpatient Medications on File Prior to Visit   Medication Sig    acetaminophen (TYLENOL) 500 MG tablet Take 500 mg by mouth every 6 (six) hours as needed for Pain.    ALPRAZolam (XANAX) 0.5 MG tablet Take 0.5 mg by mouth 2 (two) times daily as needed for Anxiety (anxiety).    amLODIPine (NORVASC) 5 MG tablet Take 5 mg by mouth once daily.    carvediloL (COREG) 6.25 MG tablet Take 1 tablet (6.25 mg total) by mouth 2 (two) times daily with meals.    cetirizine (ZYRTEC) 10 MG tablet Take 10 mg by mouth daily as needed for Allergies.    DULoxetine (CYMBALTA) 30 MG capsule Take 30 mg by mouth once daily.    DULoxetine (CYMBALTA) 60 MG capsule Take 60 mg by mouth every morning.    famotidine (PEPCID) 40 MG tablet Take 1 tablet (40 mg total) by mouth every evening.    folic acid (FOLVITE) 1 MG tablet Take 1 tablet (1 mg total) by mouth once daily.    gabapentin (NEURONTIN) 600 MG tablet Take 1 tablet (600 mg total) by mouth 3 (three) times daily. (Patient taking differently: Take 600 mg by mouth as needed.)    hydroxychloroquine (PLAQUENIL) 200 mg tablet Take 1 tablet (200 mg total) by mouth once daily. After food    leflunomide (ARAVA) 20 MG Tab Take 1 tablet (20 mg total) by mouth once daily.    lisinopriL 10 MG tablet Take 1 tablet (10 mg total) by mouth once daily.    nitroGLYCERIN (NITROSTAT) 0.4 MG SL tablet Place 1 tablet under the tongue every 5 (five) minutes as needed.  "   pantoprazole (PROTONIX) 40 MG tablet Take 1 tablet (40 mg total) by mouth once daily.    pregabalin (LYRICA) 75 MG capsule Take 75 mg by mouth 2 (two) times daily.    rosuvastatin (CRESTOR) 40 MG Tab Take 40 mg by mouth once daily.    traZODone (DESYREL) 300 MG tablet Take 450 mg by mouth nightly.    buPROPion (WELLBUTRIN SR) 150 MG TBSR 12 hr tablet Take 1 tablet by mouth once daily. (Patient not taking: Reported on 12/30/2024)    clopidogreL (PLAVIX) 75 mg tablet Take 1 tablet (75 mg total) by mouth once daily.    DULoxetine (CYMBALTA) 30 MG capsule Take 1 capsule (30 mg total) by mouth every evening.    loperamide (IMODIUM) 2 mg capsule Take 2 tablets in the morning and 1 tablet after every loose stool, no more than 6 tablets a day (Patient not taking: Reported on 8/19/2024)    mirtazapine (REMERON) 15 MG tablet  (Patient not taking: Reported on 8/19/2024)    nicotine (NICODERM CQ) 21 mg/24 hr SMARTSIG:Topical (Patient not taking: Reported on 12/30/2024)    PEPTO-BISMOL 262 mg Tab Take 2 tablets by mouth 4 (four) times daily. (Patient not taking: Reported on 8/19/2024)    predniSONE (DELTASONE) 5 MG tablet Take 2 tab po qd x 3 days then take 1 tab po qd x 2 days prn RA flare. (Patient not taking: Reported on 12/30/2024)     Current Facility-Administered Medications on File Prior to Visit   Medication    acetaminophen tablet 650 mg    ferric gluconate 62.5 mg/5 mL injection 125 mg       Review of patient's allergies indicates:   Allergen Reactions    Benadryl itch relief Other (See Comments)     RESTLESS LEGS         OBJECTIVE:   Vital Signs:  There were no vitals filed for this visit.  Vitals:    12/30/24 1429   BP: 112/70   BP Location: Right arm   Patient Position: Sitting   Pulse: 80   Resp: 16   Temp: 98.7 °F (37.1 °C)   TempSrc: Oral   SpO2: 97%   Weight: 52.7 kg (116 lb 3.2 oz)   Height: 4' 11" (1.499 m)          No results found for this or any previous visit (from the past 24 hours).      Physical " Exam    General - Appears comfortable, appropriatley conversive   Mental Status - alert and oriented x 3, speaking in logical, relevant sentences   HEENT - no rhinorrhea   Cardiac - RRR, no murmurs, rubs, or gallops; no edema in LE   Respiratory - breathing comfortably; clear to ascultation bilaterally   Abdominal - nondistended, soft, nontender to palpation   Extremities - tender in PIP of left 4'th digit. Some tenderness over a couple MCP's on left hand. Slightly tender over 5th MTP of left foot. Has some swelling near left wrist  Skin - no rashes or bruises seen on skin        ASSESSMENT & PLAN:     Symptomatic Anemia  -reticulocyte count normal  -folate, B12 levels were low - has been on oral supplements with normalization of levels  -Contiue B12 and folate 1 tablet daily  -continue iron supplement every-other-day.   -Doing IV iron infusions with  [facility] hematology   -underwent capsule endoscopy with GI and is undergoing EGD again on march 2025  -remainder of anemia workup is normal    Joint Pains  Seems combined osteoarthritis and rheumatoid arthritis  Right forearm pain   -labwork is positive for RF and MALIKA. Negative CCP and ds-DNA. Am ordering additional autoimmune labs today  -Following up with Dr. Marsh  -Gave her a course of prednisone 20 for 5 days with no improvement, avoiding long periods due to risk of GI bleed  -Continue Arava 20 mg  - will get xray of R forearm today     Hoarseness  Has worsened symptoms  Claims to have laryngoscopy 2 years ago  Still smoking  Seeing ENT     BLE Claudication  -Got bilateral lower extremity stents. Is compliant with Plavix.    GI bleed  -Had pill camera showing telangiactacias  -avoid NSAIDS and steroids  -Started protonix     Osteopenia  -DEXA 2/2023 - FRAX score 0.8% and 8.4% for hip and major osteoporotic fracture. No need for bisphosphonates  -low vitamin D in April/2023, started her on vitamin D supplement last visit    CAD  -Scheduled PCI x1 for 90% stenosis in  2013 for stable angina  -Has GRANGER - following [facility]  - Ordered TTE for symptoms concerning for CHF but pending      HCV s/p treatment  -s/p Epclusa treatment 2020; post-treatment viral load undetectable  -Fibroscan F1 so doesn't have cirrhosis  -no longer follows ID clinic since she was cured    Hx positive HBV core antibody  -Hx HBV Core IgG positive with negative surface antigen - suggests prior infection, not active    Tobacco abuse  -has dyspnea - she says she had PFT done at [facility] but I don't seen the records. I will call [facility]  -will discuss nicotine patches in future      RTC in 3 months    Ashu Guajardo, DO   Internal Medicine PGY-3      Xray forearm ordered  Follow with GI   Refused vaccines today

## 2024-12-31 NOTE — PROGRESS NOTES
Attending Addendum:   Patient seen and examined in clinic. Management and Plan were discussed with resident. Care was reasonable and necessary.   Peg Tsang MD  [facility] - Internal Medicine

## 2025-01-06 ENCOUNTER — TELEPHONE (OUTPATIENT)
Dept: HEMATOLOGY/ONCOLOGY | Facility: CLINIC | Age: 60
End: 2025-01-06
Payer: MEDICAID

## 2025-01-06 PROBLEM — R91.8 LUNG NODULES: Status: ACTIVE | Noted: 2025-01-06

## 2025-01-07 ENCOUNTER — TELEPHONE (OUTPATIENT)
Dept: HEMATOLOGY/ONCOLOGY | Facility: CLINIC | Age: 60
End: 2025-01-07
Payer: MEDICAID

## 2025-01-10 ENCOUNTER — PATIENT MESSAGE (OUTPATIENT)
Dept: AUDIOLOGY | Facility: HOSPITAL | Age: 60
End: 2025-01-10
Payer: MEDICAID

## 2025-01-13 ENCOUNTER — TELEPHONE (OUTPATIENT)
Dept: INTERNAL MEDICINE | Facility: CLINIC | Age: 60
End: 2025-01-13
Payer: MEDICAID

## 2025-01-13 DIAGNOSIS — M79.7 FIBROMYALGIA: Primary | ICD-10-CM

## 2025-01-13 NOTE — TELEPHONE ENCOUNTER
----- Message from Bethany sent at 1/13/2025 10:44 AM CST -----  Regarding: Dr. Dobbs-Refill Request  Good morning, patient called in stating during her LOV on 12/30/24 with Dr. Guajardo she requested a refill of Lyrica but just called her pharmacy and it's not there. Patient is requesting the medication for Fibromyalgia, neck,shoulder, and right hand pain. Patient would also like the Lyrica to be increased from 25 mg because she has to take four at a time in order to have some pain relief. RTC is scheduled for 05/21/25. Thank you

## 2025-01-15 ENCOUNTER — TELEPHONE (OUTPATIENT)
Dept: GASTROENTEROLOGY | Facility: CLINIC | Age: 60
End: 2025-01-15
Payer: MEDICAID

## 2025-01-15 ENCOUNTER — TELEPHONE (OUTPATIENT)
Dept: INTERNAL MEDICINE | Facility: CLINIC | Age: 60
End: 2025-01-15
Payer: MEDICAID

## 2025-01-15 RX ORDER — PREGABALIN 75 MG/1
75 CAPSULE ORAL 2 TIMES DAILY
Qty: 60 CAPSULE | Refills: 2 | Status: SHIPPED | OUTPATIENT
Start: 2025-01-15 | End: 2025-04-15

## 2025-01-15 NOTE — TELEPHONE ENCOUNTER
----- Message from Chey sent at 1/15/2025 10:03 AM CST -----  Regarding: Cancel Egd  1/15/25  Pt requested to cancel  EGD .

## 2025-01-15 NOTE — TELEPHONE ENCOUNTER
IF POSSIBLE CAN SOMEONE PLEASE ADDRESS PT PAIN & REFILL REQUEST.  SHE STATED THAT SHE WOULD LIKE HER LYRICA DOSE TITRATED.  PLEASE ADVISE.

## 2025-01-16 ENCOUNTER — CLINICAL SUPPORT (OUTPATIENT)
Dept: AUDIOLOGY | Facility: HOSPITAL | Age: 60
End: 2025-01-16
Payer: MEDICAID

## 2025-01-16 DIAGNOSIS — H90.0 CONDUCTIVE HEARING LOSS, BILATERAL: Primary | ICD-10-CM

## 2025-01-16 PROCEDURE — V5140 BEHIND EAR BINAUR HEARING AI: HCPCS | Performed by: AUDIOLOGIST

## 2025-01-16 PROCEDURE — V5160 DISPENSING FEE BINAURAL: HCPCS | Performed by: AUDIOLOGIST

## 2025-01-16 NOTE — PROGRESS NOTES
Hearing Aid Fitting    Date of fittin25    Make/model Phonak Audeo L50-R   Right SN [id number]   Left SN [id number]    length/size 0L/R - standard   Dome  Small/closed   Battery rechargeable   Warranty 28   Accessory N/a     Fitting Note: Ms. Burns did well with all aspects of the device. Insertion and removal achieved without difficulty.  Tolerated sound quality well therefore no adjustments required to programming.      Recommendation: 2 week follow up scheduled but encouraged to call if problems arise sooner.    Edna Looney, AuD    **Prior authorization document scanned in**

## 2025-02-04 DIAGNOSIS — D50.0 IRON DEFICIENCY ANEMIA DUE TO CHRONIC BLOOD LOSS: Primary | ICD-10-CM

## 2025-02-05 ENCOUNTER — TELEPHONE (OUTPATIENT)
Dept: ADMINISTRATIVE | Facility: HOSPITAL | Age: 60
End: 2025-02-05
Payer: MEDICAID

## 2025-02-06 ENCOUNTER — OFFICE VISIT (OUTPATIENT)
Dept: HEMATOLOGY/ONCOLOGY | Facility: CLINIC | Age: 60
End: 2025-02-06
Attending: INTERNAL MEDICINE
Payer: MEDICAID

## 2025-02-06 VITALS
WEIGHT: 108.38 LBS | HEART RATE: 93 BPM | TEMPERATURE: 98 F | RESPIRATION RATE: 20 BRPM | HEIGHT: 59 IN | DIASTOLIC BLOOD PRESSURE: 76 MMHG | SYSTOLIC BLOOD PRESSURE: 122 MMHG | OXYGEN SATURATION: 97 % | BODY MASS INDEX: 21.85 KG/M2

## 2025-02-06 DIAGNOSIS — D50.0 IRON DEFICIENCY ANEMIA DUE TO CHRONIC BLOOD LOSS: ICD-10-CM

## 2025-02-06 DIAGNOSIS — E53.8 VITAMIN B12 DEFICIENCY: Primary | ICD-10-CM

## 2025-02-06 DIAGNOSIS — R91.8 MULTIPLE PULMONARY NODULES: ICD-10-CM

## 2025-02-06 PROBLEM — D50.9 IRON DEFICIENCY ANEMIA: Status: ACTIVE | Noted: 2025-02-06

## 2025-02-06 PROCEDURE — 99215 OFFICE O/P EST HI 40 MIN: CPT | Mod: PBBFAC | Performed by: INTERNAL MEDICINE

## 2025-02-06 PROCEDURE — 99214 OFFICE O/P EST MOD 30 MIN: CPT | Mod: S$PBB,,, | Performed by: INTERNAL MEDICINE

## 2025-02-06 PROCEDURE — 1160F RVW MEDS BY RX/DR IN RCRD: CPT | Mod: CPTII,,, | Performed by: INTERNAL MEDICINE

## 2025-02-06 PROCEDURE — 1159F MED LIST DOCD IN RCRD: CPT | Mod: CPTII,,, | Performed by: INTERNAL MEDICINE

## 2025-02-06 PROCEDURE — 3074F SYST BP LT 130 MM HG: CPT | Mod: CPTII,,, | Performed by: INTERNAL MEDICINE

## 2025-02-06 PROCEDURE — 3008F BODY MASS INDEX DOCD: CPT | Mod: CPTII,,, | Performed by: INTERNAL MEDICINE

## 2025-02-06 PROCEDURE — 3078F DIAST BP <80 MM HG: CPT | Mod: CPTII,,, | Performed by: INTERNAL MEDICINE

## 2025-02-06 RX ORDER — CYANOCOBALAMIN 1000 UG/ML
1000 INJECTION, SOLUTION INTRAMUSCULAR; SUBCUTANEOUS
Qty: 1 ML | Refills: 3 | Status: SHIPPED | OUTPATIENT
Start: 2025-02-06

## 2025-02-06 NOTE — Clinical Note
Orders for 02/06/2025:  Follow-up with GI for history of multiple angioectasias in intestinal tract; may need repeat endoscopy for recurrent iron-deficiency anemia Continue vitamin B12 1000 mcg IM every month; her family member administer the shots Follow-up in 3 months with CBC, serum iron, TIBC, ferritin for surveillance

## 2025-02-06 NOTE — PROGRESS NOTES
History:  Past Medical History:   Diagnosis Date    Anemia, unspecified     Coronary artery disease     Herpes simplex virus (HSV) infection     History of esophagogastroduodenoscopy (EGD) 11/01/2022    Hypertension     Osteopenia     PAD (peripheral artery disease)     PVD (peripheral vascular disease) with claudication     Rheumatoid arthritis, unspecified     Thyroid disease      Past Surgical History:   Procedure Laterality Date    COLONOSCOPY W/ BIOPSIES      EXTRACORPOREAL SHOCK WAVE LITHOTRIPSY  02/22/2023    Procedure: LITHOTRIPSY- Peripheral Shockwave;  Surgeon: Adriel Valero MD;  Location: [facility] CATH LAB;  Service: Cardiology;;    HIP SURGERY      INSERTION, STENT, ARTERY  02/22/2023    Procedure: INSERTION, STENT, ARTERY;  Surgeon: Adriel Valero MD;  Location: [facility] CATH LAB;  Service: Cardiology;;    INTRALUMINAL GASTROINTESTINAL TRACT IMAGING VIA CAPSULE N/A 08/15/2023    Procedure: IMAGING PROCEDURE, GI TRACT, INTRALUMINAL, VIA CAPSULE;  Surgeon: Liza Del Rio MD;  Location: [facility] ENDOSCOPY;  Service: Gastroenterology;  Laterality: N/A;    INTRAVASCULAR ULTRASOUND, NON-CORONARY  02/22/2023    Procedure: Intravascular Ultrasound, Non-Coronary;  Surgeon: Adriel Valero MD;  Location: [facility] CATH LAB;  Service: Cardiology;;    LEFT HEART CATHETERIZATION      PERIPHERAL ANGIOGRAPHY N/A 02/22/2023    Procedure: Peripheral angiography;  Surgeon: Adriel Valero MD;  Location: [facility] CATH LAB;  Service: Cardiology;  Laterality: N/A;  BILAT ILIAC INTERVENTION      Social History     Socioeconomic History    Marital status: Single   Tobacco Use    Smoking status: Every Day     Current packs/day: 0.25     Average packs/day: 0.3 packs/day for 40.0 years (10.0 ttl pk-yrs)     Types: Cigarettes     Passive exposure: Current    Smokeless tobacco: Never    Tobacco comments:     15 cigs per day; on nicotine patches   Substance and Sexual Activity    Alcohol use: Not Currently    Drug use: Yes     Types:  Marijuana     Comment: DAILY    Sexual activity: Not Currently   Social History Narrative    ** Merged History Encounter **           Family History   Problem Relation Name Age of Onset    Kidney failure Mother      Heart disease Mother      Hypertension Mother      No Known Problems Father          Reason for Follow-up:  Iron-deficiency anemia   Vitamin B12 deficiency   Angiodysplasia of duodenum    History of Present Illness:   iron deficiency anemia (C/o weakness and fatigue, chills, n/v, diarrhea, c/o generalized pain)        Oncologic/Hematologic History:  Oncology History    No history exists.   Past medical history:  B12 deficiency.  Anemia.  Iron-deficiency.  History of peripheral vascular disease, on dual antiplatelets.  Hypothyroidism.  CAD, S/P PCI.  Chronic hepatitis-B.  Chronic hepatitis-C. Depression.  GERD.  Right lower lung lobe nodule.  02/22/2023: Bilateral lower extremity stent placement, with resolution of bilateral lower extremity pain  Social history:  .  Lives with her brother in [place], [place].  No children.  Never became pregnant.  Disabled.  Does not work.  Has been smoking half a pack of cigarettes daily for 45 years.  Has been smoking marijuana every other day for 40 years.  No other illicit drugs.  No alcohol abuse.  Family history:  Negative for cancers or blood dyscrasias.  Health maintenance:  PCP at [facility].  -05/02/2023:  Bilateral screening mammogram (comparison:  12/03/2020 mammogram):  BI-RADS 2 (benign)  -02/24/2022: Stool for occult blood pos  -history of multiple colonoscopies and endoscopies: Angiodysplastic lesions in duodenum      57-year-old female, referred from Internal Medicine, for evaluation of anemia.        We are following the patient for history of iron-deficiency anemia and B12 deficiency anemia.  She also has angiodysplasia of duodenum.  Please refer to assessment and plan section for details.      05/22/2023:  Pleasant lady.  Presents for  initial hematology consultation.  In no acute discomfort.    Always feels tired.  No abdominal pain, nausea or vomiting.  Denies hematemesis, melena, or hematochezia.  Fair appetite.  Main complaint is chronic tiredness.  Has been taking iron pill every other day for last 2 years.  No GI side effects.  Started vitamin B12 pill x2 daily, a month ago.  We will check her iron stores and B12 level at this time.  Says that she was transfused around Easter time and a couple of years ago as well.  No history of cancers.  Chronic generalized fatigue, swelling in the legs, cough, and numbness and tingling in hands.  Generalized body pains, 9/10, which she attributes to rheumatoid arthritis.    Interval History:  [No matching plan found]   [No matching plan found]     03/11/2024:   -12/13/2020:  Anti Smith antibody negative; RNP antibody negative; HCV RNA undetected  -02/08/2024:  Hemoglobin 11.3 (was 9.8 on 12/13/2023); MCV normal; rest of CBC unremarkable; ferritin 43.48, normal (was 54.49 on 12/11/2023); transferrin saturation 56% (was 17% on 12/11/2023)  -follows up with Rheumatology for rheumatoid arthritis involving multiple joints  -03/11/2024:  Hemoglobin 9.6, down from 11.3 on 02/08/2024; ferritin level is pending; transferrin saturation 13%, remains low  Presents for a follow-up visit.  Feeling fatigued.  Feeling tired.  A week ago, had black, tarry bowel movement.  No abdominal pain, nausea, vomiting.  No hematemesis.  No hematochezia.  Appetite is okay.  Taking iron pill every other day which, obviously, is not working.  No dizziness, palpitations, or dyspnea.  Says that she missed her GI appointment and is in the process of rescheduling.  I emphasized the importance of follow-up with GI because she is bleeding in her intestine which is causing recurrent iron-deficiency anemia.    02/06/2025:  -12/13/2023: Hemoglobin 9.8, ferritin 54.49 on 12/11/2023  -02/08/2024: Hemoglobin 11.3, ferritin 43.48  -03/11/2024:  Hemoglobin 9.6, ferritin 36.27  -03/19/2024: Colonoscopy (Dr. Keyon Hatch MD):  Normal mucosa; grade/stage II internal hemorrhoids (infrared coagulation)  [Pathology:  Terminal ileum biopsy, no active or chronic enteritis/dysplasia/malignancy; cecum biopsy, consistent with collagenous colitis; ascending colon biopsy, collagenous colitis; transferrin colon biopsy, collagenous colitis; descending colon biopsy, collagenous colitis]  -S/P Feraheme 510 mg IV x2 (03/20/2024, 04/04/2024  -05/29/2024: Hemoglobin 12.5 (normalized post Feraheme)  -06/13/2024: Ferritin 92.58, normalized  -subsequently, hemoglobin dropped:  10.9 on 06/13/2024, 9.9 on 08/10/2024, 7.4 on 08/19/2024, 11.3 on 08/27/2024  (Ferritin:  49.30 on 07/15 1024, 20.18 on 08/19/2024)  -08/19/2024: S/P PRBC x2 units when hemoglobin dropped 7.4  -S/P Feraheme 510 mg IV x2 (09/17/2024, 09/24/2024)  -11/19/2024: Hemoglobin 9.8, ferritin 30.29  -11/25/2024: Hemoglobin 9.6  (no improvement with Feraheme; rather than, further drop; iron stores also dropped, indicating ongoing low-grade GI bleed))  -S/P Feraheme 510 mg IV x2 (12/02/2024, 12/27/2024)  -09/30/2024:  Limited abdominal ultrasound (comparison 09/09/2020): Atherosclerotic disease; midline fat containing hernia  -11/25/2024: CT chest without contrast (comparison:  CT chest 11/20/2023; lung nodules):  Few lung nodules, unchanged from previous exam; no new or enlarging nodules  -follows up with Rheumatology for rheumatoid arthritis involving multiple sites with positive rheumatoid factor, positive antinuclear antibodies  -no showed 01/07/2025  -02/06/2025: Hemoglobin 13.5, MCV normal, serum iron normal, TIBC 225 low, ferritin 116, transferrin saturation 22%  Presents for a follow-up visit.  Despite normalization of hemoglobin iron stores, continues to feel poorly.  Complains of weakness, fatigue, and chills.  Complains of nausea, vomiting, and diarrhea.  No hematemesis, melena, or hematochezia.  Appetite  is fair.  Denies unintentional weight loss.  No dizziness or dyspnea.  Needs repeat GI endoscopy take another look at multiple angioectasias in the GI tract which, almost certainly, continue to bleed, accounting for recurrent iron-deficiency anemia.    Medications:  Current Outpatient Medications on File Prior to Visit   Medication Sig Dispense Refill    acetaminophen (TYLENOL) 500 MG tablet Take 500 mg by mouth every 6 (six) hours as needed for Pain.      ALPRAZolam (XANAX) 0.5 MG tablet Take 0.5 mg by mouth 2 (two) times daily as needed for Anxiety (anxiety).      amLODIPine (NORVASC) 5 MG tablet Take 5 mg by mouth once daily.      carvediloL (COREG) 6.25 MG tablet Take 1 tablet (6.25 mg total) by mouth 2 (two) times daily with meals. 90 tablet 3    cetirizine (ZYRTEC) 10 MG tablet Take 10 mg by mouth daily as needed for Allergies.      DULoxetine (CYMBALTA) 30 MG capsule Take 30 mg by mouth once daily.      DULoxetine (CYMBALTA) 60 MG capsule Take 60 mg by mouth every morning.      folic acid (FOLVITE) 1 MG tablet Take 1 tablet (1 mg total) by mouth once daily. 90 tablet 1    hydroxychloroquine (PLAQUENIL) 200 mg tablet Take 1 tablet (200 mg total) by mouth once daily. After food 30 tablet 5    leflunomide (ARAVA) 20 MG Tab Take 1 tablet (20 mg total) by mouth once daily. 30 tablet 3    lisinopriL 10 MG tablet Take 1 tablet (10 mg total) by mouth once daily. 90 tablet 3    nitroGLYCERIN (NITROSTAT) 0.4 MG SL tablet Place 1 tablet under the tongue every 5 (five) minutes as needed.      pantoprazole (PROTONIX) 40 MG tablet Take 1 tablet (40 mg total) by mouth once daily. 90 tablet 3    pregabalin (LYRICA) 75 MG capsule Take 1 capsule (75 mg total) by mouth 2 (two) times daily. 60 capsule 2    traZODone (DESYREL) 300 MG tablet Take 450 mg by mouth nightly.      buPROPion (WELLBUTRIN SR) 150 MG TBSR 12 hr tablet Take 1 tablet by mouth once daily. (Patient not taking: Reported on 11/11/2024)      clopidogreL (PLAVIX)  75 mg tablet Take 1 tablet (75 mg total) by mouth once daily. 30 tablet 5    famotidine (PEPCID) 40 MG tablet Take 1 tablet (40 mg total) by mouth every evening. (Patient not taking: Reported on 2/6/2025) 90 tablet 3    loperamide (IMODIUM) 2 mg capsule Take 2 tablets in the morning and 1 tablet after every loose stool, no more than 6 tablets a day (Patient not taking: Reported on 2/6/2025) 20 capsule 0    mirtazapine (REMERON) 15 MG tablet  (Patient not taking: Reported on 2/6/2025)      nicotine (NICODERM CQ) 21 mg/24 hr SMARTSIG:Topical (Patient not taking: Reported on 2/6/2025)      PEPTO-BISMOL 262 mg Tab Take 2 tablets by mouth 4 (four) times daily. (Patient not taking: Reported on 2/6/2025)      predniSONE (DELTASONE) 5 MG tablet Take 2 tab po qd x 3 days then take 1 tab po qd x 2 days prn RA flare. (Patient not taking: Reported on 2/6/2025) 30 tablet 0    rosuvastatin (CRESTOR) 40 MG Tab Take 40 mg by mouth once daily. (Patient not taking: Reported on 2/6/2025)       Current Facility-Administered Medications on File Prior to Visit   Medication Dose Route Frequency Provider Last Rate Last Admin    acetaminophen tablet 650 mg  650 mg Oral PRN Enoc Hdz, CRIS        ferric gluconate 62.5 mg/5 mL injection 125 mg  125 mg Intravenous 1 time in Clinic/HOD Akin Gan MD           Review of Systems:   All systems reviewed and found to be negative except for the symptoms detailed above    Physical Examination:   VITAL SIGNS:   Vitals:    02/06/25 1344   BP: 122/76   Pulse: 93   Resp: 20   Temp: 98.2 °F (36.8 °C)         GENERAL:  In no apparent distress.    HEAD:  No signs of head trauma.  EYES:  Pupils are equal.  Extraocular motions intact.    EARS:  Hearing grossly intact.  MOUTH:  Oropharynx is normal.   NECK:  No adenopathy, no JVD.     CHEST:  Chest with clear breath sounds bilaterally.  No wheezes, rales, rhonchi.    CARDIAC:  Regular rate and rhythm.  S1 and S2, without murmurs, gallops,  "rubs.  VASCULAR:  No Edema.  Peripheral pulses normal and equal in all extremities.  ABDOMEN:  Soft, without detectable tenderness.  No sign of distention.  No   rebound or guarding, and no masses palpated.   Bowel Sounds normal.  MUSCULOSKELETAL:  Good range of motion of all major joints. Extremities without clubbing, cyanosis or edema.    NEUROLOGIC EXAM:  Alert and oriented x 3.  No focal sensory or strength deficits.   Speech normal.  Follows commands.  PSYCHIATRIC:  Mood normal.    No results for input(s): "CBC" in the last 72 hours.   No results for input(s): "CMP" in the last 72 hours.     Assessment:  Problem List Items Addressed This Visit       Vitamin B12 deficiency - Primary    Multiple pulmonary nodules    Iron deficiency anemia       Orders for 02/06/2025:   Follow-up with GI for history of multiple angioectasias in intestinal tract; may need repeat endoscopy for recurrent iron-deficiency anemia  Continue vitamin B12 1000 mcg IM every month; her family member administer the shots  Follow-up in 3 months with CBC, serum iron, TIBC, ferritin for surveillance    Above discussed with the.  All questions answered.  Discussed labs and gave her copies of relevant results.  She understands and agrees with this plan.  =================================    # Iron-deficiency anemia, B12 deficiency anemia:  (Multiple angioectasias in duodenum, without bleeding; multiple angioectasias in jejunum, without bleeding; multiple angioectasias in ileum, without bleeding):  -chronic anemia, ranging between 6.8-8.1; was normal in October 2022  -chronic iron-deficiency  -PRBC x1 unit 04/03/2023 for hemoglobin 6.8  -Celiac serology negative 05/17/2023  -RBC osmotic fragility normal 05/16/2023  -no hemoglobinopathy on hemoglobin electrophoresis 04/03/2023  -found to be B12 deficient when hospitalized 04/07/2023-04/08/2023 with symptomatic anemia, requiring PRBC x1 unit  -Ferrlecit 125 mg IV x2 (02/20/2023, " 04/08/2023)  -12/16/2020: EGD:  No esophageal varices; 3 diminutive nonbleeding angiodysplastic lesions 2nd portion of duodenum   -11/15/2021: EGD:  Esophagitis, gastritis, a single bleeding angiectasia duodenal bulb treated with cautery (thermotherapy)  -09/22/2021: Colonoscopy:  Occult blood in stool: Normal  -EGD and colonoscopy 2022: Colon polyp  -no response to oral iron therapy x2-3 years; remained iron-deficiency  -S/P Feraheme 510 mg IV x2 (06/05/2023, 06/12/2023), with good response (hemoglobin improved to 12.3 and ferritin 122.03 on 07/10/2023  -07/12/2023:  Patient desired to have B12 shots rather than pills because she can not afford the pills  -08/15/2023: Video capsule endoscopy: Multiple angioectasias without bleeding in the duodenum; multiple angioectasias without bleeding in the jejunum; multiple angioectasias without bleeding in the ileum  -09/11/2023:  Hemoglobin 11.1, stable  -11/14/2023:  Stool occult blood positive  -02/08/2024:  Hemoglobin 11.3 (was 9.8 on 12/13/2023); ferritin 43.48 (was 54.49 on 12/11/2023)  -03/11/2024:  Hemoglobin 9.6, down from 11.3 on 02/08/2024; ferritin level is pending; transferrin saturation 13%, remains low  -12/13/2023: Hemoglobin 9.8, ferritin 54.49 on 12/11/2023  -02/08/2024: Hemoglobin 11.3, ferritin 43.48  -03/11/2024: Hemoglobin 9.6, ferritin 36.27  -03/19/2024: Colonoscopy (Dr. Keyon Hatch MD):  Normal mucosa; grade/stage II internal hemorrhoids (infrared coagulation)  [Pathology:  Terminal ileum biopsy, no active or chronic enteritis/dysplasia/malignancy; cecum biopsy, consistent with collagenous colitis; ascending colon biopsy, collagenous colitis; transferrin colon biopsy, collagenous colitis; descending colon biopsy, collagenous colitis]  -S/P Feraheme 510 mg IV x2 (03/20/2024, 04/04/2024  -05/29/2024: Hemoglobin 12.5 (normalized post Feraheme)  -06/13/2024: Ferritin 92.58, normalized  -subsequently, hemoglobin dropped:  10.9 on 06/13/2024, 9.9 on  08/10/2024, 7.4 on 08/19/2024, 11.3 on 08/27/2024  (Ferritin:  49.30 on 07/15 1024, 20.18 on 08/19/2024)  -08/19/2024: S/P PRBC x2 units when hemoglobin dropped 7.4  -S/P Feraheme 510 mg IV x2 (09/17/2024, 09/24/2024)  -11/19/2024: Hemoglobin 9.8, ferritin 30.29  -11/25/2024: Hemoglobin 9.6  (no improvement with Feraheme; rather than, further drop; iron stores also dropped, indicating ongoing low-grade GI bleed))  -S/P Feraheme 510 mg IV x2 (12/02/2024, 12/27/2024)  -02/06/2025: Hemoglobin 13.5, MCV normal, serum iron normal, TIBC 225 low, ferritin 116, transferrin saturation 22%  (iron-deficiency anemia resolved with Feraheme x2, administered December 2024  >>>  -chronic recurrent iron-deficiency anemia, requiring multiple rounds of intravenous iron therapy (must be bleeding from multiple angioectasias in intestinal tract; on colonoscopy, also found to have collagenous colitis)  -has failed oral iron therapy  -follow-up with GI for history of multiple angioectasias in intestinal tract    # Vitamin B12 deficiency:  -diagnosed 04/03/2023: B12 level 209  -05/22/2023: Tells me that she has been taking 2 vitamin B12 pills for last 1 month.  -05/22/2023: Vitamin B12 level 521 (absorbing satisfactorily via oral route).   Intrinsic factor antibody negative.    Homocystine level 4.6 micromoles per L, suppressed (patient already on oral B12 supplementation).  Methylmalonic acid level 0.12 nanomoles /ml, normal (patient already on oral vitamin B12 supplementation, with adequate absorption)  -07/12/2023:  Patient desired to have B12 shots rather than pills because she can not afford the pills  -09/11/2023:  For last couple she has discontinued B12 pills because she could not afford   -09/11/2023:  For last 2 months, her sister-in-law has been administering her monthly B12 shots;   -12/11/2023:  Hemoglobin 10.0 (was 11.1 on 09/11/2023, 11.3 on 08/14/2023, 12.3 on 07/10/2023, etc).; ferritin 54.49 (was 18.98 on 09/11/2023); B12  level 435   >>>  Continue vitamin B12 1000 mcg IM (per her preference) every month; her sister in law administers the shots    # Subcentimeter lung nodules:  -noncontrast chest CT 11/20/2023:  Stable subcentimeter lung nodules  -12/04/2023:  Pulmonary follow-up: Repeat CT chest in 1 year  -noncontrast chest CT 11/25/2024: Comparison CT chest 11/20/2023:  Unchanged lung nodules  (per pulmonary, no need of further CTs in view of stability)    # History of hepatitis-C and liver cirrhosis:  -treated with Epclusa in 2020   -Posttreatment viral load undetectable  -FibroScan F 1 (no liver cirrhosis)  -ultrasound 09/09/2020: Liver cirrhosis     -EGD 12/16/2020:  No esophageal varices    Follow-up:  No follow-ups on file.

## 2025-02-12 ENCOUNTER — OFFICE VISIT (OUTPATIENT)
Dept: RHEUMATOLOGY | Facility: CLINIC | Age: 60
End: 2025-02-12
Payer: MEDICAID

## 2025-02-12 VITALS
BODY MASS INDEX: 22.58 KG/M2 | RESPIRATION RATE: 18 BRPM | WEIGHT: 112 LBS | HEIGHT: 59 IN | DIASTOLIC BLOOD PRESSURE: 70 MMHG | TEMPERATURE: 98 F | HEART RATE: 77 BPM | OXYGEN SATURATION: 98 % | SYSTOLIC BLOOD PRESSURE: 118 MMHG

## 2025-02-12 DIAGNOSIS — Z79.899 DRUG-INDUCED IMMUNODEFICIENCY: ICD-10-CM

## 2025-02-12 DIAGNOSIS — M05.79 RHEUMATOID ARTHRITIS INVOLVING MULTIPLE SITES WITH POSITIVE RHEUMATOID FACTOR: Primary | ICD-10-CM

## 2025-02-12 DIAGNOSIS — I10 HYPERTENSION, UNSPECIFIED TYPE: Primary | ICD-10-CM

## 2025-02-12 DIAGNOSIS — D84.821 DRUG-INDUCED IMMUNODEFICIENCY: ICD-10-CM

## 2025-02-12 DIAGNOSIS — R76.8 HEPATITIS B CORE ANTIBODY POSITIVE: ICD-10-CM

## 2025-02-12 DIAGNOSIS — I25.10 CORONARY ARTERY DISEASE, UNSPECIFIED VESSEL OR LESION TYPE, UNSPECIFIED WHETHER ANGINA PRESENT, UNSPECIFIED WHETHER NATIVE OR TRANSPLANTED HEART: ICD-10-CM

## 2025-02-12 DIAGNOSIS — M15.0 PRIMARY OSTEOARTHRITIS INVOLVING MULTIPLE JOINTS: ICD-10-CM

## 2025-02-12 DIAGNOSIS — E78.5 HYPERLIPIDEMIA, UNSPECIFIED HYPERLIPIDEMIA TYPE: ICD-10-CM

## 2025-02-12 DIAGNOSIS — R76.8 POSITIVE ANA (ANTINUCLEAR ANTIBODY): ICD-10-CM

## 2025-02-12 DIAGNOSIS — F17.210 NICOTINE DEPENDENCE, CIGARETTES, UNCOMPLICATED: ICD-10-CM

## 2025-02-12 DIAGNOSIS — I10 PRIMARY HYPERTENSION: ICD-10-CM

## 2025-02-12 DIAGNOSIS — Z79.899 HIGH RISK MEDICATION USE: ICD-10-CM

## 2025-02-12 DIAGNOSIS — Z86.19 HISTORY OF HEPATITIS C: ICD-10-CM

## 2025-02-12 PROCEDURE — 99215 OFFICE O/P EST HI 40 MIN: CPT | Mod: PBBFAC | Performed by: INTERNAL MEDICINE

## 2025-02-12 RX ORDER — ROSUVASTATIN CALCIUM 40 MG/1
40 TABLET, COATED ORAL DAILY
Qty: 30 TABLET | Refills: 5 | Status: SHIPPED | OUTPATIENT
Start: 2025-02-12 | End: 2025-02-12

## 2025-02-12 RX ORDER — GABAPENTIN 600 MG/1
600 TABLET ORAL 3 TIMES DAILY
COMMUNITY
Start: 2025-02-03

## 2025-02-12 RX ORDER — ROSUVASTATIN CALCIUM 40 MG/1
40 TABLET, COATED ORAL DAILY
Qty: 30 TABLET | Refills: 5 | Status: SHIPPED | OUTPATIENT
Start: 2025-02-12

## 2025-02-12 RX ORDER — ADALIMUMAB 40MG/0.4ML
40 KIT SUBCUTANEOUS
Qty: 2 PEN | Refills: 11 | Status: SHIPPED | OUTPATIENT
Start: 2025-02-12

## 2025-02-12 RX ORDER — LEFLUNOMIDE 20 MG/1
20 TABLET ORAL DAILY
Qty: 30 TABLET | Refills: 3 | Status: SHIPPED | OUTPATIENT
Start: 2025-02-12
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
## 2025-02-14 ENCOUNTER — PATIENT MESSAGE (OUTPATIENT)
Dept: RHEUMATOLOGY | Facility: CLINIC | Age: 60
End: 2025-02-14
Payer: MEDICAID

## 2025-02-19 NOTE — PROGRESS NOTES
Attending Addendum:   Patient seen and examined in clinic. Management and Plan were discussed with resident. Care was reasonable and necessary.   Peg Tsang MD  [facility] - Internal Medicine    [facility]

## 2025-03-24 RX ORDER — IBUPROFEN 200 MG
1 TABLET ORAL DAILY
Qty: 14 PATCH | Refills: 0 | Status: SHIPPED | OUTPATIENT
Start: 2025-03-24

## 2025-04-04 ENCOUNTER — TELEPHONE (OUTPATIENT)
Dept: ENDOSCOPY | Facility: HOSPITAL | Age: 60
End: 2025-04-04
Payer: MEDICAID

## 2025-04-04 NOTE — TELEPHONE ENCOUNTER
Called patient, verified date of birth. Instructed patient cardiology provider recommend holding Plavix seven days prior to gi procedure. Patient verbalized understanding. MS

## 2025-04-15 ENCOUNTER — OFFICE VISIT (OUTPATIENT)
Dept: GYNECOLOGY | Facility: CLINIC | Age: 60
End: 2025-04-15
Payer: MEDICAID

## 2025-04-15 VITALS
OXYGEN SATURATION: 97 % | RESPIRATION RATE: 18 BRPM | SYSTOLIC BLOOD PRESSURE: 120 MMHG | BODY MASS INDEX: 22.38 KG/M2 | WEIGHT: 111 LBS | DIASTOLIC BLOOD PRESSURE: 69 MMHG | HEIGHT: 59 IN | TEMPERATURE: 98 F | HEART RATE: 78 BPM

## 2025-04-15 DIAGNOSIS — N39.41 URGE INCONTINENCE: ICD-10-CM

## 2025-04-15 DIAGNOSIS — Z12.31 SCREENING MAMMOGRAM FOR BREAST CANCER: ICD-10-CM

## 2025-04-15 DIAGNOSIS — Z01.419 WOMEN'S ANNUAL ROUTINE GYNECOLOGICAL EXAMINATION: Primary | ICD-10-CM

## 2025-04-15 DIAGNOSIS — N95.2 ATROPHIC VAGINITIS: ICD-10-CM

## 2025-04-15 LAB
BILIRUB SERPL-MCNC: NEGATIVE MG/DL
BLOOD URINE, POC: NEGATIVE
COLOR, POC UA: YELLOW
GLUCOSE UR QL STRIP: NEGATIVE
KETONES UR QL STRIP: NEGATIVE
LEUKOCYTE ESTERASE URINE, POC: NEGATIVE
NITRITE, POC UA: NEGATIVE
PH, POC UA: 6
PROTEIN, POC: NEGATIVE
SPECIFIC GRAVITY, POC UA: 1.02
UROBILINOGEN, POC UA: 0.2

## 2025-04-15 PROCEDURE — 1159F MED LIST DOCD IN RCRD: CPT | Mod: CPTII,,, | Performed by: NURSE PRACTITIONER

## 2025-04-15 PROCEDURE — 81001 URINALYSIS AUTO W/SCOPE: CPT | Mod: PBBFAC | Performed by: NURSE PRACTITIONER

## 2025-04-15 PROCEDURE — 99215 OFFICE O/P EST HI 40 MIN: CPT | Mod: PBBFAC | Performed by: NURSE PRACTITIONER

## 2025-04-15 PROCEDURE — 3078F DIAST BP <80 MM HG: CPT | Mod: CPTII,,, | Performed by: NURSE PRACTITIONER

## 2025-04-15 PROCEDURE — 88174 CYTOPATH C/V AUTO IN FLUID: CPT | Performed by: NURSE PRACTITIONER

## 2025-04-15 PROCEDURE — 99396 PREV VISIT EST AGE 40-64: CPT | Mod: S$PBB,,, | Performed by: NURSE PRACTITIONER

## 2025-04-15 PROCEDURE — 3074F SYST BP LT 130 MM HG: CPT | Mod: CPTII,,, | Performed by: NURSE PRACTITIONER

## 2025-04-15 PROCEDURE — 1160F RVW MEDS BY RX/DR IN RCRD: CPT | Mod: CPTII,,, | Performed by: NURSE PRACTITIONER

## 2025-04-15 PROCEDURE — 3008F BODY MASS INDEX DOCD: CPT | Mod: CPTII,,, | Performed by: NURSE PRACTITIONER

## 2025-04-15 PROCEDURE — 4010F ACE/ARB THERAPY RXD/TAKEN: CPT | Mod: CPTII,,, | Performed by: NURSE PRACTITIONER

## 2025-04-15 RX ORDER — ESTRADIOL 10 UG/1
TABLET, FILM COATED VAGINAL
Qty: 18 TABLET | Refills: 11 | Status: SHIPPED | OUTPATIENT
Start: 2025-04-15

## 2025-04-15 NOTE — PROGRESS NOTES
"Patient ID: Sandra Burns is a 59 y.o. female.    Chief Complaint: Gynecologic Exam      Review of patient's allergies indicates:   Allergen Reactions    Benadryl itch relief Other (See Comments)     RESTLESS LEGS           Past Medical History:   Diagnosis Date    Anemia, unspecified     Bipolar disorder     Coronary artery disease     Fibromyalgia     Herpes simplex virus (HSV) infection     History of esophagogastroduodenoscopy (EGD) 11/01/2022    Hypertension     Osteopenia     PAD (peripheral artery disease)     PVD (peripheral vascular disease) with claudication     Rheumatoid arthritis, unspecified     Thyroid disease     Vitamin B12 deficiency             HPI:  The patient is G0 here for annual gyn exam. Denies hx of abnormal pap. Last pap 4/2024-NIL;HPV negative. Pt states is postmenopausal since early 40s. Denies vaginal bleeding. Denies pain. Denies breast complaints. Pt has hx of left breast biopsy with benign pathology. Pt is not sexually active and states has not been since 2010. Pt given clobetasol in the past for c/o vulvar itching with complete resolution. Denies any recent flare ups. Pt also given vaginal estrace for atrophic vaginitis. States did not use as the medication tube did not come sealed .States contacted the pharmacist and was told that was how it comes. She has fear that it could be tampered with so is not interested in new RX. Pt was referred to urology at last visit for urge incontinence. States never received appt and symptoms have continued. Denies  dysuria. Denies hematuria. Denies hx of renal stones.          Review of Systems:   Negative except for findings in HPI     Objective:   /69   Pulse 78   Temp 98.3 °F (36.8 °C) (Oral)   Resp 18   Ht 4' 11" (1.499 m)   Wt 50.3 kg (111 lb)   LMP  (LMP Unknown)   SpO2 97%   BMI 22.42 kg/m²    Physical Exam:  GENERAL: Pt is aware and alert and  in no acute distress.  BREASTS: Bilateral-No masses, nipple discharge, skin " changes, or tenderness.  ABDOMEN: Soft, non tender.  VULVA: no discoloration; no visible lesions; no skin thickening  URETHRA: No lesions  BLADDER: No tenderness.  VAGINA: Mucosa atrophic,no discharge; no lesions.  CERVIX:  no CMT, NO discharge; NO lesions  BIMANUAL EXAM: reveals a 8-week-sized uterus. The uterus is mobile, nontender, no palpable masses. Gume adnexa reveal no evidence of masses; no fullness   SKIN: Warm and Dry  PSYCHIATRIC: Patient is awake and alert. Mood and affect are normal.     Assessment:   Women's annual routine gynecological examination  -     Liquid-Based Pap Smear, Screening    Screening mammogram for breast cancer  -     Mammo Digital Screening Bilat w/ Yossi (XPD); Future; Expected date: 05/15/2025    Urge incontinence  -     POCT urinalysis, dipstick or tablet reag  -     Ambulatory referral/consult to Urology; Future; Expected date: 04/22/2025    Atrophic vaginitis  -     estradioL (VAGIFEM) 10 mcg Tab; Insert 1 suppository vaginally every night for 2 weeks then twice per week  Dispense: 18 tablet; Refill: 11            1. Women's annual routine gynecological examination    2. Screening mammogram for breast cancer    3. Urge incontinence    4. Atrophic vaginitis             -pap/hpv (hx of RA on humira)  -Contact clinic with any vaginal bleeding as this would be abnormal in postmenopausal pt.   -discussed alternative methods of delivery for vaginal estrogen and she opted for Vagifem. Educated pt on proper use. RX sent  Plan:       Follow up in about 1 year (around 4/15/2026).

## 2025-05-05 ENCOUNTER — TELEPHONE (OUTPATIENT)
Dept: HEMATOLOGY/ONCOLOGY | Facility: CLINIC | Age: 60
End: 2025-05-05
Payer: MEDICAID

## 2025-05-06 ENCOUNTER — TELEPHONE (OUTPATIENT)
Dept: HEMATOLOGY/ONCOLOGY | Facility: CLINIC | Age: 60
End: 2025-05-06
Payer: MEDICAID

## 2025-05-06 ENCOUNTER — RESULTS FOLLOW-UP (OUTPATIENT)
Dept: HEMATOLOGY/ONCOLOGY | Facility: CLINIC | Age: 60
End: 2025-05-06
Payer: MEDICAID

## 2025-05-06 ENCOUNTER — LAB VISIT (OUTPATIENT)
Dept: LAB | Facility: HOSPITAL | Age: 60
End: 2025-05-06
Attending: INTERNAL MEDICINE
Payer: MEDICAID

## 2025-05-06 ENCOUNTER — HOSPITAL ENCOUNTER (EMERGENCY)
Facility: HOSPITAL | Age: 60
Discharge: HOME | End: 2025-05-06
Attending: FAMILY MEDICINE
Payer: MEDICAID

## 2025-05-06 VITALS
WEIGHT: 111.31 LBS | SYSTOLIC BLOOD PRESSURE: 146 MMHG | RESPIRATION RATE: 20 BRPM | DIASTOLIC BLOOD PRESSURE: 88 MMHG | OXYGEN SATURATION: 97 % | BODY MASS INDEX: 21.85 KG/M2 | HEIGHT: 60 IN | HEART RATE: 99 BPM | TEMPERATURE: 99 F

## 2025-05-06 DIAGNOSIS — D64.9 ANEMIA, UNSPECIFIED TYPE: ICD-10-CM

## 2025-05-06 DIAGNOSIS — D50.0 IRON DEFICIENCY ANEMIA DUE TO CHRONIC BLOOD LOSS: ICD-10-CM

## 2025-05-06 DIAGNOSIS — R91.8 MULTIPLE PULMONARY NODULES: ICD-10-CM

## 2025-05-06 DIAGNOSIS — D64.9 ANEMIA, UNSPECIFIED TYPE: Primary | ICD-10-CM

## 2025-05-06 DIAGNOSIS — E53.8 VITAMIN B12 DEFICIENCY: Primary | ICD-10-CM

## 2025-05-06 DIAGNOSIS — E53.8 VITAMIN B12 DEFICIENCY: ICD-10-CM

## 2025-05-06 DIAGNOSIS — R53.1 WEAKNESS: ICD-10-CM

## 2025-05-06 LAB
ALBUMIN SERPL-MCNC: 3.3 G/DL (ref 3.4–4.8)
ALBUMIN/GLOB SERPL: 0.9 RATIO (ref 1.1–2)
ALP SERPL-CCNC: 67 UNIT/L (ref 40–150)
ALT SERPL-CCNC: 9 UNIT/L (ref 0–55)
ANION GAP SERPL CALC-SCNC: 9 MEQ/L
AST SERPL-CCNC: 15 UNIT/L (ref 11–45)
BACTERIA #/AREA URNS AUTO: ABNORMAL /HPF
BASOPHILS # BLD AUTO: 0.06 X10(3)/MCL
BASOPHILS # BLD AUTO: 0.07 X10(3)/MCL
BASOPHILS NFR BLD AUTO: 1.1 %
BASOPHILS NFR BLD AUTO: 1.1 %
BILIRUB SERPL-MCNC: 0.2 MG/DL
BILIRUB UR QL STRIP.AUTO: NEGATIVE
BNP BLD-MCNC: 78.7 PG/ML
BUN SERPL-MCNC: 11.6 MG/DL (ref 9.8–20.1)
CALCIUM SERPL-MCNC: 8.6 MG/DL (ref 8.4–10.2)
CHLORIDE SERPL-SCNC: 107 MMOL/L (ref 98–107)
CLARITY UR: CLEAR
CO2 SERPL-SCNC: 24 MMOL/L (ref 23–31)
COLOR UR AUTO: YELLOW
CREAT SERPL-MCNC: 0.75 MG/DL (ref 0.55–1.02)
CREAT/UREA NIT SERPL: 15
EOSINOPHIL # BLD AUTO: 0.27 X10(3)/MCL (ref 0–0.9)
EOSINOPHIL # BLD AUTO: 0.29 X10(3)/MCL (ref 0–0.9)
EOSINOPHIL NFR BLD AUTO: 4.4 %
EOSINOPHIL NFR BLD AUTO: 4.8 %
ERYTHROCYTE [DISTWIDTH] IN BLOOD BY AUTOMATED COUNT: 14.8 % (ref 11.5–17)
ERYTHROCYTE [DISTWIDTH] IN BLOOD BY AUTOMATED COUNT: 15 % (ref 11.5–17)
FERRITIN SERPL-MCNC: 17.5 NG/ML (ref 4.63–204)
FLUAV AG UPPER RESP QL IA.RAPID: NOT DETECTED
FLUBV AG UPPER RESP QL IA.RAPID: NOT DETECTED
GFR SERPLBLD CREATININE-BSD FMLA CKD-EPI: >60 ML/MIN/1.73/M2
GLOBULIN SER-MCNC: 3.6 GM/DL (ref 2.4–3.5)
GLUCOSE SERPL-MCNC: 100 MG/DL (ref 82–115)
GLUCOSE UR QL STRIP: NORMAL
HCT VFR BLD AUTO: 26.2 % (ref 37–47)
HCT VFR BLD AUTO: 29.7 % (ref 37–47)
HGB BLD-MCNC: 8 G/DL (ref 12–16)
HGB BLD-MCNC: 8.9 G/DL (ref 12–16)
HGB UR QL STRIP: NEGATIVE
HYALINE CASTS #/AREA URNS LPF: ABNORMAL /LPF
IMM GRANULOCYTES # BLD AUTO: 0 X10(3)/MCL (ref 0–0.04)
IMM GRANULOCYTES # BLD AUTO: 0.03 X10(3)/MCL (ref 0–0.04)
IMM GRANULOCYTES NFR BLD AUTO: 0 %
IMM GRANULOCYTES NFR BLD AUTO: 0.5 %
IRON SATN MFR SERPL: 5 % (ref 20–50)
IRON SERPL-MCNC: 16 UG/DL (ref 50–170)
KETONES UR QL STRIP: ABNORMAL
LEUKOCYTE ESTERASE UR QL STRIP: 25
LYMPHOCYTES # BLD AUTO: 1.22 X10(3)/MCL (ref 0.6–4.6)
LYMPHOCYTES # BLD AUTO: 1.45 X10(3)/MCL (ref 0.6–4.6)
LYMPHOCYTES NFR BLD AUTO: 18.5 %
LYMPHOCYTES NFR BLD AUTO: 25.6 %
MCH RBC QN AUTO: 27.1 PG (ref 27–31)
MCH RBC QN AUTO: 27.6 PG (ref 27–31)
MCHC RBC AUTO-ENTMCNC: 30 G/DL (ref 33–36)
MCHC RBC AUTO-ENTMCNC: 30.5 G/DL (ref 33–36)
MCV RBC AUTO: 90.3 FL (ref 80–94)
MCV RBC AUTO: 90.5 FL (ref 80–94)
MONOCYTES # BLD AUTO: 0.54 X10(3)/MCL (ref 0.1–1.3)
MONOCYTES # BLD AUTO: 0.59 X10(3)/MCL (ref 0.1–1.3)
MONOCYTES NFR BLD AUTO: 8.9 %
MONOCYTES NFR BLD AUTO: 9.5 %
MUCOUS THREADS URNS QL MICRO: ABNORMAL /LPF
NEUTROPHILS # BLD AUTO: 3.32 X10(3)/MCL (ref 2.1–9.2)
NEUTROPHILS # BLD AUTO: 4.44 X10(3)/MCL (ref 2.1–9.2)
NEUTROPHILS NFR BLD AUTO: 58.5 %
NEUTROPHILS NFR BLD AUTO: 67.1 %
NITRITE UR QL STRIP: NEGATIVE
NRBC BLD AUTO-RTO: 0 %
PH UR STRIP: 6 [PH]
PLATELET # BLD AUTO: 220 X10(3)/MCL (ref 130–400)
PLATELET # BLD AUTO: 250 X10(3)/MCL (ref 130–400)
PMV BLD AUTO: 10.5 FL (ref 7.4–10.4)
PMV BLD AUTO: 10.6 FL (ref 7.4–10.4)
POTASSIUM SERPL-SCNC: 3.7 MMOL/L (ref 3.5–5.1)
PROT SERPL-MCNC: 6.9 GM/DL (ref 5.8–7.6)
PROT UR QL STRIP: NEGATIVE
RBC # BLD AUTO: 2.9 X10(6)/MCL (ref 4.2–5.4)
RBC # BLD AUTO: 3.28 X10(6)/MCL (ref 4.2–5.4)
RBC #/AREA URNS AUTO: ABNORMAL /HPF
RSV A 5' UTR RNA NPH QL NAA+PROBE: NOT DETECTED
SARS-COV-2 RNA RESP QL NAA+PROBE: NOT DETECTED
SODIUM SERPL-SCNC: 140 MMOL/L (ref 136–145)
SP GR UR STRIP.AUTO: 1.03 (ref 1–1.03)
SQUAMOUS #/AREA URNS LPF: ABNORMAL /HPF
STREP A PCR (OHS): NOT DETECTED
TIBC SERPL-MCNC: 331 UG/DL (ref 70–310)
TIBC SERPL-MCNC: 347 UG/DL (ref 250–450)
TRANSFERRIN SERPL-MCNC: 331 MG/DL (ref 180–382)
TROPONIN I SERPL-MCNC: <0.01 NG/ML (ref 0–0.04)
UROBILINOGEN UR STRIP-ACNC: ABNORMAL
WBC # BLD AUTO: 5.67 X10(3)/MCL (ref 4.5–11.5)
WBC # BLD AUTO: 6.61 X10(3)/MCL (ref 4.5–11.5)
WBC #/AREA URNS AUTO: ABNORMAL /HPF

## 2025-05-06 PROCEDURE — 80053 COMPREHEN METABOLIC PANEL: CPT | Performed by: NURSE PRACTITIONER

## 2025-05-06 PROCEDURE — 87651 STREP A DNA AMP PROBE: CPT | Performed by: NURSE PRACTITIONER

## 2025-05-06 PROCEDURE — 82728 ASSAY OF FERRITIN: CPT

## 2025-05-06 PROCEDURE — 83540 ASSAY OF IRON: CPT

## 2025-05-06 PROCEDURE — 0241U COVID/RSV/FLU A&B PCR: CPT | Performed by: NURSE PRACTITIONER

## 2025-05-06 PROCEDURE — 93005 ELECTROCARDIOGRAM TRACING: CPT

## 2025-05-06 PROCEDURE — 36415 COLL VENOUS BLD VENIPUNCTURE: CPT

## 2025-05-06 PROCEDURE — 83880 ASSAY OF NATRIURETIC PEPTIDE: CPT | Performed by: NURSE PRACTITIONER

## 2025-05-06 PROCEDURE — 99285 EMERGENCY DEPT VISIT HI MDM: CPT | Mod: 25

## 2025-05-06 PROCEDURE — 81001 URINALYSIS AUTO W/SCOPE: CPT | Performed by: NURSE PRACTITIONER

## 2025-05-06 PROCEDURE — 85025 COMPLETE CBC W/AUTO DIFF WBC: CPT | Performed by: NURSE PRACTITIONER

## 2025-05-06 PROCEDURE — 85025 COMPLETE CBC W/AUTO DIFF WBC: CPT

## 2025-05-06 PROCEDURE — 84484 ASSAY OF TROPONIN QUANT: CPT | Performed by: NURSE PRACTITIONER

## 2025-05-06 RX ORDER — SODIUM CHLORIDE 0.9 % (FLUSH) 0.9 %
10 SYRINGE (ML) INJECTION
OUTPATIENT
Start: 2025-05-07

## 2025-05-06 RX ORDER — EPINEPHRINE 0.3 MG/.3ML
0.3 INJECTION SUBCUTANEOUS ONCE AS NEEDED
OUTPATIENT
Start: 2025-05-07

## 2025-05-06 RX ORDER — HEPARIN 100 UNIT/ML
500 SYRINGE INTRAVENOUS
OUTPATIENT
Start: 2025-05-07

## 2025-05-06 NOTE — PROGRESS NOTES
Hemoglobin 8.9, down from 13.5 on 02/06/2025.    Please check with the patient if she is experiencing any bleeding in the form of hematemesis, melena, hematochezia, hematuria, etc..    Send a referral to GI for evaluation ASAP.  Check serum iron, TIBC, ferritin, B12 level, folic acid level, LDH, haptoglobin, FIT test.  Follow-up visit with me earliest possible.

## 2025-05-06 NOTE — PROGRESS NOTES
Serum iron 16 low.  TIBC 347, normal.  Transferrin saturation 5% low.  Hemoglobin 8.9 low.  MCV normal.  Ferritin 17.50.  (ferritin was 116.38, normal, 2 months ago).    Plan:   Proceed with Feraheme x2   Assess response with repeat CBC, serum iron, TIBC, ferritin in 6 weeks   I need to see the patient ASAP.    Refer to GI for evaluation of iron-deficiency anemia.

## 2025-05-06 NOTE — TELEPHONE ENCOUNTER
"Called patient per Dr. Herndon after reviewing lab work from today. Patient denies noticing any blood in the urine or in her stools. States that she has noticed that her stools are "a little darker but not what they used to be." Patient states she knew something was going on because she has been eating a lot of ice. States that she was scheduled for a procedure with GI but had to reschedule. Will message GI to see if we can get procedure rescheduled.   "

## 2025-05-07 LAB
OHS QRS DURATION: 70 MS
OHS QTC CALCULATION: 459 MS

## 2025-05-07 NOTE — ED PROVIDER NOTES
Encounter Date: 5/6/2025       History     Chief Complaint   Patient presents with    Fatigue     States she was told by her hematology nurse told her her hemoglobin was low had blood work drawn this morning H&H: 8.9/29. state she does not feel good has had a headache for a week and fatigue      Sandra Burns is a 58yo female with a PMHx of B12 deficiency. Anemia. Iron-deficiency. History of peripheral vascular disease, on dual antiplatelets. Hypothyroidism. CAD, S/P PCI. Chronic hepatitis-B. Chronic hepatitis-C. Depression. GERD. Right lower lung lobe nodule. 02/22/2023: Bilateral lower extremity stent placement, with resolution of bilateral lower extremity pain. She presents today reporting increased fatigue with generalized body pain for a week. She had labwork today ordered by Dr Herndon (hem/onc) with H/H 8.9/29.7 today from 13.5/41.0 on 2/6/25. She denies any bleeding. She denies chest pain, shortness of breath, cough, abdominal pain, nausea, vomiting, dysuria, fever, and chills.        Review of patient's allergies indicates:   Allergen Reactions    Benadryl itch relief Other (See Comments)     RESTLESS LEGS       Past Medical History:   Diagnosis Date    Anemia, unspecified     Bipolar disorder     Coronary artery disease     Fibromyalgia     Herpes simplex virus (HSV) infection     History of esophagogastroduodenoscopy (EGD) 11/01/2022    Hypertension     Osteopenia     PAD (peripheral artery disease)     PVD (peripheral vascular disease) with claudication     Rheumatoid arthritis, unspecified     Thyroid disease     Vitamin B12 deficiency      Past Surgical History:   Procedure Laterality Date    COLONOSCOPY W/ BIOPSIES  03/19/2024    EXTRACORPOREAL SHOCK WAVE LITHOTRIPSY  02/22/2023    Procedure: LITHOTRIPSY- Peripheral Shockwave;  Surgeon: Adriel Valero MD;  Location: [facility] CATH LAB;  Service: Cardiology;;    HIP SURGERY      INSERTION, STENT, ARTERY  02/22/2023    Procedure: INSERTION, STENT, ARTERY;   Surgeon: Adriel Valero MD;  Location: [facility] CATH LAB;  Service: Cardiology;;    INTRALUMINAL GASTROINTESTINAL TRACT IMAGING VIA CAPSULE N/A 08/15/2023    Procedure: IMAGING PROCEDURE, GI TRACT, INTRALUMINAL, VIA CAPSULE;  Surgeon: Liza Del Rio MD;  Location: [facility] ENDOSCOPY;  Service: Gastroenterology;  Laterality: N/A;    INTRAVASCULAR ULTRASOUND, NON-CORONARY  02/22/2023    Procedure: Intravascular Ultrasound, Non-Coronary;  Surgeon: Adriel Valero MD;  Location: [facility] CATH LAB;  Service: Cardiology;;    LEFT HEART CATHETERIZATION      PERIPHERAL ANGIOGRAPHY N/A 02/22/2023    Procedure: Peripheral angiography;  Surgeon: Adriel Valero MD;  Location: [facility] CATH LAB;  Service: Cardiology;  Laterality: N/A;  BILAT ILIAC INTERVENTION     Family History   Problem Relation Name Age of Onset    Kidney failure Mother      Heart disease Mother      Hypertension Mother      No Known Problems Father       Social History[1]  Review of Systems   Constitutional:  Negative for fever.   HENT:  Negative for sore throat.    Respiratory:  Negative for shortness of breath.    Cardiovascular:  Negative for chest pain.   Gastrointestinal:  Negative for nausea.   Genitourinary:  Negative for dysuria.   Musculoskeletal:  Positive for myalgias. Negative for back pain.   Skin:  Negative for rash.   Neurological:  Positive for weakness.   Hematological:  Does not bruise/bleed easily.   All other systems reviewed and are negative.      Physical Exam     Initial Vitals [05/06/25 1920]   BP Pulse Resp Temp SpO2   (!) 146/88 99 20 98.9 °F (37.2 °C) 97 %      MAP       --         Physical Exam    Nursing note and vitals reviewed.  Constitutional: She appears well-developed and well-nourished.   HENT:   Head: Normocephalic and atraumatic.   Right Ear: Tympanic membrane normal.   Left Ear: Tympanic membrane normal.   Nose: Nose normal. Mouth/Throat: Uvula is midline, oropharynx is clear and moist and mucous membranes are normal.   Neck:  Neck supple.   Normal range of motion.  Cardiovascular:  Normal rate, regular rhythm, normal heart sounds and intact distal pulses.           Pulmonary/Chest: Effort normal and breath sounds normal. She has no decreased breath sounds.   Abdominal: Abdomen is soft and flat. Bowel sounds are normal. There is no abdominal tenderness.   Musculoskeletal:         General: Normal range of motion.      Cervical back: Normal range of motion and neck supple.     Neurological: She is alert. She has normal strength.   Skin: Skin is warm and dry.   Psychiatric: She has a normal mood and affect.         ED Course   Procedures  Labs Reviewed   CBC WITH DIFFERENTIAL - Abnormal       Result Value    WBC 5.67      RBC 2.90 (*)     Hgb 8.0 (*)     Hct 26.2 (*)     MCV 90.3      MCH 27.6      MCHC 30.5 (*)     RDW 15.0      Platelet 220      MPV 10.5 (*)     Neut % 58.5      Lymph % 25.6      Mono % 9.5      Eos % 4.8      Basophil % 1.1      Imm Grans % 0.5      Neut # 3.32      Lymph # 1.45      Mono # 0.54      Eos # 0.27      Baso # 0.06      Imm Gran # 0.03      NRBC% 0.0     COMPREHENSIVE METABOLIC PANEL - Abnormal    Sodium 140      Potassium 3.7      Chloride 107      CO2 24      Glucose 100      Blood Urea Nitrogen 11.6      Creatinine 0.75      Calcium 8.6      Protein Total 6.9      Albumin 3.3 (*)     Globulin 3.6 (*)     Albumin/Globulin Ratio 0.9 (*)     Bilirubin Total 0.2      ALP 67      ALT 9      AST 15      eGFR >60      Anion Gap 9.0      BUN/Creatinine Ratio 15     URINALYSIS, REFLEX TO URINE CULTURE - Abnormal    Color, UA Yellow      Appearance, UA Clear      Specific Gravity, UA 1.030      pH, UA 6.0      Protein, UA Negative      Glucose, UA Normal      Ketones, UA Trace (*)     Blood, UA Negative      Bilirubin, UA Negative      Urobilinogen, UA 1+ (*)     Nitrites, UA Negative      Leukocyte Esterase, UA 25 (*)     RBC, UA 0-5      WBC, UA 0-5      Bacteria, UA None Seen      Squamous Epithelial Cells, UA  Trace (*)     Mucous, UA Trace (*)     Hyaline Casts, UA None Seen     B-TYPE NATRIURETIC PEPTIDE - Normal    Natriuretic Peptide 78.7     TROPONIN I - Normal    Troponin-I <0.010     COVID/RSV/FLU A&B PCR - Normal    Influenza A PCR Not Detected      Influenza B PCR Not Detected      Respiratory Syncytial Virus PCR Not Detected      SARS-CoV-2 PCR Not Detected      Narrative:     The Xpert Xpress SARS-CoV-2/FLU/RSV plus is a rapid, multiplexed real-time PCR test intended for the simultaneous qualitative detection and differentiation of SARS-CoV-2, Influenza A, Influenza B, and respiratory syncytial virus (RSV) viral RNA in either nasopharyngeal swab or nasal swab specimens.         STREP GROUP A BY PCR - Normal    STREP A PCR ([facility]) Not Detected      Narrative:     The Xpert Xpress Strep A test is a rapid, qualitative in vitro diagnostic test for the detection of Streptococcus pyogenes (Group A ß-hemolytic Streptococcus, Strep A) in throat swab specimens from patients with signs and symptoms of pharyngitis.     CBC W/ AUTO DIFFERENTIAL    Narrative:     The following orders were created for panel order CBC auto differential.  Procedure                               Abnormality         Status                     ---------                               -----------         ------                     CBC with Differential[[id number]]       Abnormal            Final result                 Please view results for these tests on the individual orders.     EKG Readings: (Independently Interpreted)   Initial Reading: No STEMI. Rhythm: Normal Sinus Rhythm. Heart Rate: 89. Ectopy: No Ectopy. Conduction: Normal. Axis: Normal.   Reviewed by Dr Rizo (ER staff)       Imaging Results              X-Ray Chest AP Portable (Final result)  Result time 05/06/25 21:05:50      Final result by Ayden Fermin MD (05/06/25 21:05:50)                   Impression:      No acute disease is seen      Electronically signed by: Ayden  MD Zander  Date:    05/06/2025  Time:    21:05               Narrative:    EXAMINATION:  XR CHEST AP PORTABLE    CLINICAL HISTORY:  Weakness    TECHNIQUE:  Single frontal view of the chest was performed.    COMPARISON:  08/27/2024    FINDINGS:  No acute infiltrates are seen.  Heart size is within normal limits.  Costophrenic angles are clear.  There is vascular calcification noted.                                       Medications - No data to display  Medical Decision Making  Sandra Burns is a 60yo female with a PMHx of B12 deficiency. Anemia. Iron-deficiency. History of peripheral vascular disease, on dual antiplatelets. Hypothyroidism. CAD, S/P PCI. Chronic hepatitis-B. Chronic hepatitis-C. Depression. GERD. Right lower lung lobe nodule. 02/22/2023: Bilateral lower extremity stent placement, with resolution of bilateral lower extremity pain. She presents today reporting increased fatigue with generalized body pain for a week. She had labwork today ordered by Dr Herndon (hem/onc) with H/H 8.9/29.7 today from 13.5/41.0 on 2/6/25. She denies any bleeding. She denies chest pain, shortness of breath, cough, abdominal pain, nausea, vomiting, dysuria, fever, and chills.        Amount and/or Complexity of Data Reviewed  Labs: ordered. Decision-making details documented in ED Course.  Radiology: ordered. Decision-making details documented in ED Course.      Additional MDM:   Differential Diagnosis:   At this time differential diagnosis is but not limited to weakness, acute coronary syndrome, dehydration, electrolyte imbalance, symptomatic anemia                  ED Course as of 05/06/25 2135   Tue May 06, 2025   2112 X-Ray Chest AP Portable  Impression:     No acute disease is seen   [RB]   2120 Hemoglobin(!): 8.0 [RB]   2120 Hematocrit(!): 26.2 [RB]      ED Course User Index  [RB] Jose Rene, MIKE                           Clinical Impression:  Final diagnoses:  [R53.1] Weakness  [D64.9] Anemia, unspecified type  (Primary)          ED Disposition Condition    Eloped                     [1]   Social History  Tobacco Use    Smoking status: Every Day     Current packs/day: 0.25     Average packs/day: 0.3 packs/day for 40.0 years (10.0 ttl pk-yrs)     Types: Cigarettes     Passive exposure: Current    Smokeless tobacco: Never    Tobacco comments:     15 cigs per day; on nicotine patches   Substance Use Topics    Alcohol use: Not Currently    Drug use: Yes     Types: Marijuana     Comment: DAILY        Jose Rene, [facility]  05/06/25 0152

## 2025-05-13 ENCOUNTER — TELEPHONE (OUTPATIENT)
Dept: RHEUMATOLOGY | Facility: CLINIC | Age: 60
End: 2025-05-13
Payer: MEDICAID

## 2025-05-13 ENCOUNTER — OFFICE VISIT (OUTPATIENT)
Dept: HEMATOLOGY/ONCOLOGY | Facility: CLINIC | Age: 60
End: 2025-05-13
Attending: INTERNAL MEDICINE
Payer: MEDICAID

## 2025-05-13 VITALS
TEMPERATURE: 98 F | OXYGEN SATURATION: 98 % | WEIGHT: 113 LBS | SYSTOLIC BLOOD PRESSURE: 98 MMHG | RESPIRATION RATE: 18 BRPM | HEART RATE: 79 BPM | BODY MASS INDEX: 22.19 KG/M2 | DIASTOLIC BLOOD PRESSURE: 65 MMHG | HEIGHT: 60 IN

## 2025-05-13 DIAGNOSIS — R91.8 LUNG NODULES: Primary | ICD-10-CM

## 2025-05-13 DIAGNOSIS — E53.8 VITAMIN B12 DEFICIENCY: ICD-10-CM

## 2025-05-13 DIAGNOSIS — D50.0 IRON DEFICIENCY ANEMIA DUE TO CHRONIC BLOOD LOSS: ICD-10-CM

## 2025-05-13 DIAGNOSIS — K31.819 ANGIODYSPLASIA OF DUODENUM: ICD-10-CM

## 2025-05-13 DIAGNOSIS — K29.70 GASTRITIS, PRESENCE OF BLEEDING UNSPECIFIED, UNSPECIFIED CHRONICITY, UNSPECIFIED GASTRITIS TYPE: ICD-10-CM

## 2025-05-13 DIAGNOSIS — D62 ACUTE BLOOD LOSS ANEMIA: ICD-10-CM

## 2025-05-13 DIAGNOSIS — K55.20 ANGIODYSPLASIA OF SMALL INTESTINE: ICD-10-CM

## 2025-05-13 DIAGNOSIS — K20.90 ESOPHAGITIS: ICD-10-CM

## 2025-05-13 DIAGNOSIS — M05.79 RHEUMATOID ARTHRITIS INVOLVING MULTIPLE SITES WITH POSITIVE RHEUMATOID FACTOR: ICD-10-CM

## 2025-05-13 DIAGNOSIS — Z86.0100 HISTORY OF COLONIC POLYPS: ICD-10-CM

## 2025-05-13 DIAGNOSIS — K74.60 HEPATIC CIRRHOSIS, UNSPECIFIED HEPATIC CIRRHOSIS TYPE, UNSPECIFIED WHETHER ASCITES PRESENT: ICD-10-CM

## 2025-05-13 LAB
BASOPHILS # BLD AUTO: 0.06 X10(3)/MCL
BASOPHILS NFR BLD AUTO: 0.6 %
EOSINOPHIL # BLD AUTO: 0.13 X10(3)/MCL (ref 0–0.9)
EOSINOPHIL NFR BLD AUTO: 1.4 %
ERYTHROCYTE [DISTWIDTH] IN BLOOD BY AUTOMATED COUNT: 15 % (ref 11.5–17)
FERRITIN SERPL-MCNC: 31.72 NG/ML (ref 4.63–204)
HCT VFR BLD AUTO: 24.3 % (ref 37–47)
HGB BLD-MCNC: 7.4 G/DL (ref 12–16)
IMM GRANULOCYTES # BLD AUTO: 0.06 X10(3)/MCL (ref 0–0.04)
IMM GRANULOCYTES NFR BLD AUTO: 0.6 %
IRON SATN MFR SERPL: 24 % (ref 20–50)
IRON SERPL-MCNC: 73 UG/DL (ref 50–170)
LYMPHOCYTES # BLD AUTO: 1.16 X10(3)/MCL (ref 0.6–4.6)
LYMPHOCYTES NFR BLD AUTO: 12.6 %
MCH RBC QN AUTO: 27.4 PG (ref 27–31)
MCHC RBC AUTO-ENTMCNC: 30.5 G/DL (ref 33–36)
MCV RBC AUTO: 90 FL (ref 80–94)
MONOCYTES # BLD AUTO: 1.1 X10(3)/MCL (ref 0.1–1.3)
MONOCYTES NFR BLD AUTO: 11.9 %
NEUTROPHILS # BLD AUTO: 6.73 X10(3)/MCL (ref 2.1–9.2)
NEUTROPHILS NFR BLD AUTO: 72.9 %
NRBC BLD AUTO-RTO: 0 %
PLATELET # BLD AUTO: 279 X10(3)/MCL (ref 130–400)
PMV BLD AUTO: 10.5 FL (ref 7.4–10.4)
RBC # BLD AUTO: 2.7 X10(6)/MCL (ref 4.2–5.4)
TIBC SERPL-MCNC: 231 UG/DL (ref 70–310)
TIBC SERPL-MCNC: 304 UG/DL (ref 250–450)
TRANSFERRIN SERPL-MCNC: 266 MG/DL (ref 180–382)
WBC # BLD AUTO: 9.24 X10(3)/MCL (ref 4.5–11.5)

## 2025-05-13 PROCEDURE — 99215 OFFICE O/P EST HI 40 MIN: CPT | Mod: PBBFAC | Performed by: INTERNAL MEDICINE

## 2025-05-13 PROCEDURE — 85025 COMPLETE CBC W/AUTO DIFF WBC: CPT

## 2025-05-13 PROCEDURE — 83540 ASSAY OF IRON: CPT

## 2025-05-13 PROCEDURE — 82728 ASSAY OF FERRITIN: CPT

## 2025-05-13 RX ORDER — ADALIMUMAB 40MG/0.4ML
40 KIT SUBCUTANEOUS
Qty: 2 PEN | Refills: 11 | Status: SHIPPED | OUTPATIENT
Start: 2025-05-13 | End: 2025-05-14 | Stop reason: SDUPTHER

## 2025-05-13 NOTE — Clinical Note
Orders for 05/13/2025:  Proceed with Feraheme x2; assess response with repeat CBC, serum iron, TIBC, ferritin in 6 weeks  Refer to GI for follow-up ASAP: Strong consideration of continue GI bleeding leading to recurrent iron-deficiency anemia Continue vitamin B12 1000 mcg IM every month; her family member administers the shots Repeat CBC in 1 week, then in 2 weeks  Follow-up with NP in 2 weeks

## 2025-05-13 NOTE — PROGRESS NOTES
History:  Past Medical History:   Diagnosis Date    Anemia, unspecified     Bipolar disorder     Coronary artery disease     Fibromyalgia     Herpes simplex virus (HSV) infection     History of esophagogastroduodenoscopy (EGD) 11/01/2022    Hypertension     Osteopenia     PAD (peripheral artery disease)     PVD (peripheral vascular disease) with claudication     Rheumatoid arthritis, unspecified     Thyroid disease     Vitamin B12 deficiency      Past Surgical History:   Procedure Laterality Date    COLONOSCOPY W/ BIOPSIES  03/19/2024    EXTRACORPOREAL SHOCK WAVE LITHOTRIPSY  02/22/2023    Procedure: LITHOTRIPSY- Peripheral Shockwave;  Surgeon: Adriel Valero MD;  Location: [facility] CATH LAB;  Service: Cardiology;;    HIP SURGERY      INSERTION, STENT, ARTERY  02/22/2023    Procedure: INSERTION, STENT, ARTERY;  Surgeon: Adriel Valero MD;  Location: [facility] CATH LAB;  Service: Cardiology;;    INTRALUMINAL GASTROINTESTINAL TRACT IMAGING VIA CAPSULE N/A 08/15/2023    Procedure: IMAGING PROCEDURE, GI TRACT, INTRALUMINAL, VIA CAPSULE;  Surgeon: Liza Del Rio MD;  Location: [facility] ENDOSCOPY;  Service: Gastroenterology;  Laterality: N/A;    INTRAVASCULAR ULTRASOUND, NON-CORONARY  02/22/2023    Procedure: Intravascular Ultrasound, Non-Coronary;  Surgeon: Adriel Valero MD;  Location: [facility] CATH LAB;  Service: Cardiology;;    LEFT HEART CATHETERIZATION      PERIPHERAL ANGIOGRAPHY N/A 02/22/2023    Procedure: Peripheral angiography;  Surgeon: Adriel Valero MD;  Location: [facility] CATH LAB;  Service: Cardiology;  Laterality: N/A;  BILAT ILIAC INTERVENTION      Social History     Socioeconomic History    Marital status: Single   Tobacco Use    Smoking status: Every Day     Current packs/day: 0.25     Average packs/day: 0.3 packs/day for 40.0 years (10.0 ttl pk-yrs)     Types: Cigarettes     Passive exposure: Current    Smokeless tobacco: Never    Tobacco comments:     15 cigs per day; on nicotine patches   Substance and Sexual  Activity    Alcohol use: Not Currently    Drug use: Yes     Types: Marijuana     Comment: DAILY    Sexual activity: Not Currently   Social History Narrative    ** Merged History Encounter **           Family History   Problem Relation Name Age of Onset    Kidney failure Mother      Heart disease Mother      Hypertension Mother      No Known Problems Father        Reason for Follow-up:  Iron-deficiency anemia   Vitamin B12 deficiency   Angiodysplasia of duodenum    History of Present Illness:   Iron deficiency anemia      Oncologic/Hematologic History:  Oncology History    No history exists.   Past medical history:  B12 deficiency.  Anemia.  Iron-deficiency.  History of peripheral vascular disease, on dual antiplatelets.  Hypothyroidism.  CAD, S/P PCI.  Chronic hepatitis-B.  Chronic hepatitis-C. Depression.  GERD.  Right lower lung lobe nodule.  02/22/2023: Bilateral lower extremity stent placement, with resolution of bilateral lower extremity pain  Social history:  .  Lives with her brother in [place], [place].  No children.  Never became pregnant.  Disabled.  Does not work.  Has been smoking half a pack of cigarettes daily for 45 years.  Has been smoking marijuana every other day for 40 years.  No other illicit drugs.  No alcohol abuse.  Family history:  Negative for cancers or blood dyscrasias.  Health maintenance:  PCP at [facility].  -05/02/2023:  Bilateral screening mammogram (comparison:  12/03/2020 mammogram):  BI-RADS 2 (benign)  -02/24/2022: Stool for occult blood pos  -history of multiple colonoscopies and endoscopies: Angiodysplastic lesions in duodenum      57-year-old female, referred from Internal Medicine, for evaluation of anemia.    We are following the patient for history of iron-deficiency anemia and B12 deficiency anemia.  She also has angiodysplasia of duodenum.  Please refer to assessment and plan section for details.    05/22/2023:  Pleasant lady.  Presents for initial  hematology consultation.  In no acute discomfort.    Always feels tired.  No abdominal pain, nausea or vomiting.  Denies hematemesis, melena, or hematochezia.  Fair appetite.  Main complaint is chronic tiredness.  Has been taking iron pill every other day for last 2 years.  No GI side effects.  Started vitamin B12 pill x2 daily, a month ago.  We will check her iron stores and B12 level at this time.  Says that she was transfused around Easter time and a couple of years ago as well.  No history of cancers.  Chronic generalized fatigue, swelling in the legs, cough, and numbness and tingling in hands.  Generalized body pains, 9/10, which she attributes to rheumatoid arthritis.    Interval History:  OP IV IRON THERAPY   [No matching plan found]     05/13/2025:  -no showed 05/06/2025  -05/06/2025:  Hemoglobin 8.0, dropping (down from 13.5 on 02/06/2025); ferritin 17.50, dropping (ferritin was 116, normal, 2 months ago; Feraheme ordered; patient referred to GI for evaluation; she no showed for appointment on 05/06/2025  -05/13/2025: Hemoglobin 7.4, keeps dropping; MCV normal; rest of CBC unremarkable; serum iron normal, TIBC normal, transferrin saturation 24% normal (was 5% on 05/06/2025), ferritin 31.72 (remains borderline)  Presents for a follow-up visit.  Hemoglobin keeps dropping.  Denies abdominal pain, nausea, vomiting, hematemesis, melena, or hematochezia.  Feels weak and fatigued.  No dizzy spells.  No syncopal episodes.  Minor headaches.  Mild exertional dyspnea.  No palpitations.  For last 1 week, has been taking 1 over-the-counter iron pill daily.  This shows up in blood test as well.  Most likely, she is losing occult blood from multiple angioectasias in GI tract.  Have sent a consultation requested GI, for repeat endoscopy and endoscopic hemostasis, as and if required.  Follows up with Rheumatology for rheumatoid arthritis.    Immunization History   Administered Date(s) Administered    COVID-19 Vaccine  08/07/2021, 09/11/2021    Hepatitis A / Hepatitis B 02/13/2012, 01/02/2014, 07/01/2014    Hepatitis B, Adult 01/07/2021, 02/08/2021, 07/07/2021    Influenza (FLUAD) - Quadrivalent - Adjuvanted - PF *Preferred* (65+) 10/11/2022    Influenza - Quadrivalent 09/23/2020    Influenza - Quadrivalent - PF *Preferred* (6 months and older) 10/09/2023    Pneumococcal Conjugate - 13 Valent 10/27/2020    Pneumococcal Conjugate - 20 Valent 02/01/2023    Td (ADULT) 06/10/2007    Tdap 09/23/2020       Review of patient's allergies indicates:   Allergen Reactions    Benadryl itch relief Other (See Comments)     RESTLESS LEGS       Medications:  Current Outpatient Medications on File Prior to Visit   Medication Sig Dispense Refill    acetaminophen (TYLENOL) 500 MG tablet Take 500 mg by mouth every 6 (six) hours as needed for Pain.      ALPRAZolam (XANAX) 0.5 MG tablet Take 0.5 mg by mouth 2 (two) times daily as needed for Anxiety (anxiety).      amLODIPine (NORVASC) 5 MG tablet Take 5 mg by mouth once daily.      cetirizine (ZYRTEC) 10 MG tablet Take 10 mg by mouth daily as needed for Allergies.      cyanocobalamin 1,000 mcg/mL injection Inject 1 mL (1,000 mcg total) into the muscle every 28 days. 1 mL 3    DULoxetine (CYMBALTA) 30 MG capsule Take 30 mg by mouth once daily.      DULoxetine (CYMBALTA) 60 MG capsule Take 60 mg by mouth every morning.      estradioL (VAGIFEM) 10 mcg Tab Insert 1 suppository vaginally every night for 2 weeks then twice per week 18 tablet 11    famotidine (PEPCID) 40 MG tablet Take 1 tablet (40 mg total) by mouth every evening. 90 tablet 3    folic acid (FOLVITE) 1 MG tablet Take 1 tablet (1 mg total) by mouth once daily. 90 tablet 1    gabapentin (NEURONTIN) 600 MG tablet Take 600 mg by mouth 3 (three) times daily.      leflunomide (ARAVA) 20 MG Tab Take 1 tablet (20 mg total) by mouth once daily. 30 tablet 3    nicotine (NICODERM CQ) 21 mg/24 hr Place 1 patch onto the skin once daily. 14 patch 0     nitroGLYCERIN (NITROSTAT) 0.4 MG SL tablet Place 1 tablet under the tongue every 5 (five) minutes as needed.      pantoprazole (PROTONIX) 40 MG tablet Take 1 tablet (40 mg total) by mouth once daily. 90 tablet 3    rosuvastatin (CRESTOR) 40 MG Tab Take 1 tablet (40 mg total) by mouth once daily. 30 tablet 5    traZODone (DESYREL) 300 MG tablet Take 450 mg by mouth nightly.      carvediloL (COREG) 6.25 MG tablet Take 1 tablet (6.25 mg total) by mouth 2 (two) times daily with meals. 90 tablet 3    clopidogreL (PLAVIX) 75 mg tablet Take 1 tablet (75 mg total) by mouth once daily. 30 tablet 5    lisinopriL 10 MG tablet Take 1 tablet (10 mg total) by mouth once daily. 90 tablet 3    pregabalin (LYRICA) 75 MG capsule Take 1 capsule (75 mg total) by mouth 2 (two) times daily. 60 capsule 2    [DISCONTINUED] adalimumab (HUMIRA,CF, PEN) 40 mg/0.4 mL PnKt Inject 0.4 mLs (40 mg total) into the skin every 14 (fourteen) days. (Patient not taking: Reported on 4/10/2025) 2 pen 11     No current facility-administered medications on file prior to visit.     Review of Systems:   All systems reviewed and found to be negative except for the symptoms detailed above    Physical Examination:   VITAL SIGNS:   Vitals:    05/13/25 1435   BP: 98/65   Pulse: 79   Resp: 18   Temp: 98.2 °F (36.8 °C)       GENERAL:  In no apparent distress.    HEAD:  No signs of head trauma.  EYES:  Pupils are equal.  Extraocular motions intact.    EARS:  Hearing grossly intact.  MOUTH:  Oropharynx is normal.   NECK:  No adenopathy, no JVD.     CHEST:  Chest with clear breath sounds bilaterally.  No wheezes, rales, rhonchi.    CARDIAC:  Regular rate and rhythm.  S1 and S2, without murmurs, gallops, rubs.  VASCULAR:  No Edema.  Peripheral pulses normal and equal in all extremities.  ABDOMEN:  Soft, without detectable tenderness.  No sign of distention.  No   rebound or guarding, and no masses palpated.   Bowel Sounds normal.  MUSCULOSKELETAL:  Good range of motion  of all major joints. Extremities without clubbing, cyanosis or edema.    NEUROLOGIC EXAM:  Alert and oriented x 3.  No focal sensory or strength deficits.   Speech normal.  Follows commands.  PSYCHIATRIC:  Mood normal.      Assessment:  Problem List Items Addressed This Visit       Acute blood loss anemia    Vitamin B12 deficiency    Angiodysplasia of duodenum    Angiodysplasia of small intestine    Liver cirrhosis    Esophagitis    Gastritis    Personal history of colonic polyps    Overview   Replacing diagnoses that were inactivated after the 4/1 regulatory import.         Lung nodules - Primary    Iron deficiency anemia     Orders for 05/13/2025:   Proceed with Feraheme x2; assess response with repeat CBC, serum iron, TIBC, ferritin in 6 weeks   Refer to GI for follow-up ASAP: Strong consideration of continue GI bleeding leading to recurrent iron-deficiency anemia  Continue vitamin B12 1000 mcg IM every month; her family member administers the shots  Repeat CBC in 1 week, then in 2 weeks   Follow-up with NP in 2 weeks    Above discussed with the.  All questions answered.  Discussed labs and gave her copies of relevant results.  She understands and agrees with this plan.  =================================    # Iron-deficiency anemia, B12 deficiency anemia:  (Multiple angioectasias in duodenum, without bleeding; multiple angioectasias in jejunum, without bleeding; multiple angioectasias in ileum, without bleeding):  -chronic anemia, ranging between 6.8-8.1; hemoglobin was normal in October 2022  -chronic iron-deficiency  -PRBC x1 unit 04/03/2023 for hemoglobin 6.8  -Celiac serology negative 05/17/2023  -RBC osmotic fragility normal 05/16/2023  -no hemoglobinopathy on hemoglobin electrophoresis 04/03/2023  -found to be B12 deficient when hospitalized 04/07/2023-04/08/2023 with symptomatic anemia, requiring PRBC x1 unit  -Ferrlecit 125 mg IV x2 (02/20/2023, 04/08/2023)  -12/16/2020: EGD:  No esophageal varices; 3  diminutive nonbleeding angiodysplastic lesions 2nd portion of duodenum   -11/15/2021: EGD:  Esophagitis, gastritis, a single bleeding angiectasia duodenal bulb treated with cautery (thermotherapy)  -09/22/2021: Colonoscopy:  Occult blood in stool: Normal  -EGD and colonoscopy 2022: Colon polyp  -no response to oral iron therapy x2-3 years; remained iron-deficiency  -S/P Feraheme 510 mg IV x2 (06/05/2023, 06/12/2023), with good response (hemoglobin improved to 12.3 and ferritin 122.03 on 07/10/2023  -07/12/2023:  Patient desired to have B12 shots rather than pills because she can not afford the pills  -08/15/2023: Video capsule endoscopy: Multiple angioectasias without bleeding in the duodenum; multiple angioectasias without bleeding in the jejunum; multiple angioectasias without bleeding in the ileum  -09/11/2023:  Hemoglobin 11.1, stable  -11/14/2023:  Stool occult blood positive  -02/08/2024:  Hemoglobin 11.3 (was 9.8 on 12/13/2023); ferritin 43.48 (was 54.49 on 12/11/2023)  -03/11/2024:  Hemoglobin 9.6, down from 11.3 on 02/08/2024; ferritin level is pending; transferrin saturation 13%, remains low  -12/13/2023: Hemoglobin 9.8, ferritin 54.49 on 12/11/2023  -02/08/2024: Hemoglobin 11.3, ferritin 43.48  -03/11/2024: Hemoglobin 9.6, ferritin 36.27  -03/19/2024: Colonoscopy (Dr. Keyon Hatch MD):  Normal mucosa; grade/stage II internal hemorrhoids (infrared coagulation)  [Pathology:  Terminal ileum biopsy, no active or chronic enteritis/dysplasia/malignancy; cecum biopsy, consistent with collagenous colitis; ascending colon biopsy, collagenous colitis; transferrin colon biopsy, collagenous colitis; descending colon biopsy, collagenous colitis]  -S/P Feraheme 510 mg IV x2 (03/20/2024, 04/04/2024  -05/29/2024: Hemoglobin 12.5 (normalized post Feraheme)  -06/13/2024: Ferritin 92.58, normalized  -subsequently, hemoglobin dropped:  10.9 on 06/13/2024, 9.9 on 08/10/2024, 7.4 on 08/19/2024, 11.3 on 08/27/2024  (Ferritin:   49.30 on 07/15 1024, 20.18 on 08/19/2024)  -08/19/2024: S/P PRBC x2 units when hemoglobin dropped 7.4  -S/P Feraheme 510 mg IV x2 (09/17/2024, 09/24/2024)  -11/19/2024: Hemoglobin 9.8, ferritin 30.29  -11/25/2024: Hemoglobin 9.6  (no improvement with Feraheme; rather than, further drop; iron stores also dropped, indicating ongoing low-grade GI bleed))  -S/P Feraheme 510 mg IV x2 (12/02/2024, 12/27/2024)  -02/06/2025: Hemoglobin 13.5, MCV normal, serum iron normal, TIBC 225 low, ferritin 116, transferrin saturation 22%  (iron-deficiency anemia resolved with Feraheme x2, administered December 2024  -05/06/2025:  Hemoglobin 8.0, dropping (down from 13.5 on 02/06/2025); ferritin 17.50, dropping (ferritin was 116, normal on 02/06/2025; Feraheme ordered; referral to GI for evaluation, placed; she no showed for appointment on 05/06/2025  (05/06/2025:  Dramatic, severe, acute iron-deficiency anemia developing over last 3 months)  -05/13/2025: Hemoglobin 7.4, keeps dropping; MCV normal; rest of CBC unremarkable; serum iron normal, TIBC normal, transferrin saturation 24% normal (was 5% on 05/06/2025), ferritin 31.72 (remains borderline)  (hemoglobin keeps dropping)  >>>  Plan:  -chronic recurrent iron-deficiency anemia, requiring multiple rounds of intravenous iron therapy (must be bleeding from multiple angioectasias in intestinal tract; on colonoscopy, also found to have collagenous colitis)  -has failed oral iron therapy  -05/06/2025:  now, has developed dramatic severe acute iron-deficiency anemia over last 3 months  -proceed with intravenous iron therapy with Feraheme x2; assess response with repeat CBC, serum iron, TIBC, ferritin 6 weeks later  -follow-up with GI for history of multiple angioectasias in intestinal tract (occult bleeding from multiple angioectasias in the intestinal tract, is the most likely etiology of recurrent severe iron-deficiency anemia)    # Vitamin B12 deficiency:  -diagnosed 04/03/2023: B12  level 209  -05/22/2023: Tells me that she has been taking 2 vitamin B12 pills for last 1 month.  -05/22/2023: Vitamin B12 level 521 (absorbing satisfactorily via oral route).   Intrinsic factor antibody negative.    Homocystine level 4.6 micromoles per L, suppressed (patient already on oral B12 supplementation).  Methylmalonic acid level 0.12 nanomoles /ml, normal (patient already on oral vitamin B12 supplementation, with adequate absorption)  -07/12/2023:  Patient desired to have B12 shots rather than pills because she can not afford the pills  -09/11/2023:  For last couple she has discontinued B12 pills because she could not afford   -09/11/2023:  For last 2 months, her sister-in-law has been administering her monthly B12 shots;   -12/11/2023:  Hemoglobin 10.0 (was 11.1 on 09/11/2023, 11.3 on 08/14/2023, 12.3 on 07/10/2023, etc).; ferritin 54.49 (was 18.98 on 09/11/2023); B12 level 435   >>>  Continue vitamin B12 1000 mcg IM (per her preference) every month; her sister in law administers her the shots    # Subcentimeter lung nodules:  -noncontrast chest CT 11/20/2023:  Stable subcentimeter lung nodules  -12/04/2023:  Pulmonary follow-up: Repeat CT chest in 1 year  -noncontrast chest CT 11/25/2024: Comparison CT chest 11/20/2023:  Unchanged lung nodules  (per pulmonary, no need of further CTs in view of stability)    # History of hepatitis-C and liver cirrhosis:  -treated with Epclusa in 2020   -Posttreatment viral load undetectable  -FibroScan F 1 (no liver cirrhosis)  -ultrasound 09/09/2020: Liver cirrhosis     -EGD 12/16/2020:  No esophageal varices    Follow-up:  No follow-ups on file.

## 2025-05-13 NOTE — TELEPHONE ENCOUNTER
Patient missed her apt yesterday and r/s to 6/2/25- then cx this apt again, Mitzy reached out to me to advise, they did call her and she reports she has an upcoming surgery but has the dates confused- please call the patient and find out more details as to what kind of surgery and when as she may need to hold her medication - please keep me posted, thanks

## 2025-05-13 NOTE — TELEPHONE ENCOUNTER
Spoke with patient whom confirmed she will be having a Hernia Surgery  with Dr Jossue Grijalva at [facility] Number given [phone]. I asked her if she discussed her meds and she stated she told them about Leflunomide but not Humira. She was calling them now to advise.     She stated she could come in sooner to see Rheumatology to discuss meds.    Upon calling facility names above to confirm patient report I was given Facility name below         Upon calling [facility] I introduced myself and patient affiliation and reason for my tc to confirm patient surgery date.     Below information was confirmed.      Hernia Surgery is scheduled for  06.03.25 outpatient in [place] with Dr Jossue Angeles    [facility]  [phone] Tel  [phone] Fax

## 2025-05-14 ENCOUNTER — PATIENT MESSAGE (OUTPATIENT)
Dept: RHEUMATOLOGY | Facility: CLINIC | Age: 60
End: 2025-05-14
Payer: MEDICAID

## 2025-05-14 RX ORDER — EPINEPHRINE 0.3 MG/.3ML
0.3 INJECTION SUBCUTANEOUS ONCE AS NEEDED
Status: CANCELLED | OUTPATIENT
Start: 2025-05-14

## 2025-05-14 RX ORDER — HEPARIN 100 UNIT/ML
500 SYRINGE INTRAVENOUS
Status: CANCELLED | OUTPATIENT
Start: 2025-05-14

## 2025-05-14 RX ORDER — ADALIMUMAB 40MG/0.4ML
40 KIT SUBCUTANEOUS
Qty: 2 PEN | Refills: 11 | Status: SHIPPED | OUTPATIENT
Start: 2025-05-14

## 2025-05-14 RX ORDER — SODIUM FERRIC GLUCONATE COMPLEX IN SUCROSE 12.5 MG/ML
125 INJECTION INTRAVENOUS
Status: CANCELLED | OUTPATIENT
Start: 2025-05-14

## 2025-05-14 RX ORDER — SODIUM CHLORIDE 0.9 % (FLUSH) 0.9 %
10 SYRINGE (ML) INJECTION
Status: CANCELLED | OUTPATIENT
Start: 2025-05-14

## 2025-05-15 ENCOUNTER — INFUSION (OUTPATIENT)
Dept: INFUSION THERAPY | Facility: HOSPITAL | Age: 60
End: 2025-05-15
Attending: INTERNAL MEDICINE
Payer: MEDICAID

## 2025-05-15 ENCOUNTER — PATIENT MESSAGE (OUTPATIENT)
Dept: RHEUMATOLOGY | Facility: CLINIC | Age: 60
End: 2025-05-15
Payer: MEDICAID

## 2025-05-15 VITALS
DIASTOLIC BLOOD PRESSURE: 68 MMHG | RESPIRATION RATE: 20 BRPM | BODY MASS INDEX: 21.82 KG/M2 | SYSTOLIC BLOOD PRESSURE: 132 MMHG | WEIGHT: 111.13 LBS | OXYGEN SATURATION: 98 % | TEMPERATURE: 98 F | HEIGHT: 60 IN | HEART RATE: 82 BPM

## 2025-05-15 DIAGNOSIS — D50.0 IRON DEFICIENCY ANEMIA DUE TO CHRONIC BLOOD LOSS: Primary | ICD-10-CM

## 2025-05-15 PROCEDURE — 63600175 PHARM REV CODE 636 W HCPCS: Performed by: INTERNAL MEDICINE

## 2025-05-15 PROCEDURE — 96374 THER/PROPH/DIAG INJ IV PUSH: CPT

## 2025-05-15 RX ORDER — EPINEPHRINE 0.3 MG/.3ML
0.3 INJECTION SUBCUTANEOUS ONCE AS NEEDED
OUTPATIENT
Start: 2025-05-22

## 2025-05-15 RX ORDER — SODIUM FERRIC GLUCONATE COMPLEX IN SUCROSE 12.5 MG/ML
125 INJECTION INTRAVENOUS
Status: COMPLETED | OUTPATIENT
Start: 2025-05-15 | End: 2025-05-15

## 2025-05-15 RX ORDER — SODIUM FERRIC GLUCONATE COMPLEX IN SUCROSE 12.5 MG/ML
125 INJECTION INTRAVENOUS
OUTPATIENT
Start: 2025-05-22

## 2025-05-15 RX ORDER — HEPARIN 100 UNIT/ML
500 SYRINGE INTRAVENOUS
OUTPATIENT
Start: 2025-05-22

## 2025-05-15 RX ORDER — SODIUM CHLORIDE 0.9 % (FLUSH) 0.9 %
10 SYRINGE (ML) INJECTION
OUTPATIENT
Start: 2025-05-22

## 2025-05-15 RX ORDER — SODIUM CHLORIDE 0.9 % (FLUSH) 0.9 %
10 SYRINGE (ML) INJECTION
Status: DISCONTINUED | OUTPATIENT
Start: 2025-05-15 | End: 2025-05-15 | Stop reason: HOSPADM

## 2025-05-15 RX ADMIN — SODIUM FERRIC GLUCONATE COMPLEX IN SUCROSE 125 MG: 12.5 INJECTION INTRAVENOUS at 12:05

## 2025-05-15 NOTE — NURSING
"Patient here for ferrlecit #1/8. Patient labs on 5/13/25 HGB 7.4. Dr. Herndon notes 5/153/25 "Most likely, she is losing occult blood from multiple angioectasias in GI tract. Have sent a consultation requested GI, for repeat endoscopy and endoscopic hemostasis, as and if required. " Dr. Herndon orders ferrlecit. Next CBC 5/20/25. Patient verbalizes confirmation of next appointment. Patient states "I did not receive a call yet from GI."  Patient tolerated treatment.   "

## 2025-05-19 DIAGNOSIS — R91.8 LUNG NODULES: Primary | ICD-10-CM

## 2025-05-20 ENCOUNTER — LAB VISIT (OUTPATIENT)
Dept: HEMATOLOGY/ONCOLOGY | Facility: CLINIC | Age: 60
End: 2025-05-20
Attending: INTERNAL MEDICINE
Payer: MEDICAID

## 2025-05-20 ENCOUNTER — TELEPHONE (OUTPATIENT)
Dept: ENDOSCOPY | Facility: HOSPITAL | Age: 60
End: 2025-05-20
Payer: MEDICAID

## 2025-05-20 ENCOUNTER — INFUSION (OUTPATIENT)
Dept: INFUSION THERAPY | Facility: HOSPITAL | Age: 60
End: 2025-05-20
Attending: INTERNAL MEDICINE
Payer: MEDICAID

## 2025-05-20 ENCOUNTER — TELEPHONE (OUTPATIENT)
Dept: HEMATOLOGY/ONCOLOGY | Facility: CLINIC | Age: 60
End: 2025-05-20
Payer: MEDICAID

## 2025-05-20 VITALS
OXYGEN SATURATION: 97 % | BODY MASS INDEX: 21.17 KG/M2 | HEART RATE: 74 BPM | WEIGHT: 107.81 LBS | SYSTOLIC BLOOD PRESSURE: 112 MMHG | DIASTOLIC BLOOD PRESSURE: 65 MMHG | TEMPERATURE: 98 F | HEIGHT: 60 IN | RESPIRATION RATE: 18 BRPM

## 2025-05-20 DIAGNOSIS — D50.0 IRON DEFICIENCY ANEMIA DUE TO CHRONIC BLOOD LOSS: ICD-10-CM

## 2025-05-20 DIAGNOSIS — E53.8 VITAMIN B12 DEFICIENCY: ICD-10-CM

## 2025-05-20 DIAGNOSIS — D62 ACUTE BLOOD LOSS ANEMIA: ICD-10-CM

## 2025-05-20 DIAGNOSIS — R91.8 LUNG NODULES: ICD-10-CM

## 2025-05-20 DIAGNOSIS — D50.0 IRON DEFICIENCY ANEMIA DUE TO CHRONIC BLOOD LOSS: Primary | ICD-10-CM

## 2025-05-20 LAB
ALBUMIN SERPL-MCNC: 3.4 G/DL (ref 3.4–4.8)
ALBUMIN/GLOB SERPL: 0.9 RATIO (ref 1.1–2)
ALP SERPL-CCNC: 68 UNIT/L (ref 40–150)
ALT SERPL-CCNC: 6 UNIT/L (ref 0–55)
ANION GAP SERPL CALC-SCNC: 8 MEQ/L
AST SERPL-CCNC: 14 UNIT/L (ref 11–45)
BASOPHILS # BLD AUTO: 0.09 X10(3)/MCL
BASOPHILS NFR BLD AUTO: 0.7 %
BILIRUB SERPL-MCNC: 0.2 MG/DL
BUN SERPL-MCNC: 11.3 MG/DL (ref 9.8–20.1)
CALCIUM SERPL-MCNC: 9.4 MG/DL (ref 8.4–10.2)
CHLORIDE SERPL-SCNC: 104 MMOL/L (ref 98–107)
CO2 SERPL-SCNC: 26 MMOL/L (ref 23–31)
CREAT SERPL-MCNC: 0.69 MG/DL (ref 0.55–1.02)
CREAT/UREA NIT SERPL: 16
EOSINOPHIL # BLD AUTO: 0.31 X10(3)/MCL (ref 0–0.9)
EOSINOPHIL NFR BLD AUTO: 2.3 %
ERYTHROCYTE [DISTWIDTH] IN BLOOD BY AUTOMATED COUNT: 16.3 % (ref 11.5–17)
GFR SERPLBLD CREATININE-BSD FMLA CKD-EPI: >60 ML/MIN/1.73/M2
GLOBULIN SER-MCNC: 3.9 GM/DL (ref 2.4–3.5)
GLUCOSE SERPL-MCNC: 100 MG/DL (ref 82–115)
HCT VFR BLD AUTO: 30.3 % (ref 37–47)
HGB BLD-MCNC: 9.2 G/DL (ref 12–16)
IMM GRANULOCYTES # BLD AUTO: 0.1 X10(3)/MCL (ref 0–0.04)
IMM GRANULOCYTES NFR BLD AUTO: 0.7 %
LYMPHOCYTES # BLD AUTO: 1 X10(3)/MCL (ref 0.6–4.6)
LYMPHOCYTES NFR BLD AUTO: 7.5 %
MCH RBC QN AUTO: 27.1 PG (ref 27–31)
MCHC RBC AUTO-ENTMCNC: 30.4 G/DL (ref 33–36)
MCV RBC AUTO: 89.4 FL (ref 80–94)
MONOCYTES # BLD AUTO: 0.93 X10(3)/MCL (ref 0.1–1.3)
MONOCYTES NFR BLD AUTO: 7 %
NEUTROPHILS # BLD AUTO: 10.92 X10(3)/MCL (ref 2.1–9.2)
NEUTROPHILS NFR BLD AUTO: 81.8 %
NRBC BLD AUTO-RTO: 0 %
PLATELET # BLD AUTO: 352 X10(3)/MCL (ref 130–400)
PMV BLD AUTO: 10.3 FL (ref 7.4–10.4)
POTASSIUM SERPL-SCNC: 4.5 MMOL/L (ref 3.5–5.1)
PROT SERPL-MCNC: 7.3 GM/DL (ref 5.8–7.6)
RBC # BLD AUTO: 3.39 X10(6)/MCL (ref 4.2–5.4)
SODIUM SERPL-SCNC: 138 MMOL/L (ref 136–145)
WBC # BLD AUTO: 13.35 X10(3)/MCL (ref 4.5–11.5)

## 2025-05-20 PROCEDURE — 85025 COMPLETE CBC W/AUTO DIFF WBC: CPT

## 2025-05-20 PROCEDURE — 96374 THER/PROPH/DIAG INJ IV PUSH: CPT

## 2025-05-20 PROCEDURE — 80053 COMPREHEN METABOLIC PANEL: CPT

## 2025-05-20 PROCEDURE — 63600175 PHARM REV CODE 636 W HCPCS: Performed by: INTERNAL MEDICINE

## 2025-05-20 PROCEDURE — 25000003 PHARM REV CODE 250: Performed by: INTERNAL MEDICINE

## 2025-05-20 RX ORDER — SODIUM FERRIC GLUCONATE COMPLEX IN SUCROSE 12.5 MG/ML
125 INJECTION INTRAVENOUS
Status: COMPLETED | OUTPATIENT
Start: 2025-05-20 | End: 2025-05-20

## 2025-05-20 RX ORDER — SODIUM CHLORIDE 0.9 % (FLUSH) 0.9 %
10 SYRINGE (ML) INJECTION
OUTPATIENT
Start: 2025-05-27

## 2025-05-20 RX ORDER — EPINEPHRINE 0.3 MG/.3ML
0.3 INJECTION SUBCUTANEOUS ONCE AS NEEDED
OUTPATIENT
Start: 2025-05-27

## 2025-05-20 RX ORDER — HEPARIN 100 UNIT/ML
500 SYRINGE INTRAVENOUS
Status: DISCONTINUED | OUTPATIENT
Start: 2025-05-20 | End: 2025-05-20 | Stop reason: HOSPADM

## 2025-05-20 RX ORDER — HEPARIN 100 UNIT/ML
500 SYRINGE INTRAVENOUS
OUTPATIENT
Start: 2025-05-27

## 2025-05-20 RX ORDER — SODIUM CHLORIDE 0.9 % (FLUSH) 0.9 %
10 SYRINGE (ML) INJECTION
Status: DISCONTINUED | OUTPATIENT
Start: 2025-05-20 | End: 2025-05-20 | Stop reason: HOSPADM

## 2025-05-20 RX ORDER — SODIUM FERRIC GLUCONATE COMPLEX IN SUCROSE 12.5 MG/ML
125 INJECTION INTRAVENOUS
OUTPATIENT
Start: 2025-05-27

## 2025-05-20 RX ADMIN — SODIUM FERRIC GLUCONATE COMPLEX IN SUCROSE 125 MG: 12.5 INJECTION INTRAVENOUS at 10:05

## 2025-05-20 RX ADMIN — SODIUM CHLORIDE: 9 INJECTION, SOLUTION INTRAVENOUS at 10:05

## 2025-05-20 NOTE — NURSING
Patient is here for labs & Ferrlecit #2. She voices she feels a little better.   Ferrlecit given via r ac piv.  HGB 9.2; No new orders from Dr. Jackson.

## 2025-05-21 ENCOUNTER — OFFICE VISIT (OUTPATIENT)
Dept: INTERNAL MEDICINE | Facility: CLINIC | Age: 60
End: 2025-05-21
Payer: MEDICAID

## 2025-05-21 VITALS
RESPIRATION RATE: 18 BRPM | BODY MASS INDEX: 21.63 KG/M2 | DIASTOLIC BLOOD PRESSURE: 80 MMHG | TEMPERATURE: 98 F | HEART RATE: 75 BPM | HEIGHT: 60 IN | SYSTOLIC BLOOD PRESSURE: 127 MMHG | OXYGEN SATURATION: 97 % | WEIGHT: 110.19 LBS

## 2025-05-21 DIAGNOSIS — D50.0 IRON DEFICIENCY ANEMIA DUE TO CHRONIC BLOOD LOSS: ICD-10-CM

## 2025-05-21 DIAGNOSIS — K21.9 GASTROESOPHAGEAL REFLUX DISEASE, UNSPECIFIED WHETHER ESOPHAGITIS PRESENT: ICD-10-CM

## 2025-05-21 DIAGNOSIS — E78.5 HYPERLIPIDEMIA, UNSPECIFIED HYPERLIPIDEMIA TYPE: Primary | ICD-10-CM

## 2025-05-21 DIAGNOSIS — Z12.31 ENCOUNTER FOR SCREENING MAMMOGRAM FOR MALIGNANT NEOPLASM OF BREAST: ICD-10-CM

## 2025-05-21 DIAGNOSIS — R91.8 LUNG NODULES: ICD-10-CM

## 2025-05-21 PROBLEM — D50.9 IRON DEFICIENCY ANEMIA: Status: RESOLVED | Noted: 2025-02-06 | Resolved: 2025-05-21

## 2025-05-21 PROCEDURE — 99215 OFFICE O/P EST HI 40 MIN: CPT | Mod: PBBFAC | Performed by: INTERNAL MEDICINE

## 2025-05-21 NOTE — PROGRESS NOTES
INTERNAL [facility]IC[facility]INIC  CLINIC NOTE    Patient Name: Sandra Burns  YOB: 1965    PRESENTING HISTORY       History of Present Illness:  Ms. Sandra Burns is a 59 y.o. female w/ an active medical problem list including Bipolar Disorder, Depression, Chronic pain, GERD, HCV, HBV, HTN. She is presnting today to [facility] for follow up appointment.     Today, she complained of poor appetite stated that she was stressed recently as she almost lost her home but she got it back and she is also worried about her anemia and the need for frequent infusions and having to go through multiple scope procedures and still could not find the bleeding source.   She stated that she is trying to cut down smoking but for gets to use her nicotine patches some times, but they help when ever she uses them.         PSocH  -no alcohol use; smokes 1ppd for 41 years, no drug use    PFH  -ESRD and heart failure      Review of Systems   Constitutional:  Negative for chills and fever.   Respiratory:  Negative for cough and shortness of breath.    Cardiovascular:  Negative for chest pain, palpitations and leg swelling.   Gastrointestinal:  Negative for abdominal pain, blood in stool, constipation, diarrhea, heartburn, melena, nausea and vomiting.   Musculoskeletal:  Positive for joint pain.     Constitutional: no fever/chills  EENT: no sore throat, ear pain, sinus pain/congestion, nasal congestion/drainage  Respiratory: no cough, no wheezing, no shortness of breath  Cardiovascular: no chest pain, no palpitations, no edema  Gastrointestinal: as above  Genitourinary: no dysuria, no urinary frequency or urgency, no hematuria  Integumentary: no skin rash or abnormal lesion  Neurologic: no headache, no dizziness, no weakness or numbness  MSK: as above      MEDICATIONS & ALLERGIES:     Current Outpatient Medications on File Prior to Visit   Medication Sig    acetaminophen (TYLENOL) 500 MG tablet Take 500 mg by  mouth every 6 (six) hours as needed for Pain.    ALPRAZolam (XANAX) 0.5 MG tablet Take 0.5 mg by mouth 2 (two) times daily as needed for Anxiety (anxiety).    amLODIPine (NORVASC) 5 MG tablet Take 5 mg by mouth once daily.    cetirizine (ZYRTEC) 10 MG tablet Take 10 mg by mouth daily as needed for Allergies.    cyanocobalamin 1,000 mcg/mL injection Inject 1 mL (1,000 mcg total) into the muscle every 28 days.    DULoxetine (CYMBALTA) 30 MG capsule Take 30 mg by mouth once daily.    DULoxetine (CYMBALTA) 60 MG capsule Take 60 mg by mouth every morning.    famotidine (PEPCID) 40 MG tablet Take 1 tablet (40 mg total) by mouth every evening.    folic acid (FOLVITE) 1 MG tablet Take 1 tablet (1 mg total) by mouth once daily.    gabapentin (NEURONTIN) 600 MG tablet Take 600 mg by mouth 3 (three) times daily.    leflunomide (ARAVA) 20 MG Tab Take 1 tablet (20 mg total) by mouth once daily.    nicotine (NICODERM CQ) 21 mg/24 hr Place 1 patch onto the skin once daily.    nitroGLYCERIN (NITROSTAT) 0.4 MG SL tablet Place 1 tablet under the tongue every 5 (five) minutes as needed.    pantoprazole (PROTONIX) 40 MG tablet Take 1 tablet (40 mg total) by mouth once daily.    rosuvastatin (CRESTOR) 40 MG Tab Take 1 tablet (40 mg total) by mouth once daily.    traZODone (DESYREL) 300 MG tablet Take 450 mg by mouth nightly.    adalimumab (HUMIRA,CF, PEN) 40 mg/0.4 mL PnKt Inject 0.4 mLs (40 mg total) into the skin every 14 (fourteen) days. (Patient not taking: Reported on 5/21/2025)    carvediloL (COREG) 6.25 MG tablet Take 1 tablet (6.25 mg total) by mouth 2 (two) times daily with meals.    clopidogreL (PLAVIX) 75 mg tablet Take 1 tablet (75 mg total) by mouth once daily.    estradioL (VAGIFEM) 10 mcg Tab Insert 1 suppository vaginally every night for 2 weeks then twice per week (Patient not taking: Reported on 5/21/2025)    lisinopriL 10 MG tablet Take 1 tablet (10 mg total) by mouth once daily.    pregabalin (LYRICA) 75 MG capsule  Take 1 capsule (75 mg total) by mouth 2 (two) times daily.     Current Facility-Administered Medications on File Prior to Visit   Medication    [COMPLETED] ferric gluconate (Ferrlecit) 62.5 mg/5 mL injection 125 mg    [DISCONTINUED] 0.9% NaCl 250 mL flush bag    [DISCONTINUED] alteplase injection 2 mg    [DISCONTINUED] heparin, porcine (PF) 100 unit/mL injection flush 500 Units    [DISCONTINUED] sodium chloride 0.9% flush 10 mL       Review of patient's allergies indicates:   Allergen Reactions    Benadryl itch relief Other (See Comments)     RESTLESS LEGS         OBJECTIVE:   Vital Signs:  Vitals:    05/21/25 0740   BP: 127/80   Pulse: 75   Resp: 18   Temp: 98.4 °F (36.9 °C)   TempSrc: Oral   SpO2: 97%   Weight: 50 kg (110 lb 3.2 oz)   Height: 5' (1.524 m)     Vitals:    05/21/25 0740   BP: 127/80   BP Location: Right arm   Patient Position: Sitting   Pulse: 75   Resp: 18   Temp: 98.4 °F (36.9 °C)   TempSrc: Oral   SpO2: 97%   Weight: 50 kg (110 lb 3.2 oz)   Height: 5' (1.524 m)          Recent Results (from the past 24 hours)   CMP    Collection Time: 05/20/25 10:00 AM   Result Value Ref Range    Sodium 138 136 - 145 mmol/L    Potassium 4.5 3.5 - 5.1 mmol/L    Chloride 104 98 - 107 mmol/L    CO2 26 23 - 31 mmol/L    Glucose 100 82 - 115 mg/dL    Blood Urea Nitrogen 11.3 9.8 - 20.1 mg/dL    Creatinine 0.69 0.55 - 1.02 mg/dL    Calcium 9.4 8.4 - 10.2 mg/dL    Protein Total 7.3 5.8 - 7.6 gm/dL    Albumin 3.4 3.4 - 4.8 g/dL    Globulin 3.9 (H) 2.4 - 3.5 gm/dL    Albumin/Globulin Ratio 0.9 (L) 1.1 - 2.0 ratio    Bilirubin Total 0.2 <=1.5 mg/dL    ALP 68 40 - 150 unit/L    ALT 6 0 - 55 unit/L    AST 14 11 - 45 unit/L    eGFR >60 mL/min/1.73/m2    Anion Gap 8.0 mEq/L    BUN/Creatinine Ratio 16    CBC with Differential    Collection Time: 05/20/25 10:00 AM   Result Value Ref Range    WBC 13.35 (H) 4.50 - 11.50 x10(3)/mcL    RBC 3.39 (L) 4.20 - 5.40 x10(6)/mcL    Hgb 9.2 (L) 12.0 - 16.0 g/dL    Hct 30.3 (L) 37.0 - 47.0 %     MCV 89.4 80.0 - 94.0 fL    MCH 27.1 27.0 - 31.0 pg    MCHC 30.4 (L) 33.0 - 36.0 g/dL    RDW 16.3 11.5 - 17.0 %    Platelet 352 130 - 400 x10(3)/mcL    MPV 10.3 7.4 - 10.4 fL    Neut % 81.8 %    Lymph % 7.5 %    Mono % 7.0 %    Eos % 2.3 %    Basophil % 0.7 %    Imm Grans % 0.7 %    Neut # 10.92 (H) 2.1 - 9.2 x10(3)/mcL    Lymph # 1.00 0.6 - 4.6 x10(3)/mcL    Mono # 0.93 0.1 - 1.3 x10(3)/mcL    Eos # 0.31 0 - 0.9 x10(3)/mcL    Baso # 0.09 <=0.2 x10(3)/mcL    Imm Gran # 0.10 (H) 0.00 - 0.04 x10(3)/mcL    NRBC% 0.0 %         Physical Exam  Vitals and nursing note reviewed.   HENT:      Right Ear: There is no impacted cerumen.      Left Ear: There is no impacted cerumen.      Nose: No congestion or rhinorrhea.   Eyes:      General:         Right eye: No discharge.         Left eye: No discharge.   Cardiovascular:      Rate and Rhythm: Normal rate and regular rhythm.      Heart sounds: No murmur heard.  Pulmonary:      Effort: No respiratory distress.      Breath sounds: No stridor. No wheezing or rhonchi.   Abdominal:      General: Bowel sounds are normal. There is no distension.      Tenderness: There is no abdominal tenderness. There is no guarding.   Musculoskeletal:      Right lower leg: No edema.      Left lower leg: No edema.   Skin:     Findings: No lesion or rash.   Neurological:      General: No focal deficit present.      Mental Status: She is alert.   Psychiatric:         Thought Content: Thought content normal.             ASSESSMENT & PLAN:     Symptomatic Anemia  -reticulocyte count normal  -folate, B12 levels were low - has been on oral supplements with normalization of levels  -Contiue B12 and folate 1 tablet daily  -Doing IV iron infusions with  [facility] hematology   -underwent capsule endoscopy with GI and is scheduled for repeat endoscopy   -remainder of anemia workup is normal    Joint Pains  Seems combined osteoarthritis and rheumatoid arthritis  Right forearm pain   -labwork is positive for RF and  MALIKA. Negative CCP and ds-DNA. Am ordering additional autoimmune labs today  -Following up with Dr. Marsh  -Gave her a course of prednisone 20 for 5 days with no improvement, avoiding long periods due to risk of GI bleed  -Continue Arava 20 mg      Hoarseness  Has worsened symptoms  Claims to have laryngoscopy 2 years ago  Still smoking  Seeing ENT     BLE Claudication  -Got bilateral lower extremity stents. Is compliant with Plavix.    GI bleed  -Had capsule endoscopy showing multiple angiodysplasiaa  -avoid NSAIDS and steroids  -continue protonix   - denied any blood in stools, melena,     Osteopenia  -DEXA 2/2023 - FRAX score 0.8% and 8.4% for hip and major osteoporotic fracture. No need for bisphosphonates  -low vitamin D in April/2023, started her on vitamin D supplement last visit    CAD  -Scheduled PCI x1 for 90% stenosis in 2013 for stable angina  -Has GRANGER - following [facility]       HCV s/p treatment  -s/p Epclusa treatment 2020; post-treatment viral load undetectable  -Fibroscan F1 so doesn't have cirrhosis  -no longer follows ID clinic since she was cured    Hx positive HBV core antibody  -Hx HBV Core IgG positive with negative surface antigen - suggests prior infection, not active    Tobacco abuse  Multiple lung nodules   -has dyspnea   - pft (2023)moderate obstruction, no significant bronchodilator response, lung volumes decreased  -will discuss nicotine patches in future  - has tried smoking cessation in the past, have trouble travelling to the appointment so refused to try it again   - want to stick with nicotine patches, currently smoking 10 cigarettes a day ( used to smoke a pack to pack and half a day)  - follows with pulmonology, recommended LD lung cancer screening for follow up, due this year November       RTC in 6 months      PAMELA SCHNEIDER MD  Internal Medicine - PGY-1

## 2025-05-23 ENCOUNTER — HOSPITAL ENCOUNTER (OUTPATIENT)
Dept: RADIOLOGY | Facility: HOSPITAL | Age: 60
Discharge: HOME | End: 2025-05-23
Attending: INTERNAL MEDICINE
Payer: MEDICAID

## 2025-05-23 DIAGNOSIS — Z12.31 ENCOUNTER FOR SCREENING MAMMOGRAM FOR MALIGNANT NEOPLASM OF BREAST: ICD-10-CM

## 2025-05-23 PROCEDURE — 77067 SCR MAMMO BI INCL CAD: CPT | Mod: 26,,, | Performed by: RADIOLOGY

## 2025-05-23 PROCEDURE — 77063 BREAST TOMOSYNTHESIS BI: CPT | Mod: 26,,, | Performed by: RADIOLOGY

## 2025-05-23 PROCEDURE — 77067 SCR MAMMO BI INCL CAD: CPT | Mod: TC

## 2025-05-26 DIAGNOSIS — R91.8 LUNG NODULES: Primary | ICD-10-CM

## 2025-05-26 DIAGNOSIS — D50.0 IRON DEFICIENCY ANEMIA DUE TO CHRONIC BLOOD LOSS: ICD-10-CM

## 2025-05-27 ENCOUNTER — TELEPHONE (OUTPATIENT)
Dept: HEMATOLOGY/ONCOLOGY | Facility: CLINIC | Age: 60
End: 2025-05-27
Payer: MEDICAID

## 2025-05-28 ENCOUNTER — LAB VISIT (OUTPATIENT)
Dept: HEMATOLOGY/ONCOLOGY | Facility: CLINIC | Age: 60
End: 2025-05-28
Attending: INTERNAL MEDICINE
Payer: MEDICAID

## 2025-05-28 ENCOUNTER — INFUSION (OUTPATIENT)
Dept: INFUSION THERAPY | Facility: HOSPITAL | Age: 60
End: 2025-05-28
Attending: INTERNAL MEDICINE
Payer: MEDICAID

## 2025-05-28 VITALS
WEIGHT: 111.63 LBS | WEIGHT: 111.56 LBS | TEMPERATURE: 98 F | TEMPERATURE: 98 F | RESPIRATION RATE: 20 BRPM | DIASTOLIC BLOOD PRESSURE: 75 MMHG | RESPIRATION RATE: 20 BRPM | OXYGEN SATURATION: 100 % | SYSTOLIC BLOOD PRESSURE: 154 MMHG | HEART RATE: 91 BPM | OXYGEN SATURATION: 97 % | HEART RATE: 91 BPM | BODY MASS INDEX: 21.91 KG/M2 | DIASTOLIC BLOOD PRESSURE: 75 MMHG | BODY MASS INDEX: 21.9 KG/M2 | HEIGHT: 60 IN | SYSTOLIC BLOOD PRESSURE: 154 MMHG | HEIGHT: 60 IN

## 2025-05-28 DIAGNOSIS — D50.0 IRON DEFICIENCY ANEMIA DUE TO CHRONIC BLOOD LOSS: ICD-10-CM

## 2025-05-28 DIAGNOSIS — D50.0 IRON DEFICIENCY ANEMIA DUE TO CHRONIC BLOOD LOSS: Primary | ICD-10-CM

## 2025-05-28 DIAGNOSIS — R91.8 LUNG NODULES: ICD-10-CM

## 2025-05-28 LAB
ALBUMIN SERPL-MCNC: 3.3 G/DL (ref 3.4–4.8)
ALBUMIN/GLOB SERPL: 0.9 RATIO (ref 1.1–2)
ALP SERPL-CCNC: 63 UNIT/L (ref 40–150)
ALT SERPL-CCNC: 6 UNIT/L (ref 0–55)
ANION GAP SERPL CALC-SCNC: 9 MEQ/L
AST SERPL-CCNC: 13 UNIT/L (ref 11–45)
BASOPHILS # BLD AUTO: 0.06 X10(3)/MCL
BASOPHILS NFR BLD AUTO: 1 %
BILIRUB SERPL-MCNC: 0.2 MG/DL
BUN SERPL-MCNC: 12.7 MG/DL (ref 9.8–20.1)
CALCIUM SERPL-MCNC: 8.8 MG/DL (ref 8.4–10.2)
CHLORIDE SERPL-SCNC: 105 MMOL/L (ref 98–107)
CO2 SERPL-SCNC: 27 MMOL/L (ref 23–31)
CREAT SERPL-MCNC: 0.66 MG/DL (ref 0.55–1.02)
CREAT/UREA NIT SERPL: 19
EOSINOPHIL # BLD AUTO: 0.14 X10(3)/MCL (ref 0–0.9)
EOSINOPHIL NFR BLD AUTO: 2.4 %
ERYTHROCYTE [DISTWIDTH] IN BLOOD BY AUTOMATED COUNT: 18.3 % (ref 11.5–17)
GFR SERPLBLD CREATININE-BSD FMLA CKD-EPI: >60 ML/MIN/1.73/M2
GLOBULIN SER-MCNC: 3.7 GM/DL (ref 2.4–3.5)
GLUCOSE SERPL-MCNC: 94 MG/DL (ref 82–115)
HCT VFR BLD AUTO: 27.5 % (ref 37–47)
HGB BLD-MCNC: 8.2 G/DL (ref 12–16)
IMM GRANULOCYTES # BLD AUTO: 0.03 X10(3)/MCL (ref 0–0.04)
IMM GRANULOCYTES NFR BLD AUTO: 0.5 %
LYMPHOCYTES # BLD AUTO: 0.86 X10(3)/MCL (ref 0.6–4.6)
LYMPHOCYTES NFR BLD AUTO: 14.8 %
MCH RBC QN AUTO: 27 PG (ref 27–31)
MCHC RBC AUTO-ENTMCNC: 29.8 G/DL (ref 33–36)
MCV RBC AUTO: 90.5 FL (ref 80–94)
MONOCYTES # BLD AUTO: 0.54 X10(3)/MCL (ref 0.1–1.3)
MONOCYTES NFR BLD AUTO: 9.3 %
NEUTROPHILS # BLD AUTO: 4.2 X10(3)/MCL (ref 2.1–9.2)
NEUTROPHILS NFR BLD AUTO: 72 %
NRBC BLD AUTO-RTO: 0 %
PLATELET # BLD AUTO: 274 X10(3)/MCL (ref 130–400)
PMV BLD AUTO: 10 FL (ref 7.4–10.4)
POTASSIUM SERPL-SCNC: 3.8 MMOL/L (ref 3.5–5.1)
PROT SERPL-MCNC: 7 GM/DL (ref 5.8–7.6)
RBC # BLD AUTO: 3.04 X10(6)/MCL (ref 4.2–5.4)
SODIUM SERPL-SCNC: 141 MMOL/L (ref 136–145)
WBC # BLD AUTO: 5.83 X10(3)/MCL (ref 4.5–11.5)

## 2025-05-28 PROCEDURE — 3008F BODY MASS INDEX DOCD: CPT | Mod: CPTII,,,

## 2025-05-28 PROCEDURE — 3078F DIAST BP <80 MM HG: CPT | Mod: CPTII,,,

## 2025-05-28 PROCEDURE — 4010F ACE/ARB THERAPY RXD/TAKEN: CPT | Mod: CPTII,,,

## 2025-05-28 PROCEDURE — 96374 THER/PROPH/DIAG INJ IV PUSH: CPT

## 2025-05-28 PROCEDURE — 63600175 PHARM REV CODE 636 W HCPCS: Performed by: INTERNAL MEDICINE

## 2025-05-28 PROCEDURE — 85025 COMPLETE CBC W/AUTO DIFF WBC: CPT

## 2025-05-28 PROCEDURE — 1160F RVW MEDS BY RX/DR IN RCRD: CPT | Mod: CPTII,,,

## 2025-05-28 PROCEDURE — 25000003 PHARM REV CODE 250: Performed by: INTERNAL MEDICINE

## 2025-05-28 PROCEDURE — 99213 OFFICE O/P EST LOW 20 MIN: CPT | Mod: S$PBB,,,

## 2025-05-28 PROCEDURE — 99215 OFFICE O/P EST HI 40 MIN: CPT | Mod: PBBFAC,25

## 2025-05-28 PROCEDURE — 80053 COMPREHEN METABOLIC PANEL: CPT

## 2025-05-28 PROCEDURE — 3077F SYST BP >= 140 MM HG: CPT | Mod: CPTII,,,

## 2025-05-28 PROCEDURE — 1159F MED LIST DOCD IN RCRD: CPT | Mod: CPTII,,,

## 2025-05-28 RX ORDER — EPINEPHRINE 1 MG/ML
0.3 INJECTION INTRAMUSCULAR; INTRAVENOUS; SUBCUTANEOUS ONCE AS NEEDED
Status: DISCONTINUED | OUTPATIENT
Start: 2025-05-28 | End: 2025-05-28 | Stop reason: HOSPADM

## 2025-05-28 RX ORDER — HEPARIN 100 UNIT/ML
500 SYRINGE INTRAVENOUS
OUTPATIENT
Start: 2025-06-03

## 2025-05-28 RX ORDER — SODIUM FERRIC GLUCONATE COMPLEX IN SUCROSE 12.5 MG/ML
125 INJECTION INTRAVENOUS
Status: COMPLETED | OUTPATIENT
Start: 2025-05-28 | End: 2025-05-28

## 2025-05-28 RX ORDER — SODIUM CHLORIDE 0.9 % (FLUSH) 0.9 %
10 SYRINGE (ML) INJECTION
Status: DISCONTINUED | OUTPATIENT
Start: 2025-05-28 | End: 2025-05-28 | Stop reason: HOSPADM

## 2025-05-28 RX ORDER — HEPARIN 100 UNIT/ML
500 SYRINGE INTRAVENOUS
Status: DISCONTINUED | OUTPATIENT
Start: 2025-05-28 | End: 2025-05-28 | Stop reason: HOSPADM

## 2025-05-28 RX ORDER — EPINEPHRINE 0.3 MG/.3ML
0.3 INJECTION SUBCUTANEOUS ONCE AS NEEDED
Status: CANCELLED | OUTPATIENT
Start: 2025-06-03

## 2025-05-28 RX ORDER — SODIUM CHLORIDE 0.9 % (FLUSH) 0.9 %
10 SYRINGE (ML) INJECTION
OUTPATIENT
Start: 2025-06-03

## 2025-05-28 RX ORDER — SODIUM FERRIC GLUCONATE COMPLEX IN SUCROSE 12.5 MG/ML
125 INJECTION INTRAVENOUS
OUTPATIENT
Start: 2025-06-03

## 2025-05-28 RX ADMIN — SODIUM FERRIC GLUCONATE COMPLEX IN SUCROSE 125 MG: 12.5 INJECTION INTRAVENOUS at 01:05

## 2025-05-28 RX ADMIN — SODIUM CHLORIDE: 9 INJECTION, SOLUTION INTRAVENOUS at 01:05

## 2025-05-28 NOTE — PROGRESS NOTES
History:  Past medical history:  B12 deficiency.  Anemia.  Iron-deficiency.  History of peripheral vascular disease, on dual antiplatelets.  Hypothyroidism.  CAD, S/P PCI.  Chronic hepatitis-B.  Chronic hepatitis-C. Depression.  GERD.  Right lower lung lobe nodule.  02/22/2023: Bilateral lower extremity stent placement, with resolution of bilateral lower extremity pain  Social history:  .  Lives with her brother in [place], [place].  No children.  Never became pregnant.  Disabled.  Does not work.  Has been smoking half a pack of cigarettes daily for 45 years.  Has been smoking marijuana every other day for 40 years.  No other illicit drugs.  No alcohol abuse.  Family history:  Negative for cancers or blood dyscrasias.  Health maintenance:  PCP at [facility].  -05/02/2023:  Bilateral screening mammogram (comparison:  12/03/2020 mammogram):  BI-RADS 2 (benign)  -02/24/2022: Stool for occult blood pos  -history of multiple colonoscopies and endoscopies: Angiodysplastic lesions in duodenum     Reason for Follow-up:  Iron-deficiency anemia   Vitamin B12 deficiency   Angiodysplasia of duodenum    History of Present Illness:   Follow-up (Patient reports neck, shoulder, back and joint pain. Patient stated this started this morning.)      Oncologic/Hematologic History:      57-year-old female, referred from Internal Medicine, for evaluation of anemia.    We are following the patient for history of iron-deficiency anemia and B12 deficiency anemia.  She also has angiodysplasia of duodenum.  Please refer to assessment and plan section for details.    05/22/2023:  Pleasant lady.  Presents for initial hematology consultation.  In no acute discomfort.    Always feels tired.  No abdominal pain, nausea or vomiting.  Denies hematemesis, melena, or hematochezia.  Fair appetite.  Main complaint is chronic tiredness.  Has been taking iron pill every other day for last 2 years.  No GI side effects.  Started vitamin B12  pill x2 daily, a month ago.  We will check her iron stores and B12 level at this time.  Says that she was transfused around Easter time and a couple of years ago as well.  No history of cancers.  Chronic generalized fatigue, swelling in the legs, cough, and numbness and tingling in hands.  Generalized body pains, 9/10, which she attributes to rheumatoid arthritis.    Interval History 5/28/25:  Patient presented to the clinic today for a scheduled clinic visit to follow-up regarding iron-deficiency anemia.  She denies any abnormal bleeding.  Denies any PICA any shortness of breath any chest pain.  She does admit to having fatigue but states that it is getting better each week that he is taking her Iron.  Lab work was reviewed with the patient.  All future appointments were discussed.    05/13/2025:  -no showed 05/06/2025  -05/06/2025:  Hemoglobin 8.0, dropping (down from 13.5 on 02/06/2025); ferritin 17.50, dropping (ferritin was 116, normal, 2 months ago; Feraheme ordered; patient referred to GI for evaluation; she no showed for appointment on 05/06/2025  -05/13/2025: Hemoglobin 7.4, keeps dropping; MCV normal; rest of CBC unremarkable; serum iron normal, TIBC normal, transferrin saturation 24% normal (was 5% on 05/06/2025), ferritin 31.72 (remains borderline)  Presents for a follow-up visit.  Hemoglobin keeps dropping.  Denies abdominal pain, nausea, vomiting, hematemesis, melena, or hematochezia.  Feels weak and fatigued.  No dizzy spells.  No syncopal episodes.  Minor headaches.  Mild exertional dyspnea.  No palpitations.  For last 1 week, has been taking 1 over-the-counter iron pill daily.  This shows up in blood test as well.  Most likely, she is losing occult blood from multiple angioectasias in GI tract.  Have sent a consultation requested GI, for repeat endoscopy and endoscopic hemostasis, as and if required.  Follows up with Rheumatology for rheumatoid arthritis.    Immunization History   Administered  Date(s) Administered    COVID-19 Vaccine 08/07/2021, 09/11/2021    Hepatitis A / Hepatitis B 02/13/2012, 01/02/2014, 07/01/2014    Hepatitis B, Adult 01/07/2021, 02/08/2021, 07/07/2021    Influenza (FLUAD) - Quadrivalent - Adjuvanted - PF *Preferred* (65+) 10/11/2022    Influenza - Quadrivalent 09/23/2020    Influenza - Quadrivalent - PF *Preferred* (6 months and older) 10/09/2023    Pneumococcal Conjugate - 13 Valent 10/27/2020    Pneumococcal Conjugate - 20 Valent 02/01/2023    Td (ADULT) 06/10/2007    Tdap 09/23/2020       Review of patient's allergies indicates:   Allergen Reactions    Benadryl itch relief Other (See Comments)     RESTLESS LEGS         Review of Systems:   All systems reviewed and found to be negative except for the symptoms detailed above    Physical Examination:   VITAL SIGNS:   Vitals:    05/28/25 1358   BP: (!) 154/75   Pulse: 91   Resp: 20   Temp: 97.9 °F (36.6 °C)       Physical Exam  Vitals reviewed.   Constitutional:       Appearance: Normal appearance.   HENT:      Head: Normocephalic and atraumatic.      Mouth/Throat:      Mouth: Mucous membranes are moist.   Cardiovascular:      Rate and Rhythm: Normal rate and regular rhythm.      Pulses: Normal pulses.      Heart sounds: Normal heart sounds.   Pulmonary:      Effort: Pulmonary effort is normal.      Breath sounds: Normal breath sounds.   Abdominal:      General: Bowel sounds are normal.      Palpations: Abdomen is soft.   Skin:     General: Skin is warm and dry.   Neurological:      Mental Status: She is alert and oriented to person, place, and time.   Psychiatric:         Mood and Affect: Mood normal.         Behavior: Behavior normal.         Thought Content: Thought content normal.         Judgment: Judgment normal.          Assessment:  Problem List Items Addressed This Visit    None      Above discussed with the.  All questions answered.  Discussed labs and gave her copies of relevant results.  She understands and agrees with  this plan.  =================================    # Iron-deficiency anemia, B12 deficiency anemia:  (Multiple angioectasias in duodenum, without bleeding; multiple angioectasias in jejunum, without bleeding; multiple angioectasias in ileum, without bleeding):  -chronic anemia, ranging between 6.8-8.1; hemoglobin was normal in October 2022  -chronic iron-deficiency  -PRBC x1 unit 04/03/2023 for hemoglobin 6.8  -Celiac serology negative 05/17/2023  -RBC osmotic fragility normal 05/16/2023  -no hemoglobinopathy on hemoglobin electrophoresis 04/03/2023  -found to be B12 deficient when hospitalized 04/07/2023-04/08/2023 with symptomatic anemia, requiring PRBC x1 unit  -Ferrlecit 125 mg IV x2 (02/20/2023, 04/08/2023)  -12/16/2020: EGD:  No esophageal varices; 3 diminutive nonbleeding angiodysplastic lesions 2nd portion of duodenum   -11/15/2021: EGD:  Esophagitis, gastritis, a single bleeding angiectasia duodenal bulb treated with cautery (thermotherapy)  -09/22/2021: Colonoscopy:  Occult blood in stool: Normal  -EGD and colonoscopy 2022: Colon polyp  -no response to oral iron therapy x2-3 years; remained iron-deficiency  -S/P Feraheme 510 mg IV x2 (06/05/2023, 06/12/2023), with good response (hemoglobin improved to 12.3 and ferritin 122.03 on 07/10/2023  -07/12/2023:  Patient desired to have B12 shots rather than pills because she can not afford the pills  -08/15/2023: Video capsule endoscopy: Multiple angioectasias without bleeding in the duodenum; multiple angioectasias without bleeding in the jejunum; multiple angioectasias without bleeding in the ileum  -09/11/2023:  Hemoglobin 11.1, stable  -11/14/2023:  Stool occult blood positive  -02/08/2024:  Hemoglobin 11.3 (was 9.8 on 12/13/2023); ferritin 43.48 (was 54.49 on 12/11/2023)  -03/11/2024:  Hemoglobin 9.6, down from 11.3 on 02/08/2024; ferritin level is pending; transferrin saturation 13%, remains low  -12/13/2023: Hemoglobin 9.8, ferritin 54.49 on  12/11/2023  -02/08/2024: Hemoglobin 11.3, ferritin 43.48  -03/11/2024: Hemoglobin 9.6, ferritin 36.27  -03/19/2024: Colonoscopy (Dr. Keyon Hatch MD):  Normal mucosa; grade/stage II internal hemorrhoids (infrared coagulation)  [Pathology:  Terminal ileum biopsy, no active or chronic enteritis/dysplasia/malignancy; cecum biopsy, consistent with collagenous colitis; ascending colon biopsy, collagenous colitis; transferrin colon biopsy, collagenous colitis; descending colon biopsy, collagenous colitis]  -S/P Feraheme 510 mg IV x2 (03/20/2024, 04/04/2024  -05/29/2024: Hemoglobin 12.5 (normalized post Feraheme)  -06/13/2024: Ferritin 92.58, normalized  -subsequently, hemoglobin dropped:  10.9 on 06/13/2024, 9.9 on 08/10/2024, 7.4 on 08/19/2024, 11.3 on 08/27/2024  (Ferritin:  49.30 on 07/15 1024, 20.18 on 08/19/2024)  -08/19/2024: S/P PRBC x2 units when hemoglobin dropped 7.4  -S/P Feraheme 510 mg IV x2 (09/17/2024, 09/24/2024)  -11/19/2024: Hemoglobin 9.8, ferritin 30.29  -11/25/2024: Hemoglobin 9.6  (no improvement with Feraheme; rather than, further drop; iron stores also dropped, indicating ongoing low-grade GI bleed))  -S/P Feraheme 510 mg IV x2 (12/02/2024, 12/27/2024)  -02/06/2025: Hemoglobin 13.5, MCV normal, serum iron normal, TIBC 225 low, ferritin 116, transferrin saturation 22%  (iron-deficiency anemia resolved with Feraheme x2, administered December 2024  -05/06/2025:  Hemoglobin 8.0, dropping (down from 13.5 on 02/06/2025); ferritin 17.50, dropping (ferritin was 116, normal on 02/06/2025; Feraheme ordered; referral to GI for evaluation, placed; she no showed for appointment on 05/06/2025  (05/06/2025:  Dramatic, severe, acute iron-deficiency anemia developing over last 3 months)  -05/13/2025: Hemoglobin 7.4, keeps dropping; MCV normal; rest of CBC unremarkable; serum iron normal, TIBC normal, transferrin saturation 24% normal (was 5% on 05/06/2025), ferritin 31.72 (remains borderline)  (hemoglobin keeps  dropping)  >>>  Plan:  Iron deficiency anemia:   Proceed with Feraheme x2; assess response with repeat CBC, serum iron, TIBC, ferritin in 6 weeks after the completion  Refer to GI for follow-up ASAP: Strong consideration of continue GI bleeding leading to recurrent iron-deficiency anemia  Continue vitamin B12 1000 mcg IM every month; her family member administers the shots          -chronic recurrent iron-deficiency anemia, requiring multiple rounds of intravenous iron therapy (must be bleeding from multiple angioectasias in intestinal tract; on colonoscopy, also found to have collagenous colitis)  -has failed oral iron therapy  -05/06/2025:  now, has developed dramatic severe acute iron-deficiency anemia over last 3 months  -proceed with intravenous iron therapy with Feraheme x2; assess response with repeat CBC, serum iron, TIBC, ferritin 6 weeks later  -follow-up with GI for history of multiple angioectasias in intestinal tract (occult bleeding from multiple angioectasias in the intestinal tract, is the most likely etiology of recurrent severe iron-deficiency anemia)    # Vitamin B12 deficiency:  -diagnosed 04/03/2023: B12 level 209  -05/22/2023: Tells me that she has been taking 2 vitamin B12 pills for last 1 month.  -05/22/2023: Vitamin B12 level 521 (absorbing satisfactorily via oral route).   Intrinsic factor antibody negative.    Homocystine level 4.6 micromoles per L, suppressed (patient already on oral B12 supplementation).  Methylmalonic acid level 0.12 nanomoles /ml, normal (patient already on oral vitamin B12 supplementation, with adequate absorption)  -07/12/2023:  Patient desired to have B12 shots rather than pills because she can not afford the pills  -09/11/2023:  For last couple she has discontinued B12 pills because she could not afford   -09/11/2023:  For last 2 months, her sister-in-law has been administering her monthly B12 shots;   -12/11/2023:  Hemoglobin 10.0 (was 11.1 on 09/11/2023, 11.3 on  08/14/2023, 12.3 on 07/10/2023, etc).; ferritin 54.49 (was 18.98 on 09/11/2023); B12 level 435   >>>  Continue vitamin B12 1000 mcg IM (per her preference) every month; her sister in law administers her the shots    # Subcentimeter lung nodules:  -noncontrast chest CT 11/20/2023:  Stable subcentimeter lung nodules  -12/04/2023:  Pulmonary follow-up: Repeat CT chest in 1 year  -noncontrast chest CT 11/25/2024: Comparison CT chest 11/20/2023:  Unchanged lung nodules  (per pulmonary, no need of further CTs in view of stability)    # History of hepatitis-C and liver cirrhosis:  -treated with Epclusa in 2020   -Posttreatment viral load undetectable  -FibroScan F 1 (no liver cirrhosis)  -ultrasound 09/09/2020: Liver cirrhosis     -EGD 12/16/2020:  No esophageal varices    Follow-up:  No follow-ups on file.

## 2025-05-28 NOTE — NURSING
4133  Pt did labs, will see A Wilder NP, and is here for #3/8 ferrlecit.  Pt rates LOP 8/10 to joints.  Pt reports SOB, diarrhea, and fatigue.  Pt's voice is hoarse.

## 2025-06-03 ENCOUNTER — DOCUMENTATION ONLY (OUTPATIENT)
Dept: INFUSION THERAPY | Facility: HOSPITAL | Age: 60
End: 2025-06-03
Payer: MEDICAID

## 2025-06-10 ENCOUNTER — INFUSION (OUTPATIENT)
Dept: INFUSION THERAPY | Facility: HOSPITAL | Age: 60
End: 2025-06-10
Attending: INTERNAL MEDICINE
Payer: MEDICAID

## 2025-06-10 VITALS
HEIGHT: 60 IN | SYSTOLIC BLOOD PRESSURE: 154 MMHG | BODY MASS INDEX: 21.29 KG/M2 | WEIGHT: 108.44 LBS | TEMPERATURE: 98 F | DIASTOLIC BLOOD PRESSURE: 73 MMHG | OXYGEN SATURATION: 98 % | HEART RATE: 83 BPM | RESPIRATION RATE: 18 BRPM

## 2025-06-10 DIAGNOSIS — D50.0 IRON DEFICIENCY ANEMIA DUE TO CHRONIC BLOOD LOSS: Primary | ICD-10-CM

## 2025-06-10 PROCEDURE — 63600175 PHARM REV CODE 636 W HCPCS: Performed by: INTERNAL MEDICINE

## 2025-06-10 PROCEDURE — 25000003 PHARM REV CODE 250: Performed by: INTERNAL MEDICINE

## 2025-06-10 PROCEDURE — 96374 THER/PROPH/DIAG INJ IV PUSH: CPT

## 2025-06-10 RX ORDER — SODIUM FERRIC GLUCONATE COMPLEX IN SUCROSE 12.5 MG/ML
125 INJECTION INTRAVENOUS
OUTPATIENT
Start: 2025-06-17

## 2025-06-10 RX ORDER — SODIUM FERRIC GLUCONATE COMPLEX IN SUCROSE 12.5 MG/ML
125 INJECTION INTRAVENOUS
Status: COMPLETED | OUTPATIENT
Start: 2025-06-10 | End: 2025-06-10

## 2025-06-10 RX ORDER — SODIUM CHLORIDE 0.9 % (FLUSH) 0.9 %
10 SYRINGE (ML) INJECTION
OUTPATIENT
Start: 2025-06-17

## 2025-06-10 RX ORDER — SODIUM CHLORIDE 0.9 % (FLUSH) 0.9 %
10 SYRINGE (ML) INJECTION
Status: DISCONTINUED | OUTPATIENT
Start: 2025-06-10 | End: 2025-06-10 | Stop reason: HOSPADM

## 2025-06-10 RX ORDER — HEPARIN 100 UNIT/ML
500 SYRINGE INTRAVENOUS
Status: DISCONTINUED | OUTPATIENT
Start: 2025-06-10 | End: 2025-06-10 | Stop reason: HOSPADM

## 2025-06-10 RX ORDER — HEPARIN 100 UNIT/ML
500 SYRINGE INTRAVENOUS
OUTPATIENT
Start: 2025-06-17

## 2025-06-10 RX ORDER — EPINEPHRINE 1 MG/ML
0.3 INJECTION INTRAMUSCULAR; INTRAVENOUS; SUBCUTANEOUS ONCE AS NEEDED
Status: DISCONTINUED | OUTPATIENT
Start: 2025-06-10 | End: 2025-06-10 | Stop reason: HOSPADM

## 2025-06-10 RX ADMIN — SODIUM FERRIC GLUCONATE COMPLEX IN SUCROSE 125 MG: 12.5 INJECTION INTRAVENOUS at 10:06

## 2025-06-10 RX ADMIN — SODIUM CHLORIDE: 9 INJECTION, SOLUTION INTRAVENOUS at 10:06

## 2025-06-10 NOTE — NURSING
Pt ambulated to room with steady gait, accompanied bu grand-daughter. C/o being tired all the time, denies any other symptoms at this time. Educated pt on 4/8 treatment of Ferrlecit.

## 2025-06-17 ENCOUNTER — DOCUMENTATION ONLY (OUTPATIENT)
Dept: INFUSION THERAPY | Facility: HOSPITAL | Age: 60
End: 2025-06-17
Payer: MEDICAID

## 2025-06-17 NOTE — PROGRESS NOTES
1030 scheduled for tx 5 Ferrlecit q wk did not attend as scheduled have a return appointment scheduled for 6/24/25.

## 2025-06-19 ENCOUNTER — DOCUMENTATION ONLY (OUTPATIENT)
Dept: INTERNAL MEDICINE | Facility: CLINIC | Age: 60
End: 2025-06-19
Payer: MEDICAID

## 2025-06-19 LAB — EGD FOLLOW UP EXTERNAL: NORMAL

## 2025-06-24 ENCOUNTER — DOCUMENTATION ONLY (OUTPATIENT)
Dept: INFUSION THERAPY | Facility: HOSPITAL | Age: 60
End: 2025-06-24
Payer: MEDICAID

## 2025-06-24 NOTE — PROGRESS NOTES
1100 Scheduled for tx 5/8 Ferrlecit q wk did not attend as scheduled have a return visit scheduled for 7/1/25.

## 2025-06-27 ENCOUNTER — INFUSION (OUTPATIENT)
Dept: INFUSION THERAPY | Facility: HOSPITAL | Age: 60
End: 2025-06-27
Attending: INTERNAL MEDICINE
Payer: MEDICAID

## 2025-06-27 VITALS
RESPIRATION RATE: 18 BRPM | HEART RATE: 83 BPM | OXYGEN SATURATION: 99 % | HEIGHT: 60 IN | SYSTOLIC BLOOD PRESSURE: 112 MMHG | WEIGHT: 107.81 LBS | BODY MASS INDEX: 21.17 KG/M2 | DIASTOLIC BLOOD PRESSURE: 71 MMHG | TEMPERATURE: 98 F

## 2025-06-27 DIAGNOSIS — D50.0 IRON DEFICIENCY ANEMIA DUE TO CHRONIC BLOOD LOSS: Primary | ICD-10-CM

## 2025-06-27 PROCEDURE — 96374 THER/PROPH/DIAG INJ IV PUSH: CPT

## 2025-06-27 PROCEDURE — 63600175 PHARM REV CODE 636 W HCPCS: Performed by: INTERNAL MEDICINE

## 2025-06-27 RX ORDER — SODIUM CHLORIDE 0.9 % (FLUSH) 0.9 %
10 SYRINGE (ML) INJECTION
OUTPATIENT
Start: 2025-07-01

## 2025-06-27 RX ORDER — HEPARIN 100 UNIT/ML
500 SYRINGE INTRAVENOUS
Status: DISCONTINUED | OUTPATIENT
Start: 2025-06-27 | End: 2025-06-27 | Stop reason: HOSPADM

## 2025-06-27 RX ORDER — SODIUM FERRIC GLUCONATE COMPLEX IN SUCROSE 12.5 MG/ML
125 INJECTION INTRAVENOUS
OUTPATIENT
Start: 2025-07-01

## 2025-06-27 RX ORDER — SODIUM CHLORIDE 0.9 % (FLUSH) 0.9 %
10 SYRINGE (ML) INJECTION
Status: DISCONTINUED | OUTPATIENT
Start: 2025-06-27 | End: 2025-06-27 | Stop reason: HOSPADM

## 2025-06-27 RX ORDER — EPINEPHRINE 1 MG/ML
0.3 INJECTION INTRAMUSCULAR; INTRAVENOUS; SUBCUTANEOUS ONCE AS NEEDED
Status: DISCONTINUED | OUTPATIENT
Start: 2025-06-27 | End: 2025-06-27 | Stop reason: HOSPADM

## 2025-06-27 RX ORDER — HEPARIN 100 UNIT/ML
500 SYRINGE INTRAVENOUS
OUTPATIENT
Start: 2025-07-01

## 2025-06-27 RX ORDER — SODIUM FERRIC GLUCONATE COMPLEX IN SUCROSE 12.5 MG/ML
125 INJECTION INTRAVENOUS
Status: COMPLETED | OUTPATIENT
Start: 2025-06-27 | End: 2025-06-27

## 2025-06-27 RX ADMIN — SODIUM FERRIC GLUCONATE COMPLEX IN SUCROSE 125 MG: 12.5 INJECTION INTRAVENOUS at 11:06

## 2025-06-27 NOTE — NURSING
1037 Arrive for tx 5/8 Ferrlecit q wk c/o of generalized pain level 8/10 takes home meds for relief of pain.

## 2025-06-30 ENCOUNTER — PATIENT MESSAGE (OUTPATIENT)
Dept: RHEUMATOLOGY | Facility: CLINIC | Age: 60
End: 2025-06-30
Payer: MEDICAID

## 2025-06-30 DIAGNOSIS — M05.79 RHEUMATOID ARTHRITIS INVOLVING MULTIPLE SITES WITH POSITIVE RHEUMATOID FACTOR: Primary | ICD-10-CM

## 2025-07-01 DIAGNOSIS — D50.0 IRON DEFICIENCY ANEMIA DUE TO CHRONIC BLOOD LOSS: ICD-10-CM

## 2025-07-01 DIAGNOSIS — E53.8 VITAMIN B12 DEFICIENCY: ICD-10-CM

## 2025-07-01 LAB — EGD FOLLOW UP EXTERNAL: NORMAL

## 2025-07-01 RX ORDER — CYANOCOBALAMIN 1000 UG/ML
1000 INJECTION, SOLUTION INTRAMUSCULAR; SUBCUTANEOUS
Qty: 1 ML | Refills: 3 | Status: SHIPPED | OUTPATIENT
Start: 2025-07-01

## 2025-07-02 ENCOUNTER — INFUSION (OUTPATIENT)
Dept: INFUSION THERAPY | Facility: HOSPITAL | Age: 60
End: 2025-07-02
Attending: INTERNAL MEDICINE
Payer: MEDICAID

## 2025-07-02 ENCOUNTER — DOCUMENTATION ONLY (OUTPATIENT)
Dept: INTERNAL MEDICINE | Facility: CLINIC | Age: 60
End: 2025-07-02
Payer: MEDICAID

## 2025-07-02 VITALS
TEMPERATURE: 98 F | DIASTOLIC BLOOD PRESSURE: 73 MMHG | HEART RATE: 77 BPM | HEIGHT: 60 IN | SYSTOLIC BLOOD PRESSURE: 150 MMHG | OXYGEN SATURATION: 99 % | WEIGHT: 111.56 LBS | BODY MASS INDEX: 21.9 KG/M2 | RESPIRATION RATE: 20 BRPM

## 2025-07-02 DIAGNOSIS — D50.0 IRON DEFICIENCY ANEMIA DUE TO CHRONIC BLOOD LOSS: Primary | ICD-10-CM

## 2025-07-02 PROCEDURE — 96374 THER/PROPH/DIAG INJ IV PUSH: CPT

## 2025-07-02 PROCEDURE — 63600175 PHARM REV CODE 636 W HCPCS: Performed by: INTERNAL MEDICINE

## 2025-07-02 PROCEDURE — 25000003 PHARM REV CODE 250: Performed by: INTERNAL MEDICINE

## 2025-07-02 RX ORDER — SODIUM CHLORIDE 0.9 % (FLUSH) 0.9 %
10 SYRINGE (ML) INJECTION
Status: DISCONTINUED | OUTPATIENT
Start: 2025-07-02 | End: 2025-07-02 | Stop reason: HOSPADM

## 2025-07-02 RX ORDER — HEPARIN 100 UNIT/ML
500 SYRINGE INTRAVENOUS
Status: DISCONTINUED | OUTPATIENT
Start: 2025-07-02 | End: 2025-07-02 | Stop reason: HOSPADM

## 2025-07-02 RX ORDER — SODIUM FERRIC GLUCONATE COMPLEX IN SUCROSE 12.5 MG/ML
125 INJECTION INTRAVENOUS
OUTPATIENT
Start: 2025-07-08

## 2025-07-02 RX ORDER — SODIUM CHLORIDE 0.9 % (FLUSH) 0.9 %
10 SYRINGE (ML) INJECTION
OUTPATIENT
Start: 2025-07-08

## 2025-07-02 RX ORDER — SODIUM FERRIC GLUCONATE COMPLEX IN SUCROSE 12.5 MG/ML
125 INJECTION INTRAVENOUS
Status: COMPLETED | OUTPATIENT
Start: 2025-07-02 | End: 2025-07-02

## 2025-07-02 RX ORDER — HEPARIN 100 UNIT/ML
500 SYRINGE INTRAVENOUS
OUTPATIENT
Start: 2025-07-08

## 2025-07-02 RX ADMIN — SODIUM FERRIC GLUCONATE COMPLEX IN SUCROSE 125 MG: 12.5 INJECTION INTRAVENOUS at 11:07

## 2025-07-02 RX ADMIN — SODIUM CHLORIDE: 9 INJECTION, SOLUTION INTRAVENOUS at 11:07

## 2025-07-02 NOTE — NURSING
Pt ambulates with steady gait, alert & oriented x4. C/o being tired, states that is her normal. Educated on tx 6/8 for Ferrlecit. Tolerated tx well.

## 2025-07-02 NOTE — TELEPHONE ENCOUNTER
Called patient and notified of lab work due four weeks after starting Humira patient verbalized understanding.

## 2025-07-08 ENCOUNTER — INFUSION (OUTPATIENT)
Dept: INFUSION THERAPY | Facility: HOSPITAL | Age: 60
End: 2025-07-08
Attending: INTERNAL MEDICINE
Payer: MEDICAID

## 2025-07-08 VITALS
TEMPERATURE: 98 F | BODY MASS INDEX: 21.56 KG/M2 | SYSTOLIC BLOOD PRESSURE: 129 MMHG | OXYGEN SATURATION: 98 % | WEIGHT: 109.81 LBS | HEIGHT: 60 IN | DIASTOLIC BLOOD PRESSURE: 68 MMHG | HEART RATE: 86 BPM | RESPIRATION RATE: 20 BRPM

## 2025-07-08 DIAGNOSIS — D50.0 IRON DEFICIENCY ANEMIA DUE TO CHRONIC BLOOD LOSS: Primary | ICD-10-CM

## 2025-07-08 PROCEDURE — 63600175 PHARM REV CODE 636 W HCPCS: Performed by: INTERNAL MEDICINE

## 2025-07-08 PROCEDURE — 96374 THER/PROPH/DIAG INJ IV PUSH: CPT

## 2025-07-08 RX ORDER — HEPARIN 100 UNIT/ML
500 SYRINGE INTRAVENOUS
OUTPATIENT
Start: 2025-07-15

## 2025-07-08 RX ORDER — SODIUM FERRIC GLUCONATE COMPLEX IN SUCROSE 12.5 MG/ML
125 INJECTION INTRAVENOUS
OUTPATIENT
Start: 2025-07-15

## 2025-07-08 RX ORDER — SODIUM CHLORIDE 0.9 % (FLUSH) 0.9 %
10 SYRINGE (ML) INJECTION
Status: DISCONTINUED | OUTPATIENT
Start: 2025-07-08 | End: 2025-07-08 | Stop reason: HOSPADM

## 2025-07-08 RX ORDER — SODIUM CHLORIDE 0.9 % (FLUSH) 0.9 %
10 SYRINGE (ML) INJECTION
OUTPATIENT
Start: 2025-07-15

## 2025-07-08 RX ORDER — SODIUM FERRIC GLUCONATE COMPLEX IN SUCROSE 12.5 MG/ML
125 INJECTION INTRAVENOUS
Status: COMPLETED | OUTPATIENT
Start: 2025-07-08 | End: 2025-07-08

## 2025-07-08 RX ADMIN — SODIUM FERRIC GLUCONATE COMPLEX IN SUCROSE 125 MG: 12.5 INJECTION INTRAVENOUS at 10:07

## 2025-07-14 ENCOUNTER — INFUSION (OUTPATIENT)
Dept: INFUSION THERAPY | Facility: HOSPITAL | Age: 60
End: 2025-07-14
Attending: INTERNAL MEDICINE
Payer: MEDICAID

## 2025-07-14 DIAGNOSIS — D50.0 IRON DEFICIENCY ANEMIA DUE TO CHRONIC BLOOD LOSS: Primary | ICD-10-CM

## 2025-07-14 PROCEDURE — 96374 THER/PROPH/DIAG INJ IV PUSH: CPT

## 2025-07-14 PROCEDURE — 63600175 PHARM REV CODE 636 W HCPCS: Performed by: INTERNAL MEDICINE

## 2025-07-14 RX ORDER — SODIUM CHLORIDE 0.9 % (FLUSH) 0.9 %
10 SYRINGE (ML) INJECTION
Status: DISCONTINUED | OUTPATIENT
Start: 2025-07-14 | End: 2025-07-14 | Stop reason: HOSPADM

## 2025-07-14 RX ORDER — EPINEPHRINE 0.3 MG/.3ML
0.3 INJECTION SUBCUTANEOUS ONCE AS NEEDED
Status: DISCONTINUED | OUTPATIENT
Start: 2025-07-14 | End: 2025-07-14 | Stop reason: HOSPADM

## 2025-07-14 RX ORDER — SODIUM FERRIC GLUCONATE COMPLEX IN SUCROSE 12.5 MG/ML
125 INJECTION INTRAVENOUS
Status: CANCELLED | OUTPATIENT
Start: 2025-07-14

## 2025-07-14 RX ORDER — SODIUM CHLORIDE 0.9 % (FLUSH) 0.9 %
10 SYRINGE (ML) INJECTION
OUTPATIENT
Start: 2025-07-14

## 2025-07-14 RX ORDER — SODIUM FERRIC GLUCONATE COMPLEX IN SUCROSE 12.5 MG/ML
125 INJECTION INTRAVENOUS
Status: COMPLETED | OUTPATIENT
Start: 2025-07-14 | End: 2025-07-14

## 2025-07-14 RX ORDER — HEPARIN 100 UNIT/ML
500 SYRINGE INTRAVENOUS
OUTPATIENT
Start: 2025-07-14

## 2025-07-14 RX ORDER — HEPARIN 100 UNIT/ML
500 SYRINGE INTRAVENOUS
Status: DISCONTINUED | OUTPATIENT
Start: 2025-07-14 | End: 2025-07-14 | Stop reason: HOSPADM

## 2025-07-14 RX ADMIN — SODIUM FERRIC GLUCONATE COMPLEX IN SUCROSE 125 MG: 12.5 INJECTION INTRAVENOUS at 11:07

## 2025-07-23 ENCOUNTER — HOSPITAL ENCOUNTER (EMERGENCY)
Facility: HOSPITAL | Age: 60
Discharge: HOME | End: 2025-07-23
Attending: SPECIALIST
Payer: MEDICAID

## 2025-07-23 VITALS
BODY MASS INDEX: 20.81 KG/M2 | RESPIRATION RATE: 17 BRPM | WEIGHT: 106 LBS | TEMPERATURE: 98 F | OXYGEN SATURATION: 98 % | DIASTOLIC BLOOD PRESSURE: 79 MMHG | SYSTOLIC BLOOD PRESSURE: 165 MMHG | HEART RATE: 81 BPM | HEIGHT: 60 IN

## 2025-07-23 DIAGNOSIS — R07.9 CHEST PAIN: ICD-10-CM

## 2025-07-23 DIAGNOSIS — D50.9 IRON DEFICIENCY ANEMIA, UNSPECIFIED IRON DEFICIENCY ANEMIA TYPE: ICD-10-CM

## 2025-07-23 DIAGNOSIS — F41.9 ANXIETY: Primary | ICD-10-CM

## 2025-07-23 LAB
ALBUMIN SERPL-MCNC: 3.4 G/DL (ref 3.4–4.8)
ALBUMIN/GLOB SERPL: 1 RATIO (ref 1.1–2)
ALP SERPL-CCNC: 67 UNIT/L (ref 40–150)
ALT SERPL-CCNC: 7 UNIT/L (ref 0–55)
ANION GAP SERPL CALC-SCNC: 6 MEQ/L
AST SERPL-CCNC: 15 UNIT/L (ref 11–45)
BASOPHILS # BLD AUTO: 0.03 X10(3)/MCL
BASOPHILS NFR BLD AUTO: 0.6 %
BILIRUB SERPL-MCNC: 0.2 MG/DL
BUN SERPL-MCNC: 16.5 MG/DL (ref 9.8–20.1)
CALCIUM SERPL-MCNC: 9.1 MG/DL (ref 8.4–10.2)
CHLORIDE SERPL-SCNC: 108 MMOL/L (ref 98–107)
CO2 SERPL-SCNC: 27 MMOL/L (ref 23–31)
CREAT SERPL-MCNC: 0.64 MG/DL (ref 0.55–1.02)
CREAT/UREA NIT SERPL: 26
EOSINOPHIL # BLD AUTO: 0.23 X10(3)/MCL (ref 0–0.9)
EOSINOPHIL NFR BLD AUTO: 4.4 %
ERYTHROCYTE [DISTWIDTH] IN BLOOD BY AUTOMATED COUNT: 19.8 % (ref 11.5–17)
GFR SERPLBLD CREATININE-BSD FMLA CKD-EPI: >60 ML/MIN/1.73/M2
GLOBULIN SER-MCNC: 3.5 GM/DL (ref 2.4–3.5)
GLUCOSE SERPL-MCNC: 97 MG/DL (ref 82–115)
HCT VFR BLD AUTO: 25 % (ref 37–47)
HGB BLD-MCNC: 7.3 G/DL (ref 12–16)
IMM GRANULOCYTES # BLD AUTO: 0.01 X10(3)/MCL (ref 0–0.04)
IMM GRANULOCYTES NFR BLD AUTO: 0.2 %
LYMPHOCYTES # BLD AUTO: 1.23 X10(3)/MCL (ref 0.6–4.6)
LYMPHOCYTES NFR BLD AUTO: 23.6 %
MCH RBC QN AUTO: 27 PG (ref 27–31)
MCHC RBC AUTO-ENTMCNC: 29.2 G/DL (ref 33–36)
MCV RBC AUTO: 92.6 FL (ref 80–94)
MONOCYTES # BLD AUTO: 0.58 X10(3)/MCL (ref 0.1–1.3)
MONOCYTES NFR BLD AUTO: 11.1 %
NEUTROPHILS # BLD AUTO: 3.13 X10(3)/MCL (ref 2.1–9.2)
NEUTROPHILS NFR BLD AUTO: 60.1 %
PLATELET # BLD AUTO: 229 X10(3)/MCL (ref 130–400)
PMV BLD AUTO: 10.4 FL (ref 7.4–10.4)
POTASSIUM SERPL-SCNC: 4.6 MMOL/L (ref 3.5–5.1)
PROT SERPL-MCNC: 6.9 GM/DL (ref 5.8–7.6)
RBC # BLD AUTO: 2.7 X10(6)/MCL (ref 4.2–5.4)
SODIUM SERPL-SCNC: 141 MMOL/L (ref 136–145)
TROPONIN I SERPL-MCNC: 0.01 NG/ML (ref 0–0.04)
WBC # BLD AUTO: 5.21 X10(3)/MCL (ref 4.5–11.5)

## 2025-07-23 PROCEDURE — 84484 ASSAY OF TROPONIN QUANT: CPT | Performed by: SPECIALIST

## 2025-07-23 PROCEDURE — 93005 ELECTROCARDIOGRAM TRACING: CPT

## 2025-07-23 PROCEDURE — 85025 COMPLETE CBC W/AUTO DIFF WBC: CPT | Performed by: SPECIALIST

## 2025-07-23 PROCEDURE — 80053 COMPREHEN METABOLIC PANEL: CPT | Performed by: SPECIALIST

## 2025-07-23 PROCEDURE — 99285 EMERGENCY DEPT VISIT HI MDM: CPT | Mod: 25

## 2025-07-23 PROCEDURE — 93010 ELECTROCARDIOGRAM REPORT: CPT | Mod: ,,, | Performed by: INTERNAL MEDICINE

## 2025-07-24 ENCOUNTER — OFFICE VISIT (OUTPATIENT)
Dept: HEMATOLOGY/ONCOLOGY | Facility: CLINIC | Age: 60
End: 2025-07-24
Payer: MEDICAID

## 2025-07-24 ENCOUNTER — INFUSION (OUTPATIENT)
Dept: INFUSION THERAPY | Facility: HOSPITAL | Age: 60
End: 2025-07-24
Attending: INTERNAL MEDICINE
Payer: MEDICAID

## 2025-07-24 ENCOUNTER — LAB VISIT (OUTPATIENT)
Dept: HEMATOLOGY/ONCOLOGY | Facility: CLINIC | Age: 60
End: 2025-07-24
Payer: MEDICAID

## 2025-07-24 VITALS
DIASTOLIC BLOOD PRESSURE: 78 MMHG | HEART RATE: 70 BPM | RESPIRATION RATE: 20 BRPM | OXYGEN SATURATION: 99 % | SYSTOLIC BLOOD PRESSURE: 177 MMHG | TEMPERATURE: 98 F | WEIGHT: 106 LBS | HEIGHT: 60 IN | BODY MASS INDEX: 20.81 KG/M2

## 2025-07-24 DIAGNOSIS — D64.9 ANEMIA, UNSPECIFIED TYPE: ICD-10-CM

## 2025-07-24 DIAGNOSIS — D50.0 IRON DEFICIENCY ANEMIA DUE TO CHRONIC BLOOD LOSS: Primary | ICD-10-CM

## 2025-07-24 DIAGNOSIS — D50.0 IRON DEFICIENCY ANEMIA DUE TO CHRONIC BLOOD LOSS: ICD-10-CM

## 2025-07-24 DIAGNOSIS — D62 ACUTE BLOOD LOSS ANEMIA: Primary | ICD-10-CM

## 2025-07-24 DIAGNOSIS — D64.9 ANEMIA, UNSPECIFIED TYPE: Primary | ICD-10-CM

## 2025-07-24 LAB
ABO + RH BLD: NORMAL
ABO + RH BLD: NORMAL
BASOPHILS # BLD AUTO: 0.06 X10(3)/MCL
BASOPHILS NFR BLD AUTO: 0.8 %
BLD PROD TYP BPU: NORMAL
BLD PROD TYP BPU: NORMAL
BLOOD UNIT EXPIRATION DATE: NORMAL
BLOOD UNIT EXPIRATION DATE: NORMAL
BLOOD UNIT TYPE CODE: 5100
BLOOD UNIT TYPE CODE: 5100
CROSSMATCH INTERPRETATION: NORMAL
CROSSMATCH INTERPRETATION: NORMAL
DISPENSE STATUS: NORMAL
DISPENSE STATUS: NORMAL
EOSINOPHIL # BLD AUTO: 0.39 X10(3)/MCL (ref 0–0.9)
EOSINOPHIL NFR BLD AUTO: 5.4 %
ERYTHROCYTE [DISTWIDTH] IN BLOOD BY AUTOMATED COUNT: 19.6 % (ref 11.5–17)
FERRITIN SERPL-MCNC: 62.51 NG/ML (ref 4.63–204)
GROUP & RH: NORMAL
HCT VFR BLD AUTO: 25.9 % (ref 37–47)
HGB BLD-MCNC: 7.4 G/DL (ref 12–16)
IMM GRANULOCYTES # BLD AUTO: 0.03 X10(3)/MCL (ref 0–0.04)
IMM GRANULOCYTES NFR BLD AUTO: 0.4 %
INDIRECT COOMBS: NORMAL
IRON SATN MFR SERPL: 5 % (ref 20–50)
IRON SERPL-MCNC: 16 UG/DL (ref 50–170)
LYMPHOCYTES # BLD AUTO: 1.39 X10(3)/MCL (ref 0.6–4.6)
LYMPHOCYTES NFR BLD AUTO: 19.3 %
MCH RBC QN AUTO: 26.4 PG (ref 27–31)
MCHC RBC AUTO-ENTMCNC: 28.6 G/DL (ref 33–36)
MCV RBC AUTO: 92.5 FL (ref 80–94)
MONOCYTES # BLD AUTO: 0.64 X10(3)/MCL (ref 0.1–1.3)
MONOCYTES NFR BLD AUTO: 8.9 %
NEUTROPHILS # BLD AUTO: 4.68 X10(3)/MCL (ref 2.1–9.2)
NEUTROPHILS NFR BLD AUTO: 65.2 %
NRBC BLD AUTO-RTO: 0 %
PLATELET # BLD AUTO: 252 X10(3)/MCL (ref 130–400)
PMV BLD AUTO: 11.1 FL (ref 7.4–10.4)
RBC # BLD AUTO: 2.8 X10(6)/MCL (ref 4.2–5.4)
SPECIMEN OUTDATE: NORMAL
TIBC SERPL-MCNC: 311 UG/DL (ref 70–310)
TIBC SERPL-MCNC: 327 UG/DL (ref 250–450)
TRANSFERRIN SERPL-MCNC: 297 MG/DL (ref 180–382)
UNIT NUMBER: NORMAL
UNIT NUMBER: NORMAL
WBC # BLD AUTO: 7.19 X10(3)/MCL (ref 4.5–11.5)

## 2025-07-24 PROCEDURE — 80053 COMPREHEN METABOLIC PANEL: CPT

## 2025-07-24 PROCEDURE — 86850 RBC ANTIBODY SCREEN: CPT

## 2025-07-24 PROCEDURE — 25000003 PHARM REV CODE 250

## 2025-07-24 PROCEDURE — 4010F ACE/ARB THERAPY RXD/TAKEN: CPT | Mod: CPTII,,,

## 2025-07-24 PROCEDURE — 83540 ASSAY OF IRON: CPT

## 2025-07-24 PROCEDURE — 82728 ASSAY OF FERRITIN: CPT

## 2025-07-24 PROCEDURE — 85025 COMPLETE CBC W/AUTO DIFF WBC: CPT

## 2025-07-24 PROCEDURE — 36430 TRANSFUSION BLD/BLD COMPNT: CPT

## 2025-07-24 PROCEDURE — P9016 RBC LEUKOCYTES REDUCED: HCPCS

## 2025-07-24 PROCEDURE — 99214 OFFICE O/P EST MOD 30 MIN: CPT | Mod: S$PBB,,,

## 2025-07-24 PROCEDURE — 86923 COMPATIBILITY TEST ELECTRIC: CPT | Mod: 91

## 2025-07-24 RX ORDER — EPINEPHRINE 0.3 MG/.3ML
0.3 INJECTION SUBCUTANEOUS ONCE AS NEEDED
OUTPATIENT
Start: 2025-07-24

## 2025-07-24 RX ORDER — ACETAMINOPHEN 325 MG/1
650 TABLET ORAL
Status: CANCELLED | OUTPATIENT
Start: 2025-07-24

## 2025-07-24 RX ORDER — HEPARIN 100 UNIT/ML
500 SYRINGE INTRAVENOUS
OUTPATIENT
Start: 2025-07-24

## 2025-07-24 RX ORDER — ACETAMINOPHEN 325 MG/1
650 TABLET ORAL
Status: COMPLETED | OUTPATIENT
Start: 2025-07-24 | End: 2025-07-24

## 2025-07-24 RX ORDER — SODIUM CHLORIDE 0.9 % (FLUSH) 0.9 %
10 SYRINGE (ML) INJECTION
OUTPATIENT
Start: 2025-07-24

## 2025-07-24 RX ADMIN — ACETAMINOPHEN 650 MG: 325 TABLET ORAL at 01:07

## 2025-07-24 NOTE — Clinical Note
Schedule an appointment with MD in 3 weeks with labs prior (CBC/CMP) Schedule Feraheme in infusion x2 doses Schedule a lab visit 6 weeks after the last dose of Feraheme (CBC/TIBC/ferritin/serum iron)

## 2025-07-24 NOTE — ED PROVIDER NOTES
Encounter Date: 7/23/2025       History     Chief Complaint   Patient presents with    Chest Pain     C/o mid chest pain x 3 days. Hx of stent placement and is on plavix. Also stated had 3 syncopal episodes over the past 3 days. Took half of xanax before calling 911.     59-year-old female with chest pain but states it feels like when she is anxious, has had the pain for 3 days; sharp and pressure, no nausea, no shortness of breath; took Xanax prior to arrival and states the pain is easing up; no fever or chills, no cough    The history is provided by the patient.     Review of patient's allergies indicates:   Allergen Reactions    Benadryl itch relief Other (See Comments)     RESTLESS LEGS       Past Medical History:   Diagnosis Date    Anemia, unspecified     Bipolar disorder     Coronary artery disease     Fibromyalgia     Herpes simplex virus (HSV) infection     History of esophagogastroduodenoscopy (EGD) 11/01/2022    Hypertension     Osteopenia     PAD (peripheral artery disease)     PVD (peripheral vascular disease) with claudication     Rheumatoid arthritis, unspecified     Thyroid disease     Vitamin B12 deficiency      Past Surgical History:   Procedure Laterality Date    COLONOSCOPY W/ BIOPSIES  03/19/2024    EXTRACORPOREAL SHOCK WAVE LITHOTRIPSY  02/22/2023    Procedure: LITHOTRIPSY- Peripheral Shockwave;  Surgeon: Adriel Valero MD;  Location: [facility] CATH LAB;  Service: Cardiology;;    HIP SURGERY      INSERTION, STENT, ARTERY  02/22/2023    Procedure: INSERTION, STENT, ARTERY;  Surgeon: Adriel Valero MD;  Location: [facility] CATH LAB;  Service: Cardiology;;    INTRALUMINAL GASTROINTESTINAL TRACT IMAGING VIA CAPSULE N/A 08/15/2023    Procedure: IMAGING PROCEDURE, GI TRACT, INTRALUMINAL, VIA CAPSULE;  Surgeon: Liza Del Rio MD;  Location: [facility] ENDOSCOPY;  Service: Gastroenterology;  Laterality: N/A;    INTRAVASCULAR ULTRASOUND, NON-CORONARY  02/22/2023    Procedure: Intravascular Ultrasound,  Non-Coronary;  Surgeon: Adriel Valero MD;  Location: [facility] CATH LAB;  Service: Cardiology;;    LEFT HEART CATHETERIZATION      PERIPHERAL ANGIOGRAPHY N/A 02/22/2023    Procedure: Peripheral angiography;  Surgeon: Adriel Valero MD;  Location: [facility] CATH LAB;  Service: Cardiology;  Laterality: N/A;  BILAT ILIAC INTERVENTION     Family History   Problem Relation Name Age of Onset    Kidney failure Mother      Heart disease Mother      Hypertension Mother      No Known Problems Father       Social History[1]  Review of Systems   Constitutional: Negative.    HENT: Negative.     Respiratory: Negative.     Cardiovascular: Negative.    Gastrointestinal: Negative.    Musculoskeletal: Negative.    Skin: Negative.    Neurological: Negative.    All other systems reviewed and are negative.      Physical Exam     Initial Vitals [07/23/25 2012]   BP Pulse Resp Temp SpO2   (!) 146/90 94 18 98.1 °F (36.7 °C) 98 %      MAP       --         Physical Exam    Nursing note and vitals reviewed.  Constitutional: She appears well-developed and well-nourished.   HENT:   Head: Normocephalic and atraumatic.   Eyes: EOM are normal. Pupils are equal, round, and reactive to light.   Neck: Neck supple.   Normal range of motion.  Cardiovascular:  Normal rate, regular rhythm, normal heart sounds and intact distal pulses.           Pulmonary/Chest: Breath sounds normal.   Abdominal: Abdomen is soft. Bowel sounds are normal. She exhibits no distension. There is no abdominal tenderness. There is no rebound.   Musculoskeletal:         General: Normal range of motion.      Cervical back: Normal range of motion and neck supple.     Neurological: She is alert and oriented to person, place, and time. She has normal strength. GCS score is 15. GCS eye subscore is 4. GCS verbal subscore is 5. GCS motor subscore is 6.   Skin: Skin is warm and dry.         ED Course   Procedures  Labs Reviewed   COMPREHENSIVE METABOLIC PANEL - Abnormal       Result Value     Sodium 141      Potassium 4.6      Chloride 108 (*)     CO2 27      Glucose 97      Blood Urea Nitrogen 16.5      Creatinine 0.64      Calcium 9.1      Protein Total 6.9      Albumin 3.4      Globulin 3.5      Albumin/Globulin Ratio 1.0 (*)     Bilirubin Total 0.2      ALP 67      ALT 7      AST 15      eGFR >60      Anion Gap 6.0      BUN/Creatinine Ratio 26     CBC WITH DIFFERENTIAL - Abnormal    WBC 5.21      RBC 2.70 (*)     Hgb 7.3 (*)     Hct 25.0 (*)     MCV 92.6      MCH 27.0      MCHC 29.2 (*)     RDW 19.8 (*)     Platelet 229      MPV 10.4      Neut % 60.1      Lymph % 23.6      Mono % 11.1      Eos % 4.4      Basophil % 0.6      Imm Grans % 0.2      Neut # 3.13      Lymph # 1.23      Mono # 0.58      Eos # 0.23      Baso # 0.03      Imm Gran # 0.01     TROPONIN I - Normal    Troponin-I 0.014     CBC W/ AUTO DIFFERENTIAL    Narrative:     The following orders were created for panel order CBC auto differential.  Procedure                               Abnormality         Status                     ---------                               -----------         ------                     CBC with Differential[[id number]]       Abnormal            Final result                 Please view results for these tests on the individual orders.     EKG Readings: (Independently Interpreted)   Initial Reading: No STEMI. Rhythm: Normal Sinus Rhythm. Heart Rate: 77. Ectopy: No Ectopy. Conduction: Normal. ST Segments: Normal ST Segments. T Waves: Normal. Clinical Impression: Normal Sinus Rhythm   Possible LAE       Imaging Results              X-Ray Chest AP Portable (Final result)  Result time 07/23/25 20:28:03      Final result by Parminder Ward MD (07/23/25 20:28:03)                   Impression:      No acute cardiopulmonary process identified.      Electronically signed by: Parminder Ward  Date:    07/23/2025  Time:    20:28               Narrative:    EXAMINATION:  XR CHEST AP PORTABLE    CLINICAL  HISTORY:  Cough;    TECHNIQUE:  One view    COMPARISON:  May 6, 2025.    FINDINGS:  Cardiopericardial silhouette is within normal limits.  No acute dense focal or segmental consolidation, congestive process, pleural effusions or pneumothorax.  Bilateral lower chest symmetrical round opacities likely represent nipple shadows.                                       Medications - No data to display  Medical Decision Making  59-year-old female with chest pain but states it feels like when she is anxious, has had the pain for 3 days; sharp and pressure, no nausea, no shortness of breath; took Xanax prior to arrival and states the pain is easing up; no fever or chills, no cough    DIFFERENTIAL DIAGNOSIS- atypical chest pain, anxiety, musculoskeletal    Amount and/or Complexity of Data Reviewed  External Data Reviewed: notes.  Labs: ordered. Decision-making details documented in ED Course.  Radiology: ordered. Decision-making details documented in ED Course.  ECG/medicine tests: ordered and independent interpretation performed. Decision-making details documented in ED Course.    Risk  Risk Details: Patient with low hemoglobin 7.3, receiving iron transfusions; to contact her hematologist in the morning  Chest pain resolved       Patient Vitals for the past 24 hrs:   BP Temp Temp src Pulse Resp SpO2 Height Weight   07/23/25 2105 (!) 165/79 -- -- -- -- -- -- --   07/23/25 2059 (!) 171/78 -- -- 81 17 98 % -- --   07/23/25 2012 (!) 146/90 98.1 °F (36.7 °C) Oral 94 18 98 % 5' (1.524 m) 48.1 kg (106 lb)              The patient is resting comfortably and in no acute distress.  She states that her symptoms have improved after treatment in Emergency Department. I personally discussed her test results and treatment plan.  Gave strict ED precautions.  Specific conditions for return to the emergency department and importance of follow up with her primary care provided or the physician listed on the discharge instructions.  Patient  voices understanding and agrees to the plan discussed. All patients' questions have been answered at this time.   She has remained hemodynamically stable throughout entire stay in ED and is stable for discharge home.                          Clinical Impression:  Final diagnoses:  [R07.9] Chest pain  [F41.9] Anxiety (Primary)  [D50.9] Iron deficiency anemia, unspecified iron deficiency anemia type          ED Disposition Condition    Discharge Stable          ED Prescriptions    None       Follow-up Information       Follow up With Specialties Details Why Contact Info    Justyna Dobbs MD Internal Medicine  As needed [address] [facility] [address] [phone]      Hematology-Oncology  Call in 1 day Call tomorrow regarding hemoglobin 7.3                    [1]   Social History  Tobacco Use    Smoking status: Every Day     Current packs/day: 0.25     Average packs/day: 0.3 packs/day for 40.0 years (10.0 ttl pk-yrs)     Types: Cigarettes     Passive exposure: Current    Smokeless tobacco: Never    Tobacco comments:     15 cigs per day; on nicotine patches   Substance Use Topics    Alcohol use: Not Currently    Drug use: Yes     Types: Marijuana     Comment: DAILY        Richard Valadez MD  07/24/25 [id number]

## 2025-07-24 NOTE — PROGRESS NOTES
History:  Past medical history:  B12 deficiency.  Anemia.  Iron-deficiency.  History of peripheral vascular disease, on dual antiplatelets.  Hypothyroidism.  CAD, S/P PCI.  Chronic hepatitis-B.  Chronic hepatitis-C. Depression.  GERD.  Right lower lung lobe nodule.  02/22/2023: Bilateral lower extremity stent placement, with resolution of bilateral lower extremity pain  Social history:  .  Lives with her brother in [place], [place].  No children.  Never became pregnant.  Disabled.  Does not work.  Has been smoking half a pack of cigarettes daily for 45 years.  Has been smoking marijuana every other day for 40 years.  No other illicit drugs.  No alcohol abuse.  Family history:  Negative for cancers or blood dyscrasias.  Health maintenance:  PCP at [facility].  -05/02/2023:  Bilateral screening mammogram (comparison:  12/03/2020 mammogram):  BI-RADS 2 (benign)  -02/24/2022: Stool for occult blood pos  -history of multiple colonoscopies and endoscopies: Angiodysplastic lesions in duodenum     Reason for Follow-up:  Iron-deficiency anemia   Vitamin B12 deficiency   Angiodysplasia of duodenum    History of Present Illness:   No chief complaint on file.      Oncologic/Hematologic History:      57-year-old female, referred from Internal Medicine, for evaluation of anemia.    We are following the patient for history of iron-deficiency anemia and B12 deficiency anemia.  She also has angiodysplasia of duodenum.  Please refer to assessment and plan section for details.    05/22/2023:  Pleasant lady.  Presents for initial hematology consultation.  In no acute discomfort.    Always feels tired.  No abdominal pain, nausea or vomiting.  Denies hematemesis, melena, or hematochezia.  Fair appetite.  Main complaint is chronic tiredness.  Has been taking iron pill every other day for last 2 years.  No GI side effects.  Started vitamin B12 pill x2 daily, a month ago.  We will check her iron stores and B12 level at  this time.  Says that she was transfused around Easter time and a couple of years ago as well.  No history of cancers.  Chronic generalized fatigue, swelling in the legs, cough, and numbness and tingling in hands.  Generalized body pains, 9/10, which she attributes to rheumatoid arthritis.    Interval History 7/24/25:  Patient presented to the clinic today for an impromptu clinic visit to follow up regarding iron-deficiency anemia.  Patient went to the ED yesterday on 07/23 4th anxiety in 3 syncopal episodes.  Patient was discharged and told to follow-up with oncology clinic in 1 day for a low hemoglobin level.  Patient had a hemoglobin level of 7.3 and a hematocrit level of 25.0 in the ED. she denies any abnormal bleeding or bruising.  However she does report feeling weak and dizzy and being short of breath.  Lab work was reviewed with the patient all future appointments were discussed.      05/13/2025:  -no showed 05/06/2025  -05/06/2025:  Hemoglobin 8.0, dropping (down from 13.5 on 02/06/2025); ferritin 17.50, dropping (ferritin was 116, normal, 2 months ago; Feraheme ordered; patient referred to GI for evaluation; she no showed for appointment on 05/06/2025  -05/13/2025: Hemoglobin 7.4, keeps dropping; MCV normal; rest of CBC unremarkable; serum iron normal, TIBC normal, transferrin saturation 24% normal (was 5% on 05/06/2025), ferritin 31.72 (remains borderline)  Presents for a follow-up visit.  Hemoglobin keeps dropping.  Denies abdominal pain, nausea, vomiting, hematemesis, melena, or hematochezia.  Feels weak and fatigued.  No dizzy spells.  No syncopal episodes.  Minor headaches.  Mild exertional dyspnea.  No palpitations.  For last 1 week, has been taking 1 over-the-counter iron pill daily.  This shows up in blood test as well.  Most likely, she is losing occult blood from multiple angioectasias in GI tract.  Have sent a consultation requested GI, for repeat endoscopy and endoscopic hemostasis, as and if  required.  Follows up with Rheumatology for rheumatoid arthritis.    Immunization History   Administered Date(s) Administered    COVID-19 Vaccine 08/07/2021, 09/11/2021    Hepatitis A / Hepatitis B 02/13/2012, 01/02/2014, 07/01/2014    Hepatitis B, Adult 01/07/2021, 02/08/2021, 07/07/2021    Influenza (FLUAD) - Quadrivalent - Adjuvanted - PF *Preferred* (65+) 10/11/2022    Influenza - Quadrivalent 09/23/2020    Influenza - Quadrivalent - PF *Preferred* (6 months and older) 10/09/2023    Pneumococcal Conjugate - 13 Valent 10/27/2020    Pneumococcal Conjugate - 20 Valent 02/01/2023    Td (ADULT) 06/10/2007    Tdap 09/23/2020       Review of patient's allergies indicates:   Allergen Reactions    Benadryl itch relief Other (See Comments)     RESTLESS LEGS         Review of Systems:   All systems reviewed and found to be negative except for the symptoms detailed above    Physical Examination:   VITAL SIGNS:   There were no vitals filed for this visit.      Physical Exam  Vitals reviewed.   Constitutional:       Appearance: Normal appearance.   HENT:      Head: Normocephalic and atraumatic.      Mouth/Throat:      Mouth: Mucous membranes are moist.   Cardiovascular:      Rate and Rhythm: Normal rate and regular rhythm.      Pulses: Normal pulses.      Heart sounds: Normal heart sounds.   Pulmonary:      Effort: Pulmonary effort is normal.      Breath sounds: Normal breath sounds.   Abdominal:      General: Bowel sounds are normal.      Palpations: Abdomen is soft.   Skin:     General: Skin is warm and dry.   Neurological:      Mental Status: She is alert and oriented to person, place, and time.   Psychiatric:         Mood and Affect: Mood normal.         Behavior: Behavior normal.         Thought Content: Thought content normal.         Judgment: Judgment normal.          Assessment:  Problem List Items Addressed This Visit    None      Above discussed with the.  All questions answered.  Discussed labs and gave her copies  of relevant results.  She understands and agrees with this plan.  =================================    # Iron-deficiency anemia, B12 deficiency anemia:  (Multiple angioectasias in duodenum, without bleeding; multiple angioectasias in jejunum, without bleeding; multiple angioectasias in ileum, without bleeding):  -chronic anemia, ranging between 6.8-8.1; hemoglobin was normal in October 2022  -chronic iron-deficiency  -PRBC x1 unit 04/03/2023 for hemoglobin 6.8  -Celiac serology negative 05/17/2023  -RBC osmotic fragility normal 05/16/2023  -no hemoglobinopathy on hemoglobin electrophoresis 04/03/2023  -found to be B12 deficient when hospitalized 04/07/2023-04/08/2023 with symptomatic anemia, requiring PRBC x1 unit  -Ferrlecit 125 mg IV x2 (02/20/2023, 04/08/2023)  -12/16/2020: EGD:  No esophageal varices; 3 diminutive nonbleeding angiodysplastic lesions 2nd portion of duodenum   -11/15/2021: EGD:  Esophagitis, gastritis, a single bleeding angiectasia duodenal bulb treated with cautery (thermotherapy)  -09/22/2021: Colonoscopy:  Occult blood in stool: Normal  -EGD and colonoscopy 2022: Colon polyp  -no response to oral iron therapy x2-3 years; remained iron-deficiency  -S/P Feraheme 510 mg IV x2 (06/05/2023, 06/12/2023), with good response (hemoglobin improved to 12.3 and ferritin 122.03 on 07/10/2023  -07/12/2023:  Patient desired to have B12 shots rather than pills because she can not afford the pills  -08/15/2023: Video capsule endoscopy: Multiple angioectasias without bleeding in the duodenum; multiple angioectasias without bleeding in the jejunum; multiple angioectasias without bleeding in the ileum  -09/11/2023:  Hemoglobin 11.1, stable  -11/14/2023:  Stool occult blood positive  -02/08/2024:  Hemoglobin 11.3 (was 9.8 on 12/13/2023); ferritin 43.48 (was 54.49 on 12/11/2023)  -03/11/2024:  Hemoglobin 9.6, down from 11.3 on 02/08/2024; ferritin level is pending; transferrin saturation 13%, remains  low  -12/13/2023: Hemoglobin 9.8, ferritin 54.49 on 12/11/2023  -02/08/2024: Hemoglobin 11.3, ferritin 43.48  -03/11/2024: Hemoglobin 9.6, ferritin 36.27  -03/19/2024: Colonoscopy (Dr. Keyon Hatch MD):  Normal mucosa; grade/stage II internal hemorrhoids (infrared coagulation)  [Pathology:  Terminal ileum biopsy, no active or chronic enteritis/dysplasia/malignancy; cecum biopsy, consistent with collagenous colitis; ascending colon biopsy, collagenous colitis; transferrin colon biopsy, collagenous colitis; descending colon biopsy, collagenous colitis]  -S/P Feraheme 510 mg IV x2 (03/20/2024, 04/04/2024  -05/29/2024: Hemoglobin 12.5 (normalized post Feraheme)  -06/13/2024: Ferritin 92.58, normalized  -subsequently, hemoglobin dropped:  10.9 on 06/13/2024, 9.9 on 08/10/2024, 7.4 on 08/19/2024, 11.3 on 08/27/2024  (Ferritin:  49.30 on 07/15 1024, 20.18 on 08/19/2024)  -08/19/2024: S/P PRBC x2 units when hemoglobin dropped 7.4  -S/P Feraheme 510 mg IV x2 (09/17/2024, 09/24/2024)  -11/19/2024: Hemoglobin 9.8, ferritin 30.29  -11/25/2024: Hemoglobin 9.6  (no improvement with Feraheme; rather than, further drop; iron stores also dropped, indicating ongoing low-grade GI bleed))  -S/P Feraheme 510 mg IV x2 (12/02/2024, 12/27/2024)  -02/06/2025: Hemoglobin 13.5, MCV normal, serum iron normal, TIBC 225 low, ferritin 116, transferrin saturation 22%  (iron-deficiency anemia resolved with Feraheme x2, administered December 2024  -05/06/2025:  Hemoglobin 8.0, dropping (down from 13.5 on 02/06/2025); ferritin 17.50, dropping (ferritin was 116, normal on 02/06/2025; Feraheme ordered; referral to GI for evaluation, placed; she no showed for appointment on 05/06/2025  (05/06/2025:  Dramatic, severe, acute iron-deficiency anemia developing over last 3 months)  -05/13/2025: Hemoglobin 7.4, keeps dropping; MCV normal; rest of CBC unremarkable; serum iron normal, TIBC normal, transferrin saturation 24% normal (was 5% on 05/06/2025),  ferritin 31.72 (remains borderline)  (hemoglobin keeps dropping)  >>>  Plan:  Iron deficiency anemia:   Give 2 units of blood today in infusion for hemoglobin/hematocrit level of 7.4/25.9  We will order Feraheme x2 doses- orders are in  We will need to repeat CBC, serum iron, TIBC, ferritin level in 6 weeks after the completion of Feraheme  Return to the clinic with MD in 3 weeks with labs prior (CBC/CMP)  Refer to GI for follow-up ASAP: Strong consideration of continue GI bleeding leading to recurrent iron-deficiency anemia-we will have nurse request notes from Dr. Hatch's office  Continue vitamin B12 1000 mcg IM every month; her family member administers the shots          -chronic recurrent iron-deficiency anemia, requiring multiple rounds of intravenous iron therapy (must be bleeding from multiple angioectasias in intestinal tract; on colonoscopy, also found to have collagenous colitis)  -has failed oral iron therapy  -05/06/2025:  now, has developed dramatic severe acute iron-deficiency anemia over last 3 months  -proceed with intravenous iron therapy with Feraheme x2; assess response with repeat CBC, serum iron, TIBC, ferritin 6 weeks later  -follow-up with GI for history of multiple angioectasias in intestinal tract (occult bleeding from multiple angioectasias in the intestinal tract, is the most likely etiology of recurrent severe iron-deficiency anemia)    # Vitamin B12 deficiency:  -diagnosed 04/03/2023: B12 level 209  -05/22/2023: Tells me that she has been taking 2 vitamin B12 pills for last 1 month.  -05/22/2023: Vitamin B12 level 521 (absorbing satisfactorily via oral route).   Intrinsic factor antibody negative.    Homocystine level 4.6 micromoles per L, suppressed (patient already on oral B12 supplementation).  Methylmalonic acid level 0.12 nanomoles /ml, normal (patient already on oral vitamin B12 supplementation, with adequate absorption)  -07/12/2023:  Patient desired to have B12 shots rather  than pills because she can not afford the pills  -09/11/2023:  For last couple she has discontinued B12 pills because she could not afford   -09/11/2023:  For last 2 months, her sister-in-law has been administering her monthly B12 shots;   -12/11/2023:  Hemoglobin 10.0 (was 11.1 on 09/11/2023, 11.3 on 08/14/2023, 12.3 on 07/10/2023, etc).; ferritin 54.49 (was 18.98 on 09/11/2023); B12 level 435   >>>  Continue vitamin B12 1000 mcg IM (per her preference) every month; her sister in law administers her the shots    # Subcentimeter lung nodules:  -noncontrast chest CT 11/20/2023:  Stable subcentimeter lung nodules  -12/04/2023:  Pulmonary follow-up: Repeat CT chest in 1 year  -noncontrast chest CT 11/25/2024: Comparison CT chest 11/20/2023:  Unchanged lung nodules  (per pulmonary, no need of further CTs in view of stability)    # History of hepatitis-C and liver cirrhosis:  -treated with Epclusa in 2020   -Posttreatment viral load undetectable  -FibroScan F 1 (no liver cirrhosis)  -ultrasound 09/09/2020: Liver cirrhosis     -EGD 12/16/2020:  No esophageal varices    Follow-up:  No follow-ups on file.

## 2025-07-25 LAB
ALBUMIN SERPL-MCNC: 3.5 G/DL (ref 3.4–4.8)
ALBUMIN/GLOB SERPL: 0.9 RATIO (ref 1.1–2)
ALP SERPL-CCNC: 72 UNIT/L (ref 40–150)
ALT SERPL-CCNC: 8 UNIT/L (ref 0–55)
ANION GAP SERPL CALC-SCNC: 6 MEQ/L
AST SERPL-CCNC: 17 UNIT/L (ref 11–45)
BILIRUB SERPL-MCNC: 0.1 MG/DL
BUN SERPL-MCNC: 12.9 MG/DL (ref 9.8–20.1)
CALCIUM SERPL-MCNC: 9.1 MG/DL (ref 8.4–10.2)
CHLORIDE SERPL-SCNC: 107 MMOL/L (ref 98–107)
CO2 SERPL-SCNC: 25 MMOL/L (ref 23–31)
CREAT SERPL-MCNC: 0.67 MG/DL (ref 0.55–1.02)
CREAT/UREA NIT SERPL: 19
GFR SERPLBLD CREATININE-BSD FMLA CKD-EPI: >60 ML/MIN/1.73/M2
GLOBULIN SER-MCNC: 3.7 GM/DL (ref 2.4–3.5)
GLUCOSE SERPL-MCNC: 134 MG/DL (ref 82–115)
POTASSIUM SERPL-SCNC: 4.4 MMOL/L (ref 3.5–5.1)
PROT SERPL-MCNC: 7.2 GM/DL (ref 5.8–7.6)
SODIUM SERPL-SCNC: 138 MMOL/L (ref 136–145)

## 2025-07-29 LAB
OHS QRS DURATION: 72 MS
OHS QTC CALCULATION: 454 MS

## 2025-07-30 RX ORDER — EPINEPHRINE 0.3 MG/.3ML
0.3 INJECTION SUBCUTANEOUS ONCE AS NEEDED
OUTPATIENT
Start: 2025-08-04

## 2025-07-30 RX ORDER — HEPARIN 100 UNIT/ML
500 SYRINGE INTRAVENOUS
OUTPATIENT
Start: 2025-08-04

## 2025-07-30 RX ORDER — SODIUM CHLORIDE 0.9 % (FLUSH) 0.9 %
10 SYRINGE (ML) INJECTION
OUTPATIENT
Start: 2025-08-04

## 2025-08-11 RX ORDER — GABAPENTIN 600 MG/1
600 TABLET ORAL 3 TIMES DAILY
Qty: 90 TABLET | Refills: 2 | Status: SHIPPED | OUTPATIENT
Start: 2025-08-11

## 2025-08-13 DIAGNOSIS — D50.0 IRON DEFICIENCY ANEMIA DUE TO CHRONIC BLOOD LOSS: Primary | ICD-10-CM

## 2025-08-14 ENCOUNTER — OFFICE VISIT (OUTPATIENT)
Dept: HEMATOLOGY/ONCOLOGY | Facility: CLINIC | Age: 60
End: 2025-08-14
Attending: INTERNAL MEDICINE
Payer: MEDICAID

## 2025-08-14 ENCOUNTER — INFUSION (OUTPATIENT)
Dept: INFUSION THERAPY | Facility: HOSPITAL | Age: 60
End: 2025-08-14
Attending: INTERNAL MEDICINE
Payer: MEDICAID

## 2025-08-14 VITALS
OXYGEN SATURATION: 96 % | DIASTOLIC BLOOD PRESSURE: 63 MMHG | TEMPERATURE: 97 F | SYSTOLIC BLOOD PRESSURE: 109 MMHG | WEIGHT: 114.38 LBS | BODY MASS INDEX: 22.45 KG/M2 | RESPIRATION RATE: 18 BRPM | HEART RATE: 78 BPM | HEIGHT: 60 IN

## 2025-08-14 VITALS
DIASTOLIC BLOOD PRESSURE: 65 MMHG | HEIGHT: 60 IN | TEMPERATURE: 98 F | SYSTOLIC BLOOD PRESSURE: 128 MMHG | HEART RATE: 70 BPM | BODY MASS INDEX: 22.42 KG/M2 | OXYGEN SATURATION: 98 % | RESPIRATION RATE: 20 BRPM | WEIGHT: 114.19 LBS

## 2025-08-14 DIAGNOSIS — E53.8 VITAMIN B12 DEFICIENCY: ICD-10-CM

## 2025-08-14 DIAGNOSIS — D64.9 ANEMIA, UNSPECIFIED TYPE: ICD-10-CM

## 2025-08-14 DIAGNOSIS — I10 HYPERTENSION, UNSPECIFIED TYPE: ICD-10-CM

## 2025-08-14 DIAGNOSIS — K55.20 ANGIODYSPLASIA OF SMALL INTESTINE: ICD-10-CM

## 2025-08-14 DIAGNOSIS — K62.5 RECTAL BLEEDING: ICD-10-CM

## 2025-08-14 DIAGNOSIS — B18.2 CHRONIC HEPATITIS C WITHOUT HEPATIC COMA: ICD-10-CM

## 2025-08-14 DIAGNOSIS — K20.90 ESOPHAGITIS: ICD-10-CM

## 2025-08-14 DIAGNOSIS — R91.8 LUNG NODULES: Primary | ICD-10-CM

## 2025-08-14 DIAGNOSIS — D62 ACUTE BLOOD LOSS ANEMIA: ICD-10-CM

## 2025-08-14 DIAGNOSIS — K44.9 ESOPHAGEAL HIATAL HERNIA: ICD-10-CM

## 2025-08-14 DIAGNOSIS — K29.70 GASTRITIS, PRESENCE OF BLEEDING UNSPECIFIED, UNSPECIFIED CHRONICITY, UNSPECIFIED GASTRITIS TYPE: ICD-10-CM

## 2025-08-14 DIAGNOSIS — D50.0 IRON DEFICIENCY ANEMIA DUE TO CHRONIC BLOOD LOSS: ICD-10-CM

## 2025-08-14 DIAGNOSIS — K31.819 ANGIODYSPLASIA OF DUODENUM: ICD-10-CM

## 2025-08-14 DIAGNOSIS — K62.5 RECTAL BLEEDING: Primary | ICD-10-CM

## 2025-08-14 DIAGNOSIS — K74.60 HEPATIC CIRRHOSIS, UNSPECIFIED HEPATIC CIRRHOSIS TYPE, UNSPECIFIED WHETHER ASCITES PRESENT: ICD-10-CM

## 2025-08-14 DIAGNOSIS — D50.0 IRON DEFICIENCY ANEMIA DUE TO CHRONIC BLOOD LOSS: Primary | ICD-10-CM

## 2025-08-14 LAB
FERRITIN SERPL-MCNC: 181.81 NG/ML (ref 4.63–204)
FOLATE SERPL-MCNC: 29.6 NG/ML (ref 7–31.4)
IRON SATN MFR SERPL: 14 % (ref 20–50)
IRON SERPL-MCNC: 34 UG/DL (ref 50–170)
RET# (OHS): 0.1 X10E6/UL (ref 0.02–0.08)
RETICULOCYTE COUNT AUTOMATED (OLG): 3.16 % (ref 1.1–2.1)
TIBC SERPL-MCNC: 209 UG/DL (ref 70–310)
TIBC SERPL-MCNC: 243 UG/DL (ref 250–450)
TRANSFERRIN SERPL-MCNC: 222 MG/DL (ref 180–382)
VIT B12 SERPL-MCNC: 1109 PG/ML (ref 213–816)

## 2025-08-14 PROCEDURE — 63600175 PHARM REV CODE 636 W HCPCS

## 2025-08-14 PROCEDURE — 1160F RVW MEDS BY RX/DR IN RCRD: CPT | Mod: CPTII,,, | Performed by: INTERNAL MEDICINE

## 2025-08-14 PROCEDURE — 3078F DIAST BP <80 MM HG: CPT | Mod: CPTII,,, | Performed by: INTERNAL MEDICINE

## 2025-08-14 PROCEDURE — 99214 OFFICE O/P EST MOD 30 MIN: CPT | Mod: S$PBB,,, | Performed by: INTERNAL MEDICINE

## 2025-08-14 PROCEDURE — 99215 OFFICE O/P EST HI 40 MIN: CPT | Mod: PBBFAC,25 | Performed by: INTERNAL MEDICINE

## 2025-08-14 PROCEDURE — 25000003 PHARM REV CODE 250

## 2025-08-14 PROCEDURE — 4010F ACE/ARB THERAPY RXD/TAKEN: CPT | Mod: CPTII,,, | Performed by: INTERNAL MEDICINE

## 2025-08-14 PROCEDURE — 96374 THER/PROPH/DIAG INJ IV PUSH: CPT

## 2025-08-14 PROCEDURE — 3008F BODY MASS INDEX DOCD: CPT | Mod: CPTII,,, | Performed by: INTERNAL MEDICINE

## 2025-08-14 PROCEDURE — 1159F MED LIST DOCD IN RCRD: CPT | Mod: CPTII,,, | Performed by: INTERNAL MEDICINE

## 2025-08-14 PROCEDURE — 3074F SYST BP LT 130 MM HG: CPT | Mod: CPTII,,, | Performed by: INTERNAL MEDICINE

## 2025-08-14 PROCEDURE — 82746 ASSAY OF FOLIC ACID SERUM: CPT | Performed by: INTERNAL MEDICINE

## 2025-08-14 RX ORDER — HEPARIN 100 UNIT/ML
500 SYRINGE INTRAVENOUS
Status: DISCONTINUED | OUTPATIENT
Start: 2025-08-14 | End: 2025-08-14 | Stop reason: HOSPADM

## 2025-08-14 RX ORDER — SODIUM CHLORIDE 0.9 % (FLUSH) 0.9 %
10 SYRINGE (ML) INJECTION
Status: DISCONTINUED | OUTPATIENT
Start: 2025-08-14 | End: 2025-08-14 | Stop reason: HOSPADM

## 2025-08-14 RX ORDER — EPINEPHRINE 1 MG/ML
0.3 INJECTION INTRAMUSCULAR; INTRAVENOUS; SUBCUTANEOUS ONCE AS NEEDED
Status: DISCONTINUED | OUTPATIENT
Start: 2025-08-14 | End: 2025-08-14 | Stop reason: HOSPADM

## 2025-08-14 RX ADMIN — SODIUM CHLORIDE: 9 INJECTION, SOLUTION INTRAVENOUS at 12:08

## 2025-08-14 RX ADMIN — IRON SUCROSE 200 MG: 20 INJECTION, SOLUTION INTRAVENOUS at 12:08

## 2025-08-21 ENCOUNTER — INFUSION (OUTPATIENT)
Dept: INFUSION THERAPY | Facility: HOSPITAL | Age: 60
End: 2025-08-21
Attending: INTERNAL MEDICINE
Payer: MEDICAID

## 2025-08-21 VITALS
HEART RATE: 91 BPM | TEMPERATURE: 98 F | HEIGHT: 60 IN | DIASTOLIC BLOOD PRESSURE: 82 MMHG | SYSTOLIC BLOOD PRESSURE: 146 MMHG | BODY MASS INDEX: 21.44 KG/M2 | OXYGEN SATURATION: 97 % | WEIGHT: 109.19 LBS | RESPIRATION RATE: 20 BRPM

## 2025-08-21 DIAGNOSIS — D50.0 IRON DEFICIENCY ANEMIA DUE TO CHRONIC BLOOD LOSS: Primary | ICD-10-CM

## 2025-08-21 PROCEDURE — 96374 THER/PROPH/DIAG INJ IV PUSH: CPT

## 2025-08-21 PROCEDURE — 63600175 PHARM REV CODE 636 W HCPCS: Performed by: INTERNAL MEDICINE

## 2025-08-21 RX ORDER — SODIUM CHLORIDE 0.9 % (FLUSH) 0.9 %
10 SYRINGE (ML) INJECTION
Status: DISCONTINUED | OUTPATIENT
Start: 2025-08-21 | End: 2025-08-21 | Stop reason: HOSPADM

## 2025-08-21 RX ADMIN — IRON SUCROSE 200 MG: 20 INJECTION, SOLUTION INTRAVENOUS at 01:08

## 2025-08-28 ENCOUNTER — INFUSION (OUTPATIENT)
Dept: INFUSION THERAPY | Facility: HOSPITAL | Age: 60
End: 2025-08-28
Attending: INTERNAL MEDICINE
Payer: MEDICAID

## 2025-08-28 VITALS
HEIGHT: 60 IN | SYSTOLIC BLOOD PRESSURE: 144 MMHG | OXYGEN SATURATION: 98 % | TEMPERATURE: 98 F | WEIGHT: 107.13 LBS | RESPIRATION RATE: 20 BRPM | BODY MASS INDEX: 21.03 KG/M2 | DIASTOLIC BLOOD PRESSURE: 73 MMHG | HEART RATE: 77 BPM

## 2025-08-28 DIAGNOSIS — D50.0 IRON DEFICIENCY ANEMIA DUE TO CHRONIC BLOOD LOSS: Primary | ICD-10-CM

## 2025-08-28 PROCEDURE — 96374 THER/PROPH/DIAG INJ IV PUSH: CPT

## 2025-08-28 PROCEDURE — 63600175 PHARM REV CODE 636 W HCPCS

## 2025-08-28 PROCEDURE — 25000003 PHARM REV CODE 250

## 2025-08-28 RX ORDER — HEPARIN 100 UNIT/ML
500 SYRINGE INTRAVENOUS
Status: DISCONTINUED | OUTPATIENT
Start: 2025-08-28 | End: 2025-08-28 | Stop reason: HOSPADM

## 2025-08-28 RX ORDER — EPINEPHRINE 1 MG/ML
0.3 INJECTION INTRAMUSCULAR; INTRAVENOUS; SUBCUTANEOUS ONCE AS NEEDED
Status: DISCONTINUED | OUTPATIENT
Start: 2025-08-28 | End: 2025-08-28 | Stop reason: HOSPADM

## 2025-08-28 RX ORDER — SODIUM CHLORIDE 0.9 % (FLUSH) 0.9 %
10 SYRINGE (ML) INJECTION
Status: DISCONTINUED | OUTPATIENT
Start: 2025-08-28 | End: 2025-08-28 | Stop reason: HOSPADM

## 2025-08-28 RX ADMIN — SODIUM CHLORIDE: 9 INJECTION, SOLUTION INTRAVENOUS at 01:08

## 2025-08-28 RX ADMIN — IRON SUCROSE 200 MG: 20 INJECTION, SOLUTION INTRAVENOUS at 02:08

## 2025-09-03 ENCOUNTER — TELEPHONE (OUTPATIENT)
Facility: CLINIC | Age: 60
End: 2025-09-03
Payer: MEDICAID

## 2025-09-04 ENCOUNTER — INFUSION (OUTPATIENT)
Dept: INFUSION THERAPY | Facility: HOSPITAL | Age: 60
End: 2025-09-04
Attending: INTERNAL MEDICINE
Payer: MEDICAID

## 2025-09-04 VITALS
TEMPERATURE: 98 F | DIASTOLIC BLOOD PRESSURE: 63 MMHG | WEIGHT: 109.56 LBS | SYSTOLIC BLOOD PRESSURE: 107 MMHG | RESPIRATION RATE: 20 BRPM | OXYGEN SATURATION: 97 % | HEART RATE: 96 BPM | BODY MASS INDEX: 21.51 KG/M2 | HEIGHT: 60 IN

## 2025-09-04 DIAGNOSIS — D50.0 IRON DEFICIENCY ANEMIA DUE TO CHRONIC BLOOD LOSS: Primary | ICD-10-CM

## 2025-09-04 PROCEDURE — 96374 THER/PROPH/DIAG INJ IV PUSH: CPT

## 2025-09-04 PROCEDURE — 25000003 PHARM REV CODE 250: Performed by: INTERNAL MEDICINE

## 2025-09-04 PROCEDURE — 63600175 PHARM REV CODE 636 W HCPCS: Performed by: INTERNAL MEDICINE

## 2025-09-04 RX ORDER — HEPARIN 100 UNIT/ML
500 SYRINGE INTRAVENOUS
Status: DISCONTINUED | OUTPATIENT
Start: 2025-09-04 | End: 2025-09-04 | Stop reason: HOSPADM

## 2025-09-04 RX ORDER — SODIUM CHLORIDE 0.9 % (FLUSH) 0.9 %
10 SYRINGE (ML) INJECTION
Status: DISCONTINUED | OUTPATIENT
Start: 2025-09-04 | End: 2025-09-04 | Stop reason: HOSPADM

## 2025-09-04 RX ORDER — EPINEPHRINE 1 MG/ML
0.3 INJECTION INTRAMUSCULAR; INTRAVENOUS; SUBCUTANEOUS ONCE AS NEEDED
Status: DISCONTINUED | OUTPATIENT
Start: 2025-09-04 | End: 2025-09-04 | Stop reason: HOSPADM

## 2025-09-04 RX ADMIN — IRON SUCROSE 200 MG: 20 INJECTION, SOLUTION INTRAVENOUS at 02:09

## 2025-09-04 RX ADMIN — SODIUM CHLORIDE: 9 INJECTION, SOLUTION INTRAVENOUS at 02:09

## (undated) DEVICE — CATH SHOCKWAVE M5 IVL 7.0X60MM

## (undated) DEVICE — SHEATH BRITE TIP 7FR 23CM

## (undated) DEVICE — CATH ANGIO OMNI W/10SH 5F .035

## (undated) DEVICE — COVER PROBE US 5.5X58L NON LTX

## (undated) DEVICE — PAD DEFIB CADENCE ADULT R2

## (undated) DEVICE — GUIDEWIRE GLADIUS STR 300CM

## (undated) DEVICE — GUIDEWIRE STF .035X350CM ANG

## (undated) DEVICE — CATH GLIDE ANGLED 5FR 65CM

## (undated) DEVICE — Device

## (undated) DEVICE — PILLCAM SB3 EX EXTENDED

## (undated) DEVICE — SHEATH INTRODUCER 5FR 10CM

## (undated) DEVICE — GUIDEWIRE INQWIRE SE 3MM JTIP

## (undated) DEVICE — LINE HIGH PRESSURE TUBING 48

## (undated) DEVICE — CATH PV .018

## (undated) DEVICE — KIT MINI STK MAX COAX 5FR 10CM

## (undated) DEVICE — CANNULA NASAL ADULT

## (undated) DEVICE — KIT GLIDESHEATH SLEND 6FR 10CM

## (undated) DEVICE — DRAPE ANGIO BRACH 38X44IN

## (undated) DEVICE — SET ANGIO ACIST CVI ANGIOTOUCH

## (undated) DEVICE — DEVICE INDEFLATOR BASIX